# Patient Record
Sex: FEMALE | Race: WHITE | HISPANIC OR LATINO
[De-identification: names, ages, dates, MRNs, and addresses within clinical notes are randomized per-mention and may not be internally consistent; named-entity substitution may affect disease eponyms.]

---

## 2018-03-02 ENCOUNTER — RESULT REVIEW (OUTPATIENT)
Age: 58
End: 2018-03-02

## 2018-03-28 ENCOUNTER — APPOINTMENT (OUTPATIENT)
Dept: INTERNAL MEDICINE | Facility: CLINIC | Age: 58
End: 2018-03-28
Payer: COMMERCIAL

## 2018-03-28 VITALS
OXYGEN SATURATION: 98 % | WEIGHT: 162 LBS | DIASTOLIC BLOOD PRESSURE: 70 MMHG | BODY MASS INDEX: 28.7 KG/M2 | HEART RATE: 72 BPM | HEIGHT: 63 IN | SYSTOLIC BLOOD PRESSURE: 118 MMHG

## 2018-03-28 DIAGNOSIS — Z00.00 ENCOUNTER FOR GENERAL ADULT MEDICAL EXAMINATION W/OUT ABNORMAL FINDINGS: ICD-10-CM

## 2018-03-28 DIAGNOSIS — E78.00 PURE HYPERCHOLESTEROLEMIA, UNSPECIFIED: ICD-10-CM

## 2018-03-28 PROCEDURE — 99396 PREV VISIT EST AGE 40-64: CPT

## 2018-03-29 LAB
ALBUMIN SERPL ELPH-MCNC: 4.4 G/DL
ALP BLD-CCNC: 65 U/L
ALT SERPL-CCNC: 19 U/L
ANION GAP SERPL CALC-SCNC: 16 MMOL/L
AST SERPL-CCNC: 24 U/L
BASOPHILS # BLD AUTO: 0.02 K/UL
BASOPHILS NFR BLD AUTO: 0.4 %
BILIRUB SERPL-MCNC: 0.2 MG/DL
BUN SERPL-MCNC: 18 MG/DL
CALCIUM SERPL-MCNC: 10.7 MG/DL
CHLORIDE SERPL-SCNC: 103 MMOL/L
CHOLEST SERPL-MCNC: 251 MG/DL
CHOLEST/HDLC SERPL: 3.9 RATIO
CO2 SERPL-SCNC: 23 MMOL/L
CREAT SERPL-MCNC: 0.82 MG/DL
EOSINOPHIL # BLD AUTO: 0.09 K/UL
EOSINOPHIL NFR BLD AUTO: 1.6 %
GLUCOSE SERPL-MCNC: 77 MG/DL
HBA1C MFR BLD HPLC: 5.4 %
HCT VFR BLD CALC: 39.4 %
HDLC SERPL-MCNC: 65 MG/DL
HGB BLD-MCNC: 12.7 G/DL
IMM GRANULOCYTES NFR BLD AUTO: 0.2 %
LDLC SERPL CALC-MCNC: 168 MG/DL
LYMPHOCYTES # BLD AUTO: 2.13 K/UL
LYMPHOCYTES NFR BLD AUTO: 37.4 %
MAN DIFF?: NORMAL
MCHC RBC-ENTMCNC: 27.5 PG
MCHC RBC-ENTMCNC: 32.2 GM/DL
MCV RBC AUTO: 85.5 FL
MONOCYTES # BLD AUTO: 0.47 K/UL
MONOCYTES NFR BLD AUTO: 8.3 %
NEUTROPHILS # BLD AUTO: 2.97 K/UL
NEUTROPHILS NFR BLD AUTO: 52.1 %
PLATELET # BLD AUTO: 227 K/UL
POTASSIUM SERPL-SCNC: 4.7 MMOL/L
PROT SERPL-MCNC: 7.9 G/DL
RBC # BLD: 4.61 M/UL
RBC # FLD: 16.5 %
SODIUM SERPL-SCNC: 142 MMOL/L
TRIGL SERPL-MCNC: 90 MG/DL
TSH SERPL-ACNC: 0.46 UIU/ML
WBC # FLD AUTO: 5.69 K/UL

## 2018-06-11 ENCOUNTER — FORM ENCOUNTER (OUTPATIENT)
Age: 58
End: 2018-06-11

## 2018-06-12 ENCOUNTER — APPOINTMENT (OUTPATIENT)
Dept: ULTRASOUND IMAGING | Facility: CLINIC | Age: 58
End: 2018-06-12
Payer: COMMERCIAL

## 2018-06-12 ENCOUNTER — APPOINTMENT (OUTPATIENT)
Dept: MAMMOGRAPHY | Facility: CLINIC | Age: 58
End: 2018-06-12
Payer: COMMERCIAL

## 2018-06-12 ENCOUNTER — OUTPATIENT (OUTPATIENT)
Dept: OUTPATIENT SERVICES | Facility: HOSPITAL | Age: 58
LOS: 1 days | End: 2018-06-12
Payer: COMMERCIAL

## 2018-06-12 DIAGNOSIS — Z00.8 ENCOUNTER FOR OTHER GENERAL EXAMINATION: ICD-10-CM

## 2018-06-12 PROCEDURE — 77066 DX MAMMO INCL CAD BI: CPT | Mod: 26

## 2018-06-12 PROCEDURE — 76641 ULTRASOUND BREAST COMPLETE: CPT | Mod: 26,50

## 2018-06-12 PROCEDURE — G0279: CPT

## 2018-06-12 PROCEDURE — G0279: CPT | Mod: 26

## 2018-06-12 PROCEDURE — 77066 DX MAMMO INCL CAD BI: CPT

## 2018-06-12 PROCEDURE — 76641 ULTRASOUND BREAST COMPLETE: CPT

## 2018-06-18 DIAGNOSIS — M25.569 PAIN IN UNSPECIFIED KNEE: ICD-10-CM

## 2020-01-08 ENCOUNTER — APPOINTMENT (OUTPATIENT)
Dept: INTERNAL MEDICINE | Facility: CLINIC | Age: 60
End: 2020-01-08

## 2020-03-15 ENCOUNTER — INPATIENT (INPATIENT)
Facility: HOSPITAL | Age: 60
LOS: 34 days | End: 2020-04-19
Attending: INTERNAL MEDICINE | Admitting: INTERNAL MEDICINE
Payer: COMMERCIAL

## 2020-03-15 VITALS
OXYGEN SATURATION: 98 % | TEMPERATURE: 100 F | DIASTOLIC BLOOD PRESSURE: 79 MMHG | SYSTOLIC BLOOD PRESSURE: 124 MMHG | HEART RATE: 108 BPM | RESPIRATION RATE: 20 BRPM

## 2020-03-15 LAB
ALBUMIN SERPL ELPH-MCNC: 4.3 G/DL — SIGNIFICANT CHANGE UP (ref 3.3–5)
ALP SERPL-CCNC: 59 U/L — SIGNIFICANT CHANGE UP (ref 40–120)
ALT FLD-CCNC: 42 U/L — HIGH (ref 4–33)
ANION GAP SERPL CALC-SCNC: 13 MMO/L — SIGNIFICANT CHANGE UP (ref 7–14)
APPEARANCE UR: CLEAR — SIGNIFICANT CHANGE UP
AST SERPL-CCNC: 33 U/L — HIGH (ref 4–32)
BACTERIA # UR AUTO: NEGATIVE — SIGNIFICANT CHANGE UP
BASOPHILS # BLD AUTO: 0.01 K/UL — SIGNIFICANT CHANGE UP (ref 0–0.2)
BASOPHILS NFR BLD AUTO: 0.2 % — SIGNIFICANT CHANGE UP (ref 0–2)
BILIRUB SERPL-MCNC: 0.3 MG/DL — SIGNIFICANT CHANGE UP (ref 0.2–1.2)
BILIRUB UR-MCNC: NEGATIVE — SIGNIFICANT CHANGE UP
BLOOD UR QL VISUAL: HIGH
BUN SERPL-MCNC: 14 MG/DL — SIGNIFICANT CHANGE UP (ref 7–23)
CALCIUM SERPL-MCNC: 10.1 MG/DL — SIGNIFICANT CHANGE UP (ref 8.4–10.5)
CHLORIDE SERPL-SCNC: 95 MMOL/L — LOW (ref 98–107)
CO2 SERPL-SCNC: 23 MMOL/L — SIGNIFICANT CHANGE UP (ref 22–31)
COLOR SPEC: YELLOW — SIGNIFICANT CHANGE UP
CREAT SERPL-MCNC: 0.84 MG/DL — SIGNIFICANT CHANGE UP (ref 0.5–1.3)
EOSINOPHIL # BLD AUTO: 0 K/UL — SIGNIFICANT CHANGE UP (ref 0–0.5)
EOSINOPHIL NFR BLD AUTO: 0 % — SIGNIFICANT CHANGE UP (ref 0–6)
GLUCOSE SERPL-MCNC: 106 MG/DL — HIGH (ref 70–99)
GLUCOSE UR-MCNC: NEGATIVE — SIGNIFICANT CHANGE UP
HCT VFR BLD CALC: 41.2 % — SIGNIFICANT CHANGE UP (ref 34.5–45)
HGB BLD-MCNC: 13 G/DL — SIGNIFICANT CHANGE UP (ref 11.5–15.5)
HYALINE CASTS # UR AUTO: SIGNIFICANT CHANGE UP
IMM GRANULOCYTES NFR BLD AUTO: 0.2 % — SIGNIFICANT CHANGE UP (ref 0–1.5)
KETONES UR-MCNC: SIGNIFICANT CHANGE UP
LEUKOCYTE ESTERASE UR-ACNC: NEGATIVE — SIGNIFICANT CHANGE UP
LYMPHOCYTES # BLD AUTO: 1.06 K/UL — SIGNIFICANT CHANGE UP (ref 1–3.3)
LYMPHOCYTES # BLD AUTO: 20.5 % — SIGNIFICANT CHANGE UP (ref 13–44)
MCHC RBC-ENTMCNC: 26.9 PG — LOW (ref 27–34)
MCHC RBC-ENTMCNC: 31.6 % — LOW (ref 32–36)
MCV RBC AUTO: 85.1 FL — SIGNIFICANT CHANGE UP (ref 80–100)
MONOCYTES # BLD AUTO: 0.4 K/UL — SIGNIFICANT CHANGE UP (ref 0–0.9)
MONOCYTES NFR BLD AUTO: 7.7 % — SIGNIFICANT CHANGE UP (ref 2–14)
NEUTROPHILS # BLD AUTO: 3.7 K/UL — SIGNIFICANT CHANGE UP (ref 1.8–7.4)
NEUTROPHILS NFR BLD AUTO: 71.4 % — SIGNIFICANT CHANGE UP (ref 43–77)
NITRITE UR-MCNC: NEGATIVE — SIGNIFICANT CHANGE UP
NRBC # FLD: 0 K/UL — SIGNIFICANT CHANGE UP (ref 0–0)
PH UR: 6 — SIGNIFICANT CHANGE UP (ref 5–8)
PLATELET # BLD AUTO: 158 K/UL — SIGNIFICANT CHANGE UP (ref 150–400)
PMV BLD: 11.4 FL — SIGNIFICANT CHANGE UP (ref 7–13)
POTASSIUM SERPL-MCNC: 4 MMOL/L — SIGNIFICANT CHANGE UP (ref 3.5–5.3)
POTASSIUM SERPL-SCNC: 4 MMOL/L — SIGNIFICANT CHANGE UP (ref 3.5–5.3)
PROT SERPL-MCNC: 8 G/DL — SIGNIFICANT CHANGE UP (ref 6–8.3)
PROT UR-MCNC: 200 — HIGH
RBC # BLD: 4.84 M/UL — SIGNIFICANT CHANGE UP (ref 3.8–5.2)
RBC # FLD: 14.6 % — HIGH (ref 10.3–14.5)
RBC CASTS # UR COMP ASSIST: HIGH (ref 0–?)
SODIUM SERPL-SCNC: 131 MMOL/L — LOW (ref 135–145)
SP GR SPEC: 1.04 — SIGNIFICANT CHANGE UP (ref 1–1.04)
SQUAMOUS # UR AUTO: SIGNIFICANT CHANGE UP
UROBILINOGEN FLD QL: SIGNIFICANT CHANGE UP
WBC # BLD: 5.18 K/UL — SIGNIFICANT CHANGE UP (ref 3.8–10.5)
WBC # FLD AUTO: 5.18 K/UL — SIGNIFICANT CHANGE UP (ref 3.8–10.5)
WBC UR QL: SIGNIFICANT CHANGE UP (ref 0–?)

## 2020-03-15 PROCEDURE — 71045 X-RAY EXAM CHEST 1 VIEW: CPT | Mod: 26

## 2020-03-15 RX ORDER — ACETAMINOPHEN 500 MG
650 TABLET ORAL ONCE
Refills: 0 | Status: COMPLETED | OUTPATIENT
Start: 2020-03-15 | End: 2020-03-15

## 2020-03-15 RX ORDER — KETOROLAC TROMETHAMINE 30 MG/ML
30 SYRINGE (ML) INJECTION ONCE
Refills: 0 | Status: DISCONTINUED | OUTPATIENT
Start: 2020-03-15 | End: 2020-03-15

## 2020-03-15 RX ORDER — SODIUM CHLORIDE 9 MG/ML
2000 INJECTION INTRAMUSCULAR; INTRAVENOUS; SUBCUTANEOUS ONCE
Refills: 0 | Status: COMPLETED | OUTPATIENT
Start: 2020-03-15 | End: 2020-03-15

## 2020-03-15 RX ADMIN — Medication 100 MILLIGRAM(S): at 22:31

## 2020-03-15 RX ADMIN — Medication 650 MILLIGRAM(S): at 22:31

## 2020-03-15 RX ADMIN — Medication 30 MILLIGRAM(S): at 22:31

## 2020-03-15 RX ADMIN — Medication 30 MILLIGRAM(S): at 23:00

## 2020-03-15 RX ADMIN — SODIUM CHLORIDE 2000 MILLILITER(S): 9 INJECTION INTRAMUSCULAR; INTRAVENOUS; SUBCUTANEOUS at 22:32

## 2020-03-15 NOTE — ED ADULT TRIAGE NOTE - CHIEF COMPLAINT QUOTE
"I have been feeling fever since thursday and I been progressivley getting worse, fever, chills, aching all over body, coughing."

## 2020-03-15 NOTE — ED ADULT NURSE NOTE - OBJECTIVE STATEMENT
Pt is a 59yr old female, A&OX4 and ambulatory, complaining of dry cough, intermittent SOB, body aches, and subjective fevers x1 week. Pt oral temp currently 100.3F. Medicated as per order. Pt denies any sick contacts or recent travel. Pt reports going to urgent care and being prescribed Tamiflu which she is currently taking, "they didn't tell me if I had the flu or not". Droplet precautions initiated, RVP sent. Pt took Motrin at 7pm tonight. Pt breathing even and unlabored, oxygen saturation at 100% on room air. Appears comfortable. Denies chest pain, headache, dizziness, abd. pain, N/V, and dysuria at this time. VS as noted. 20g IV placed in the left AC. Labs sent. Will continue to monitor.

## 2020-03-16 DIAGNOSIS — Z02.9 ENCOUNTER FOR ADMINISTRATIVE EXAMINATIONS, UNSPECIFIED: ICD-10-CM

## 2020-03-16 DIAGNOSIS — J18.9 PNEUMONIA, UNSPECIFIED ORGANISM: ICD-10-CM

## 2020-03-16 DIAGNOSIS — R74.0 NONSPECIFIC ELEVATION OF LEVELS OF TRANSAMINASE AND LACTIC ACID DEHYDROGENASE [LDH]: ICD-10-CM

## 2020-03-16 DIAGNOSIS — E87.1 HYPO-OSMOLALITY AND HYPONATREMIA: ICD-10-CM

## 2020-03-16 DIAGNOSIS — A41.9 SEPSIS, UNSPECIFIED ORGANISM: ICD-10-CM

## 2020-03-16 DIAGNOSIS — Z29.9 ENCOUNTER FOR PROPHYLACTIC MEASURES, UNSPECIFIED: ICD-10-CM

## 2020-03-16 LAB
ALBUMIN SERPL ELPH-MCNC: 3.4 G/DL — SIGNIFICANT CHANGE UP (ref 3.3–5)
ALBUMIN SERPL ELPH-MCNC: 3.7 G/DL — SIGNIFICANT CHANGE UP (ref 3.3–5)
ALP SERPL-CCNC: 44 U/L — SIGNIFICANT CHANGE UP (ref 40–120)
ALP SERPL-CCNC: 46 U/L — SIGNIFICANT CHANGE UP (ref 40–120)
ALT FLD-CCNC: 32 U/L — SIGNIFICANT CHANGE UP (ref 4–33)
ALT FLD-CCNC: 34 U/L — HIGH (ref 4–33)
ANION GAP SERPL CALC-SCNC: 10 MMO/L — SIGNIFICANT CHANGE UP (ref 7–14)
ANION GAP SERPL CALC-SCNC: 9 MMO/L — SIGNIFICANT CHANGE UP (ref 7–14)
AST SERPL-CCNC: 25 U/L — SIGNIFICANT CHANGE UP (ref 4–32)
AST SERPL-CCNC: 28 U/L — SIGNIFICANT CHANGE UP (ref 4–32)
B PERT DNA SPEC QL NAA+PROBE: NOT DETECTED — SIGNIFICANT CHANGE UP
BILIRUB SERPL-MCNC: 0.2 MG/DL — SIGNIFICANT CHANGE UP (ref 0.2–1.2)
BILIRUB SERPL-MCNC: < 0.2 MG/DL — LOW (ref 0.2–1.2)
BUN SERPL-MCNC: 10 MG/DL — SIGNIFICANT CHANGE UP (ref 7–23)
BUN SERPL-MCNC: 8 MG/DL — SIGNIFICANT CHANGE UP (ref 7–23)
C PNEUM DNA SPEC QL NAA+PROBE: NOT DETECTED — SIGNIFICANT CHANGE UP
CALCIUM SERPL-MCNC: 9 MG/DL — SIGNIFICANT CHANGE UP (ref 8.4–10.5)
CALCIUM SERPL-MCNC: 9 MG/DL — SIGNIFICANT CHANGE UP (ref 8.4–10.5)
CHLORIDE SERPL-SCNC: 102 MMOL/L — SIGNIFICANT CHANGE UP (ref 98–107)
CHLORIDE SERPL-SCNC: 107 MMOL/L — SIGNIFICANT CHANGE UP (ref 98–107)
CO2 SERPL-SCNC: 21 MMOL/L — LOW (ref 22–31)
CO2 SERPL-SCNC: 23 MMOL/L — SIGNIFICANT CHANGE UP (ref 22–31)
CREAT SERPL-MCNC: 0.56 MG/DL — SIGNIFICANT CHANGE UP (ref 0.5–1.3)
CREAT SERPL-MCNC: 0.7 MG/DL — SIGNIFICANT CHANGE UP (ref 0.5–1.3)
FLUAV H1 2009 PAND RNA SPEC QL NAA+PROBE: NOT DETECTED — SIGNIFICANT CHANGE UP
FLUAV H1 RNA SPEC QL NAA+PROBE: NOT DETECTED — SIGNIFICANT CHANGE UP
FLUAV H3 RNA SPEC QL NAA+PROBE: NOT DETECTED — SIGNIFICANT CHANGE UP
FLUAV SUBTYP SPEC NAA+PROBE: NOT DETECTED — SIGNIFICANT CHANGE UP
FLUBV RNA SPEC QL NAA+PROBE: NOT DETECTED — SIGNIFICANT CHANGE UP
GLUCOSE SERPL-MCNC: 107 MG/DL — HIGH (ref 70–99)
GLUCOSE SERPL-MCNC: 124 MG/DL — HIGH (ref 70–99)
HADV DNA SPEC QL NAA+PROBE: NOT DETECTED — SIGNIFICANT CHANGE UP
HCOV PNL SPEC NAA+PROBE: SIGNIFICANT CHANGE UP
HCT VFR BLD CALC: 34.8 % — SIGNIFICANT CHANGE UP (ref 34.5–45)
HCV AB S/CO SERPL IA: 0.12 S/CO — SIGNIFICANT CHANGE UP (ref 0–0.99)
HCV AB SERPL-IMP: SIGNIFICANT CHANGE UP
HGB BLD-MCNC: 10.9 G/DL — LOW (ref 11.5–15.5)
HMPV RNA SPEC QL NAA+PROBE: NOT DETECTED — SIGNIFICANT CHANGE UP
HPIV1 RNA SPEC QL NAA+PROBE: NOT DETECTED — SIGNIFICANT CHANGE UP
HPIV2 RNA SPEC QL NAA+PROBE: NOT DETECTED — SIGNIFICANT CHANGE UP
HPIV3 RNA SPEC QL NAA+PROBE: NOT DETECTED — SIGNIFICANT CHANGE UP
HPIV4 RNA SPEC QL NAA+PROBE: NOT DETECTED — SIGNIFICANT CHANGE UP
MAGNESIUM SERPL-MCNC: 1.8 MG/DL — SIGNIFICANT CHANGE UP (ref 1.6–2.6)
MAGNESIUM SERPL-MCNC: 1.8 MG/DL — SIGNIFICANT CHANGE UP (ref 1.6–2.6)
MCHC RBC-ENTMCNC: 27.3 PG — SIGNIFICANT CHANGE UP (ref 27–34)
MCHC RBC-ENTMCNC: 31.3 % — LOW (ref 32–36)
MCV RBC AUTO: 87 FL — SIGNIFICANT CHANGE UP (ref 80–100)
NRBC # FLD: 0 K/UL — SIGNIFICANT CHANGE UP (ref 0–0)
PHOSPHATE SERPL-MCNC: 1.4 MG/DL — LOW (ref 2.5–4.5)
PHOSPHATE SERPL-MCNC: 1.7 MG/DL — LOW (ref 2.5–4.5)
PLATELET # BLD AUTO: 131 K/UL — LOW (ref 150–400)
PMV BLD: 12 FL — SIGNIFICANT CHANGE UP (ref 7–13)
POTASSIUM SERPL-MCNC: 3.5 MMOL/L — SIGNIFICANT CHANGE UP (ref 3.5–5.3)
POTASSIUM SERPL-MCNC: 3.8 MMOL/L — SIGNIFICANT CHANGE UP (ref 3.5–5.3)
POTASSIUM SERPL-SCNC: 3.5 MMOL/L — SIGNIFICANT CHANGE UP (ref 3.5–5.3)
POTASSIUM SERPL-SCNC: 3.8 MMOL/L — SIGNIFICANT CHANGE UP (ref 3.5–5.3)
PROT SERPL-MCNC: 6.4 G/DL — SIGNIFICANT CHANGE UP (ref 6–8.3)
PROT SERPL-MCNC: 6.6 G/DL — SIGNIFICANT CHANGE UP (ref 6–8.3)
RBC # BLD: 4 M/UL — SIGNIFICANT CHANGE UP (ref 3.8–5.2)
RBC # FLD: 15 % — HIGH (ref 10.3–14.5)
RSV RNA SPEC QL NAA+PROBE: NOT DETECTED — SIGNIFICANT CHANGE UP
RV+EV RNA SPEC QL NAA+PROBE: NOT DETECTED — SIGNIFICANT CHANGE UP
SODIUM SERPL-SCNC: 135 MMOL/L — SIGNIFICANT CHANGE UP (ref 135–145)
SODIUM SERPL-SCNC: 137 MMOL/L — SIGNIFICANT CHANGE UP (ref 135–145)
WBC # BLD: 3.9 K/UL — SIGNIFICANT CHANGE UP (ref 3.8–10.5)
WBC # FLD AUTO: 3.9 K/UL — SIGNIFICANT CHANGE UP (ref 3.8–10.5)

## 2020-03-16 PROCEDURE — 12345: CPT | Mod: NC

## 2020-03-16 PROCEDURE — 99223 1ST HOSP IP/OBS HIGH 75: CPT

## 2020-03-16 RX ORDER — AZITHROMYCIN 500 MG/1
500 TABLET, FILM COATED ORAL ONCE
Refills: 0 | Status: COMPLETED | OUTPATIENT
Start: 2020-03-16 | End: 2020-03-16

## 2020-03-16 RX ORDER — AZITHROMYCIN 500 MG/1
TABLET, FILM COATED ORAL
Refills: 0 | Status: DISCONTINUED | OUTPATIENT
Start: 2020-03-16 | End: 2020-03-18

## 2020-03-16 RX ORDER — AZITHROMYCIN 500 MG/1
500 TABLET, FILM COATED ORAL EVERY 24 HOURS
Refills: 0 | Status: DISCONTINUED | OUTPATIENT
Start: 2020-03-17 | End: 2020-03-18

## 2020-03-16 RX ORDER — ACETAMINOPHEN 500 MG
650 TABLET ORAL EVERY 6 HOURS
Refills: 0 | Status: DISCONTINUED | OUTPATIENT
Start: 2020-03-16 | End: 2020-03-20

## 2020-03-16 RX ORDER — ENOXAPARIN SODIUM 100 MG/ML
40 INJECTION SUBCUTANEOUS DAILY
Refills: 0 | Status: DISCONTINUED | OUTPATIENT
Start: 2020-03-16 | End: 2020-03-21

## 2020-03-16 RX ORDER — ACETAMINOPHEN 500 MG
325 TABLET ORAL ONCE
Refills: 0 | Status: COMPLETED | OUTPATIENT
Start: 2020-03-16 | End: 2020-03-16

## 2020-03-16 RX ORDER — CEFTRIAXONE 500 MG/1
1000 INJECTION, POWDER, FOR SOLUTION INTRAMUSCULAR; INTRAVENOUS EVERY 24 HOURS
Refills: 0 | Status: COMPLETED | OUTPATIENT
Start: 2020-03-16 | End: 2020-03-22

## 2020-03-16 RX ORDER — SODIUM CHLORIDE 9 MG/ML
1000 INJECTION INTRAMUSCULAR; INTRAVENOUS; SUBCUTANEOUS
Refills: 0 | Status: DISCONTINUED | OUTPATIENT
Start: 2020-03-16 | End: 2020-03-19

## 2020-03-16 RX ORDER — SODIUM,POTASSIUM PHOSPHATES 278-250MG
1 POWDER IN PACKET (EA) ORAL
Refills: 0 | Status: COMPLETED | OUTPATIENT
Start: 2020-03-16 | End: 2020-03-17

## 2020-03-16 RX ORDER — ONDANSETRON 8 MG/1
4 TABLET, FILM COATED ORAL EVERY 6 HOURS
Refills: 0 | Status: DISCONTINUED | OUTPATIENT
Start: 2020-03-16 | End: 2020-03-21

## 2020-03-16 RX ADMIN — Medication 1 TABLET(S): at 23:27

## 2020-03-16 RX ADMIN — ENOXAPARIN SODIUM 40 MILLIGRAM(S): 100 INJECTION SUBCUTANEOUS at 12:06

## 2020-03-16 RX ADMIN — Medication 650 MILLIGRAM(S): at 13:21

## 2020-03-16 RX ADMIN — CEFTRIAXONE 100 MILLIGRAM(S): 500 INJECTION, POWDER, FOR SOLUTION INTRAMUSCULAR; INTRAVENOUS at 07:27

## 2020-03-16 RX ADMIN — Medication 650 MILLIGRAM(S): at 11:22

## 2020-03-16 RX ADMIN — Medication 325 MILLIGRAM(S): at 00:00

## 2020-03-16 RX ADMIN — Medication 650 MILLIGRAM(S): at 00:00

## 2020-03-16 RX ADMIN — Medication 650 MILLIGRAM(S): at 19:50

## 2020-03-16 RX ADMIN — AZITHROMYCIN 255 MILLIGRAM(S): 500 TABLET, FILM COATED ORAL at 04:10

## 2020-03-16 RX ADMIN — SODIUM CHLORIDE 75 MILLILITER(S): 9 INJECTION INTRAMUSCULAR; INTRAVENOUS; SUBCUTANEOUS at 04:10

## 2020-03-16 RX ADMIN — Medication 650 MILLIGRAM(S): at 05:50

## 2020-03-16 RX ADMIN — Medication 650 MILLIGRAM(S): at 07:40

## 2020-03-16 RX ADMIN — Medication 130 MILLIGRAM(S): at 23:29

## 2020-03-16 RX ADMIN — Medication 1 TABLET(S): at 11:22

## 2020-03-16 RX ADMIN — Medication 650 MILLIGRAM(S): at 20:50

## 2020-03-16 NOTE — H&P ADULT - NSHPPHYSICALEXAM_GEN_ALL_CORE
Exam limited as no disposable stethoscope was available upon my exam    GENERAL APPEARANCE: Well developed, well nourished, alert and cooperative. NAD. Toxic appearing  HEENT:  PERRL, EOMI. External auditory canals normal, hearing grossly intact.  NECK: Neck supple, non-tender without lymphadenopathy, masses or thyromegaly.  LUNGS: Breathing comfortable in no resp distress   ABDOMEN: Positive bowel sounds. Soft, nondistended, nontender. No guarding or rebound.   MUSCULOSKELETAL: ROM intact spine and extremities. No joint erythema or tenderness.   EXTREMITIES: No significant deformity or joint abnormality. No edema. Peripheral pulses intact. No varicosities.  NEUROLOGICAL: CN II-XII intact. Strength and sensation symmetric and intact throughout.   SKIN: Skin normal color, texture and turgor with no lesions or eruptions.  PSYCHIATRIC: AOx3.Normal affect and behavior.

## 2020-03-16 NOTE — ED PROVIDER NOTE - OBJECTIVE STATEMENT
58 Y/O F C/O fever chills and bodyaches with cough since Wednesday. Pt denies recent travel, sick contacts or any other sx or acute complaints. Pt states she has not recently taken any fever reducers and states she has a poor appetite. Pt denies any other sx or acute complaints.

## 2020-03-16 NOTE — H&P ADULT - NSHPLABSRESULTS_GEN_ALL_CORE
(03-15 @ 22:27)                      13.0  5.18 )-----------( 158                 41.2    Neutrophils = 3.70 (71.4%)  Lymphocytes = 1.06 (20.5%)  Eosinophils = 0.00 (0.0%)  Basophils = 0.01 (0.2%)  Monocytes = 0.40 (7.7%)  Bands = --%    03-15    131<L>  |  95<L>  |  14  ----------------------------<  106<H>  4.0   |  23  |  0.84    Ca    10.1      15 Mar 2020 22:27    TPro  8.0  /  Alb  4.3  /  TBili  0.3  /  DBili  x   /  AST  33<H>  /  ALT  42<H>  /  AlkPhos  59  03-15          RVP: Neg        Urinalysis Basic - ( 15 Mar 2020 22:27 )    Color: YELLOW / Appearance: CLEAR / S.037 / pH: 6.0  Gluc: NEGATIVE / Ketone: TRACE  / Bili: NEGATIVE / Urobili: TRACE   Blood: MODERATE / Protein: 200 / Nitrite: NEGATIVE   Leuk Esterase: NEGATIVE / RBC: 11-25 / WBC 3-5   Sq Epi: FEW / Non Sq Epi: x / Bacteria: NEGATIVE      < from: Xray Chest 1 View- PORTABLE-Urgent (03.15.20 @ 22:48) >      INTERPRETATION:  Subtle bilateral patchy opacities compatible with multifocal infection in the appropriate clinical context.          < end of copied text >      Labs reviewed  Imaging reviewed    EKG: NSR, no acute ST changes

## 2020-03-16 NOTE — PROGRESS NOTE ADULT - PROBLEM SELECTOR PLAN 1
-Pt p/w low grade fevers and tachycardia in the setting of cough and CXR demonstrating bilateral patchy opacities  -No recent hospitalizations or abx. c/w CTX and azithro  -RVP neg, follow up COVID 19  -no recent travel, works at [employer], went to [place] on tuesday (3/10/20) because she generally just felt "unwell" and fatigued, she later found out a physician there tested positive for COVID but she doesn't believe he was working there the day she went in. COVID still in process, f/u result  -f/u blood cx

## 2020-03-16 NOTE — H&P ADULT - PROBLEM SELECTOR PLAN 2
-bacterial vs viral pna  -c/w CTX and azithro for likely CAP given no recent hospitalizations or abx  -r/o COVID 19  -monitor oxygen sats

## 2020-03-16 NOTE — H&P ADULT - NSHPREVIEWOFSYSTEMS_GEN_ALL_CORE
CONSTITUTIONAL:  +fever +night sweats +chills   HEENT:  Eyes:  No visual loss, blurred vision, double vision or yellow sclerae. Ears, Nose, Throat:  No hearing loss, sneezing, congestion, runny nose or sore throat.  SKIN:  No rash or itching.  CARDIOVASCULAR:  No chest pain, chest pressure or chest discomfort. No palpitations or edema.  RESPIRATORY:  +cough No shortness of breath  GASTROINTESTINAL:  +nausea +anorexia No vomiting or diarrhea. No abdominal pain or blood.  GENITOURINARY:  Denies hematuria, dysuria.   NEUROLOGICAL:  No headache, dizziness, syncope, paralysis, ataxia, numbness or tingling in the extremities. No change in bowel or bladder control.  MUSCULOSKELETAL:  +myalgias No back pain, joint pain or stiffness.  HEMATOLOGIC:  No anemia, bleeding or bruising.  LYMPHATICS:  No enlarged nodes. No history of splenectomy.  PSYCHIATRIC:  No history of depression or anxiety.  ALLERGIES:  No history of asthma, hives, eczema or rhinitis.

## 2020-03-16 NOTE — PROGRESS NOTE ADULT - SUBJECTIVE AND OBJECTIVE BOX
[facility] Division of Hospital Medicine  Deon Mcintosh MD  Pager #[phone]    Patient is a 59y old  Female who presents with a chief complaint of cough, fever (16 Mar 2020 02:05)    SUBJECTIVE / OVERNIGHT EVENTS: No acute events. tmax 100.5. Currently patient reports feeling "cold" but otherwise denies pain or discomfort. Does not feel short of breath currently. Has occasional nonproductive cough unchanged from prior. No nausea, vomiting, chest pain.     MEDICATIONS  (STANDING):  azithromycin  IVPB      cefTRIAXone   IVPB 1000 milliGRAM(s) IV Intermittent every 24 hours  enoxaparin Injectable 40 milliGRAM(s) SubCutaneous daily  potassium acid phosphate/sodium acid phosphate tablet (K-PHOS No. 2) 1 Tablet(s) Oral four times a day with meals  sodium chloride 0.9%. 1000 milliLiter(s) (75 mL/Hr) IV Continuous <Continuous>    MEDICATIONS  (PRN):  acetaminophen   Tablet .. 650 milliGRAM(s) Oral every 6 hours PRN Temp greater or equal to 38C (100.4F), Mild Pain (1 - 3)  ondansetron Injectable 4 milliGRAM(s) IV Push every 6 hours PRN Nausea and/or Vomiting    CAPILLARY BLOOD GLUCOSE    I&O's Summary    PHYSICAL EXAM:  Vital Signs Last 24 Hrs  T(C): 37.3 (16 Mar 2020 13:12), Max: 38.1 (16 Mar 2020 05:51)  T(F): 99.2 (16 Mar 2020 13:12), Max: 100.5 (16 Mar 2020 05:51)  HR: 96 (16 Mar 2020 13:12) (96 - 108)  BP: 102/84 (16 Mar 2020 13:12) (102/55 - 127/78)  BP(mean): --  RR: 16 (16 Mar 2020 13:12) (16 - 20)  SpO2: 100% (16 Mar 2020 13:12) (98% - 100%)    CONSTITUTIONAL: NAD, laying in bed, speaking in full sentences  EYES: conjunctiva and sclera clear  RESPIRATORY: Normal respiratory effort; no wheezing or crackles appreciated  CARDIOVASCULAR: Regular rate and rhythm, normal S1 and S2, no murmur/rub/gallop; No lower extremity edema; Peripheral pulses are 2+ bilaterally  ABDOMEN: Nontender to palpation, normoactive bowel sounds, no rebound/guarding  SKIN: No rashes; no palpable lesions    LABS:                        10.9   3.90  )-----------( 131      ( 16 Mar 2020 05:30 )             34.8     -    137  |  107  |  8   ----------------------------<  124<H>  3.5   |  21<L>  |  0.56    Ca    9.0      16 Mar 2020 11:04  Phos  1.4     -  Mg     1.8     -    TPro  6.6  /  Alb  3.7  /  TBili  < 0.2<L>  /  DBili  x   /  AST  25  /  ALT  32  /  AlkPhos  46  -    Urinalysis Basic - ( 15 Mar 2020 22:27 )    Color: YELLOW / Appearance: CLEAR / S.037 / pH: 6.0  Gluc: NEGATIVE / Ketone: TRACE  / Bili: NEGATIVE / Urobili: TRACE   Blood: MODERATE / Protein: 200 / Nitrite: NEGATIVE   Leuk Esterase: NEGATIVE / RBC: 11-25 / WBC 3-5   Sq Epi: FEW / Non Sq Epi: x / Bacteria: NEGATIVE      RADIOLOGY & ADDITIONAL TESTS:  < from: Xray Chest 1 View- PORTABLE-Urgent (03.15.20 @ 22:48) >    EXAM:  XR CHEST PORTABLE URGENT 1V    PROCEDURE DATE:  Mar 15 2020   INTERPRETATION:  EXAMINATION: XR CHEST URGENT  CLINICAL INDICATION: Cough.  TECHNIQUE: Single frontal view of the chest was obtained.  COMPARISON: None available..  FINDINGS:   The heart is normal in size.   Subtle bilateral patchy opacities. No pneumothorax. No pleural effusion.  No acute osseous abnormalities.  IMPRESSION:   Subtle bilateral patchy opacities compatible with multifocal infection in the appropriate clinical context.    DAISY YANES M.D., RADIOLGY RESIDENT  This document has been electronically signed.  KIANA AREVALO M.D., ATTENDING RADIOLOGIST  This document has been electronically signed. Mar 16 2020 10:36AM    < end of copied text >

## 2020-03-16 NOTE — ED PROVIDER NOTE - ATTENDING CONTRIBUTION TO CARE
58 yo with 4 days (Wednesday) of cough and body aches.  Symptoms have been constant and rather severe.  No associated symptoms.  No sick contacts or recent travel.    Gen: Well appearing in NAD  Head: NC/AT  Neck: trachea midline  Resp:  No distress  CV: tachy RR  Ext: no deformities  Neuro:  A&O appears non focal  Skin:  Warm and dry as visualized    pt with viral type symptoms in the setting of COVID pandemic.  The CxR shows multifocal PNA.  Will need admission and COVID testing and monitoring of resp status

## 2020-03-16 NOTE — H&P ADULT - HISTORY OF PRESENT ILLNESS
60 y/o F with no PMH p/w fever and cough since Thursday. Tmax 103. She presented to urgent care initially and was treated with tamiflu for presumed flu. Despite this she continued to have fevers. Pt denies recent travel, sick contacts or any other sx or acute complaints. Endorses pinching pain in chest when she coughs. Also with nausea and poor PO intake. Denies shortness of breath, abdominal pain, dysuria or LE edema.

## 2020-03-16 NOTE — PROGRESS NOTE ADULT - PROBLEM SELECTOR PLAN 2
-bacterial vs viral pna  -c/w CTX and azithro for likely CAP given no recent hospitalizations or abx  -r/o COVID 19  -Not requiring O2, not symptomatically dyspneic, satting high 90s on RA, continue to monitor O2 sats

## 2020-03-16 NOTE — ED PROVIDER NOTE - CLINICAL SUMMARY MEDICAL DECISION MAKING FREE TEXT BOX
58 Y/O F C/O fever chills and bodyaches with cough since Wednesday. Pt denies recent travel, sick contacts or any other sx or acute complaints. Pt states she has not recently taken any fever reducers and states she has a poor appetite. CXR shows multifocal pneumonia, likely viral pneumonia/potentially Covid. Pt admitted for further monitoring to ensure respiratory status does not de-compensate. Pt verbalized agreement of plan.

## 2020-03-16 NOTE — H&P ADULT - PROBLEM SELECTOR PLAN 1
-Pt p/w low grade fevers and tachycardia in the setting of cough and CXR demonstrating bilateral patchy opacities  -No recent hospitalizations or abx. c/w CTX and azithro  -RVP neg, follow up COVID 19  -f/u blood cx

## 2020-03-16 NOTE — H&P ADULT - PROBLEM SELECTOR PLAN 6
1.  Name of PCP: Dr. Perry Cristobal   2.  PCP Contacted on Admission: [ ] Y    [X ] N    3.  PCP contacted at Discharge: [ ] Y    [ ] N    [ ] N/A  4.  Post-Discharge Appointment Date and Location:  5.  Summary of Handoff given to PCP:

## 2020-03-16 NOTE — ED ADULT NURSE REASSESSMENT NOTE - NS ED NURSE REASSESS COMMENT FT1
Pt awake and alert fluids infusing vs wnl. pt denies sob with low grade fever. Pt awaiting floor bed. Pt with call bell in reach.

## 2020-03-17 DIAGNOSIS — D75.89 OTHER SPECIFIED DISEASES OF BLOOD AND BLOOD-FORMING ORGANS: ICD-10-CM

## 2020-03-17 LAB
ANION GAP SERPL CALC-SCNC: 15 MMO/L — HIGH (ref 7–14)
BUN SERPL-MCNC: 5 MG/DL — LOW (ref 7–23)
CALCIUM SERPL-MCNC: 9 MG/DL — SIGNIFICANT CHANGE UP (ref 8.4–10.5)
CHLORIDE SERPL-SCNC: 100 MMOL/L — SIGNIFICANT CHANGE UP (ref 98–107)
CK SERPL-CCNC: 48 U/L — SIGNIFICANT CHANGE UP (ref 25–170)
CO2 SERPL-SCNC: 19 MMOL/L — LOW (ref 22–31)
CREAT SERPL-MCNC: 0.56 MG/DL — SIGNIFICANT CHANGE UP (ref 0.5–1.3)
CULTURE RESULTS: SIGNIFICANT CHANGE UP
GLUCOSE SERPL-MCNC: 84 MG/DL — SIGNIFICANT CHANGE UP (ref 70–99)
HCT VFR BLD CALC: 34.3 % — LOW (ref 34.5–45)
HGB BLD-MCNC: 11.3 G/DL — LOW (ref 11.5–15.5)
MAGNESIUM SERPL-MCNC: 1.8 MG/DL — SIGNIFICANT CHANGE UP (ref 1.6–2.6)
MCHC RBC-ENTMCNC: 27.6 PG — SIGNIFICANT CHANGE UP (ref 27–34)
MCHC RBC-ENTMCNC: 32.9 % — SIGNIFICANT CHANGE UP (ref 32–36)
MCV RBC AUTO: 83.9 FL — SIGNIFICANT CHANGE UP (ref 80–100)
NRBC # FLD: 0 K/UL — SIGNIFICANT CHANGE UP (ref 0–0)
PHOSPHATE SERPL-MCNC: 2 MG/DL — LOW (ref 2.5–4.5)
PLATELET # BLD AUTO: 139 K/UL — LOW (ref 150–400)
PMV BLD: 12 FL — SIGNIFICANT CHANGE UP (ref 7–13)
POTASSIUM SERPL-MCNC: 3.4 MMOL/L — LOW (ref 3.5–5.3)
POTASSIUM SERPL-SCNC: 3.4 MMOL/L — LOW (ref 3.5–5.3)
RBC # BLD: 4.09 M/UL — SIGNIFICANT CHANGE UP (ref 3.8–5.2)
RBC # FLD: 14.7 % — HIGH (ref 10.3–14.5)
SARS-COV-2 RNA SPEC QL NAA+PROBE: (no result)
SODIUM SERPL-SCNC: 134 MMOL/L — LOW (ref 135–145)
SPECIMEN SOURCE: SIGNIFICANT CHANGE UP
TROPONIN T, HIGH SENSITIVITY: < 6 NG/L — SIGNIFICANT CHANGE UP (ref ?–14)
WBC # BLD: 4.33 K/UL — SIGNIFICANT CHANGE UP (ref 3.8–10.5)
WBC # FLD AUTO: 4.33 K/UL — SIGNIFICANT CHANGE UP (ref 3.8–10.5)

## 2020-03-17 PROCEDURE — 99233 SBSQ HOSP IP/OBS HIGH 50: CPT

## 2020-03-17 RX ORDER — IBUPROFEN 200 MG
400 TABLET ORAL ONCE
Refills: 0 | Status: COMPLETED | OUTPATIENT
Start: 2020-03-17 | End: 2020-03-17

## 2020-03-17 RX ORDER — POTASSIUM CHLORIDE 20 MEQ
40 PACKET (EA) ORAL ONCE
Refills: 0 | Status: COMPLETED | OUTPATIENT
Start: 2020-03-17 | End: 2020-03-17

## 2020-03-17 RX ADMIN — CEFTRIAXONE 100 MILLIGRAM(S): 500 INJECTION, POWDER, FOR SOLUTION INTRAMUSCULAR; INTRAVENOUS at 07:21

## 2020-03-17 RX ADMIN — Medication 650 MILLIGRAM(S): at 07:21

## 2020-03-17 RX ADMIN — AZITHROMYCIN 255 MILLIGRAM(S): 500 TABLET, FILM COATED ORAL at 01:24

## 2020-03-17 RX ADMIN — Medication 400 MILLIGRAM(S): at 21:19

## 2020-03-17 RX ADMIN — Medication 100 MILLIGRAM(S): at 01:24

## 2020-03-17 RX ADMIN — Medication 650 MILLIGRAM(S): at 08:52

## 2020-03-17 RX ADMIN — Medication 650 MILLIGRAM(S): at 16:00

## 2020-03-17 RX ADMIN — ENOXAPARIN SODIUM 40 MILLIGRAM(S): 100 INJECTION SUBCUTANEOUS at 13:50

## 2020-03-17 RX ADMIN — Medication 1 TABLET(S): at 07:21

## 2020-03-17 RX ADMIN — Medication 63.75 MILLIMOLE(S): at 13:49

## 2020-03-17 RX ADMIN — Medication 650 MILLIGRAM(S): at 14:00

## 2020-03-17 RX ADMIN — Medication 40 MILLIEQUIVALENT(S): at 13:50

## 2020-03-17 NOTE — PROVIDER CONTACT NOTE (OTHER) - ACTION/TREATMENT ORDERED:
provider ordered 1x dose of IV tylenol, medication to be given as ordered, provider states pt stable to transfer to 9T, continue to monitor

## 2020-03-17 NOTE — PROGRESS NOTE ADULT - SUBJECTIVE AND OBJECTIVE BOX
Patient is a 59y old  Female who presents with a chief complaint of cough, fever (16 Mar 2020 15:04)      SUBJECTIVE / OVERNIGHT EVENTS:    Review of Systems:     MEDICATIONS  (STANDING):  azithromycin  IVPB 500 milliGRAM(s) IV Intermittent every 24 hours  azithromycin  IVPB      cefTRIAXone   IVPB 1000 milliGRAM(s) IV Intermittent every 24 hours  enoxaparin Injectable 40 milliGRAM(s) SubCutaneous daily  sodium chloride 0.9%. 1000 milliLiter(s) (75 mL/Hr) IV Continuous <Continuous>    MEDICATIONS  (PRN):  acetaminophen   Tablet .. 650 milliGRAM(s) Oral every 6 hours PRN Temp greater or equal to 38C (100.4F), Mild Pain (1 - 3)  guaiFENesin   Syrup  (Sugar-Free) 100 milliGRAM(s) Oral every 6 hours PRN Cough  ondansetron Injectable 4 milliGRAM(s) IV Push every 6 hours PRN Nausea and/or Vomiting      PHYSICAL EXAM:    Vital Signs Last 24 Hrs  T(C): 38.9 (17 Mar 2020 07:18), Max: 38.9 (17 Mar 2020 07:18)  T(F): 102 (17 Mar 2020 07:18), Max: 102 (17 Mar 2020 07:18)  HR: 100 (17 Mar 2020 07:18) (96 - 108)  BP: 132/68 (17 Mar 2020 07:18) (102/55 - 137/70)  BP(mean): 84 (16 Mar 2020 23:04) (84 - 84)  RR: 17 (17 Mar 2020 07:18) (16 - 27)  SpO2: 95% (17 Mar 2020 07:18) (95% - 100%)    LABS:  CAPILLARY BLOOD GLUCOSE                              11.3   4.33  )-----------( 139      ( 17 Mar 2020 05:20 )             34.3     03-16    137  |  107  |  8   ----------------------------<  124<H>  3.5   |  21<L>  |  0.56    Ca    9.0      16 Mar 2020 11:04  Phos  1.4     03-16  Mg     1.8     03-16    TPro  6.6  /  Alb  3.7  /  TBili  < 0.2<L>  /  DBili  x   /  AST  25  /  ALT  32  /  AlkPhos  46  03-16      CARDIAC MARKERS ( 17 Mar 2020 05:24 )  x     / x     / 48 u/L / x     / x          Urinalysis Basic - ( 15 Mar 2020 22:27 )    Color: YELLOW / Appearance: CLEAR / S.037 / pH: 6.0  Gluc: NEGATIVE / Ketone: TRACE  / Bili: NEGATIVE / Urobili: TRACE   Blood: MODERATE / Protein: 200 / Nitrite: NEGATIVE   Leuk Esterase: NEGATIVE / RBC: 11-25 / WBC 3-5   Sq Epi: FEW / Non Sq Epi: x / Bacteria: NEGATIVE        RADIOLOGY & ADDITIONAL TESTS:    Imaging Personally Reviewed:    Consultant(s) Notes Reviewed:      Care Discussed with Consultants/Other Providers: Patient is a 59y old  Female who presents with a chief complaint of cough, fever (16 Mar 2020 15:04)      SUBJECTIVE / OVERNIGHT EVENTS: Pt feels weak and tired but denying SOB. She states that she had one episode of diarrhea this am.     Review of Systems:     MEDICATIONS  (STANDING):  azithromycin  IVPB 500 milliGRAM(s) IV Intermittent every 24 hours  azithromycin  IVPB      cefTRIAXone   IVPB 1000 milliGRAM(s) IV Intermittent every 24 hours  enoxaparin Injectable 40 milliGRAM(s) SubCutaneous daily  sodium chloride 0.9%. 1000 milliLiter(s) (75 mL/Hr) IV Continuous <Continuous>    MEDICATIONS  (PRN):  acetaminophen   Tablet .. 650 milliGRAM(s) Oral every 6 hours PRN Temp greater or equal to 38C (100.4F), Mild Pain (1 - 3)  guaiFENesin   Syrup  (Sugar-Free) 100 milliGRAM(s) Oral every 6 hours PRN Cough  ondansetron Injectable 4 milliGRAM(s) IV Push every 6 hours PRN Nausea and/or Vomiting      PHYSICAL EXAM:    Vital Signs Last 24 Hrs  T(C): 38.9 (17 Mar 2020 07:18), Max: 38.9 (17 Mar 2020 07:18)  T(F): 102 (17 Mar 2020 07:18), Max: 102 (17 Mar 2020 07:18)  HR: 100 (17 Mar 2020 07:18) (96 - 108)  BP: 132/68 (17 Mar 2020 07:18) (102/55 - 137/70)  BP(mean): 84 (16 Mar 2020 23:04) (84 - 84)  RR: 17 (17 Mar 2020 07:18) (16 - 27)  SpO2: 95% (17 Mar 2020 07:18) (95% - 100%)  CONSTITUTIONAL: NAD, laying in bed, speaking in full sentences  EYES: conjunctiva and sclera clear  RESPIRATORY: Normal respiratory effort; mild scattered wheezes   CARDIOVASCULAR: Regular rate and rhythm, normal S1 and S2, no murmur/rub/gallop; No lower extremity edema; Peripheral pulses are 2+ bilaterally  ABDOMEN: Nontender to palpation, normoactive bowel sounds, no rebound/guarding  SKIN: No rashes; no palpable lesions    LABS:  CAPILLARY BLOOD GLUCOSE                              11.3   4.33  )-----------( 139      ( 17 Mar 2020 05:20 )             34.3     03-16    137  |  107  |  8   ----------------------------<  124<H>  3.5   |  21<L>  |  0.56    Ca    9.0      16 Mar 2020 11:04  Phos  1.4     03-16  Mg     1.8     03-16    TPro  6.6  /  Alb  3.7  /  TBili  < 0.2<L>  /  DBili  x   /  AST  25  /  ALT  32  /  AlkPhos  46  03-16      CARDIAC MARKERS ( 17 Mar 2020 05:24 )  x     / x     / 48 u/L / x     / x          Urinalysis Basic - ( 15 Mar 2020 22:27 )    Color: YELLOW / Appearance: CLEAR / S.037 / pH: 6.0  Gluc: NEGATIVE / Ketone: TRACE  / Bili: NEGATIVE / Urobili: TRACE   Blood: MODERATE / Protein: 200 / Nitrite: NEGATIVE   Leuk Esterase: NEGATIVE / RBC: 11-25 / WBC 3-5   Sq Epi: FEW / Non Sq Epi: x / Bacteria: NEGATIVE        RADIOLOGY & ADDITIONAL TESTS:    Imaging Personally Reviewed:    Consultant(s) Notes Reviewed:      Care Discussed with Consultants/Other Providers:

## 2020-03-17 NOTE — PROGRESS NOTE ADULT - PROBLEM SELECTOR PLAN 1
Pt p/w fevers and tachycardia in the setting of cough and CXR demonstrating bilateral patchy opacities. No recent hospitalizations or abx. RVP neg. 3/16 blood cx NTD.   -C/w CTX and azithro  - follow up COVID 19  -f/u urine legionella

## 2020-03-17 NOTE — PROGRESS NOTE ADULT - PROBLEM SELECTOR PLAN 2
Bacterial vs viral pna  -c/w CTX and azithro for likely CAP given no recent hospitalizations or abx  -r/o COVID 19  -Not requiring O2, not symptomatically dyspneic, satting high 90s on RA, continue to monitor O2 sats  -f/u urine legionella

## 2020-03-17 NOTE — PROGRESS NOTE ADULT - ASSESSMENT
58 y/o F with no PMH p/w fever and cough admitted for sepsis 2/2  PNA (bacterial vs viral). R/o COVID.

## 2020-03-17 NOTE — PROVIDER CONTACT NOTE (OTHER) - ASSESSMENT
patient Axox4, patient tachycardic, denies chest pain, SOB, palpitations, n/v/dizziness at this time. patient complains of mild pleuritic pain and GOULD at times. No acute respiratory distress noted, patient oxygen saturation 96% on RA, HOB elevated, deep breathing encouraged. patient febrile, temperature 101.2 F orally, patient received PO tylenol around 19:50 due to pleuritic pain on previous shift. provider made aware.

## 2020-03-18 ENCOUNTER — APPOINTMENT (OUTPATIENT)
Dept: INTERNAL MEDICINE | Facility: CLINIC | Age: 60
End: 2020-03-18

## 2020-03-18 LAB
ANION GAP SERPL CALC-SCNC: 16 MMO/L — HIGH (ref 7–14)
BUN SERPL-MCNC: 6 MG/DL — LOW (ref 7–23)
CALCIUM SERPL-MCNC: 9.6 MG/DL — SIGNIFICANT CHANGE UP (ref 8.4–10.5)
CHLORIDE SERPL-SCNC: 99 MMOL/L — SIGNIFICANT CHANGE UP (ref 98–107)
CO2 SERPL-SCNC: 22 MMOL/L — SIGNIFICANT CHANGE UP (ref 22–31)
CREAT SERPL-MCNC: 0.59 MG/DL — SIGNIFICANT CHANGE UP (ref 0.5–1.3)
GLUCOSE SERPL-MCNC: 95 MG/DL — SIGNIFICANT CHANGE UP (ref 70–99)
HCT VFR BLD CALC: 35.7 % — SIGNIFICANT CHANGE UP (ref 34.5–45)
HGB BLD-MCNC: 11.8 G/DL — SIGNIFICANT CHANGE UP (ref 11.5–15.5)
L PNEUMO AG UR QL: NEGATIVE — SIGNIFICANT CHANGE UP
MAGNESIUM SERPL-MCNC: 1.8 MG/DL — SIGNIFICANT CHANGE UP (ref 1.6–2.6)
MCHC RBC-ENTMCNC: 27.8 PG — SIGNIFICANT CHANGE UP (ref 27–34)
MCHC RBC-ENTMCNC: 33.1 % — SIGNIFICANT CHANGE UP (ref 32–36)
MCV RBC AUTO: 84.2 FL — SIGNIFICANT CHANGE UP (ref 80–100)
NRBC # FLD: 0 K/UL — SIGNIFICANT CHANGE UP (ref 0–0)
PHOSPHATE SERPL-MCNC: 2.1 MG/DL — LOW (ref 2.5–4.5)
PLATELET # BLD AUTO: 166 K/UL — SIGNIFICANT CHANGE UP (ref 150–400)
PMV BLD: 12.2 FL — SIGNIFICANT CHANGE UP (ref 7–13)
POTASSIUM SERPL-MCNC: 3.9 MMOL/L — SIGNIFICANT CHANGE UP (ref 3.5–5.3)
POTASSIUM SERPL-SCNC: 3.9 MMOL/L — SIGNIFICANT CHANGE UP (ref 3.5–5.3)
RBC # BLD: 4.24 M/UL — SIGNIFICANT CHANGE UP (ref 3.8–5.2)
RBC # FLD: 15.1 % — HIGH (ref 10.3–14.5)
SODIUM SERPL-SCNC: 137 MMOL/L — SIGNIFICANT CHANGE UP (ref 135–145)
WBC # BLD: 5.14 K/UL — SIGNIFICANT CHANGE UP (ref 3.8–10.5)
WBC # FLD AUTO: 5.14 K/UL — SIGNIFICANT CHANGE UP (ref 3.8–10.5)

## 2020-03-18 PROCEDURE — 99233 SBSQ HOSP IP/OBS HIGH 50: CPT

## 2020-03-18 RX ADMIN — Medication 400 MILLIGRAM(S): at 00:52

## 2020-03-18 RX ADMIN — Medication 650 MILLIGRAM(S): at 23:25

## 2020-03-18 RX ADMIN — AZITHROMYCIN 255 MILLIGRAM(S): 500 TABLET, FILM COATED ORAL at 02:24

## 2020-03-18 RX ADMIN — Medication 650 MILLIGRAM(S): at 06:43

## 2020-03-18 RX ADMIN — ENOXAPARIN SODIUM 40 MILLIGRAM(S): 100 INJECTION SUBCUTANEOUS at 13:47

## 2020-03-18 RX ADMIN — Medication 650 MILLIGRAM(S): at 13:46

## 2020-03-18 RX ADMIN — Medication 650 MILLIGRAM(S): at 07:36

## 2020-03-18 RX ADMIN — Medication 63.75 MILLIMOLE(S): at 13:46

## 2020-03-18 RX ADMIN — CEFTRIAXONE 100 MILLIGRAM(S): 500 INJECTION, POWDER, FOR SOLUTION INTRAMUSCULAR; INTRAVENOUS at 06:43

## 2020-03-18 RX ADMIN — Medication 650 MILLIGRAM(S): at 20:42

## 2020-03-18 NOTE — PROGRESS NOTE ADULT - SUBJECTIVE AND OBJECTIVE BOX
Patient is a 59y old  Female who presents with a chief complaint of cough, fever (17 Mar 2020 09:46)      SUBJECTIVE / OVERNIGHT EVENTS:    Review of Systems:     MEDICATIONS  (STANDING):  azithromycin  IVPB 500 milliGRAM(s) IV Intermittent every 24 hours  azithromycin  IVPB      cefTRIAXone   IVPB 1000 milliGRAM(s) IV Intermittent every 24 hours  enoxaparin Injectable 40 milliGRAM(s) SubCutaneous daily  sodium chloride 0.9%. 1000 milliLiter(s) (75 mL/Hr) IV Continuous <Continuous>    MEDICATIONS  (PRN):  acetaminophen   Tablet .. 650 milliGRAM(s) Oral every 6 hours PRN Temp greater or equal to 38C (100.4F), Mild Pain (1 - 3)  guaiFENesin   Syrup  (Sugar-Free) 100 milliGRAM(s) Oral every 6 hours PRN Cough  ondansetron Injectable 4 milliGRAM(s) IV Push every 6 hours PRN Nausea and/or Vomiting      PHYSICAL EXAM:  Vital Signs Last 24 Hrs  T(C): 37.5 (18 Mar 2020 09:46), Max: 39.9 (17 Mar 2020 21:19)  T(F): 99.5 (18 Mar 2020 09:46), Max: 103.8 (17 Mar 2020 21:19)  HR: 101 (18 Mar 2020 09:46) (98 - 108)  BP: 133/82 (18 Mar 2020 09:46) (108/60 - 138/75)  BP(mean): --  RR: 18 (18 Mar 2020 09:46) (17 - 18)  SpO2: 96% (18 Mar 2020 09:46) (94% - 98%)    LABS:  CAPILLARY BLOOD GLUCOSE                              11.8   5.14  )-----------( 166      ( 18 Mar 2020 05:19 )             35.7     03-18    137  |  99  |  6<L>  ----------------------------<  95  3.9   |  22  |  0.59    Ca    9.6      18 Mar 2020 05:19  Phos  2.1     03-18  Mg     1.8     03-18    TPro  6.6  /  Alb  3.7  /  TBili  < 0.2<L>  /  DBili  x   /  AST  25  /  ALT  32  /  AlkPhos  46  03-16      CARDIAC MARKERS ( 17 Mar 2020 05:24 )  x     / x     / 48 u/L / x     / x              RADIOLOGY & ADDITIONAL TESTS:    Imaging Personally Reviewed:    Consultant(s) Notes Reviewed:      Care Discussed with Consultants/Other Providers: Patient is a 59y old  Female who presents with a chief complaint of cough, fever (17 Mar 2020 09:46)      SUBJECTIVE / OVERNIGHT EVENTS: Pt reports still feeling weak. Continues to have mild SOB.      Review of Systems:     MEDICATIONS  (STANDING):  azithromycin  IVPB 500 milliGRAM(s) IV Intermittent every 24 hours  azithromycin  IVPB      cefTRIAXone   IVPB 1000 milliGRAM(s) IV Intermittent every 24 hours  enoxaparin Injectable 40 milliGRAM(s) SubCutaneous daily  sodium chloride 0.9%. 1000 milliLiter(s) (75 mL/Hr) IV Continuous <Continuous>    MEDICATIONS  (PRN):  acetaminophen   Tablet .. 650 milliGRAM(s) Oral every 6 hours PRN Temp greater or equal to 38C (100.4F), Mild Pain (1 - 3)  guaiFENesin   Syrup  (Sugar-Free) 100 milliGRAM(s) Oral every 6 hours PRN Cough  ondansetron Injectable 4 milliGRAM(s) IV Push every 6 hours PRN Nausea and/or Vomiting      PHYSICAL EXAM:  Vital Signs Last 24 Hrs  T(C): 37.5 (18 Mar 2020 09:46), Max: 39.9 (17 Mar 2020 21:19)  T(F): 99.5 (18 Mar 2020 09:46), Max: 103.8 (17 Mar 2020 21:19)  HR: 101 (18 Mar 2020 09:46) (98 - 108)  BP: 133/82 (18 Mar 2020 09:46) (108/60 - 138/75)  BP(mean): --  RR: 18 (18 Mar 2020 09:46) (17 - 18)  SpO2: 96% (18 Mar 2020 09:46) (94% - 98%)  CONSTITUTIONAL: NAD, laying in bed, speaking in full sentences  EYES: conjunctiva and sclera clear  RESPIRATORY: Normal respiratory effort; mild scattered wheezes improved  CARDIOVASCULAR: Regular rate and rhythm, normal S1 and S2, no murmur/rub/gallop; No lower extremity edema; Peripheral pulses are 2+ bilaterally  ABDOMEN: Nontender to palpation, normoactive bowel sounds, no rebound/guarding  SKIN: No rashes; no palpable lesions  LABS:  CAPILLARY BLOOD GLUCOSE                              11.8   5.14  )-----------( 166      ( 18 Mar 2020 05:19 )             35.7     03-18    137  |  99  |  6<L>  ----------------------------<  95  3.9   |  22  |  0.59    Ca    9.6      18 Mar 2020 05:19  Phos  2.1     03-18  Mg     1.8     03-18    TPro  6.6  /  Alb  3.7  /  TBili  < 0.2<L>  /  DBili  x   /  AST  25  /  ALT  32  /  AlkPhos  46  03-16      CARDIAC MARKERS ( 17 Mar 2020 05:24 )  x     / x     / 48 u/L / x     / x              RADIOLOGY & ADDITIONAL TESTS:    Imaging Personally Reviewed:    Consultant(s) Notes Reviewed:      Care Discussed with Consultants/Other Providers:

## 2020-03-18 NOTE — CHART NOTE - NSCHARTNOTEFT_GEN_A_CORE
Called by RN with reporting that patient's Temp 102.9, , /89, SpO2 90% on RA. No acute complaints reported per RN.     Vital Signs Last 24 Hrs  T(C): 39.4 (18 Mar 2020 13:40), Max: 39.9 (17 Mar 2020 21:19)  T(F): 102.9 (18 Mar 2020 13:40), Max: 103.8 (17 Mar 2020 21:19)  HR: 118 (18 Mar 2020 13:40) (98 - 118)  BP: 154/89 (18 Mar 2020 13:40) (108/60 - 154/89)  BP(mean): --  RR: 18 (18 Mar 2020 13:40) (17 - 18)  SpO2: 90% (18 Mar 2020 13:40) (90% - 98%)                          11.8   5.14  )-----------( 166      ( 18 Mar 2020 05:19 )             35.7       03-18    137  |  99  |  6<L>  ----------------------------<  95  3.9   |  22  |  0.59    Ca    9.6      18 Mar 2020 05:19  Phos  2.1     03-18  Mg     1.8     03-18        58 y/o F with no PMH p/w fever and cough admitted for sepsis 2/2  PNA (bacterial vs viral). COVID-19 POS, now with fever, tachycardia.    PLAN:  - Tylenol  - BCx x2 ordered  - Supplemental SpO2 via NC ordered  - Legionella neg, Brenda d/c'ed  - Close VS and respiratory status monitoring    Plan of care discussed with Dr. Persaud

## 2020-03-18 NOTE — PROGRESS NOTE ADULT - PROBLEM SELECTOR PLAN 1
Pt p/w fevers and tachycardia in the setting of cough and CXR demonstrating bilateral patchy opacities. No recent hospitalizations or abx. RVP neg. 3/16 blood cx NTD. COVID 19+  -Will dc abx  CTX and azithro given presentation more likely in setting of COVID  -monitor O2 sat carefully and titrate as needed  -If increasing O2 requirements low threshold for MICU eval for intubation  -encourage PO hydration Pt p/w fevers and tachycardia in the setting of cough and CXR demonstrating bilateral patchy opacities. No recent hospitalizations or abx. RVP neg. 3/16 blood cx NTD. COVID 19+    Pt continues to spike temp today.  -repeat blood cx today 3/18  -C/w CTX  for now. If repeat bld cx neg will dc CTX presentation more likely in setting of COVID. Will dc azithro given legionella neg.   -monitor O2 sat carefully and titrate as needed  -If increasing O2 requirements low threshold for MICU eval for intubation  -encourage PO hydration

## 2020-03-19 LAB
ANION GAP SERPL CALC-SCNC: 15 MMO/L — HIGH (ref 7–14)
BASE EXCESS BLDA CALC-SCNC: 0.8 MMOL/L — SIGNIFICANT CHANGE UP
BASE EXCESS BLDA CALC-SCNC: 1.9 MMOL/L — SIGNIFICANT CHANGE UP
BLOOD GAS ARTERIAL - FIO2: 28 — SIGNIFICANT CHANGE UP
BUN SERPL-MCNC: 6 MG/DL — LOW (ref 7–23)
CALCIUM SERPL-MCNC: 9.7 MG/DL — SIGNIFICANT CHANGE UP (ref 8.4–10.5)
CHLORIDE SERPL-SCNC: 97 MMOL/L — LOW (ref 98–107)
CO2 SERPL-SCNC: 21 MMOL/L — LOW (ref 22–31)
CREAT SERPL-MCNC: 0.5 MG/DL — SIGNIFICANT CHANGE UP (ref 0.5–1.3)
FERRITIN SERPL-MCNC: 1252 NG/ML — HIGH (ref 15–150)
GLUCOSE BLDA-MCNC: 111 MG/DL — HIGH (ref 70–99)
GLUCOSE BLDA-MCNC: 128 MG/DL — HIGH (ref 70–99)
GLUCOSE SERPL-MCNC: 115 MG/DL — HIGH (ref 70–99)
HCO3 BLDA-SCNC: 26 MMOL/L — SIGNIFICANT CHANGE UP (ref 22–26)
HCO3 BLDA-SCNC: 26 MMOL/L — SIGNIFICANT CHANGE UP (ref 22–26)
HCT VFR BLD CALC: 36.7 % — SIGNIFICANT CHANGE UP (ref 34.5–45)
HCT VFR BLDA CALC: 36.1 % — SIGNIFICANT CHANGE UP (ref 34.5–46.5)
HCT VFR BLDA CALC: 38.1 % — SIGNIFICANT CHANGE UP (ref 34.5–46.5)
HGB BLD-MCNC: 12.3 G/DL — SIGNIFICANT CHANGE UP (ref 11.5–15.5)
HGB BLDA-MCNC: 11.7 G/DL — SIGNIFICANT CHANGE UP (ref 11.5–15.5)
HGB BLDA-MCNC: 12.4 G/DL — SIGNIFICANT CHANGE UP (ref 11.5–15.5)
LDH SERPL L TO P-CCNC: 445 U/L — HIGH (ref 135–225)
MAGNESIUM SERPL-MCNC: 1.9 MG/DL — SIGNIFICANT CHANGE UP (ref 1.6–2.6)
MCHC RBC-ENTMCNC: 27.5 PG — SIGNIFICANT CHANGE UP (ref 27–34)
MCHC RBC-ENTMCNC: 33.5 % — SIGNIFICANT CHANGE UP (ref 32–36)
MCV RBC AUTO: 82.1 FL — SIGNIFICANT CHANGE UP (ref 80–100)
NRBC # FLD: 0 K/UL — SIGNIFICANT CHANGE UP (ref 0–0)
PCO2 BLDA: 32 MMHG — SIGNIFICANT CHANGE UP (ref 32–48)
PCO2 BLDA: 32 MMHG — SIGNIFICANT CHANGE UP (ref 32–48)
PH BLDA: 7.48 PH — HIGH (ref 7.35–7.45)
PH BLDA: 7.5 PH — HIGH (ref 7.35–7.45)
PHOSPHATE SERPL-MCNC: 1.5 MG/DL — LOW (ref 2.5–4.5)
PLATELET # BLD AUTO: 202 K/UL — SIGNIFICANT CHANGE UP (ref 150–400)
PMV BLD: 12 FL — SIGNIFICANT CHANGE UP (ref 7–13)
PO2 BLDA: 51 MMHG — LOW (ref 83–108)
PO2 BLDA: 84 MMHG — SIGNIFICANT CHANGE UP (ref 83–108)
POTASSIUM BLDA-SCNC: 3.6 MMOL/L — SIGNIFICANT CHANGE UP (ref 3.4–4.5)
POTASSIUM BLDA-SCNC: 3.8 MMOL/L — SIGNIFICANT CHANGE UP (ref 3.4–4.5)
POTASSIUM SERPL-MCNC: 3.3 MMOL/L — LOW (ref 3.5–5.3)
POTASSIUM SERPL-SCNC: 3.3 MMOL/L — LOW (ref 3.5–5.3)
RBC # BLD: 4.47 M/UL — SIGNIFICANT CHANGE UP (ref 3.8–5.2)
RBC # FLD: 14.6 % — HIGH (ref 10.3–14.5)
SAO2 % BLDA: 88.4 % — LOW (ref 95–99)
SAO2 % BLDA: 97.2 % — SIGNIFICANT CHANGE UP (ref 95–99)
SODIUM BLDA-SCNC: 134 MMOL/L — LOW (ref 136–146)
SODIUM BLDA-SCNC: 135 MMOL/L — LOW (ref 136–146)
SODIUM SERPL-SCNC: 133 MMOL/L — LOW (ref 135–145)
WBC # BLD: 7.92 K/UL — SIGNIFICANT CHANGE UP (ref 3.8–10.5)
WBC # FLD AUTO: 7.92 K/UL — SIGNIFICANT CHANGE UP (ref 3.8–10.5)

## 2020-03-19 PROCEDURE — 99222 1ST HOSP IP/OBS MODERATE 55: CPT | Mod: GC

## 2020-03-19 PROCEDURE — 93010 ELECTROCARDIOGRAM REPORT: CPT

## 2020-03-19 PROCEDURE — 99233 SBSQ HOSP IP/OBS HIGH 50: CPT

## 2020-03-19 RX ORDER — ACETAMINOPHEN 500 MG
1000 TABLET ORAL ONCE
Refills: 0 | Status: COMPLETED | OUTPATIENT
Start: 2020-03-19 | End: 2020-03-19

## 2020-03-19 RX ORDER — SODIUM,POTASSIUM PHOSPHATES 278-250MG
1 POWDER IN PACKET (EA) ORAL ONCE
Refills: 0 | Status: COMPLETED | OUTPATIENT
Start: 2020-03-19 | End: 2020-03-19

## 2020-03-19 RX ORDER — THIAMINE MONONITRATE (VIT B1) 100 MG
200 TABLET ORAL
Refills: 0 | Status: COMPLETED | OUTPATIENT
Start: 2020-03-19 | End: 2020-03-23

## 2020-03-19 RX ORDER — HYDROXYCHLOROQUINE SULFATE 200 MG
200 TABLET ORAL EVERY 12 HOURS
Refills: 0 | Status: COMPLETED | OUTPATIENT
Start: 2020-03-20 | End: 2020-03-24

## 2020-03-19 RX ORDER — HYDROXYCHLOROQUINE SULFATE 200 MG
400 TABLET ORAL EVERY 12 HOURS
Refills: 0 | Status: COMPLETED | OUTPATIENT
Start: 2020-03-19 | End: 2020-03-20

## 2020-03-19 RX ORDER — SODIUM CHLORIDE 9 MG/ML
500 INJECTION INTRAMUSCULAR; INTRAVENOUS; SUBCUTANEOUS ONCE
Refills: 0 | Status: COMPLETED | OUTPATIENT
Start: 2020-03-19 | End: 2020-03-19

## 2020-03-19 RX ORDER — POTASSIUM CHLORIDE 20 MEQ
40 PACKET (EA) ORAL EVERY 4 HOURS
Refills: 0 | Status: COMPLETED | OUTPATIENT
Start: 2020-03-19 | End: 2020-03-19

## 2020-03-19 RX ORDER — ASCORBIC ACID 60 MG
1500 TABLET,CHEWABLE ORAL EVERY 6 HOURS
Refills: 0 | Status: COMPLETED | OUTPATIENT
Start: 2020-03-19 | End: 2020-03-24

## 2020-03-19 RX ADMIN — Medication 85 MILLIMOLE(S): at 12:39

## 2020-03-19 RX ADMIN — Medication 650 MILLIGRAM(S): at 07:35

## 2020-03-19 RX ADMIN — Medication 103 MILLIGRAM(S): at 18:09

## 2020-03-19 RX ADMIN — ENOXAPARIN SODIUM 40 MILLIGRAM(S): 100 INJECTION SUBCUTANEOUS at 12:39

## 2020-03-19 RX ADMIN — Medication 400 MILLIGRAM(S): at 17:06

## 2020-03-19 RX ADMIN — Medication 40 MILLIEQUIVALENT(S): at 18:07

## 2020-03-19 RX ADMIN — Medication 1 TABLET(S): at 18:34

## 2020-03-19 RX ADMIN — SODIUM CHLORIDE 500 MILLILITER(S): 9 INJECTION INTRAMUSCULAR; INTRAVENOUS; SUBCUTANEOUS at 07:31

## 2020-03-19 RX ADMIN — Medication 102 MILLIGRAM(S): at 19:56

## 2020-03-19 RX ADMIN — Medication 40 MILLIEQUIVALENT(S): at 12:39

## 2020-03-19 RX ADMIN — Medication 650 MILLIGRAM(S): at 06:38

## 2020-03-19 RX ADMIN — Medication 400 MILLIGRAM(S): at 12:39

## 2020-03-19 RX ADMIN — CEFTRIAXONE 100 MILLIGRAM(S): 500 INJECTION, POWDER, FOR SOLUTION INTRAMUSCULAR; INTRAVENOUS at 06:39

## 2020-03-19 NOTE — CHART NOTE - NSCHARTNOTEFT_GEN_A_CORE
Called by RN with reporting that patient hypoxic on NC, placed on non-rebreather; Tachycardia , Temp 103F. Mild chest pressure reported.     Vital Signs Last 24 Hrs  T(C): 38.3 (19 Mar 2020 07:35), Max: 39.4 (18 Mar 2020 13:40)  T(F): 101 (19 Mar 2020 07:35), Max: 103 (19 Mar 2020 06:30)  HR: 113 (19 Mar 2020 06:30) (101 - 118)  BP: 140/81 (19 Mar 2020 06:30) (120/68 - 154/89)  BP(mean): --  RR: 20 (19 Mar 2020 06:36) (17 - 20)  SpO2: 93% (19 Mar 2020 06:36) (78% - 96%)                          12.3   7.92  )-----------( 202      ( 19 Mar 2020 05:21 )             36.7       03-19    133<L>  |  97<L>  |  6<L>  ----------------------------<  115<H>  3.3<L>   |  21<L>  |  0.50    Ca    9.7      19 Mar 2020 05:21  Phos  1.5     03-19  Mg     1.9     03-19      60 y/o F with no PMH p/w fever and cough admitted for sepsis 2/2  PNA (bacterial vs viral). COVID-19 POS, now with fever 103F, tachycardia, hypoxia with NC.     PLAN:  - placed on non-rebreather, SPO2 93-95%  - continuous pulse ox, VS q4h  - EKG ordered for tachycardia  - Tylenol given  - 500cc bolus ordered  - BCx 3/18 pending    Discussed with attending Dr. Persaud

## 2020-03-19 NOTE — PROGRESS NOTE ADULT - SUBJECTIVE AND OBJECTIVE BOX
Patient is a 59y old  Female who presents with a chief complaint of cough, fever (18 Mar 2020 10:44)      SUBJECTIVE / OVERNIGHT EVENTS:    Review of Systems:     MEDICATIONS  (STANDING):  cefTRIAXone   IVPB 1000 milliGRAM(s) IV Intermittent every 24 hours  enoxaparin Injectable 40 milliGRAM(s) SubCutaneous daily  sodium chloride 0.9%. 1000 milliLiter(s) (75 mL/Hr) IV Continuous <Continuous>    MEDICATIONS  (PRN):  acetaminophen   Tablet .. 650 milliGRAM(s) Oral every 6 hours PRN Temp greater or equal to 38C (100.4F), Mild Pain (1 - 3)  guaiFENesin   Syrup  (Sugar-Free) 100 milliGRAM(s) Oral every 6 hours PRN Cough  ondansetron Injectable 4 milliGRAM(s) IV Push every 6 hours PRN Nausea and/or Vomiting      PHYSICAL EXAM:  Vital Signs Last 24 Hrs  T(C): 37.2 (19 Mar 2020 09:15), Max: 39.4 (18 Mar 2020 13:40)  T(F): 99 (19 Mar 2020 09:15), Max: 103 (19 Mar 2020 06:30)  HR: 102 (19 Mar 2020 09:15) (101 - 118)  BP: 118/65 (19 Mar 2020 09:15) (118/65 - 154/89)  BP(mean): --  RR: 30 (19 Mar 2020 09:15) (17 - 30)  SpO2: 95% (19 Mar 2020 09:15) (78% - 96%)    LABS:  CAPILLARY BLOOD GLUCOSE                              12.3   7.92  )-----------( 202      ( 19 Mar 2020 05:21 )             36.7     03-19    133<L>  |  97<L>  |  6<L>  ----------------------------<  115<H>  3.3<L>   |  21<L>  |  0.50    Ca    9.7      19 Mar 2020 05:21  Phos  1.5     03-19  Mg     1.9     03-19                RADIOLOGY & ADDITIONAL TESTS:    Imaging Personally Reviewed:    Consultant(s) Notes Reviewed:      Care Discussed with Consultants/Other Providers: Patient is a 59y old  Female who presents with a chief complaint of cough, fever (18 Mar 2020 10:44)      SUBJECTIVE / OVERNIGHT EVENTS: Pt reporting increased SOB today. She denies worsening cough of CP. She denies n/v/d.     Review of Systems:     MEDICATIONS  (STANDING):  cefTRIAXone   IVPB 1000 milliGRAM(s) IV Intermittent every 24 hours  enoxaparin Injectable 40 milliGRAM(s) SubCutaneous daily  sodium chloride 0.9%. 1000 milliLiter(s) (75 mL/Hr) IV Continuous <Continuous>    MEDICATIONS  (PRN):  acetaminophen   Tablet .. 650 milliGRAM(s) Oral every 6 hours PRN Temp greater or equal to 38C (100.4F), Mild Pain (1 - 3)  guaiFENesin   Syrup  (Sugar-Free) 100 milliGRAM(s) Oral every 6 hours PRN Cough  ondansetron Injectable 4 milliGRAM(s) IV Push every 6 hours PRN Nausea and/or Vomiting      PHYSICAL EXAM:  Vital Signs Last 24 Hrs  T(C): 37.2 (19 Mar 2020 09:15), Max: 39.4 (18 Mar 2020 13:40)  T(F): 99 (19 Mar 2020 09:15), Max: 103 (19 Mar 2020 06:30)  HR: 102 (19 Mar 2020 09:15) (101 - 118)  BP: 118/65 (19 Mar 2020 09:15) (118/65 - 154/89)  BP(mean): --  RR: 30 (19 Mar 2020 09:15) (17 - 30)  SpO2: 95% (19 Mar 2020 09:15) (78% - 96%)  CONSTITUTIONAL: laying in bed, on non rebreather,  EYES: conjunctiva and sclera clear  RESPIRATORY:;  increased WOB but currently not in resp distress, CTA, decrease breath sounds at base   CARDIOVASCULAR: Regular rate and rhythm, normal S1 and S2, no murmur/rub/gallop; No lower extremity edema; Peripheral pulses are 2+ bilaterally  ABDOMEN: Nontender to palpation, normoactive bowel sounds, no rebound/guarding  SKIN: No rashes; no palpable lesions    LABS:  CAPILLARY BLOOD GLUCOSE                              12.3   7.92  )-----------( 202      ( 19 Mar 2020 05:21 )             36.7     03-19    133<L>  |  97<L>  |  6<L>  ----------------------------<  115<H>  3.3<L>   |  21<L>  |  0.50    Ca    9.7      19 Mar 2020 05:21  Phos  1.5     03-19  Mg     1.9     03-19                RADIOLOGY & ADDITIONAL TESTS:    Imaging Personally Reviewed:    Consultant(s) Notes Reviewed:      Care Discussed with Consultants/Other Providers:

## 2020-03-19 NOTE — PHARMACOTHERAPY INTERVENTION NOTE - COMMENTS
ALEXA GARCIA, 59y Female COVID-19 (+), hypoxic, increasing breathing, still febrile.    Recommendation(s):  1) Suggest starting hydroxychloroquine 400 mg PO q12h x 2 doses, then 200 mg PO q12h x 8 more doses (total of 5 days).     With kind regards,  Rohan Sims, PharmD  PGY-2 Infectious Diseases Pharmacy Resident  Spectra [phone]  .

## 2020-03-19 NOTE — CONSULT NOTE ADULT - ATTENDING COMMENTS
Agree with above. Patient with COVID-19 pneumonia. She has had progressive hypoxemic respiratory failure and is currently on a NRB with a PO2 58 and increased work of breathing. She unfortunately appears to be progressing and I believe that she needs to be intubated.    I have spoken with patient and multiple family members of hers via FaceTime and have told them of my belief. We offered to transfer patient to ICU for further management and likely intubation. She initially deferred intubation and asked to have more time to discuss with her family. They appear to understand the risks of delayed intubation should she have continued hypoxia.     Please repeat ABG in 1 hour.   I do think patient would benefit from ICU level care if she is agreeable to intubation. Agree with above. Patient with COVID-19 pneumonia. She has had progressive hypoxemic respiratory failure and is currently on a NRB with a PO2 58 and increased work of breathing. She unfortunately appears to be progressing and I believe that she needs to be intubated.    I have spoken with patient and multiple family members of hers via FaceTime and have told them of my belief. We offered to transfer patient to ICU for further management and likely intubation. She initially deferred intubation and asked to have more time to discuss with her family. They appear to understand the risks of delayed intubation should she have continued hypoxia.     Please repeat ABG in 1 hour after patient has been self-proned for this time. Immediately upon proning she did have an increase in her peripheral O2 saturations..   I do think patient would benefit from ICU level care if she is agreeable to intubation.

## 2020-03-19 NOTE — CONSULT NOTE ADULT - ASSESSMENT
59F with Covid-19 PNA and worsening hypoxic respiratory failure.    - Please check ABG, LDH, Ferritin, ESR, CRP.  - Please re-prone patient after labs obtained on NRB.  - Continue Plaquenil, vitamin C and vitamin B1  - May switch to Azithromycin instead of Ceftriaxone but either okay.  - Please obtain IL-6, IL-8 and full T-cell subset, G6PD, Triglyceride tomorrow AM.  - No use of steroids, Bipap or High flow.  - She may need mechanical ventilation.  - Will inform CTICU/SICU of the patient's worsening respiratory status. 59F with Covid-19 PNA and worsening hypoxic respiratory failure.    - Please check ABG, LDH, Ferritin, ESR, CRP.  - Please re-prone patient after labs obtained on NRB.  - Continue Plaquenil, vitamin C and vitamin B1  - May switch to Azithromycin instead of Ceftriaxone but either okay.  - Please obtain IL-6, IL-8 and full T-cell subset, G6PD, Triglyceride tomorrow AM.  - No use of steroids, Bipap or High flow.  - She may need mechanical ventilation.   - Recommend intravenous repletion of phosphate given worsening respiratory status may use sodium phosphate 15mg.  - Will inform CTICU/SICU of the patient's worsening respiratory status. 59F with Covid-19 PNA and worsening hypoxic respiratory failure.    - Please check ABG, LDH, Ferritin, ESR, CRP.  - Please re-prone patient after labs obtained on NRB.  - Continue Plaquenil, vitamin C and vitamin B1  - May switch to Azithromycin instead of Ceftriaxone but either okay.  - Please obtain IL-6, IL-8 and full T-cell subset, G6PD, Triglyceride tomorrow AM.  - No use of steroids, Bipap or High flow.  - She may need mechanical ventilation.   - Recommend intravenous repletion of phosphate given worsening respiratory status may use sodium phosphate 15mg.  - Will inform CTICU/SICU of the patient's worsening respiratory status.  - Please leave NPO

## 2020-03-19 NOTE — DISCHARGE NOTE NURSING/CASE MANAGEMENT/SOCIAL WORK - NSDCPNINST_GEN_ALL_CORE
Call MD for any c/o shortness of breath or fever.  Continue to drink plenty of fluids, stay away from crowds, stay home, wash hands frequently.

## 2020-03-19 NOTE — PROVIDER CONTACT NOTE (OTHER) - SITUATION
Patient's O2 was 78 on 2L NC. Complained of pressure on chest. Bumped oxygen up to 5L with no change in status. Nonrebreather applied and oxygen went up to 93.

## 2020-03-19 NOTE — DISCHARGE NOTE NURSING/CASE MANAGEMENT/SOCIAL WORK - NSDCPECAREGIVERED_GEN_ALL_CORE
Recommendation for patients with COVID-19 who no longer require hospitalization: returning home, What to do if you Are sick with coronavirus disease

## 2020-03-19 NOTE — CHART NOTE - NSCHARTNOTEFT_GEN_A_CORE
Called by RN with reporting that patient-------             Vital Signs Last 24 Hrs  T(C): 37.3 (19 Mar 2020 14:10), Max: 39.4 (19 Mar 2020 06:30)  T(F): 99.2 (19 Mar 2020 14:10), Max: 103 (19 Mar 2020 06:30)  HR: 117 (19 Mar 2020 15:05) (102 - 117)  BP: 151/89 (19 Mar 2020 14:10) (118/65 - 151/89)  BP(mean): --  RR: 28 (19 Mar 2020 15:05) (17 - 30)  SpO2: 95% (19 Mar 2020 15:05) (78% - 96%)                          12.3   7.92  )-----------( 202      ( 19 Mar 2020 05:21 )             36.7       03-19    133<L>  |  97<L>  |  6<L>  ----------------------------<  115<H>  3.3<L>   |  21<L>  |  0.50    Ca    9.7      19 Mar 2020 05:21  Phos  1.5     03-19  Mg     1.9     03-19              Patient is a 59y old  Female who presents with a chief complaint of cough, fever (19 Mar 2020 09:32)  59yFemale        PHYSICAL EXAM  GENERAL: NAD, AAOx3  HEAD:  Atraumatic, Normocephalic  EYES: EOMI, PERRLA, conjunctiva and sclera clear  NECK: Supple, No JVD, No LAD  CHEST/LUNG: Clear to auscultation bilaterally; No wheeze  HEART: s1 s2 Regular rate and rhythm; No murmurs, rubs, or gallops  ABDOMEN: Soft, Nontender, Nondistended; Bowel sounds present X 4 quadrants   EXTREMITIES:  2+ Peripheral Pulses, No clubbing, cyanosis, or edema  SKIN: No rashes or lesions,  b/l LE not red, cool to touch, cellulitis appears to be healing, no open skin no drainage  NEURO: nonfocal CN/motor/sensory/reflexes  Psych: normal affect and behavior, calm and cooperative     PLAN:        discussed with attending Called by RN with reporting that patient desaturating to 87-88% on non re-breather, placed on prone position- SPO2 93-95%, RR 30s, temp 104F. Examined patient at bedside, no chest pain, dizziness, vision changes, palpitations. Pulm consult was called.       Vital Signs Last 24 Hrs  T(C): 40 (19 Mar 2020 16:20), Max: 40 (19 Mar 2020 16:20)  T(F): 104 (19 Mar 2020 16:20), Max: 104 (19 Mar 2020 16:20)  HR: 120 (19 Mar 2020 15:51) (102 - 120)  BP: 145/92 (19 Mar 2020 15:51) (118/65 - 151/89)  BP(mean): --  RR: 35 (19 Mar 2020 15:51) (17 - 35)  SpO2: 93% (19 Mar 2020 15:51) (78% - 95%)                          12.3   7.92  )-----------( 202      ( 19 Mar 2020 05:21 )             36.7       03-19    133<L>  |  97<L>  |  6<L>  ----------------------------<  115<H>  3.3<L>   |  21<L>  |  0.50    Ca    9.7      19 Mar 2020 05:21  Phos  1.5     03-19  Mg     1.9     03-19      60 y/o F with no PMH p/w fever and cough admitted for sepsis 2/2  PNA (bacterial vs viral). COVID-19 POS, now with fever 104F, tachycardia, hypoxia with non re-breather, tachypnea.     PLAN:  - IV tylenol for fever, BCx pending  - Tummy time (prone position) to assist with breathing  - Continue non re-breather for now, may potentially need intubation if clinically worsens (discussed with RN, patient, patient's family, Pulm)  - Pulm consulted- Obtained ABG, CRP, ESR, LDH, Ferritin as per recs, assessed patient at bedside with ACP  - Started Vitamin C/Thiamine, patient on Plaquenil  - Close VS/continuous monitoring       ABG results and plan of care discussed with attending Dr. Persaud and Pulm [facility]

## 2020-03-19 NOTE — PROGRESS NOTE ADULT - PROBLEM SELECTOR PLAN 1
Pt p/w fevers and tachycardia in the setting of cough and CXR demonstrating bilateral patchy opacities. No recent hospitalizations or abx. RVP neg. 3/16 blood cx NTD. COVID 19+    Pt continues to spike temp today.  -f/u repeat blood cx  3/18  -Azithro dcd given legionella neg. Will dc CTX now as presentation more likely in setting of COVID.   -monitor O2 sat carefully and titrate as needed. She is currently sating well on non-rebreather. If she is becoming fatigued or hypoxic on non rebreather low threshold for MICU/RRT eval for intubation  -encourage PO hydration Pt p/w fevers and tachycardia in the setting of cough and CXR demonstrating bilateral patchy opacities. No recent hospitalizations or abx. RVP neg. 3/16 blood cx NTD. COVID 19+    Pt continues to spike temp today.  -f/u repeat blood cx  3/18  -Azithro dcd given legionella neg. Will dc CTX now as presentation more likely in setting of COVID.   -monitor O2 sat carefully and titrate as needed. She is currently sating well on non-rebreather. If she is becoming fatigued or hypoxic on non rebreather low threshold for MICU/RRT eval for intubation  -Given worsening clinical status/increased O2 requirements will start Plaquenil protocol

## 2020-03-19 NOTE — CONSULT NOTE ADULT - SUBJECTIVE AND OBJECTIVE BOX
CHIEF COMPLAINT: dyspnea    HPI: 59F with no significant PMH presented with 1 week of fevers, dry cough, on day of diarrhea on day of admission and was found to have COVID-19 PNA. She was on 6L NC and had progressive dyspnea so this morning she was placed on NRB. She feels better with self proning. She had taken a break from proning and has dyspnea with tachypnea. She has negative RVP and subtle bilateral patchy opacities on CXR on admission. Legionella negative.  She is a non-smoker.    PAST MEDICAL & SURGICAL HISTORY:  No pertinent past medical history  No significant past surgical history      FAMILY HISTORY:  FH: hypertension      SOCIAL HISTORY:  Smoking: [ x] Never Smoked [ ] Former Smoker (__ packs x ___ years) [ ] Current Smoker  (__ packs x ___ years)  Substance Use: [ ] Never Used [ ] Used ____  EtOH Use:  Marital Status: [ ] Single [ x]  [ ]  [ ]   Sexual History:   Occupation:  Recent Travel:  Country of Birth:  Advance Directives:    Allergies    No Known Allergies    Intolerances        HOME MEDICATIONS:    REVIEW OF SYSTEMS:  Constitutional: [ ] negative [ x] fevers [ ] chills [ ] weight loss [ ] weight gain  HEENT: [x ] negative [ ] dry eyes [ ] eye irritation [ ] postnasal drip [ ] nasal congestion  CV: [ ] negative  [ ] chest pain [ ] orthopnea [ ] palpitations [ ] murmur  Resp: [ ] negative [x ] cough [x ] shortness of breath [ ] dyspnea [ ] wheezing [ ] sputum [ ] hemoptysis  GI: [ ] negative [ ] nausea [ ] vomiting x[ ] diarrhea [ ] constipation [ ] abd pain [ ] dysphagia   : [x ] negative [ ] dysuria [ ] nocturia [ ] hematuria [ ] increased urinary frequency  Musculoskeletal: [ ] negative [ ] back pain [ ] myalgias [ ] arthralgias [ ] fracture  Skin: [x ] negative [ ] rash [ ] itch  Neurological: [x ] negative [ ] headache [ ] dizziness [ ] syncope [ ] weakness [ ] numbness  Psychiatric: [x ] negative [ ] anxiety [ ] depression  Endocrine: [x ] negative [ ] diabetes [ ] thyroid problem  Hematologic/Lymphatic: [ ] negative [ ] anemia [ ] bleeding problem  Allergic/Immunologic: [ ] negative [ ] itchy eyes [ ] nasal discharge [ ] hives [ ] angioedema  [ ] All other systems negative  [ ] Unable to assess ROS because ________    OBJECTIVE:  T(C): 37.3 (19 Mar 2020 14:10), Max: 39.4 (19 Mar 2020 06:30)  T(F): 99.2 (19 Mar 2020 14:10), Max: 103 (19 Mar 2020 06:30)  HR: 120 (19 Mar 2020 15:51) (102 - 120)  BP: 145/92 (19 Mar 2020 15:51) (118/65 - 151/89)  RR: 35 (19 Mar 2020 15:51) (17 - 35)  SpO2: 93% (19 Mar 2020 15:51) (78% - 96%)        CAPILLARY BLOOD GLUCOSE          PHYSICAL EXAM:  General: Very tachypneic and cannot speak in full sentences.  HEENT: FRANSISCO  Lymph Nodes:  Neck: No JVD  Respiratory: Bibasilar crackles  Cardiovascular: tachycardic RR  Abdomen: S/NT/ND  Extremities: -C/C/E  Skin: No rash  Neurological: non-focal  Psychiatry:    HOSPITAL MEDICATIONS:  enoxaparin Injectable 40 milliGRAM(s) SubCutaneous daily    cefTRIAXone   IVPB 1000 milliGRAM(s) IV Intermittent every 24 hours  hydroxychloroquine 400 milliGRAM(s) Oral every 12 hours        guaiFENesin   Syrup  (Sugar-Free) 100 milliGRAM(s) Oral every 6 hours PRN    acetaminophen   Tablet .. 650 milliGRAM(s) Oral every 6 hours PRN  ondansetron Injectable 4 milliGRAM(s) IV Push every 6 hours PRN          ascorbic acid IVPB 1500 milliGRAM(s) IV Intermittent every 6 hours  potassium chloride    Tablet ER 40 milliEquivalent(s) Oral every 4 hours  potassium phosphate / sodium phosphate powder 1 Packet(s) Oral once  sodium chloride 0.9%. 1000 milliLiter(s) IV Continuous <Continuous>  thiamine IVPB 200 milliGRAM(s) IV Intermittent <User Schedule>            LABS:                        12.3   7.92  )-----------( 202      ( 19 Mar 2020 05:21 )             36.7     Hgb Trend: 12.3<--, 11.8<--, 11.3<--, 10.9<--, 13.0<--  03-19    133<L>  |  97<L>  |  6<L>  ----------------------------<  115<H>  3.3<L>   |  21<L>  |  0.50    Ca    9.7      19 Mar 2020 05:21  Phos  1.5     03-19  Mg     1.9     03-19      Creatinine Trend: 0.50<--, 0.59<--, 0.56<--, 0.56<--, 0.70<--, 0.84<--            MICROBIOLOGY:     RADIOLOGY:  [ ] Reviewed and interpreted by me    PULMONARY FUNCTION TESTS:    EKG: CHIEF COMPLAINT: dyspnea    HPI: 59F with no significant PMH presented with 1 week of fevers, dry cough, on day of diarrhea on day of admission and was found to have COVID-19 PNA. She was on 6L NC and had progressive dyspnea so this morning she was placed on NRB. She feels better with self proning. She had taken a break from proning and has dyspnea with tachypnea. She has negative RVP and subtle bilateral patchy opacities on CXR on admission. Legionella negative.  She is a non-smoker.    PAST MEDICAL & SURGICAL HISTORY:  No pertinent past medical history  No significant past surgical history      FAMILY HISTORY:  FH: hypertension      SOCIAL HISTORY:  Smoking: [ x] Never Smoked [ ] Former Smoker (__ packs x ___ years) [ ] Current Smoker  (__ packs x ___ years)  Substance Use: [ ] Never Used [ ] Used ____  EtOH Use:  Marital Status: [ ] Single [ x]  [ ]  [ ]   Sexual History:   Occupation:  Recent Travel:  Country of Birth:  Advance Directives:    Allergies    No Known Allergies    Intolerances        HOME MEDICATIONS:    REVIEW OF SYSTEMS:  Constitutional: [ ] negative [ x] fevers [ ] chills [ ] weight loss [ ] weight gain  HEENT: [x ] negative [ ] dry eyes [ ] eye irritation [ ] postnasal drip [ ] nasal congestion  CV: [ ] negative  [ ] chest pain [ ] orthopnea [ ] palpitations [ ] murmur  Resp: [ ] negative [x ] cough [x ] shortness of breath [ ] dyspnea [ ] wheezing [ ] sputum [ ] hemoptysis  GI: [ ] negative [ ] nausea [ ] vomiting x[ ] diarrhea [ ] constipation [ ] abd pain [ ] dysphagia   : [x ] negative [ ] dysuria [ ] nocturia [ ] hematuria [ ] increased urinary frequency  Musculoskeletal: [ ] negative [ ] back pain [ ] myalgias [ ] arthralgias [ ] fracture  Skin: [x ] negative [ ] rash [ ] itch  Neurological: [x ] negative [ ] headache [ ] dizziness [ ] syncope [ ] weakness [ ] numbness  Psychiatric: [x ] negative [ ] anxiety [ ] depression  Endocrine: [x ] negative [ ] diabetes [ ] thyroid problem  Hematologic/Lymphatic: [ ] negative [ ] anemia [ ] bleeding problem  Allergic/Immunologic: [ ] negative [ ] itchy eyes [ ] nasal discharge [ ] hives [ ] angioedema  [ ] All other systems negative  [ ] Unable to assess ROS because ________    OBJECTIVE:  T(C): 37.3 (19 Mar 2020 14:10), Max: 39.4 (19 Mar 2020 06:30)  T(F): 99.2 (19 Mar 2020 14:10), Max: 103 (19 Mar 2020 06:30)  HR: 120 (19 Mar 2020 15:51) (102 - 120)  BP: 145/92 (19 Mar 2020 15:51) (118/65 - 151/89)  RR: 35 (19 Mar 2020 15:51) (17 - 35)  SpO2: 93% (19 Mar 2020 15:51) (78% - 96%)        CAPILLARY BLOOD GLUCOSE          PHYSICAL EXAM:  General: Very tachypneic and cannot speak in full sentences.  HEENT: FRANSISCO  Lymph Nodes:  Neck: No JVD  Respiratory: Bibasilar crackles  Cardiovascular: tachycardic RR  Abdomen: S/NT/ND  Extremities: -C/C/E  Skin: No rash  Neurological: non-focal  Psychiatry:    HOSPITAL MEDICATIONS:  enoxaparin Injectable 40 milliGRAM(s) SubCutaneous daily    cefTRIAXone   IVPB 1000 milliGRAM(s) IV Intermittent every 24 hours  hydroxychloroquine 400 milliGRAM(s) Oral every 12 hours        guaiFENesin   Syrup  (Sugar-Free) 100 milliGRAM(s) Oral every 6 hours PRN    acetaminophen   Tablet .. 650 milliGRAM(s) Oral every 6 hours PRN  ondansetron Injectable 4 milliGRAM(s) IV Push every 6 hours PRN          ascorbic acid IVPB 1500 milliGRAM(s) IV Intermittent every 6 hours  potassium chloride    Tablet ER 40 milliEquivalent(s) Oral every 4 hours  potassium phosphate / sodium phosphate powder 1 Packet(s) Oral once  sodium chloride 0.9%. 1000 milliLiter(s) IV Continuous <Continuous>  thiamine IVPB 200 milliGRAM(s) IV Intermittent <User Schedule>            LABS:                        12.3   7.92  )-----------( 202      ( 19 Mar 2020 05:21 )             36.7     Hgb Trend: 12.3<--, 11.8<--, 11.3<--, 10.9<--, 13.0<--  03-19    133<L>  |  97<L>  |  6<L>  ----------------------------<  115<H>  3.3<L>   |  21<L>  |  0.50    Ca    9.7      19 Mar 2020 05:21  Phos  1.5     03-19  Mg     1.9     03-19      Creatinine Trend: 0.50<--, 0.59<--, 0.56<--, 0.56<--, 0.70<--, 0.84<--            MICROBIOLOGY:       RADIOLOGY:  [ ] Reviewed and interpreted by me    PULMONARY FUNCTION TESTS:    EKG:

## 2020-03-20 LAB
ANION GAP SERPL CALC-SCNC: 13 MMO/L — SIGNIFICANT CHANGE UP (ref 7–14)
APTT BLD: 29.9 SEC — SIGNIFICANT CHANGE UP (ref 27.5–36.3)
BASE EXCESS BLDA CALC-SCNC: -2.8 MMOL/L — SIGNIFICANT CHANGE UP
BASE EXCESS BLDA CALC-SCNC: -3.9 MMOL/L — SIGNIFICANT CHANGE UP
BASE EXCESS BLDA CALC-SCNC: -4.8 MMOL/L — SIGNIFICANT CHANGE UP
BASE EXCESS BLDA CALC-SCNC: 0.4 MMOL/L — SIGNIFICANT CHANGE UP
BASOPHILS # BLD AUTO: 0.03 K/UL — SIGNIFICANT CHANGE UP (ref 0–0.2)
BASOPHILS NFR BLD AUTO: 0.2 % — SIGNIFICANT CHANGE UP (ref 0–2)
BASOPHILS NFR SPEC: 0 % — SIGNIFICANT CHANGE UP (ref 0–2)
BLASTS # FLD: 0 % — SIGNIFICANT CHANGE UP (ref 0–0)
BLD GP AB SCN SERPL QL: NEGATIVE — SIGNIFICANT CHANGE UP
BLOOD GAS ARTERIAL - FIO2: 100 — SIGNIFICANT CHANGE UP
BLOOD GAS ARTERIAL - FIO2: 30 — SIGNIFICANT CHANGE UP
BLOOD GAS ARTERIAL - FIO2: 40 — SIGNIFICANT CHANGE UP
BUN SERPL-MCNC: 7 MG/DL — SIGNIFICANT CHANGE UP (ref 7–23)
BURR CELLS BLD QL SMEAR: PRESENT — SIGNIFICANT CHANGE UP
CA-I BLDA-SCNC: 1.28 MMOL/L — SIGNIFICANT CHANGE UP (ref 1.15–1.29)
CA-I BLDA-SCNC: 1.37 MMOL/L — HIGH (ref 1.15–1.29)
CALCIUM SERPL-MCNC: 9.9 MG/DL — SIGNIFICANT CHANGE UP (ref 8.4–10.5)
CHLORIDE SERPL-SCNC: 99 MMOL/L — SIGNIFICANT CHANGE UP (ref 98–107)
CK SERPL-CCNC: 58 U/L — SIGNIFICANT CHANGE UP (ref 25–170)
CO2 SERPL-SCNC: 21 MMOL/L — LOW (ref 22–31)
CREAT SERPL-MCNC: 0.49 MG/DL — LOW (ref 0.5–1.3)
CRP SERPL HS-MCNC: > 300 MG/L — SIGNIFICANT CHANGE UP
D DIMER BLD IA.RAPID-MCNC: 3379 NG/ML — SIGNIFICANT CHANGE UP
EOSINOPHIL # BLD AUTO: 0.02 K/UL — SIGNIFICANT CHANGE UP (ref 0–0.5)
EOSINOPHIL NFR BLD AUTO: 0.1 % — SIGNIFICANT CHANGE UP (ref 0–6)
EOSINOPHIL NFR FLD: 0 % — SIGNIFICANT CHANGE UP (ref 0–6)
ERYTHROCYTE [SEDIMENTATION RATE] IN BLOOD: 90 MM/HR — HIGH (ref 4–25)
FERRITIN SERPL-MCNC: 1789 NG/ML — HIGH (ref 15–150)
GIANT PLATELETS BLD QL SMEAR: PRESENT — SIGNIFICANT CHANGE UP
GLUCOSE BLDA-MCNC: 111 MG/DL — HIGH (ref 70–99)
GLUCOSE BLDA-MCNC: 114 MG/DL — HIGH (ref 70–99)
GLUCOSE BLDA-MCNC: 136 MG/DL — HIGH (ref 70–99)
GLUCOSE BLDA-MCNC: 149 MG/DL — HIGH (ref 70–99)
GLUCOSE SERPL-MCNC: 109 MG/DL — HIGH (ref 70–99)
HCO3 BLDA-SCNC: 21 MMOL/L — LOW (ref 22–26)
HCO3 BLDA-SCNC: 21 MMOL/L — LOW (ref 22–26)
HCO3 BLDA-SCNC: 22 MMOL/L — SIGNIFICANT CHANGE UP (ref 22–26)
HCO3 BLDA-SCNC: 25 MMOL/L — SIGNIFICANT CHANGE UP (ref 22–26)
HCT VFR BLD CALC: 34.4 % — LOW (ref 34.5–45)
HCT VFR BLD CALC: 35.5 % — SIGNIFICANT CHANGE UP (ref 34.5–45)
HCT VFR BLDA CALC: 32.9 % — LOW (ref 34.5–46.5)
HCT VFR BLDA CALC: 34.1 % — LOW (ref 34.5–46.5)
HCT VFR BLDA CALC: 34.9 % — SIGNIFICANT CHANGE UP (ref 34.5–46.5)
HCT VFR BLDA CALC: 37.7 % — SIGNIFICANT CHANGE UP (ref 34.5–46.5)
HGB BLD-MCNC: 11.4 G/DL — LOW (ref 11.5–15.5)
HGB BLD-MCNC: 11.9 G/DL — SIGNIFICANT CHANGE UP (ref 11.5–15.5)
HGB BLDA-MCNC: 10.7 G/DL — LOW (ref 11.5–15.5)
HGB BLDA-MCNC: 11 G/DL — LOW (ref 11.5–15.5)
HGB BLDA-MCNC: 11.3 G/DL — LOW (ref 11.5–15.5)
HGB BLDA-MCNC: 12.2 G/DL — SIGNIFICANT CHANGE UP (ref 11.5–15.5)
HYPOCHROMIA BLD QL: SLIGHT — SIGNIFICANT CHANGE UP
IMM GRANULOCYTES NFR BLD AUTO: 2.2 % — HIGH (ref 0–1.5)
INR BLD: 1.17 — SIGNIFICANT CHANGE UP (ref 0.88–1.17)
LACTATE BLDA-SCNC: 1.2 MMOL/L — SIGNIFICANT CHANGE UP (ref 0.5–2)
LACTATE BLDA-SCNC: 1.3 MMOL/L — SIGNIFICANT CHANGE UP (ref 0.5–2)
LDH SERPL L TO P-CCNC: 515 U/L — HIGH (ref 135–225)
LDH SERPL L TO P-CCNC: 617 U/L — HIGH (ref 135–225)
LYMPHOCYTES # BLD AUTO: 0.9 K/UL — LOW (ref 1–3.3)
LYMPHOCYTES # BLD AUTO: 5.4 % — LOW (ref 13–44)
LYMPHOCYTES NFR SPEC AUTO: 2.6 % — LOW (ref 13–44)
MAGNESIUM SERPL-MCNC: 2.1 MG/DL — SIGNIFICANT CHANGE UP (ref 1.6–2.6)
MCHC RBC-ENTMCNC: 27.5 PG — SIGNIFICANT CHANGE UP (ref 27–34)
MCHC RBC-ENTMCNC: 27.9 PG — SIGNIFICANT CHANGE UP (ref 27–34)
MCHC RBC-ENTMCNC: 33.1 % — SIGNIFICANT CHANGE UP (ref 32–36)
MCHC RBC-ENTMCNC: 33.5 % — SIGNIFICANT CHANGE UP (ref 32–36)
MCV RBC AUTO: 82 FL — SIGNIFICANT CHANGE UP (ref 80–100)
MCV RBC AUTO: 84.3 FL — SIGNIFICANT CHANGE UP (ref 80–100)
METAMYELOCYTES # FLD: 0 % — SIGNIFICANT CHANGE UP (ref 0–1)
MONOCYTES # BLD AUTO: 0.67 K/UL — SIGNIFICANT CHANGE UP (ref 0–0.9)
MONOCYTES NFR BLD AUTO: 4 % — SIGNIFICANT CHANGE UP (ref 2–14)
MONOCYTES NFR BLD: 2.6 % — SIGNIFICANT CHANGE UP (ref 2–9)
MYELOCYTES NFR BLD: 0 % — SIGNIFICANT CHANGE UP (ref 0–0)
NEUTROPHIL AB SER-ACNC: 91.3 % — HIGH (ref 43–77)
NEUTROPHILS # BLD AUTO: 14.73 K/UL — HIGH (ref 1.8–7.4)
NEUTROPHILS NFR BLD AUTO: 88.1 % — HIGH (ref 43–77)
NEUTS BAND # BLD: 2.6 % — SIGNIFICANT CHANGE UP (ref 0–6)
NRBC # FLD: 0 K/UL — SIGNIFICANT CHANGE UP (ref 0–0)
NRBC # FLD: 0.02 K/UL — SIGNIFICANT CHANGE UP (ref 0–0)
OTHER - HEMATOLOGY %: 0 — SIGNIFICANT CHANGE UP
PCO2 BLDA: 33 MMHG — SIGNIFICANT CHANGE UP (ref 32–48)
PCO2 BLDA: 36 MMHG — SIGNIFICANT CHANGE UP (ref 32–48)
PCO2 BLDA: 36 MMHG — SIGNIFICANT CHANGE UP (ref 32–48)
PCO2 BLDA: 45 MMHG — SIGNIFICANT CHANGE UP (ref 32–48)
PH BLDA: 7.3 PH — LOW (ref 7.35–7.45)
PH BLDA: 7.36 PH — SIGNIFICANT CHANGE UP (ref 7.35–7.45)
PH BLDA: 7.39 PH — SIGNIFICANT CHANGE UP (ref 7.35–7.45)
PH BLDA: 7.47 PH — HIGH (ref 7.35–7.45)
PHOSPHATE SERPL-MCNC: 1.2 MG/DL — LOW (ref 2.5–4.5)
PLATELET # BLD AUTO: 282 K/UL — SIGNIFICANT CHANGE UP (ref 150–400)
PLATELET # BLD AUTO: 325 K/UL — SIGNIFICANT CHANGE UP (ref 150–400)
PLATELET COUNT - ESTIMATE: NORMAL — SIGNIFICANT CHANGE UP
PMV BLD: 10.9 FL — SIGNIFICANT CHANGE UP (ref 7–13)
PMV BLD: 11.4 FL — SIGNIFICANT CHANGE UP (ref 7–13)
PO2 BLDA: 117 MMHG — HIGH (ref 83–108)
PO2 BLDA: 177 MMHG — HIGH (ref 83–108)
PO2 BLDA: 228 MMHG — HIGH (ref 83–108)
PO2 BLDA: 62 MMHG — LOW (ref 83–108)
POTASSIUM BLDA-SCNC: 3.3 MMOL/L — LOW (ref 3.4–4.5)
POTASSIUM BLDA-SCNC: 3.3 MMOL/L — LOW (ref 3.4–4.5)
POTASSIUM BLDA-SCNC: 3.5 MMOL/L — SIGNIFICANT CHANGE UP (ref 3.4–4.5)
POTASSIUM BLDA-SCNC: 3.5 MMOL/L — SIGNIFICANT CHANGE UP (ref 3.4–4.5)
POTASSIUM SERPL-MCNC: 3.7 MMOL/L — SIGNIFICANT CHANGE UP (ref 3.5–5.3)
POTASSIUM SERPL-SCNC: 3.7 MMOL/L — SIGNIFICANT CHANGE UP (ref 3.5–5.3)
PROCALCITONIN SERPL-MCNC: 0.37 NG/ML — HIGH (ref 0.02–0.1)
PROMYELOCYTES # FLD: 0 % — SIGNIFICANT CHANGE UP (ref 0–0)
PROTHROM AB SERPL-ACNC: 13.4 SEC — HIGH (ref 9.8–13.1)
RBC # BLD: 4.08 M/UL — SIGNIFICANT CHANGE UP (ref 3.8–5.2)
RBC # BLD: 4.33 M/UL — SIGNIFICANT CHANGE UP (ref 3.8–5.2)
RBC # FLD: 14.6 % — HIGH (ref 10.3–14.5)
RBC # FLD: 15 % — HIGH (ref 10.3–14.5)
RH IG SCN BLD-IMP: POSITIVE — SIGNIFICANT CHANGE UP
SAO2 % BLDA: 92.9 % — LOW (ref 95–99)
SAO2 % BLDA: 97.9 % — SIGNIFICANT CHANGE UP (ref 95–99)
SAO2 % BLDA: 99.1 % — HIGH (ref 95–99)
SAO2 % BLDA: 99.2 % — HIGH (ref 95–99)
SODIUM BLDA-SCNC: 135 MMOL/L — LOW (ref 136–146)
SODIUM BLDA-SCNC: 136 MMOL/L — SIGNIFICANT CHANGE UP (ref 136–146)
SODIUM BLDA-SCNC: 137 MMOL/L — SIGNIFICANT CHANGE UP (ref 136–146)
SODIUM BLDA-SCNC: 139 MMOL/L — SIGNIFICANT CHANGE UP (ref 136–146)
SODIUM SERPL-SCNC: 133 MMOL/L — LOW (ref 135–145)
VARIANT LYMPHS # BLD: 0.9 % — SIGNIFICANT CHANGE UP
WBC # BLD: 12.06 K/UL — HIGH (ref 3.8–10.5)
WBC # BLD: 16.71 K/UL — HIGH (ref 3.8–10.5)
WBC # FLD AUTO: 12.06 K/UL — HIGH (ref 3.8–10.5)
WBC # FLD AUTO: 16.71 K/UL — HIGH (ref 3.8–10.5)

## 2020-03-20 PROCEDURE — 71045 X-RAY EXAM CHEST 1 VIEW: CPT | Mod: 26

## 2020-03-20 PROCEDURE — 71045 X-RAY EXAM CHEST 1 VIEW: CPT | Mod: 26,77

## 2020-03-20 PROCEDURE — 43752 NASAL/OROGASTRIC W/TUBE PLMT: CPT | Mod: 59

## 2020-03-20 PROCEDURE — 93308 TTE F-UP OR LMTD: CPT | Mod: 26,GC

## 2020-03-20 PROCEDURE — 36620 INSERTION CATHETER ARTERY: CPT

## 2020-03-20 PROCEDURE — 76604 US EXAM CHEST: CPT | Mod: 26,GC

## 2020-03-20 PROCEDURE — 99291 CRITICAL CARE FIRST HOUR: CPT

## 2020-03-20 PROCEDURE — 99292 CRITICAL CARE ADDL 30 MIN: CPT | Mod: 25

## 2020-03-20 PROCEDURE — 36556 INSERT NON-TUNNEL CV CATH: CPT

## 2020-03-20 RX ORDER — PROPOFOL 10 MG/ML
15 INJECTION, EMULSION INTRAVENOUS
Qty: 500 | Refills: 0 | Status: DISCONTINUED | OUTPATIENT
Start: 2020-03-20 | End: 2020-03-20

## 2020-03-20 RX ORDER — FENTANYL CITRATE 50 UG/ML
0.5 INJECTION INTRAVENOUS
Qty: 2500 | Refills: 0 | Status: DISCONTINUED | OUTPATIENT
Start: 2020-03-20 | End: 2020-03-27

## 2020-03-20 RX ORDER — POTASSIUM PHOSPHATE, MONOBASIC POTASSIUM PHOSPHATE, DIBASIC 236; 224 MG/ML; MG/ML
15 INJECTION, SOLUTION INTRAVENOUS ONCE
Refills: 0 | Status: COMPLETED | OUTPATIENT
Start: 2020-03-20 | End: 2020-03-20

## 2020-03-20 RX ORDER — MIDAZOLAM HYDROCHLORIDE 1 MG/ML
2 INJECTION, SOLUTION INTRAMUSCULAR; INTRAVENOUS ONCE
Refills: 0 | Status: DISCONTINUED | OUTPATIENT
Start: 2020-03-20 | End: 2020-03-20

## 2020-03-20 RX ORDER — CISATRACURIUM BESYLATE 2 MG/ML
2 INJECTION INTRAVENOUS
Qty: 200 | Refills: 0 | Status: DISCONTINUED | OUTPATIENT
Start: 2020-03-20 | End: 2020-03-23

## 2020-03-20 RX ORDER — AZITHROMYCIN 500 MG/1
500 TABLET, FILM COATED ORAL DAILY
Refills: 0 | Status: COMPLETED | OUTPATIENT
Start: 2020-03-20 | End: 2020-03-24

## 2020-03-20 RX ORDER — POLYETHYLENE GLYCOL 3350 17 G/17G
17 POWDER, FOR SOLUTION ORAL DAILY
Refills: 0 | Status: DISCONTINUED | OUTPATIENT
Start: 2020-03-20 | End: 2020-04-07

## 2020-03-20 RX ORDER — NOREPINEPHRINE BITARTRATE/D5W 8 MG/250ML
0.05 PLASTIC BAG, INJECTION (ML) INTRAVENOUS
Qty: 8 | Refills: 0 | Status: DISCONTINUED | OUTPATIENT
Start: 2020-03-20 | End: 2020-03-20

## 2020-03-20 RX ORDER — MIDAZOLAM HYDROCHLORIDE 1 MG/ML
0.02 INJECTION, SOLUTION INTRAMUSCULAR; INTRAVENOUS
Qty: 100 | Refills: 0 | Status: DISCONTINUED | OUTPATIENT
Start: 2020-03-20 | End: 2020-03-27

## 2020-03-20 RX ORDER — CHLORHEXIDINE GLUCONATE 213 G/1000ML
15 SOLUTION TOPICAL EVERY 12 HOURS
Refills: 0 | Status: DISCONTINUED | OUTPATIENT
Start: 2020-03-20 | End: 2020-04-03

## 2020-03-20 RX ORDER — CISATRACURIUM BESYLATE 2 MG/ML
20 INJECTION INTRAVENOUS ONCE
Refills: 0 | Status: COMPLETED | OUTPATIENT
Start: 2020-03-20 | End: 2020-03-20

## 2020-03-20 RX ORDER — PROPOFOL 10 MG/ML
15 INJECTION, EMULSION INTRAVENOUS
Qty: 1000 | Refills: 0 | Status: DISCONTINUED | OUTPATIENT
Start: 2020-03-20 | End: 2020-03-21

## 2020-03-20 RX ORDER — SODIUM CHLORIDE 9 MG/ML
10 INJECTION INTRAMUSCULAR; INTRAVENOUS; SUBCUTANEOUS
Refills: 0 | Status: DISCONTINUED | OUTPATIENT
Start: 2020-03-20 | End: 2020-03-27

## 2020-03-20 RX ORDER — FENTANYL CITRATE 50 UG/ML
100 INJECTION INTRAVENOUS ONCE
Refills: 0 | Status: DISCONTINUED | OUTPATIENT
Start: 2020-03-20 | End: 2020-03-20

## 2020-03-20 RX ORDER — CHLORHEXIDINE GLUCONATE 213 G/1000ML
1 SOLUTION TOPICAL
Refills: 0 | Status: DISCONTINUED | OUTPATIENT
Start: 2020-03-20 | End: 2020-03-22

## 2020-03-20 RX ORDER — NOREPINEPHRINE BITARTRATE/D5W 8 MG/250ML
0.05 PLASTIC BAG, INJECTION (ML) INTRAVENOUS
Qty: 16 | Refills: 0 | Status: DISCONTINUED | OUTPATIENT
Start: 2020-03-20 | End: 2020-04-03

## 2020-03-20 RX ORDER — FAMOTIDINE 10 MG/ML
20 INJECTION INTRAVENOUS
Refills: 0 | Status: DISCONTINUED | OUTPATIENT
Start: 2020-03-20 | End: 2020-03-21

## 2020-03-20 RX ORDER — CHLORHEXIDINE GLUCONATE 213 G/1000ML
1 SOLUTION TOPICAL
Refills: 0 | Status: DISCONTINUED | OUTPATIENT
Start: 2020-03-20 | End: 2020-04-19

## 2020-03-20 RX ORDER — ACETAMINOPHEN 500 MG
1000 TABLET ORAL ONCE
Refills: 0 | Status: COMPLETED | OUTPATIENT
Start: 2020-03-20 | End: 2020-03-20

## 2020-03-20 RX ORDER — MAGNESIUM SULFATE 500 MG/ML
2 VIAL (ML) INJECTION ONCE
Refills: 0 | Status: COMPLETED | OUTPATIENT
Start: 2020-03-20 | End: 2020-03-20

## 2020-03-20 RX ORDER — AZITHROMYCIN 500 MG/1
TABLET, FILM COATED ORAL
Refills: 0 | Status: DISCONTINUED | OUTPATIENT
Start: 2020-03-20 | End: 2020-03-20

## 2020-03-20 RX ORDER — AZITHROMYCIN 500 MG/1
500 TABLET, FILM COATED ORAL ONCE
Refills: 0 | Status: DISCONTINUED | OUTPATIENT
Start: 2020-03-20 | End: 2020-03-20

## 2020-03-20 RX ORDER — ACETAMINOPHEN 500 MG
650 TABLET ORAL EVERY 6 HOURS
Refills: 0 | Status: DISCONTINUED | OUTPATIENT
Start: 2020-03-20 | End: 2020-04-19

## 2020-03-20 RX ORDER — FUROSEMIDE 40 MG
40 TABLET ORAL ONCE
Refills: 0 | Status: COMPLETED | OUTPATIENT
Start: 2020-03-20 | End: 2020-03-20

## 2020-03-20 RX ADMIN — MIDAZOLAM HYDROCHLORIDE 2 MILLIGRAM(S): 1 INJECTION, SOLUTION INTRAMUSCULAR; INTRAVENOUS at 06:00

## 2020-03-20 RX ADMIN — MIDAZOLAM HYDROCHLORIDE 1.44 MG/KG/HR: 1 INJECTION, SOLUTION INTRAMUSCULAR; INTRAVENOUS at 20:10

## 2020-03-20 RX ADMIN — Medication 102 MILLIGRAM(S): at 17:42

## 2020-03-20 RX ADMIN — POLYETHYLENE GLYCOL 3350 17 GRAM(S): 17 POWDER, FOR SOLUTION ORAL at 13:03

## 2020-03-20 RX ADMIN — AZITHROMYCIN 500 MILLIGRAM(S): 500 TABLET, FILM COATED ORAL at 11:40

## 2020-03-20 RX ADMIN — Medication 650 MILLIGRAM(S): at 04:12

## 2020-03-20 RX ADMIN — FENTANYL CITRATE 100 MICROGRAM(S): 50 INJECTION INTRAVENOUS at 06:00

## 2020-03-20 RX ADMIN — Medication 3.38 MICROGRAM(S)/KG/MIN: at 20:09

## 2020-03-20 RX ADMIN — Medication 650 MILLIGRAM(S): at 21:00

## 2020-03-20 RX ADMIN — CEFTRIAXONE 100 MILLIGRAM(S): 500 INJECTION, POWDER, FOR SOLUTION INTRAMUSCULAR; INTRAVENOUS at 09:10

## 2020-03-20 RX ADMIN — Medication 3.38 MICROGRAM(S)/KG/MIN: at 17:44

## 2020-03-20 RX ADMIN — Medication 650 MILLIGRAM(S): at 20:09

## 2020-03-20 RX ADMIN — CISATRACURIUM BESYLATE 20 MILLIGRAM(S): 2 INJECTION INTRAVENOUS at 06:00

## 2020-03-20 RX ADMIN — PROPOFOL 6.49 MICROGRAM(S)/KG/MIN: 10 INJECTION, EMULSION INTRAVENOUS at 11:18

## 2020-03-20 RX ADMIN — CISATRACURIUM BESYLATE 8.66 MICROGRAM(S)/KG/MIN: 2 INJECTION INTRAVENOUS at 20:10

## 2020-03-20 RX ADMIN — Medication 102 MILLIGRAM(S): at 09:10

## 2020-03-20 RX ADMIN — MIDAZOLAM HYDROCHLORIDE 1.44 MG/KG/HR: 1 INJECTION, SOLUTION INTRAMUSCULAR; INTRAVENOUS at 08:05

## 2020-03-20 RX ADMIN — Medication 400 MILLIGRAM(S): at 22:15

## 2020-03-20 RX ADMIN — CHLORHEXIDINE GLUCONATE 15 MILLILITER(S): 213 SOLUTION TOPICAL at 09:09

## 2020-03-20 RX ADMIN — Medication 103 MILLIGRAM(S): at 11:40

## 2020-03-20 RX ADMIN — Medication 400 MILLIGRAM(S): at 00:11

## 2020-03-20 RX ADMIN — Medication 103 MILLIGRAM(S): at 17:42

## 2020-03-20 RX ADMIN — CHLORHEXIDINE GLUCONATE 15 MILLILITER(S): 213 SOLUTION TOPICAL at 17:42

## 2020-03-20 RX ADMIN — FAMOTIDINE 104 MILLIGRAM(S): 10 INJECTION INTRAVENOUS at 17:42

## 2020-03-20 RX ADMIN — Medication 40 MILLIGRAM(S): at 15:00

## 2020-03-20 RX ADMIN — CISATRACURIUM BESYLATE 8.66 MICROGRAM(S)/KG/MIN: 2 INJECTION INTRAVENOUS at 11:19

## 2020-03-20 RX ADMIN — Medication 50 GRAM(S): at 09:10

## 2020-03-20 RX ADMIN — FENTANYL CITRATE 3.61 MICROGRAM(S)/KG/HR: 50 INJECTION INTRAVENOUS at 11:35

## 2020-03-20 RX ADMIN — POTASSIUM PHOSPHATE, MONOBASIC POTASSIUM PHOSPHATE, DIBASIC 62.5 MILLIMOLE(S): 236; 224 INJECTION, SOLUTION INTRAVENOUS at 09:10

## 2020-03-20 RX ADMIN — Medication 103 MILLIGRAM(S): at 00:10

## 2020-03-20 RX ADMIN — Medication 103 MILLIGRAM(S): at 09:10

## 2020-03-20 RX ADMIN — PROPOFOL 6.49 MICROGRAM(S)/KG/MIN: 10 INJECTION, EMULSION INTRAVENOUS at 20:10

## 2020-03-20 RX ADMIN — Medication 650 MILLIGRAM(S): at 04:45

## 2020-03-20 RX ADMIN — FENTANYL CITRATE 3.61 MICROGRAM(S)/KG/HR: 50 INJECTION INTRAVENOUS at 20:10

## 2020-03-20 RX ADMIN — Medication 200 MILLIGRAM(S): at 13:03

## 2020-03-20 RX ADMIN — SODIUM CHLORIDE 10 MILLILITER(S): 9 INJECTION INTRAMUSCULAR; INTRAVENOUS; SUBCUTANEOUS at 11:20

## 2020-03-20 RX ADMIN — Medication 1000 MILLIGRAM(S): at 23:00

## 2020-03-20 RX ADMIN — ENOXAPARIN SODIUM 40 MILLIGRAM(S): 100 INJECTION SUBCUTANEOUS at 11:40

## 2020-03-20 NOTE — RAPID RESPONSE TEAM SUMMARY - NSSITUATIONBACKGROUNDRRT_GEN_ALL_CORE
60 y/o Female admitted with acute hypoxemic respiratory failure in the setting of COVID-19 Pneumonia. RRT called for hypoxemia. Upon arrival at bedside, pt lethargic appearing in respiratory distress and using accessory muscles of breathing. Pt SpO2 85% on NRB. Discussed risks/benefits of intubation with both patient and her daughter on Facetime who agreed to intubation and ICU care. Anesthesia paged and performed successful intubation. Pt sedated with Propofol and started on Levophed for transfer to CTICU. RRT concluded.

## 2020-03-20 NOTE — PROGRESS NOTE ADULT - SUBJECTIVE AND OBJECTIVE BOX
INTERVAL HPI/OVERNIGHT EVENTS:    SUBJECTIVE: Patient seen and examined at bedside.     CONSTITUTIONAL: No weakness, fevers or chills  EYES/ENT: No visual changes;  No vertigo or throat pain   NECK: No pain or stiffness  RESPIRATORY: No cough, wheezing, hemoptysis; No shortness of breath  CARDIOVASCULAR: No chest pain or palpitations  GASTROINTESTINAL: No abdominal or epigastric pain. No nausea, vomiting, or hematemesis; No diarrhea or constipation. No melena or hematochezia.  GENITOURINARY: No dysuria, frequency or hematuria  NEUROLOGICAL: No numbness or weakness  SKIN: No itching, rashes    OBJECTIVE:    VITAL SIGNS:  ICU Vital Signs Last 24 Hrs  T(C): 38.6 (20 Mar 2020 05:45), Max: 40 (19 Mar 2020 16:20)  T(F): 101.4 (20 Mar 2020 05:45), Max: 104 (19 Mar 2020 16:20)  HR: 99 (20 Mar 2020 06:00) (99 - 130)  BP: 95/68 (20 Mar 2020 06:00) (95/68 - 152/85)  BP(mean): 75 (20 Mar 2020 06:00) (75 - 100)  ABP: --  ABP(mean): --  RR: 20 (20 Mar 2020 06:00) (18 - 35)  SpO2: 93% (20 Mar 2020 06:00) (67% - 99%)        CAPILLARY BLOOD GLUCOSE      POCT Blood Glucose.: 93 mg/dL (20 Mar 2020 04:20)      PHYSICAL EXAM:    General: NAD  HEENT: NC/AT; PERRL, clear conjunctiva  Neck: supple  Respiratory: CTA b/l  Cardiovascular: +S1/S2; RRR  Abdomen: soft, NT/ND; +BS x4  Extremities: WWP, 2+ peripheral pulses b/l; no LE edema  Skin: normal color and turgor; no rash  Neurological:    MEDICATIONS:  MEDICATIONS  (STANDING):  ascorbic acid IVPB 1500 milliGRAM(s) IV Intermittent every 6 hours  azithromycin  IVPB      cefTRIAXone   IVPB 1000 milliGRAM(s) IV Intermittent every 24 hours  chlorhexidine 0.12% Liquid 15 milliLiter(s) Oral Mucosa every 12 hours  chlorhexidine 4% Liquid 1 Application(s) Topical <User Schedule>  chlorhexidine 4% Liquid 1 Application(s) Topical <User Schedule>  cisatracurium Infusion 2 MICROgram(s)/kG/Min (8.66 mL/Hr) IV Continuous <Continuous>  enoxaparin Injectable 40 milliGRAM(s) SubCutaneous daily  famotidine  IVPB 20 milliGRAM(s) IV Intermittent two times a day  hydroxychloroquine 200 milliGRAM(s) Oral every 12 hours  magnesium sulfate  IVPB 2 Gram(s) IV Intermittent once  midazolam Infusion 0.02 mG/kG/Hr (1.44 mL/Hr) IV Continuous <Continuous>  norepinephrine Infusion 0.05 MICROgram(s)/kG/Min (6.76 mL/Hr) IV Continuous <Continuous>  polyethylene glycol 3350 17 Gram(s) Oral daily  potassium phosphate IVPB 15 milliMole(s) IV Intermittent once  propofol Infusion. 15 MICROgram(s)/kG/Min (6.49 mL/Hr) IV Continuous <Continuous>  thiamine IVPB 200 milliGRAM(s) IV Intermittent <User Schedule>    MEDICATIONS  (PRN):  acetaminophen   Tablet .. 650 milliGRAM(s) Oral every 6 hours PRN Temp greater or equal to 38C (100.4F), Mild Pain (1 - 3)  guaiFENesin   Syrup  (Sugar-Free) 100 milliGRAM(s) Oral every 6 hours PRN Cough  ondansetron Injectable 4 milliGRAM(s) IV Push every 6 hours PRN Nausea and/or Vomiting  sodium chloride 0.9% lock flush 10 milliLiter(s) IV Push every 1 hour PRN Pre/post blood products, medications, blood draw, and to maintain line patency      ALLERGIES:  Allergies    No Known Allergies    Intolerances        LABS:                        11.4   16.71 )-----------( 325      ( 20 Mar 2020 06:45 )             34.4     03-20    133<L>  |  99  |  7   ----------------------------<  109<H>  3.7   |  21<L>  |  0.49<L>    Ca    9.9      20 Mar 2020 04:30  Phos  1.2     03-20  Mg     2.1     03-20      PT/INR - ( 20 Mar 2020 04:30 )   PT: 13.4 SEC;   INR: 1.17          PTT - ( 20 Mar 2020 06:45 )  PTT:29.9 SEC      RADIOLOGY & ADDITIONAL TESTS: Reviewed. INTERVAL HPI/OVERNIGHT EVENTS:  - No acute events overnight     SUBJECTIVE: Patient seen and examined at bedside.     ROS: limited due to intubation and sedation     OBJECTIVE:    VITAL SIGNS:  ICU Vital Signs Last 24 Hrs  T(C): 38.6 (20 Mar 2020 05:45), Max: 40 (19 Mar 2020 16:20)  T(F): 101.4 (20 Mar 2020 05:45), Max: 104 (19 Mar 2020 16:20)  HR: 99 (20 Mar 2020 06:00) (99 - 130)  BP: 95/68 (20 Mar 2020 06:00) (95/68 - 152/85)  BP(mean): 75 (20 Mar 2020 06:00) (75 - 100)  ABP: --  ABP(mean): --  RR: 20 (20 Mar 2020 06:00) (18 - 35)  SpO2: 93% (20 Mar 2020 06:00) (67% - 99%)        CAPILLARY BLOOD GLUCOSE      POCT Blood Glucose.: 93 mg/dL (20 Mar 2020 04:20)      PHYSICAL EXAM:  Examined by fellow/attending see below for more details    MEDICATIONS:  MEDICATIONS  (STANDING):  ascorbic acid IVPB 1500 milliGRAM(s) IV Intermittent every 6 hours  azithromycin  IVPB      cefTRIAXone   IVPB 1000 milliGRAM(s) IV Intermittent every 24 hours  chlorhexidine 0.12% Liquid 15 milliLiter(s) Oral Mucosa every 12 hours  chlorhexidine 4% Liquid 1 Application(s) Topical <User Schedule>  chlorhexidine 4% Liquid 1 Application(s) Topical <User Schedule>  cisatracurium Infusion 2 MICROgram(s)/kG/Min (8.66 mL/Hr) IV Continuous <Continuous>  enoxaparin Injectable 40 milliGRAM(s) SubCutaneous daily  famotidine  IVPB 20 milliGRAM(s) IV Intermittent two times a day  hydroxychloroquine 200 milliGRAM(s) Oral every 12 hours  magnesium sulfate  IVPB 2 Gram(s) IV Intermittent once  midazolam Infusion 0.02 mG/kG/Hr (1.44 mL/Hr) IV Continuous <Continuous>  norepinephrine Infusion 0.05 MICROgram(s)/kG/Min (6.76 mL/Hr) IV Continuous <Continuous>  polyethylene glycol 3350 17 Gram(s) Oral daily  potassium phosphate IVPB 15 milliMole(s) IV Intermittent once  propofol Infusion. 15 MICROgram(s)/kG/Min (6.49 mL/Hr) IV Continuous <Continuous>  thiamine IVPB 200 milliGRAM(s) IV Intermittent <User Schedule>    MEDICATIONS  (PRN):  acetaminophen   Tablet .. 650 milliGRAM(s) Oral every 6 hours PRN Temp greater or equal to 38C (100.4F), Mild Pain (1 - 3)  guaiFENesin   Syrup  (Sugar-Free) 100 milliGRAM(s) Oral every 6 hours PRN Cough  ondansetron Injectable 4 milliGRAM(s) IV Push every 6 hours PRN Nausea and/or Vomiting  sodium chloride 0.9% lock flush 10 milliLiter(s) IV Push every 1 hour PRN Pre/post blood products, medications, blood draw, and to maintain line patency      ALLERGIES:  Allergies    No Known Allergies    Intolerances        LABS:                        11.4   16.71 )-----------( 325      ( 20 Mar 2020 06:45 )             34.4     03-20    133<L>  |  99  |  7   ----------------------------<  109<H>  3.7   |  21<L>  |  0.49<L>    Ca    9.9      20 Mar 2020 04:30  Phos  1.2     03-20  Mg     2.1     03-20      PT/INR - ( 20 Mar 2020 04:30 )   PT: 13.4 SEC;   INR: 1.17          PTT - ( 20 Mar 2020 06:45 )  PTT:29.9 SEC      RADIOLOGY & ADDITIONAL TESTS: Reviewed.

## 2020-03-20 NOTE — CHART NOTE - NSCHARTNOTEFT_GEN_A_CORE
: Dr. Mao    INDICATION: hypoxemic respiratory failure    PROCEDURE:  [x] LIMITED ECHO  [x] LIMITED CHEST  [ ] LIMITED RETROPERITONEAL  [ ] LIMITED ABDOMINAL  [ ] LIMITED DVT  [ ] NEEDLE GUIDANCE VASCULAR  [ ] NEEDLE GUIDANCE THORACENTESIS  [ ] NEEDLE GUIDANCE PARACENTESIS  [ ] NEEDLE GUIDANCE PERICARDIOCENTESIS  [ ] OTHER    FINDINGS:  Scattered B lines in b/l anterior lung fields with lung sliding  Diffuse B lines at lung bases b/l without pleural effusion  Grossly normal LV and RV systolic function  Normal RV:LV size ratio  IVC 2 cm    INTERPRETATION:  Diffuse pulmonary infiltrates, worse at the bases  No pneumothorax, no pleural effusion  Grossly normal cardiac function  IVC 2 cm    Tod Mao PGY-4  Pulmonary/Critical Care Fellow  Pager: [phone] ([facility]) [phone] (NS)  Pulmonary Spectra #[id number] (NS) / [phone] ([facility]) : Dr. Mao    INDICATION: hypoxemic respiratory failure    PROCEDURE:  [x] LIMITED ECHO  [x] LIMITED CHEST  [ ] LIMITED RETROPERITONEAL  [ ] LIMITED ABDOMINAL  [ ] LIMITED DVT  [ ] NEEDLE GUIDANCE VASCULAR  [ ] NEEDLE GUIDANCE THORACENTESIS  [ ] NEEDLE GUIDANCE PARACENTESIS  [ ] NEEDLE GUIDANCE PERICARDIOCENTESIS  [ ] OTHER    FINDINGS:  Scattered B lines in b/l anterior lung fields with lung sliding  Diffuse B lines at lung bases b/l without pleural effusion  Grossly normal LV and RV systolic function  Normal RV:LV size ratio  IVC 2 cm    INTERPRETATION:  Diffuse pulmonary infiltrates, worse at the bases  No pneumothorax, no pleural effusion  Grossly normal cardiac function  IVC 2 cm    Tod Mao PGY-4  Pulmonary/Critical Care Fellow  Pager: [phone] ([facility]) [phone] (NS)  Pulmonary Spectra #[id number] (NS) / [id number] (J)    Images saved to [facility]    Attending Attestation:  Agree with above. I was present for the entire procedure and have reviewed all images.

## 2020-03-20 NOTE — PROGRESS NOTE ADULT - ASSESSMENT
A/P:  60 y/o female with no PMHx admitted for hypoxia in setting of confirmed COVID PNA, transferred to MICU for intubation in setting of hypoxic respiratory failure     Neuro  -Sedated, on propofol  -also on versed and nimbex    Cardiac  -on levophed 0.05    Pulm  -Currently intubated 2/2 hypoxic respiratory failure in setting of COVID-19 (tx as below)  -CXR demonstrates b/l patchy opacities  -pronation as tolerated  -send IL-6 and IL-8    GI  -no active issues    Renal  -no active issues    ID  -COVID-19 (+) 3/16/20  -started on Vit C/thiamine/HCQ protocol on 3/19/20  -was originally being covered for CAP with azithromycin and CTX  -narrowed to CTX given negative legionella  -other infectious w/u (RVP, BCx(3/16 , 3/18), legionella, UA) negative    Endo  -no active issues    Heme  -no active issues    PPx: HSQ; Protonix;   FEN: none; replete electrolytes PRN; NPO  Code status: Full      Dispo: c/w care on ICU  Access/Lines: left central line in place. IO on right femur A/P:  60 y/o female with no PMHx admitted for hypoxia in setting of confirmed COVID PNA, transferred to MICU for intubation in setting of hypoxic respiratory failure     Neuro  -Sedated, on propofol  -also on versed and nimbex    Cardiac  -on levophed   - MAP goal > 65     Pulm  -Currently intubated 2/2 hypoxic respiratory failure in setting of COVID-19 (tx as below)  -CXR demonstrates b/l patchy opacities  -pronation as tolerated  -send IL-6 and IL-8    GI  -no active issues    Renal  -no active issues    ID  -COVID-19 (+) 3/16/20  -started on Vit C/thiamine/HCQ protocol on 3/19/20  -was originally being covered for CAP with azithromycin and CTX  -narrowed to CTX given negative legionella  - Restarted on Azithromycin and Pepcid 20mg BID   -other infectious w/u (RVP, BCx(3/16 , 3/18), legionella, UA) negative    Endo  -no active issues    Heme  -no active issues    DVT PPx: HSQ

## 2020-03-20 NOTE — CHART NOTE - NSCHARTNOTEFT_GEN_A_CORE
Writer was notified by RN that patient was desating  and tachycardic while on tummy time on NRB mask to high 80's.  Repeated ABG was 7.48/32/84/26/97% from prior 7.50/32/51/26/88%.      RRT was called for intubation as patient was already made aware of the care plan.  Patient will transferred to SICU.  All labs ordered: CBC, ABG, Coags, CHEM, Mag and Phos.  pending results.

## 2020-03-21 DIAGNOSIS — N17.9 ACUTE KIDNEY FAILURE, UNSPECIFIED: ICD-10-CM

## 2020-03-21 LAB
ALBUMIN SERPL ELPH-MCNC: 2.5 G/DL — LOW (ref 3.3–5)
ALP SERPL-CCNC: 86 U/L — SIGNIFICANT CHANGE UP (ref 40–120)
ALT FLD-CCNC: 64 U/L — HIGH (ref 4–33)
ANION GAP SERPL CALC-SCNC: 13 MMO/L — SIGNIFICANT CHANGE UP (ref 7–14)
ANION GAP SERPL CALC-SCNC: 14 MMO/L — SIGNIFICANT CHANGE UP (ref 7–14)
APTT BLD: 29 SEC — SIGNIFICANT CHANGE UP (ref 27.5–36.3)
AST SERPL-CCNC: 37 U/L — HIGH (ref 4–32)
BASE EXCESS BLDA CALC-SCNC: -3.9 MMOL/L — SIGNIFICANT CHANGE UP
BASE EXCESS BLDA CALC-SCNC: -5 MMOL/L — SIGNIFICANT CHANGE UP
BASE EXCESS BLDA CALC-SCNC: -6.5 MMOL/L — SIGNIFICANT CHANGE UP
BASE EXCESS BLDA CALC-SCNC: -6.6 MMOL/L — SIGNIFICANT CHANGE UP
BASE EXCESS BLDA CALC-SCNC: -7.2 MMOL/L — SIGNIFICANT CHANGE UP
BASOPHILS # BLD AUTO: 0.04 K/UL — SIGNIFICANT CHANGE UP (ref 0–0.2)
BASOPHILS NFR BLD AUTO: 0.3 % — SIGNIFICANT CHANGE UP (ref 0–2)
BILIRUB SERPL-MCNC: 0.4 MG/DL — SIGNIFICANT CHANGE UP (ref 0.2–1.2)
BLOOD GAS ARTERIAL - FIO2: 30 — SIGNIFICANT CHANGE UP
BLOOD GAS ARTERIAL - FIO2: 40 — SIGNIFICANT CHANGE UP
BUN SERPL-MCNC: 19 MG/DL — SIGNIFICANT CHANGE UP (ref 7–23)
BUN SERPL-MCNC: 21 MG/DL — SIGNIFICANT CHANGE UP (ref 7–23)
CA-I BLDA-SCNC: 1.16 MMOL/L — SIGNIFICANT CHANGE UP (ref 1.15–1.29)
CA-I BLDA-SCNC: 1.24 MMOL/L — SIGNIFICANT CHANGE UP (ref 1.15–1.29)
CA-I BLDA-SCNC: 1.26 MMOL/L — SIGNIFICANT CHANGE UP (ref 1.15–1.29)
CA-I BLDA-SCNC: 1.31 MMOL/L — HIGH (ref 1.15–1.29)
CALCIUM SERPL-MCNC: 8.7 MG/DL — SIGNIFICANT CHANGE UP (ref 8.4–10.5)
CALCIUM SERPL-MCNC: 9.4 MG/DL — SIGNIFICANT CHANGE UP (ref 8.4–10.5)
CHLORIDE BLDA-SCNC: 108 MMOL/L — SIGNIFICANT CHANGE UP (ref 96–108)
CHLORIDE SERPL-SCNC: 100 MMOL/L — SIGNIFICANT CHANGE UP (ref 98–107)
CHLORIDE SERPL-SCNC: 100 MMOL/L — SIGNIFICANT CHANGE UP (ref 98–107)
CK SERPL-CCNC: 51 U/L — SIGNIFICANT CHANGE UP (ref 25–170)
CO2 SERPL-SCNC: 21 MMOL/L — LOW (ref 22–31)
CO2 SERPL-SCNC: 21 MMOL/L — LOW (ref 22–31)
CREAT SERPL-MCNC: 2.67 MG/DL — HIGH (ref 0.5–1.3)
CREAT SERPL-MCNC: 3.07 MG/DL — HIGH (ref 0.5–1.3)
CRP SERPL-MCNC: 567.3 MG/L — HIGH
CULTURE RESULTS: SIGNIFICANT CHANGE UP
D DIMER BLD IA.RAPID-MCNC: 2230 NG/ML — SIGNIFICANT CHANGE UP
EOSINOPHIL # BLD AUTO: 0.01 K/UL — SIGNIFICANT CHANGE UP (ref 0–0.5)
EOSINOPHIL NFR BLD AUTO: 0.1 % — SIGNIFICANT CHANGE UP (ref 0–6)
ERYTHROCYTE [SEDIMENTATION RATE] IN BLOOD: 91 MM/HR — HIGH (ref 4–25)
FERRITIN SERPL-MCNC: 1819 NG/ML — HIGH (ref 15–150)
GLUCOSE BLDA-MCNC: 100 MG/DL — HIGH (ref 70–99)
GLUCOSE BLDA-MCNC: 103 MG/DL — HIGH (ref 70–99)
GLUCOSE BLDA-MCNC: 108 MG/DL — HIGH (ref 70–99)
GLUCOSE BLDA-MCNC: 116 MG/DL — HIGH (ref 70–99)
GLUCOSE BLDA-MCNC: 84 MG/DL — SIGNIFICANT CHANGE UP (ref 70–99)
GLUCOSE SERPL-MCNC: 128 MG/DL — HIGH (ref 70–99)
GLUCOSE SERPL-MCNC: 87 MG/DL — SIGNIFICANT CHANGE UP (ref 70–99)
HCO3 BLDA-SCNC: 18 MMOL/L — LOW (ref 22–26)
HCO3 BLDA-SCNC: 18 MMOL/L — LOW (ref 22–26)
HCO3 BLDA-SCNC: 19 MMOL/L — LOW (ref 22–26)
HCO3 BLDA-SCNC: 19 MMOL/L — LOW (ref 22–26)
HCO3 BLDA-SCNC: 20 MMOL/L — LOW (ref 22–26)
HCT VFR BLD CALC: 34.5 % — SIGNIFICANT CHANGE UP (ref 34.5–45)
HCT VFR BLDA CALC: 29.5 % — LOW (ref 34.5–46.5)
HCT VFR BLDA CALC: 29.7 % — LOW (ref 34.5–46.5)
HCT VFR BLDA CALC: 31.5 % — LOW (ref 34.5–46.5)
HCT VFR BLDA CALC: 32.9 % — LOW (ref 34.5–46.5)
HCT VFR BLDA CALC: 33.8 % — LOW (ref 34.5–46.5)
HGB BLD-MCNC: 10.7 G/DL — LOW (ref 11.5–15.5)
HGB BLDA-MCNC: 10.2 G/DL — LOW (ref 11.5–15.5)
HGB BLDA-MCNC: 10.7 G/DL — LOW (ref 11.5–15.5)
HGB BLDA-MCNC: 10.9 G/DL — LOW (ref 11.5–15.5)
HGB BLDA-MCNC: 9.5 G/DL — LOW (ref 11.5–15.5)
HGB BLDA-MCNC: 9.6 G/DL — LOW (ref 11.5–15.5)
IMM GRANULOCYTES NFR BLD AUTO: 3 % — HIGH (ref 0–1.5)
INR BLD: 1.03 — SIGNIFICANT CHANGE UP (ref 0.88–1.17)
LACTATE BLDA-SCNC: 1 MMOL/L — SIGNIFICANT CHANGE UP (ref 0.5–2)
LACTATE BLDA-SCNC: 1 MMOL/L — SIGNIFICANT CHANGE UP (ref 0.5–2)
LACTATE BLDA-SCNC: 1.1 MMOL/L — SIGNIFICANT CHANGE UP (ref 0.5–2)
LACTATE BLDA-SCNC: 1.3 MMOL/L — SIGNIFICANT CHANGE UP (ref 0.5–2)
LACTATE BLDA-SCNC: 1.3 MMOL/L — SIGNIFICANT CHANGE UP (ref 0.5–2)
LDH SERPL L TO P-CCNC: 469 U/L — HIGH (ref 135–225)
LYMPHOCYTES # BLD AUTO: 1.33 K/UL — SIGNIFICANT CHANGE UP (ref 1–3.3)
LYMPHOCYTES # BLD AUTO: 9.6 % — LOW (ref 13–44)
MAGNESIUM SERPL-MCNC: 2.4 MG/DL — SIGNIFICANT CHANGE UP (ref 1.6–2.6)
MAGNESIUM SERPL-MCNC: 2.7 MG/DL — HIGH (ref 1.6–2.6)
MCHC RBC-ENTMCNC: 27.2 PG — SIGNIFICANT CHANGE UP (ref 27–34)
MCHC RBC-ENTMCNC: 31 % — LOW (ref 32–36)
MCV RBC AUTO: 87.8 FL — SIGNIFICANT CHANGE UP (ref 80–100)
MONOCYTES # BLD AUTO: 0.56 K/UL — SIGNIFICANT CHANGE UP (ref 0–0.9)
MONOCYTES NFR BLD AUTO: 4.1 % — SIGNIFICANT CHANGE UP (ref 2–14)
NEUTROPHILS # BLD AUTO: 11.47 K/UL — HIGH (ref 1.8–7.4)
NEUTROPHILS NFR BLD AUTO: 82.9 % — HIGH (ref 43–77)
NRBC # FLD: 0.03 K/UL — SIGNIFICANT CHANGE UP (ref 0–0)
PCO2 BLDA: 44 MMHG — SIGNIFICANT CHANGE UP (ref 32–48)
PCO2 BLDA: 55 MMHG — HIGH (ref 32–48)
PCO2 BLDA: 58 MMHG — HIGH (ref 32–48)
PCO2 BLDA: 62 MMHG — HIGH (ref 32–48)
PCO2 BLDA: 65 MMHG — HIGH (ref 32–48)
PH BLDA: 7.13 PH — CRITICAL LOW (ref 7.35–7.45)
PH BLDA: 7.18 PH — CRITICAL LOW (ref 7.35–7.45)
PH BLDA: 7.18 PH — CRITICAL LOW (ref 7.35–7.45)
PH BLDA: 7.24 PH — LOW (ref 7.35–7.45)
PH BLDA: 7.26 PH — LOW (ref 7.35–7.45)
PHOSPHATE SERPL-MCNC: 6.3 MG/DL — HIGH (ref 2.5–4.5)
PHOSPHATE SERPL-MCNC: 6.8 MG/DL — HIGH (ref 2.5–4.5)
PLATELET # BLD AUTO: 324 K/UL — SIGNIFICANT CHANGE UP (ref 150–400)
PMV BLD: 11.1 FL — SIGNIFICANT CHANGE UP (ref 7–13)
PO2 BLDA: 53 MMHG — LOW (ref 83–108)
PO2 BLDA: 71 MMHG — LOW (ref 83–108)
PO2 BLDA: 72 MMHG — LOW (ref 83–108)
PO2 BLDA: 81 MMHG — LOW (ref 83–108)
PO2 BLDA: 84 MMHG — SIGNIFICANT CHANGE UP (ref 83–108)
POTASSIUM BLDA-SCNC: 3.2 MMOL/L — LOW (ref 3.4–4.5)
POTASSIUM BLDA-SCNC: 3.9 MMOL/L — SIGNIFICANT CHANGE UP (ref 3.4–4.5)
POTASSIUM BLDA-SCNC: 3.9 MMOL/L — SIGNIFICANT CHANGE UP (ref 3.4–4.5)
POTASSIUM BLDA-SCNC: 4 MMOL/L — SIGNIFICANT CHANGE UP (ref 3.4–4.5)
POTASSIUM BLDA-SCNC: 4.1 MMOL/L — SIGNIFICANT CHANGE UP (ref 3.4–4.5)
POTASSIUM SERPL-MCNC: 4 MMOL/L — SIGNIFICANT CHANGE UP (ref 3.5–5.3)
POTASSIUM SERPL-MCNC: 4.4 MMOL/L — SIGNIFICANT CHANGE UP (ref 3.5–5.3)
POTASSIUM SERPL-SCNC: 4 MMOL/L — SIGNIFICANT CHANGE UP (ref 3.5–5.3)
POTASSIUM SERPL-SCNC: 4.4 MMOL/L — SIGNIFICANT CHANGE UP (ref 3.5–5.3)
PROT SERPL-MCNC: 6.9 G/DL — SIGNIFICANT CHANGE UP (ref 6–8.3)
PROTHROM AB SERPL-ACNC: 11.8 SEC — SIGNIFICANT CHANGE UP (ref 9.8–13.1)
RBC # BLD: 3.93 M/UL — SIGNIFICANT CHANGE UP (ref 3.8–5.2)
RBC # FLD: 16.6 % — HIGH (ref 10.3–14.5)
SAO2 % BLDA: 87.4 % — LOW (ref 95–99)
SAO2 % BLDA: 93 % — LOW (ref 95–99)
SAO2 % BLDA: 93.6 % — LOW (ref 95–99)
SAO2 % BLDA: 94.6 % — LOW (ref 95–99)
SAO2 % BLDA: 95.8 % — SIGNIFICANT CHANGE UP (ref 95–99)
SODIUM BLDA-SCNC: 133 MMOL/L — LOW (ref 136–146)
SODIUM BLDA-SCNC: 134 MMOL/L — LOW (ref 136–146)
SODIUM BLDA-SCNC: 134 MMOL/L — LOW (ref 136–146)
SODIUM BLDA-SCNC: 136 MMOL/L — SIGNIFICANT CHANGE UP (ref 136–146)
SODIUM BLDA-SCNC: 137 MMOL/L — SIGNIFICANT CHANGE UP (ref 136–146)
SODIUM SERPL-SCNC: 134 MMOL/L — LOW (ref 135–145)
SODIUM SERPL-SCNC: 135 MMOL/L — SIGNIFICANT CHANGE UP (ref 135–145)
SPECIMEN SOURCE: SIGNIFICANT CHANGE UP
TRIGL SERPL-MCNC: 318 MG/DL — HIGH (ref 10–149)
TROPONIN T, HIGH SENSITIVITY: 16 NG/L — SIGNIFICANT CHANGE UP (ref ?–14)
WBC # BLD: 13.82 K/UL — HIGH (ref 3.8–10.5)
WBC # FLD AUTO: 13.82 K/UL — HIGH (ref 3.8–10.5)

## 2020-03-21 PROCEDURE — 99291 CRITICAL CARE FIRST HOUR: CPT

## 2020-03-21 RX ORDER — DEXTROSE 50 % IN WATER 50 %
50 SYRINGE (ML) INTRAVENOUS ONCE
Refills: 0 | Status: COMPLETED | OUTPATIENT
Start: 2020-03-21 | End: 2020-03-21

## 2020-03-21 RX ORDER — SODIUM CHLORIDE 9 MG/ML
1000 INJECTION, SOLUTION INTRAVENOUS ONCE
Refills: 0 | Status: COMPLETED | OUTPATIENT
Start: 2020-03-21 | End: 2020-03-21

## 2020-03-21 RX ORDER — HEPARIN SODIUM 5000 [USP'U]/ML
5000 INJECTION INTRAVENOUS; SUBCUTANEOUS EVERY 8 HOURS
Refills: 0 | Status: DISCONTINUED | OUTPATIENT
Start: 2020-03-21 | End: 2020-03-24

## 2020-03-21 RX ORDER — PROPOFOL 10 MG/ML
20 INJECTION, EMULSION INTRAVENOUS
Qty: 1000 | Refills: 0 | Status: DISCONTINUED | OUTPATIENT
Start: 2020-03-21 | End: 2020-04-03

## 2020-03-21 RX ORDER — FAMOTIDINE 10 MG/ML
20 INJECTION INTRAVENOUS DAILY
Refills: 0 | Status: DISCONTINUED | OUTPATIENT
Start: 2020-03-22 | End: 2020-04-19

## 2020-03-21 RX ORDER — FAMOTIDINE 10 MG/ML
20 INJECTION INTRAVENOUS
Refills: 0 | Status: DISCONTINUED | OUTPATIENT
Start: 2020-03-21 | End: 2020-03-21

## 2020-03-21 RX ADMIN — Medication 103 MILLIGRAM(S): at 12:46

## 2020-03-21 RX ADMIN — FENTANYL CITRATE 3.61 MICROGRAM(S)/KG/HR: 50 INJECTION INTRAVENOUS at 08:07

## 2020-03-21 RX ADMIN — PROPOFOL 6.49 MICROGRAM(S)/KG/MIN: 10 INJECTION, EMULSION INTRAVENOUS at 08:07

## 2020-03-21 RX ADMIN — FENTANYL CITRATE 3.61 MICROGRAM(S)/KG/HR: 50 INJECTION INTRAVENOUS at 23:40

## 2020-03-21 RX ADMIN — HEPARIN SODIUM 5000 UNIT(S): 5000 INJECTION INTRAVENOUS; SUBCUTANEOUS at 23:39

## 2020-03-21 RX ADMIN — MIDAZOLAM HYDROCHLORIDE 1.44 MG/KG/HR: 1 INJECTION, SOLUTION INTRAMUSCULAR; INTRAVENOUS at 08:07

## 2020-03-21 RX ADMIN — CHLORHEXIDINE GLUCONATE 1 APPLICATION(S): 213 SOLUTION TOPICAL at 06:49

## 2020-03-21 RX ADMIN — Medication 103 MILLIGRAM(S): at 01:00

## 2020-03-21 RX ADMIN — Medication 200 MILLIGRAM(S): at 11:42

## 2020-03-21 RX ADMIN — AZITHROMYCIN 500 MILLIGRAM(S): 500 TABLET, FILM COATED ORAL at 11:42

## 2020-03-21 RX ADMIN — Medication 103 MILLIGRAM(S): at 18:27

## 2020-03-21 RX ADMIN — SODIUM CHLORIDE 1000 MILLILITER(S): 9 INJECTION, SOLUTION INTRAVENOUS at 11:36

## 2020-03-21 RX ADMIN — SODIUM CHLORIDE 1000 MILLILITER(S): 9 INJECTION, SOLUTION INTRAVENOUS at 09:45

## 2020-03-21 RX ADMIN — CISATRACURIUM BESYLATE 8.66 MICROGRAM(S)/KG/MIN: 2 INJECTION INTRAVENOUS at 08:08

## 2020-03-21 RX ADMIN — MIDAZOLAM HYDROCHLORIDE 1.44 MG/KG/HR: 1 INJECTION, SOLUTION INTRAMUSCULAR; INTRAVENOUS at 23:39

## 2020-03-21 RX ADMIN — Medication 102 MILLIGRAM(S): at 17:51

## 2020-03-21 RX ADMIN — Medication 102 MILLIGRAM(S): at 06:48

## 2020-03-21 RX ADMIN — PROPOFOL 8.66 MICROGRAM(S)/KG/MIN: 10 INJECTION, EMULSION INTRAVENOUS at 23:40

## 2020-03-21 RX ADMIN — Medication 103 MILLIGRAM(S): at 23:39

## 2020-03-21 RX ADMIN — FAMOTIDINE 104 MILLIGRAM(S): 10 INJECTION INTRAVENOUS at 06:51

## 2020-03-21 RX ADMIN — CISATRACURIUM BESYLATE 8.66 MICROGRAM(S)/KG/MIN: 2 INJECTION INTRAVENOUS at 23:40

## 2020-03-21 RX ADMIN — Medication 200 MILLIGRAM(S): at 01:40

## 2020-03-21 RX ADMIN — Medication 650 MILLIGRAM(S): at 00:30

## 2020-03-21 RX ADMIN — Medication 3.38 MICROGRAM(S)/KG/MIN: at 23:40

## 2020-03-21 RX ADMIN — Medication 50 MILLILITER(S): at 18:33

## 2020-03-21 RX ADMIN — POLYETHYLENE GLYCOL 3350 17 GRAM(S): 17 POWDER, FOR SOLUTION ORAL at 11:45

## 2020-03-21 RX ADMIN — Medication 650 MILLIGRAM(S): at 23:45

## 2020-03-21 RX ADMIN — CHLORHEXIDINE GLUCONATE 1 APPLICATION(S): 213 SOLUTION TOPICAL at 01:38

## 2020-03-21 RX ADMIN — CHLORHEXIDINE GLUCONATE 15 MILLILITER(S): 213 SOLUTION TOPICAL at 18:27

## 2020-03-21 RX ADMIN — Medication 200 MILLIGRAM(S): at 23:39

## 2020-03-21 RX ADMIN — Medication 103 MILLIGRAM(S): at 06:49

## 2020-03-21 RX ADMIN — CHLORHEXIDINE GLUCONATE 15 MILLILITER(S): 213 SOLUTION TOPICAL at 06:48

## 2020-03-21 RX ADMIN — Medication 3.38 MICROGRAM(S)/KG/MIN: at 08:07

## 2020-03-21 RX ADMIN — HEPARIN SODIUM 5000 UNIT(S): 5000 INJECTION INTRAVENOUS; SUBCUTANEOUS at 13:42

## 2020-03-21 RX ADMIN — CEFTRIAXONE 100 MILLIGRAM(S): 500 INJECTION, POWDER, FOR SOLUTION INTRAMUSCULAR; INTRAVENOUS at 06:49

## 2020-03-21 NOTE — DIETITIAN INITIAL EVALUATION ADULT. - PERTINENT LABORATORY DATA
3/21/2020  WBC 13.82 HIGH, Cr. 2.67 high, H/H 10.7/34.5 low, [id number] high, Phos. 6.8 high, Triglycerides  318 high

## 2020-03-21 NOTE — PROGRESS NOTE ADULT - SUBJECTIVE AND OBJECTIVE BOX
INTERVAL HPI/OVERNIGHT EVENTS:  - Febrile 101.7 overnight received Tylenol x 2  - Attempted to prone but tachycardic and hypotensive so did not tolerate  - Gave a bolus of propofol     SUBJECTIVE: Patient seen and examined at bedside.     ROS: unable to obtain due to intubation and sedation     OBJECTIVE:    VITAL SIGNS:  ICU Vital Signs Last 24 Hrs  T(C): 34.9 (21 Mar 2020 08:00), Max: 38.8 (20 Mar 2020 20:00)  T(F): 94.9 (21 Mar 2020 08:00), Max: 101.8 (20 Mar 2020 20:00)  HR: 90 (21 Mar 2020 10:46) (90 - 119)  BP: 117/74 (21 Mar 2020 10:00) (89/56 - 117/74)  BP(mean): 83 (21 Mar 2020 10:00) (65 - 85)  ABP: 139/69 (21 Mar 2020 10:00) (99/48 - 139/69)  ABP(mean): 97 (21 Mar 2020 10:00) (65 - 97)  RR: 24 (21 Mar 2020 10:00) (18 - 29)  SpO2: 96% (21 Mar 2020 10:46) (92% - 100%)    Mode: AC/ CMV (Assist Control/ Continuous Mandatory Ventilation), RR (machine): 28, TV (machine): 300, FiO2: 30, PEEP: 12, ITime: 1, MAP: 18, PIP: 28    03-20 @ 07:01 - 03-21 @ 07:00  --------------------------------------------------------  IN: 1570.3 mL / OUT: 745 mL / NET: 825.3 mL    03-21 @ 07:01 - 03-21 @ 10:51  --------------------------------------------------------  IN: 1210.6 mL / OUT: 55 mL / NET: 1155.6 mL      CAPILLARY BLOOD GLUCOSE      POCT Blood Glucose.: 94 mg/dL (20 Mar 2020 17:40)      PHYSICAL EXAM:  Examined by fellow/attending see below for details     MEDICATIONS:  MEDICATIONS  (STANDING):  ascorbic acid IVPB 1500 milliGRAM(s) IV Intermittent every 6 hours  azithromycin   Tablet 500 milliGRAM(s) Oral daily  cefTRIAXone   IVPB 1000 milliGRAM(s) IV Intermittent every 24 hours  chlorhexidine 0.12% Liquid 15 milliLiter(s) Oral Mucosa every 12 hours  chlorhexidine 4% Liquid 1 Application(s) Topical <User Schedule>  chlorhexidine 4% Liquid 1 Application(s) Topical <User Schedule>  cisatracurium Infusion 2 MICROgram(s)/kG/Min (8.66 mL/Hr) IV Continuous <Continuous>  famotidine  IVPB 20 milliGRAM(s) IV Intermittent two times a day  fentaNYL   Infusion. 0.5 MICROgram(s)/kG/Hr (3.61 mL/Hr) IV Continuous <Continuous>  heparin  Injectable 5000 Unit(s) SubCutaneous every 8 hours  hydroxychloroquine 200 milliGRAM(s) Oral every 12 hours  lactated ringers Bolus 1000 milliLiter(s) IV Bolus once  midazolam Infusion 0.02 mG/kG/Hr (1.44 mL/Hr) IV Continuous <Continuous>  norepinephrine Infusion 0.05 MICROgram(s)/kG/Min (3.38 mL/Hr) IV Continuous <Continuous>  polyethylene glycol 3350 17 Gram(s) Oral daily  propofol Infusion. 15 MICROgram(s)/kG/Min (6.49 mL/Hr) IV Continuous <Continuous>  thiamine IVPB 200 milliGRAM(s) IV Intermittent <User Schedule>    MEDICATIONS  (PRN):  acetaminophen    Suspension .. 650 milliGRAM(s) Oral every 6 hours PRN Temp greater or equal to 38C (100.4F)  guaiFENesin   Syrup  (Sugar-Free) 100 milliGRAM(s) Oral every 6 hours PRN Cough  ondansetron Injectable 4 milliGRAM(s) IV Push every 6 hours PRN Nausea and/or Vomiting  sodium chloride 0.9% lock flush 10 milliLiter(s) IV Push every 1 hour PRN Pre/post blood products, medications, blood draw, and to maintain line patency      ALLERGIES:  Allergies    No Known Allergies    Intolerances        LABS:                        10.7   13.82 )-----------( 324      ( 21 Mar 2020 06:35 )             34.5     03-21    135  |  100  |  19  ----------------------------<  128<H>  4.0   |  21<L>  |  2.67<H>    Ca    9.4      21 Mar 2020 06:35  Phos  6.8     03-21  Mg     2.7     03-21    TPro  6.9  /  Alb  2.5<L>  /  TBili  0.4  /  DBili  x   /  AST  37<H>  /  ALT  64<H>  /  AlkPhos  86  03-21    PT/INR - ( 21 Mar 2020 06:35 )   PT: 11.8 SEC;   INR: 1.03          PTT - ( 21 Mar 2020 06:35 )  PTT:29.0 SEC      RADIOLOGY & ADDITIONAL TESTS: Reviewed.

## 2020-03-21 NOTE — CONSULT NOTE ADULT - SUBJECTIVE AND OBJECTIVE BOX
[facility] DIVISION OF KIDNEY DISEASES AND HYPERTENSION -- [phone]  -- INITIAL CONSULT NOTE  --------------------------------------------------------------------------------  HPI: 59-year-old female with no PMHx admitted for hypoxic respiratory failure. Nephrology consulted for SAM. Pt. was seen and examined at bedside. Found to have confirmed COVID PNA, transferred to MICU for intubation on 3/20/2020. Pt. with no previous history of kidney disease. On lab review of [facility] SCr WNL (0.82) on 3/28/18. Scr stable to 0.84 during current hospitalization (3/15/2020). Scr elevated to 2.61 today (3/21/2020).     Pt. was seen and examined at bedside, intubated, on IV pressor support, sedated. FiO2 30%.    PAST HISTORY  --------------------------------------------------------------------------------  PAST MEDICAL & SURGICAL HISTORY:  No pertinent past medical history  No significant past surgical history    FAMILY HISTORY:  FH: hypertension    PAST SOCIAL HISTORY:  Lives at home    ALLERGIES & MEDICATIONS  --------------------------------------------------------------------------------  Allergies    No Known Allergies    Intolerances      Standing Inpatient Medications  ascorbic acid IVPB 1500 milliGRAM(s) IV Intermittent every 6 hours  azithromycin   Tablet 500 milliGRAM(s) Oral daily  cefTRIAXone   IVPB 1000 milliGRAM(s) IV Intermittent every 24 hours  chlorhexidine 0.12% Liquid 15 milliLiter(s) Oral Mucosa every 12 hours  chlorhexidine 4% Liquid 1 Application(s) Topical <User Schedule>  chlorhexidine 4% Liquid 1 Application(s) Topical <User Schedule>  cisatracurium Infusion 2 MICROgram(s)/kG/Min IV Continuous <Continuous>  famotidine  IVPB 20 milliGRAM(s) IV Intermittent two times a day  fentaNYL   Infusion. 0.5 MICROgram(s)/kG/Hr IV Continuous <Continuous>  heparin  Injectable 5000 Unit(s) SubCutaneous every 8 hours  hydroxychloroquine 200 milliGRAM(s) Oral every 12 hours  midazolam Infusion 0.02 mG/kG/Hr IV Continuous <Continuous>  norepinephrine Infusion 0.05 MICROgram(s)/kG/Min IV Continuous <Continuous>  polyethylene glycol 3350 17 Gram(s) Oral daily  propofol Infusion. 15 MICROgram(s)/kG/Min IV Continuous <Continuous>  thiamine IVPB 200 milliGRAM(s) IV Intermittent <User Schedule>    PRN Inpatient Medications  acetaminophen    Suspension .. 650 milliGRAM(s) Oral every 6 hours PRN  guaiFENesin   Syrup  (Sugar-Free) 100 milliGRAM(s) Oral every 6 hours PRN  ondansetron Injectable 4 milliGRAM(s) IV Push every 6 hours PRN  sodium chloride 0.9% lock flush 10 milliLiter(s) IV Push every 1 hour PRN    REVIEW OF SYSTEMS  --------------------------------------------------------------------------------  Unable to obtain    VITALS/PHYSICAL EXAM  --------------------------------------------------------------------------------  T(C): 35.4 (03-21-20 @ 12:00), Max: 38.8 (03-20-20 @ 20:00)  HR: 89 (03-21-20 @ 12:00) (89 - 119)  BP: 123/77 (03-21-20 @ 12:00) (89/56 - 123/77)  RR: 28 (03-21-20 @ 12:00) (18 - 29)  SpO2: 92% (03-21-20 @ 12:00) (92% - 100%)  Wt(kg): --    03-20-20 @ 07:01  -  03-21-20 @ 07:00  --------------------------------------------------------  IN: 1570.3 mL / OUT: 745 mL / NET: 825.3 mL    03-21-20 @ 07:01  -  03-21-20 @ 13:07  --------------------------------------------------------  IN: 1326 mL / OUT: 120 mL / NET: 1206 mL    Physical Exam:  	Gen: Intubated  	HEENT: +ETT  	Pulm: Coarse breath sounds B/L  	CV: S1S2  	Abd: Soft, +BS   	Ext: No LE edema B/L  	Neuro: Sedated  	Skin: Warm and dry    LABS/STUDIES  --------------------------------------------------------------------------------              10.7   13.82 >-----------<  324      [03-21-20 @ 06:35]              34.5     135  |  100  |  19  ----------------------------<  128      [03-21-20 @ 06:35]  4.0   |  21  |  2.67        Ca     9.4     [03-21-20 @ 06:35]      Mg     2.7     [03-21-20 @ 06:35]      Phos  6.8     [03-21-20 @ 06:35]    TPro  6.9  /  Alb  2.5  /  TBili  0.4  /  DBili  x   /  AST  37  /  ALT  64  /  AlkPhos  86  [03-21-20 @ 06:35]    PT/INR: PT 11.8 , INR 1.03       [03-21-20 @ 06:35]  PTT: 29.0       [03-21-20 @ 06:35]    CK 51      [03-21-20 @ 06:35]        [03-21-20 @ 06:35]    Creatinine Trend:  SCr 2.67 [03-21 @ 06:35]  SCr 0.49 [03-20 @ 04:30]  SCr 0.50 [03-19 @ 05:21]  SCr 0.59 [03-18 @ 05:19]  SCr 0.56 [03-17 @ 05:24]

## 2020-03-21 NOTE — DIETITIAN INITIAL EVALUATION ADULT. - PERTINENT MEDS FT
Miralax, Vit. C, Thiamine, Pepcid, Hydroxychloroquine, Rocephin, Zithromax, Propofol, Versed, Fentanyl, LEVOPHED

## 2020-03-21 NOTE — CONSULT NOTE ADULT - PROBLEM SELECTOR RECOMMENDATION 9
Pt. with SAM in the setting of COVID-19 infection, hypotension. [facility]. Scr stable to 0.84 during current hospitalization (3/15/2020). Scr elevated to 2.61 today (3/21/2020). Pt. with likely ATN. Recommend to continue IVF. Obtain UA, urine electrolytes (Na, K, Cl, urea, Osm, creatinine), Kidney sonogram. Monitor labs and urine output. Avoid any potential nephrotoxins. Dose medications as per eGFR. Pt. with SAM in the setting of COVID-19 infection, hypotension. On lab review of [facility] SCr WNL (0.82) on 3/28/18. Scr 0.84 during current hospitalization (3/15/2020). Scr elevated to 2.61 today (3/21/2020). Pt. with likely ATN. Recommend to continue IVF. Obtain UA, urine electrolytes (Na, K, Cl, urea, Osm, creatinine), Kidney sonogram. Monitor labs and urine output. Avoid any potential nephrotoxins. Dose medications as per eGFR.

## 2020-03-21 NOTE — CONSULT NOTE ADULT - ATTENDING COMMENTS
oliguric ATN.   Fio2 40%   permissive hypercapnia  fluid status ; not hypervolemic. a trial of IVF again to improve UOP.  assessed for Hd and will hold off. ICU team to continue to evaluate and reassess HD needs based on ACid-base and volume

## 2020-03-21 NOTE — DIETITIAN INITIAL EVALUATION ADULT. - PHYSICAL APPEARANCE
overweight/per BMI. No nutrition focussed physical examination considered @ present per hospital guidelines for COVID 19 isolation

## 2020-03-21 NOTE — PROGRESS NOTE ADULT - ASSESSMENT
A/P:  58 y/o female with no PMHx admitted for hypoxia in setting of confirmed COVID PNA, transferred to MICU for intubation in setting of hypoxic respiratory failure     Neuro  -Sedated, on propofol and fentanyl   -also on versed and nimbex    Cardiac  -on levophed increasing requirements   - MAP goal > 65     Pulm  -Currently intubated 2/2 hypoxic respiratory failure in setting of COVID-19 (tx as below)  -CXR demonstrates b/l patchy opacities  - unable to prone acidemic on ABG this AM will change vent settings and repeat ABG     GI  -no active issues    Renal  SAM SCr 0.49 -> >2  with decreased UOP hemodynamically mediated in the setting of sepsis/COVID  - Will attempt to fluid challenge with 1L of LR  - Consult renal   - Check bladder scan     ID  -COVID-19 (+) 3/16/20  -started on Vit C/thiamine/HCQ protocol on 3/19/20  -was originally being covered for CAP with azithromycin and CTX  -narrowed to CTX given negative legionella  - Restarted on Azithromycin and Pepcid 20mg BID   -other infectious w/u (RVP, BCx(3/16 , 3/18), legionella, UA) negative    Endo  -no active issues    Heme  -no active issues    DVT PPx: HSQ

## 2020-03-21 NOTE — DIETITIAN INITIAL EVALUATION ADULT. - OTHER INFO
60 y/o female with no PMHx admitted for hypoxia in setting of confirmed COVID PNA, transferred to MICU for intubation in setting of hypoxic respiratory failure . Pt. remains intubated, sedated , initiated on EN support, was on PO until 3/20/2020. No known food allergies , pt. unable to speak 2/2 intubation .

## 2020-03-22 LAB
ALBUMIN SERPL ELPH-MCNC: 1.9 G/DL — LOW (ref 3.3–5)
ALBUMIN SERPL ELPH-MCNC: 2.8 G/DL — LOW (ref 3.3–5)
ALP SERPL-CCNC: 85 U/L — SIGNIFICANT CHANGE UP (ref 40–120)
ALP SERPL-CCNC: 96 U/L — SIGNIFICANT CHANGE UP (ref 40–120)
ALT FLD-CCNC: 32 U/L — SIGNIFICANT CHANGE UP (ref 4–33)
ALT FLD-CCNC: 44 U/L — HIGH (ref 4–33)
ANION GAP SERPL CALC-SCNC: 14 MMO/L — SIGNIFICANT CHANGE UP (ref 7–14)
ANION GAP SERPL CALC-SCNC: 15 MMO/L — HIGH (ref 7–14)
AST SERPL-CCNC: 21 U/L — SIGNIFICANT CHANGE UP (ref 4–32)
AST SERPL-CCNC: 28 U/L — SIGNIFICANT CHANGE UP (ref 4–32)
BASE EXCESS BLDA CALC-SCNC: -5.3 MMOL/L — SIGNIFICANT CHANGE UP
BASE EXCESS BLDA CALC-SCNC: -5.6 MMOL/L — SIGNIFICANT CHANGE UP
BASOPHILS # BLD AUTO: 0.02 K/UL — SIGNIFICANT CHANGE UP (ref 0–0.2)
BASOPHILS # BLD AUTO: 0.02 K/UL — SIGNIFICANT CHANGE UP (ref 0–0.2)
BASOPHILS NFR BLD AUTO: 0.2 % — SIGNIFICANT CHANGE UP (ref 0–2)
BASOPHILS NFR BLD AUTO: 0.2 % — SIGNIFICANT CHANGE UP (ref 0–2)
BILIRUB SERPL-MCNC: 0.3 MG/DL — SIGNIFICANT CHANGE UP (ref 0.2–1.2)
BILIRUB SERPL-MCNC: 0.4 MG/DL — SIGNIFICANT CHANGE UP (ref 0.2–1.2)
BLOOD GAS ARTERIAL - FIO2: 40 — SIGNIFICANT CHANGE UP
BUN SERPL-MCNC: 22 MG/DL — SIGNIFICANT CHANGE UP (ref 7–23)
BUN SERPL-MCNC: 24 MG/DL — HIGH (ref 7–23)
CALCIUM SERPL-MCNC: 8.8 MG/DL — SIGNIFICANT CHANGE UP (ref 8.4–10.5)
CALCIUM SERPL-MCNC: 9 MG/DL — SIGNIFICANT CHANGE UP (ref 8.4–10.5)
CHLORIDE BLDA-SCNC: 106 MMOL/L — SIGNIFICANT CHANGE UP (ref 96–108)
CHLORIDE SERPL-SCNC: 100 MMOL/L — SIGNIFICANT CHANGE UP (ref 98–107)
CHLORIDE SERPL-SCNC: 100 MMOL/L — SIGNIFICANT CHANGE UP (ref 98–107)
CK SERPL-CCNC: 316 U/L — HIGH (ref 25–170)
CO2 SERPL-SCNC: 18 MMOL/L — LOW (ref 22–31)
CO2 SERPL-SCNC: 18 MMOL/L — LOW (ref 22–31)
CREAT SERPL-MCNC: 3.4 MG/DL — HIGH (ref 0.5–1.3)
CREAT SERPL-MCNC: 4.1 MG/DL — HIGH (ref 0.5–1.3)
CRP SERPL-MCNC: 552.6 MG/L — HIGH
EOSINOPHIL # BLD AUTO: 0.1 K/UL — SIGNIFICANT CHANGE UP (ref 0–0.5)
EOSINOPHIL # BLD AUTO: 0.21 K/UL — SIGNIFICANT CHANGE UP (ref 0–0.5)
EOSINOPHIL NFR BLD AUTO: 0.9 % — SIGNIFICANT CHANGE UP (ref 0–6)
EOSINOPHIL NFR BLD AUTO: 1.6 % — SIGNIFICANT CHANGE UP (ref 0–6)
GLUCOSE BLDA-MCNC: 121 MG/DL — HIGH (ref 70–99)
GLUCOSE BLDA-MCNC: 99 MG/DL — SIGNIFICANT CHANGE UP (ref 70–99)
GLUCOSE SERPL-MCNC: 100 MG/DL — HIGH (ref 70–99)
GLUCOSE SERPL-MCNC: 118 MG/DL — HIGH (ref 70–99)
HCO3 BLDA-SCNC: 19 MMOL/L — LOW (ref 22–26)
HCO3 BLDA-SCNC: 20 MMOL/L — LOW (ref 22–26)
HCT VFR BLD CALC: 25.2 % — LOW (ref 34.5–45)
HCT VFR BLD CALC: 29.6 % — LOW (ref 34.5–45)
HCT VFR BLDA CALC: 26.7 % — LOW (ref 34.5–46.5)
HCT VFR BLDA CALC: 30.7 % — LOW (ref 34.5–46.5)
HGB BLD-MCNC: 8.5 G/DL — LOW (ref 11.5–15.5)
HGB BLD-MCNC: 9.4 G/DL — LOW (ref 11.5–15.5)
HGB BLDA-MCNC: 8.6 G/DL — LOW (ref 11.5–15.5)
HGB BLDA-MCNC: 9.9 G/DL — LOW (ref 11.5–15.5)
IMM GRANULOCYTES NFR BLD AUTO: 3.9 % — HIGH (ref 0–1.5)
IMM GRANULOCYTES NFR BLD AUTO: 4.3 % — HIGH (ref 0–1.5)
LACTATE BLDA-SCNC: 1.4 MMOL/L — SIGNIFICANT CHANGE UP (ref 0.5–2)
LYMPHOCYTES # BLD AUTO: 0.69 K/UL — LOW (ref 1–3.3)
LYMPHOCYTES # BLD AUTO: 0.85 K/UL — LOW (ref 1–3.3)
LYMPHOCYTES # BLD AUTO: 5.4 % — LOW (ref 13–44)
LYMPHOCYTES # BLD AUTO: 7.6 % — LOW (ref 13–44)
MAGNESIUM SERPL-MCNC: 2.3 MG/DL — SIGNIFICANT CHANGE UP (ref 1.6–2.6)
MAGNESIUM SERPL-MCNC: 2.7 MG/DL — HIGH (ref 1.6–2.6)
MCHC RBC-ENTMCNC: 27.2 PG — SIGNIFICANT CHANGE UP (ref 27–34)
MCHC RBC-ENTMCNC: 28.3 PG — SIGNIFICANT CHANGE UP (ref 27–34)
MCHC RBC-ENTMCNC: 31.8 % — LOW (ref 32–36)
MCHC RBC-ENTMCNC: 33.7 % — SIGNIFICANT CHANGE UP (ref 32–36)
MCV RBC AUTO: 84 FL — SIGNIFICANT CHANGE UP (ref 80–100)
MCV RBC AUTO: 85.5 FL — SIGNIFICANT CHANGE UP (ref 80–100)
MONOCYTES # BLD AUTO: 0.63 K/UL — SIGNIFICANT CHANGE UP (ref 0–0.9)
MONOCYTES # BLD AUTO: 0.7 K/UL — SIGNIFICANT CHANGE UP (ref 0–0.9)
MONOCYTES NFR BLD AUTO: 5.4 % — SIGNIFICANT CHANGE UP (ref 2–14)
MONOCYTES NFR BLD AUTO: 5.6 % — SIGNIFICANT CHANGE UP (ref 2–14)
NEUTROPHILS # BLD AUTO: 10.7 K/UL — HIGH (ref 1.8–7.4)
NEUTROPHILS # BLD AUTO: 9.14 K/UL — HIGH (ref 1.8–7.4)
NEUTROPHILS NFR BLD AUTO: 81.8 % — HIGH (ref 43–77)
NEUTROPHILS NFR BLD AUTO: 83.1 % — HIGH (ref 43–77)
NRBC # FLD: 0.02 K/UL — SIGNIFICANT CHANGE UP (ref 0–0)
NRBC # FLD: 0.03 K/UL — SIGNIFICANT CHANGE UP (ref 0–0)
PCO2 BLDA: 45 MMHG — SIGNIFICANT CHANGE UP (ref 32–48)
PCO2 BLDA: 46 MMHG — SIGNIFICANT CHANGE UP (ref 32–48)
PH BLDA: 7.27 PH — LOW (ref 7.35–7.45)
PH BLDA: 7.27 PH — LOW (ref 7.35–7.45)
PHOSPHATE SERPL-MCNC: 4.6 MG/DL — HIGH (ref 2.5–4.5)
PHOSPHATE SERPL-MCNC: 5.6 MG/DL — HIGH (ref 2.5–4.5)
PLATELET # BLD AUTO: 315 K/UL — SIGNIFICANT CHANGE UP (ref 150–400)
PLATELET # BLD AUTO: 341 K/UL — SIGNIFICANT CHANGE UP (ref 150–400)
PMV BLD: 10.6 FL — SIGNIFICANT CHANGE UP (ref 7–13)
PMV BLD: 10.7 FL — SIGNIFICANT CHANGE UP (ref 7–13)
PO2 BLDA: 57 MMHG — LOW (ref 83–108)
PO2 BLDA: 78 MMHG — LOW (ref 83–108)
POTASSIUM BLDA-SCNC: 3.9 MMOL/L — SIGNIFICANT CHANGE UP (ref 3.4–4.5)
POTASSIUM BLDA-SCNC: 4 MMOL/L — SIGNIFICANT CHANGE UP (ref 3.4–4.5)
POTASSIUM SERPL-MCNC: 4.1 MMOL/L — SIGNIFICANT CHANGE UP (ref 3.5–5.3)
POTASSIUM SERPL-MCNC: 4.3 MMOL/L — SIGNIFICANT CHANGE UP (ref 3.5–5.3)
POTASSIUM SERPL-SCNC: 4.1 MMOL/L — SIGNIFICANT CHANGE UP (ref 3.5–5.3)
POTASSIUM SERPL-SCNC: 4.3 MMOL/L — SIGNIFICANT CHANGE UP (ref 3.5–5.3)
PROT SERPL-MCNC: 5.8 G/DL — LOW (ref 6–8.3)
PROT SERPL-MCNC: 5.9 G/DL — LOW (ref 6–8.3)
RBC # BLD: 3 M/UL — LOW (ref 3.8–5.2)
RBC # BLD: 3.46 M/UL — LOW (ref 3.8–5.2)
RBC # FLD: 16.6 % — HIGH (ref 10.3–14.5)
RBC # FLD: 16.9 % — HIGH (ref 10.3–14.5)
SAO2 % BLDA: 88.2 % — LOW (ref 95–99)
SAO2 % BLDA: 95.3 % — SIGNIFICANT CHANGE UP (ref 95–99)
SODIUM BLDA-SCNC: 131 MMOL/L — LOW (ref 136–146)
SODIUM BLDA-SCNC: 134 MMOL/L — LOW (ref 136–146)
SODIUM SERPL-SCNC: 132 MMOL/L — LOW (ref 135–145)
SODIUM SERPL-SCNC: 133 MMOL/L — LOW (ref 135–145)
TRIGL SERPL-MCNC: 214 MG/DL — HIGH (ref 10–149)
WBC # BLD: 11.18 K/UL — HIGH (ref 3.8–10.5)
WBC # BLD: 12.87 K/UL — HIGH (ref 3.8–10.5)
WBC # FLD AUTO: 11.18 K/UL — HIGH (ref 3.8–10.5)
WBC # FLD AUTO: 12.87 K/UL — HIGH (ref 3.8–10.5)

## 2020-03-22 PROCEDURE — 99233 SBSQ HOSP IP/OBS HIGH 50: CPT | Mod: GC

## 2020-03-22 RX ORDER — ALBUMIN HUMAN 25 %
250 VIAL (ML) INTRAVENOUS EVERY 6 HOURS
Refills: 0 | Status: DISCONTINUED | OUTPATIENT
Start: 2020-03-22 | End: 2020-03-22

## 2020-03-22 RX ORDER — ALBUMIN HUMAN 25 %
250 VIAL (ML) INTRAVENOUS ONCE
Refills: 0 | Status: COMPLETED | OUTPATIENT
Start: 2020-03-22 | End: 2020-03-22

## 2020-03-22 RX ORDER — SODIUM CHLORIDE 9 MG/ML
1000 INJECTION, SOLUTION INTRAVENOUS ONCE
Refills: 0 | Status: DISCONTINUED | OUTPATIENT
Start: 2020-03-22 | End: 2020-03-22

## 2020-03-22 RX ORDER — MAGNESIUM SULFATE 500 MG/ML
2 VIAL (ML) INJECTION ONCE
Refills: 0 | Status: COMPLETED | OUTPATIENT
Start: 2020-03-22 | End: 2020-03-22

## 2020-03-22 RX ORDER — ALBUMIN HUMAN 25 %
50 VIAL (ML) INTRAVENOUS EVERY 6 HOURS
Refills: 0 | Status: COMPLETED | OUTPATIENT
Start: 2020-03-22 | End: 2020-03-23

## 2020-03-22 RX ORDER — ALBUMIN HUMAN 25 %
100 VIAL (ML) INTRAVENOUS EVERY 6 HOURS
Refills: 0 | Status: DISCONTINUED | OUTPATIENT
Start: 2020-03-22 | End: 2020-03-22

## 2020-03-22 RX ORDER — BUMETANIDE 0.25 MG/ML
4 INJECTION INTRAMUSCULAR; INTRAVENOUS
Qty: 20 | Refills: 0 | Status: DISCONTINUED | OUTPATIENT
Start: 2020-03-22 | End: 2020-03-24

## 2020-03-22 RX ORDER — SODIUM CHLORIDE 9 MG/ML
1000 INJECTION, SOLUTION INTRAVENOUS ONCE
Refills: 0 | Status: COMPLETED | OUTPATIENT
Start: 2020-03-22 | End: 2020-03-22

## 2020-03-22 RX ORDER — BUMETANIDE 0.25 MG/ML
2 INJECTION INTRAMUSCULAR; INTRAVENOUS
Refills: 0 | Status: DISCONTINUED | OUTPATIENT
Start: 2020-03-22 | End: 2020-03-22

## 2020-03-22 RX ADMIN — CHLORHEXIDINE GLUCONATE 1 APPLICATION(S): 213 SOLUTION TOPICAL at 06:27

## 2020-03-22 RX ADMIN — HEPARIN SODIUM 5000 UNIT(S): 5000 INJECTION INTRAVENOUS; SUBCUTANEOUS at 06:25

## 2020-03-22 RX ADMIN — Medication 50 MILLILITER(S): at 18:48

## 2020-03-22 RX ADMIN — FENTANYL CITRATE 3.61 MICROGRAM(S)/KG/HR: 50 INJECTION INTRAVENOUS at 20:09

## 2020-03-22 RX ADMIN — Medication 50 MILLILITER(S): at 13:41

## 2020-03-22 RX ADMIN — Medication 102 MILLIGRAM(S): at 06:24

## 2020-03-22 RX ADMIN — POLYETHYLENE GLYCOL 3350 17 GRAM(S): 17 POWDER, FOR SOLUTION ORAL at 13:39

## 2020-03-22 RX ADMIN — CHLORHEXIDINE GLUCONATE 15 MILLILITER(S): 213 SOLUTION TOPICAL at 17:33

## 2020-03-22 RX ADMIN — FENTANYL CITRATE 3.61 MICROGRAM(S)/KG/HR: 50 INJECTION INTRAVENOUS at 08:39

## 2020-03-22 RX ADMIN — Medication 3.38 MICROGRAM(S)/KG/MIN: at 20:09

## 2020-03-22 RX ADMIN — CISATRACURIUM BESYLATE 8.66 MICROGRAM(S)/KG/MIN: 2 INJECTION INTRAVENOUS at 08:39

## 2020-03-22 RX ADMIN — Medication 103 MILLIGRAM(S): at 06:51

## 2020-03-22 RX ADMIN — Medication 103 MILLIGRAM(S): at 18:48

## 2020-03-22 RX ADMIN — BUMETANIDE 2 MILLIGRAM(S): 0.25 INJECTION INTRAMUSCULAR; INTRAVENOUS at 13:40

## 2020-03-22 RX ADMIN — MIDAZOLAM HYDROCHLORIDE 1.44 MG/KG/HR: 1 INJECTION, SOLUTION INTRAMUSCULAR; INTRAVENOUS at 08:39

## 2020-03-22 RX ADMIN — Medication 102 MILLIGRAM(S): at 17:33

## 2020-03-22 RX ADMIN — HEPARIN SODIUM 5000 UNIT(S): 5000 INJECTION INTRAVENOUS; SUBCUTANEOUS at 21:17

## 2020-03-22 RX ADMIN — MIDAZOLAM HYDROCHLORIDE 1.44 MG/KG/HR: 1 INJECTION, SOLUTION INTRAMUSCULAR; INTRAVENOUS at 06:32

## 2020-03-22 RX ADMIN — Medication 50 GRAM(S): at 06:38

## 2020-03-22 RX ADMIN — Medication 200 MILLIGRAM(S): at 13:40

## 2020-03-22 RX ADMIN — HEPARIN SODIUM 5000 UNIT(S): 5000 INJECTION INTRAVENOUS; SUBCUTANEOUS at 13:39

## 2020-03-22 RX ADMIN — Medication 103 MILLIGRAM(S): at 23:13

## 2020-03-22 RX ADMIN — CEFTRIAXONE 100 MILLIGRAM(S): 500 INJECTION, POWDER, FOR SOLUTION INTRAMUSCULAR; INTRAVENOUS at 08:00

## 2020-03-22 RX ADMIN — CHLORHEXIDINE GLUCONATE 15 MILLILITER(S): 213 SOLUTION TOPICAL at 06:25

## 2020-03-22 RX ADMIN — Medication 103 MILLIGRAM(S): at 13:41

## 2020-03-22 RX ADMIN — Medication 125 MILLILITER(S): at 06:26

## 2020-03-22 RX ADMIN — SODIUM CHLORIDE 2000 MILLILITER(S): 9 INJECTION, SOLUTION INTRAVENOUS at 07:07

## 2020-03-22 RX ADMIN — Medication 3.38 MICROGRAM(S)/KG/MIN: at 08:38

## 2020-03-22 RX ADMIN — AZITHROMYCIN 500 MILLIGRAM(S): 500 TABLET, FILM COATED ORAL at 13:40

## 2020-03-22 RX ADMIN — PROPOFOL 8.66 MICROGRAM(S)/KG/MIN: 10 INJECTION, EMULSION INTRAVENOUS at 08:39

## 2020-03-22 RX ADMIN — Medication 50 MILLILITER(S): at 23:16

## 2020-03-22 RX ADMIN — PROPOFOL 8.66 MICROGRAM(S)/KG/MIN: 10 INJECTION, EMULSION INTRAVENOUS at 20:09

## 2020-03-22 RX ADMIN — BUMETANIDE 2 MILLIGRAM(S): 0.25 INJECTION INTRAMUSCULAR; INTRAVENOUS at 23:13

## 2020-03-22 RX ADMIN — Medication 200 MILLIGRAM(S): at 23:16

## 2020-03-22 RX ADMIN — MIDAZOLAM HYDROCHLORIDE 1.44 MG/KG/HR: 1 INJECTION, SOLUTION INTRAMUSCULAR; INTRAVENOUS at 20:09

## 2020-03-22 RX ADMIN — FAMOTIDINE 20 MILLIGRAM(S): 10 INJECTION INTRAVENOUS at 13:40

## 2020-03-22 RX ADMIN — CISATRACURIUM BESYLATE 8.66 MICROGRAM(S)/KG/MIN: 2 INJECTION INTRAVENOUS at 20:09

## 2020-03-22 NOTE — PROGRESS NOTE ADULT - ASSESSMENT
A/P:  58 y/o female with no PMHx admitted for hypoxia in setting of confirmed COVID PNA, transferred to MICU for intubation in setting of hypoxic respiratory failure     Neuro  -Sedated, on propofol and fentanyl   -also on versed and nimbex    Cardiac  -on levophed increasing requirements   - MAP goal > 65     Pulm  -Currently intubated 2/2 hypoxic respiratory failure in setting of COVID-19 (tx as below)  -CXR demonstrates b/l patchy opacities  - unable to prone acidemic on ABG this AM will change vent settings and repeat ABG     GI  -no active issues    Renal  SAM SCr 0.49 -> >2  with decreased UOP hemodynamically mediated in the setting of sepsis/COVID  - improved with IVF yesterday, also received albumin 250 cc overnight, will f/u renal recommendations.     ID  -COVID-19 (+) 3/16/20  -started on Vit C/thiamine/HCQ protocol on 3/19/20  -was originally being covered for CAP with azithromycin and CTX  - legionelle negative  - c/w Azithromycin and Pepcid 20mg BID   -other infectious w/u (RVP, BCx(3/16 , 3/18), legionella, UA) negative    Endo  -no active issues    Heme  -no active issues    DVT PPx: HSQ A/P:  60 y/o female with no PMHx admitted for hypoxia in setting of confirmed COVID PNA, transferred to MICU for intubation in setting of hypoxic respiratory failure     Neuro  -Sedated, on propofol and fentanyl   -also on versed and nimbex    Cardiac  -on levophed increasing requirements   - MAP goal > 65     Pulm  -Currently intubated 2/2 hypoxic respiratory failure in setting of COVID-19 (tx as below)  -CXR demonstrates b/l patchy opacities    GI  -no active issues    Renal  SAM SCr 0.49 -> >2--> 3.4 today  with decreased UOP hemodynamically mediated in the setting of sepsis/COVID  - improved with IVF yesterday, also received albumin 250 cc overnight with mild UOP increase, will f/u renal recommendations.   - plan to trial albumin 25% 250 cc q6h for 24 hrs, along with bumex 2 mg BID diuresis, discussed with renal.     ID  -COVID-19 (+) 3/16/20  -started on Vit C/thiamine/HCQ protocol on 3/19/20  -was originally being covered for CAP with azithromycin and CTX  - c/w Azithromycin and Pepcid 20mg BID   -other infectious w/u (RVP, BCx(3/16 , 3/18), legionella, UA) negative    Endo  -no active issues    Heme  -no active issues    DVT PPx: HSQ A/P:  60 y/o female with no PMHx admitted for hypoxia in setting of confirmed COVID PNA, transferred to MICU for intubation in setting of hypoxic respiratory failure     Neuro  -Sedated, on propofol and fentanyl   -also on versed and nimbex    Cardiac  -on levophed increasing requirements   - MAP goal > 65     Pulm  -Currently intubated 2/2 hypoxic respiratory failure in setting of COVID-19 (tx as below)  -CXR demonstrates b/l patchy opacities    GI  -no active issues    Renal  SAM SCr 0.49 -> >2--> 3.4 today  with decreased UOP hemodynamically mediated in the setting of sepsis/COVID  - improved with IVF yesterday, also received albumin 250 cc overnight with mild UOP increase, will f/u renal recommendations.   - plan to trial albumin 25% 50 cc q6h for 24 hrs, along with bumex 2 mg BID diuresis, discussed with renal.   - will likely need HD if this fails.     ID  -COVID-19 (+) 3/16/20  -started on Vit C/thiamine/HCQ protocol on 3/19/20  -was originally being covered for CAP with azithromycin and CTX  - c/w Azithromycin and Pepcid 20mg BID   -other infectious w/u (RVP, BCx(3/16 , 3/18), legionella, UA) negative    Endo  -no active issues    Heme  -no active issues    DVT PPx: HSQ

## 2020-03-22 NOTE — PROGRESS NOTE ADULT - SUBJECTIVE AND OBJECTIVE BOX
CHIEF COMPLAINT:    Interval Events: Yesterday patient received 2L IV fluid bolusess for oliguria. Renal team consulted due to worsening renal function. Bladder scan showed ~100 cc. Albumin also noted to be low at 1.9. Overnight, patient also got 250 cc's of 5% albumin. Vent settings increased as follows: RR increased to 30, and TV increased to 330.     REVIEW OF SYSTEMS:  Constitutional: [ ] negative [ ] fevers [ ] chills [ ] weight loss [ ] weight gain  HEENT: [ ] negative [ ] dry eyes [ ] eye irritation [ ] postnasal drip [ ] nasal congestion  CV: [ ] negative  [ ] chest pain [ ] orthopnea [ ] palpitations [ ] murmur  Resp: [ ] negative [ ] cough [ ] shortness of breath [ ] dyspnea [ ] wheezing [ ] sputum [ ] hemoptysis  GI: [ ] negative [ ] nausea [ ] vomiting [ ] diarrhea [ ] constipation [ ] abd pain [ ] dysphagia   : [ ] negative [ ] dysuria [ ] nocturia [ ] hematuria [ ] increased urinary frequency  Musculoskeletal: [ ] negative [ ] back pain [ ] myalgias [ ] arthralgias [ ] fracture  Skin: [ ] negative [ ] rash [ ] itch  Neurological: [ ] negative [ ] headache [ ] dizziness [ ] syncope [ ] weakness [ ] numbness  Psychiatric: [ ] negative [ ] anxiety [ ] depression  Endocrine: [ ] negative [ ] diabetes [ ] thyroid problem  Hematologic/Lymphatic: [ ] negative [ ] anemia [ ] bleeding problem  Allergic/Immunologic: [ ] negative [ ] itchy eyes [ ] nasal discharge [ ] hives [ ] angioedema  [ ] All other systems negative  [ ] Unable to assess ROS because ________    OBJECTIVE:  ICU Vital Signs Last 24 Hrs  T(C): 36.4 (22 Mar 2020 04:00), Max: 38.2 (21 Mar 2020 23:46)  T(F): 97.5 (22 Mar 2020 04:00), Max: 100.7 (21 Mar 2020 23:46)  HR: 88 (22 Mar 2020 07:00) (88 - 118)  BP: 107/57 (22 Mar 2020 07:00) (94/60 - 123/77)  BP(mean): 69 (22 Mar 2020 07:00) (66 - 89)  ABP: 124/56 (22 Mar 2020 07:00) (93/47 - 143/65)  ABP(mean): 80 (22 Mar 2020 07:00) (62 - 97)  RR: 30 (22 Mar 2020 07:00) (24 - 30)  SpO2: 99% (22 Mar 2020 07:00) (91% - 100%)    Mode: AC/ CMV (Assist Control/ Continuous Mandatory Ventilation), RR (machine): 30, TV (machine): 330, FiO2: 40, PEEP: 12, ITime: 1, MAP: 17.6, PIP: 29    03-21 @ 07:01  -  03-22 @ 07:00  --------------------------------------------------------  IN: 4394.9 mL / OUT: 630 mL / NET: 3764.9 mL      CAPILLARY BLOOD GLUCOSE      POCT Blood Glucose.: 193 mg/dL (21 Mar 2020 18:59)      PHYSICAL EXAM:  General:   HEENT:   Lymph Nodes:  Neck:   Respiratory:   Cardiovascular:   Abdomen:   Extremities:   Skin:   Neurological:  Psychiatry:    LINES:    HOSPITAL MEDICATIONS:  heparin  Injectable 5000 Unit(s) SubCutaneous every 8 hours    azithromycin   Tablet 500 milliGRAM(s) Oral daily  cefTRIAXone   IVPB 1000 milliGRAM(s) IV Intermittent every 24 hours  hydroxychloroquine 200 milliGRAM(s) Oral every 12 hours    norepinephrine Infusion 0.05 MICROgram(s)/kG/Min IV Continuous <Continuous>      guaiFENesin   Syrup  (Sugar-Free) 100 milliGRAM(s) Oral every 6 hours PRN    acetaminophen    Suspension .. 650 milliGRAM(s) Oral every 6 hours PRN  cisatracurium Infusion 2 MICROgram(s)/kG/Min IV Continuous <Continuous>  fentaNYL   Infusion. 0.5 MICROgram(s)/kG/Hr IV Continuous <Continuous>  midazolam Infusion 0.02 mG/kG/Hr IV Continuous <Continuous>  propofol Infusion 20 MICROgram(s)/kG/Min IV Continuous <Continuous>    famotidine Injectable 20 milliGRAM(s) IV Push daily  polyethylene glycol 3350 17 Gram(s) Oral daily        ascorbic acid IVPB 1500 milliGRAM(s) IV Intermittent every 6 hours  sodium chloride 0.9% lock flush 10 milliLiter(s) IV Push every 1 hour PRN  thiamine IVPB 200 milliGRAM(s) IV Intermittent <User Schedule>      chlorhexidine 0.12% Liquid 15 milliLiter(s) Oral Mucosa every 12 hours  chlorhexidine 4% Liquid 1 Application(s) Topical <User Schedule>  chlorhexidine 4% Liquid 1 Application(s) Topical <User Schedule>        LABS:                        9.4    11.18 )-----------( 315      ( 22 Mar 2020 03:39 )             29.6     Hgb Trend: 9.4<--, 10.7<--, 11.4<--, 11.9<--, 12.3<--  03-22    132<L>  |  100  |  22  ----------------------------<  100<H>  4.3   |  18<L>  |  3.40<H>    Ca    9.0      22 Mar 2020 03:39  Phos  5.6     03-22  Mg     2.3     03-22    TPro  5.8<L>  /  Alb  1.9<L>  /  TBili  0.3  /  DBili  x   /  AST  28  /  ALT  44<H>  /  AlkPhos  85  03-22    Creatinine Trend: 3.40<--, 3.07<--, 2.67<--, 0.49<--, 0.50<--, 0.59<--  PT/INR - ( 21 Mar 2020 06:35 )   PT: 11.8 SEC;   INR: 1.03          PTT - ( 21 Mar 2020 06:35 )  PTT:29.0 SEC    Arterial Blood Gas:  03-22 @ 03:39  7.27/45/78/19/95.3/-5.6  ABG lactate: --  Arterial Blood Gas:  03-21 @ 22:35  7.26/44/72/19/94.6/-6.6  ABG lactate: 1.0  Arterial Blood Gas:  03-21 @ 20:42  7.18/58/84/18/95.8/-6.5  ABG lactate: 1.1  Arterial Blood Gas:  03-21 @ 11:50  7.24/55/53/20/87.4/-3.9  ABG lactate: 1.3  Arterial Blood Gas:  03-21 @ 09:00  7.18/62/71/19/93.0/-5.0  ABG lactate: 1.3  Arterial Blood Gas:  03-21 @ 06:35  7.13/65/81/18/93.6/-7.2  ABG lactate: 1.0  Arterial Blood Gas:  03-20 @ 15:29  7.30/45/117/21/97.9/-3.9  ABG lactate: --  Arterial Blood Gas:  03-20 @ 09:06  7.36/36/177/21/99.1/-4.8  ABG lactate: --        MICROBIOLOGY:     RADIOLOGY:  [ ] Reviewed and interpreted by me    EKG: CHIEF COMPLAINT:    Interval Events: Yesterday patient received 2L IV fluid bolusess for oliguria. Renal team consulted due to worsening renal function. Bladder scan showed ~100 cc. Albumin also noted to be low at 1.9. Overnight, patient also got 250 cc's of 5% albumin. Vent settings increased as follows: RR increased to 30, and TV increased to 330.       OBJECTIVE:  ICU Vital Signs Last 24 Hrs  T(C): 36.4 (22 Mar 2020 04:00), Max: 38.2 (21 Mar 2020 23:46)  T(F): 97.5 (22 Mar 2020 04:00), Max: 100.7 (21 Mar 2020 23:46)  HR: 88 (22 Mar 2020 07:00) (88 - 118)  BP: 107/57 (22 Mar 2020 07:00) (94/60 - 123/77)  BP(mean): 69 (22 Mar 2020 07:00) (66 - 89)  ABP: 124/56 (22 Mar 2020 07:00) (93/47 - 143/65)  ABP(mean): 80 (22 Mar 2020 07:00) (62 - 97)  RR: 30 (22 Mar 2020 07:00) (24 - 30)  SpO2: 99% (22 Mar 2020 07:00) (91% - 100%)    Mode: AC/ CMV (Assist Control/ Continuous Mandatory Ventilation), RR (machine): 30, TV (machine): 330, FiO2: 40, PEEP: 12, ITime: 1, MAP: 17.6, PIP: 29    03-21 @ 07:01  -  03-22 @ 07:00  --------------------------------------------------------  IN: 4394.9 mL / OUT: 630 mL / NET: 3764.9 mL      CAPILLARY BLOOD GLUCOSE      POCT Blood Glucose.: 193 mg/dL (21 Mar 2020 18:59)      PHYSICAL EXAM:  please see attending addendum    LINES:    HOSPITAL MEDICATIONS:  heparin  Injectable 5000 Unit(s) SubCutaneous every 8 hours    azithromycin   Tablet 500 milliGRAM(s) Oral daily  cefTRIAXone   IVPB 1000 milliGRAM(s) IV Intermittent every 24 hours  hydroxychloroquine 200 milliGRAM(s) Oral every 12 hours    norepinephrine Infusion 0.05 MICROgram(s)/kG/Min IV Continuous <Continuous>      guaiFENesin   Syrup  (Sugar-Free) 100 milliGRAM(s) Oral every 6 hours PRN    acetaminophen    Suspension .. 650 milliGRAM(s) Oral every 6 hours PRN  cisatracurium Infusion 2 MICROgram(s)/kG/Min IV Continuous <Continuous>  fentaNYL   Infusion. 0.5 MICROgram(s)/kG/Hr IV Continuous <Continuous>  midazolam Infusion 0.02 mG/kG/Hr IV Continuous <Continuous>  propofol Infusion 20 MICROgram(s)/kG/Min IV Continuous <Continuous>    famotidine Injectable 20 milliGRAM(s) IV Push daily  polyethylene glycol 3350 17 Gram(s) Oral daily        ascorbic acid IVPB 1500 milliGRAM(s) IV Intermittent every 6 hours  sodium chloride 0.9% lock flush 10 milliLiter(s) IV Push every 1 hour PRN  thiamine IVPB 200 milliGRAM(s) IV Intermittent <User Schedule>      chlorhexidine 0.12% Liquid 15 milliLiter(s) Oral Mucosa every 12 hours  chlorhexidine 4% Liquid 1 Application(s) Topical <User Schedule>  chlorhexidine 4% Liquid 1 Application(s) Topical <User Schedule>        LABS:                        9.4    11.18 )-----------( 315      ( 22 Mar 2020 03:39 )             29.6     Hgb Trend: 9.4<--, 10.7<--, 11.4<--, 11.9<--, 12.3<--  03-22    132<L>  |  100  |  22  ----------------------------<  100<H>  4.3   |  18<L>  |  3.40<H>    Ca    9.0      22 Mar 2020 03:39  Phos  5.6     03-22  Mg     2.3     03-22    TPro  5.8<L>  /  Alb  1.9<L>  /  TBili  0.3  /  DBili  x   /  AST  28  /  ALT  44<H>  /  AlkPhos  85  03-22    Creatinine Trend: 3.40<--, 3.07<--, 2.67<--, 0.49<--, 0.50<--, 0.59<--  PT/INR - ( 21 Mar 2020 06:35 )   PT: 11.8 SEC;   INR: 1.03          PTT - ( 21 Mar 2020 06:35 )  PTT:29.0 SEC    Arterial Blood Gas:  03-22 @ 03:39  7.27/45/78/19/95.3/-5.6  ABG lactate: --  Arterial Blood Gas:  03-21 @ 22:35  7.26/44/72/19/94.6/-6.6  ABG lactate: 1.0  Arterial Blood Gas:  03-21 @ 20:42  7.18/58/84/18/95.8/-6.5  ABG lactate: 1.1  Arterial Blood Gas:  03-21 @ 11:50  7.24/55/53/20/87.4/-3.9  ABG lactate: 1.3  Arterial Blood Gas:  03-21 @ 09:00  7.18/62/71/19/93.0/-5.0  ABG lactate: 1.3  Arterial Blood Gas:  03-21 @ 06:35  7.13/65/81/18/93.6/-7.2  ABG lactate: 1.0  Arterial Blood Gas:  03-20 @ 15:29  7.30/45/117/21/97.9/-3.9  ABG lactate: --  Arterial Blood Gas:  03-20 @ 09:06  7.36/36/177/21/99.1/-4.8  ABG lactate: --        MICROBIOLOGY:     RADIOLOGY:  [ ] Reviewed and interpreted by me    EKG:

## 2020-03-23 LAB
ANION GAP SERPL CALC-SCNC: 18 MMO/L — HIGH (ref 7–14)
APTT BLD: 31.7 SEC — SIGNIFICANT CHANGE UP (ref 27.5–36.3)
BASE EXCESS BLDA CALC-SCNC: -6.1 MMOL/L — SIGNIFICANT CHANGE UP
BASE EXCESS BLDA CALC-SCNC: -7.6 MMOL/L — SIGNIFICANT CHANGE UP
BASE EXCESS BLDA CALC-SCNC: -9.3 MMOL/L — SIGNIFICANT CHANGE UP
BASE EXCESS BLDA CALC-SCNC: 18 MMOL/L — SIGNIFICANT CHANGE UP
BASOPHILS # BLD AUTO: 0.02 K/UL — SIGNIFICANT CHANGE UP (ref 0–0.2)
BASOPHILS NFR BLD AUTO: 0.2 % — SIGNIFICANT CHANGE UP (ref 0–2)
BLOOD GAS ARTERIAL - FIO2: 40 — SIGNIFICANT CHANGE UP
BUN SERPL-MCNC: 29 MG/DL — HIGH (ref 7–23)
CA-I BLDA-SCNC: 1.27 MMOL/L — SIGNIFICANT CHANGE UP (ref 1.15–1.29)
CALCIUM SERPL-MCNC: 9.1 MG/DL — SIGNIFICANT CHANGE UP (ref 8.4–10.5)
CHLORIDE BLDA-SCNC: 107 MMOL/L — SIGNIFICANT CHANGE UP (ref 96–108)
CHLORIDE BLDA-SCNC: 98 MMOL/L — SIGNIFICANT CHANGE UP (ref 96–108)
CHLORIDE SERPL-SCNC: 99 MMOL/L — SIGNIFICANT CHANGE UP (ref 98–107)
CO2 SERPL-SCNC: 17 MMOL/L — LOW (ref 22–31)
CREAT SERPL-MCNC: 4.6 MG/DL — HIGH (ref 0.5–1.3)
CRP SERPL-MCNC: 492.7 MG/L — HIGH
CULTURE RESULTS: SIGNIFICANT CHANGE UP
CULTURE RESULTS: SIGNIFICANT CHANGE UP
D DIMER BLD IA.RAPID-MCNC: 2250 NG/ML — SIGNIFICANT CHANGE UP
EOSINOPHIL # BLD AUTO: 0.21 K/UL — SIGNIFICANT CHANGE UP (ref 0–0.5)
EOSINOPHIL NFR BLD AUTO: 1.7 % — SIGNIFICANT CHANGE UP (ref 0–6)
ERYTHROCYTE [SEDIMENTATION RATE] IN BLOOD: 91 MM/HR — HIGH (ref 4–25)
FERRITIN SERPL-MCNC: 1161 NG/ML — HIGH (ref 15–150)
FERRITIN SERPL-MCNC: 1199 NG/ML — HIGH (ref 15–150)
GLUCOSE BLDA-MCNC: 120 MG/DL — HIGH (ref 70–99)
GLUCOSE BLDA-MCNC: 121 MG/DL — HIGH (ref 70–99)
GLUCOSE BLDA-MCNC: 131 MG/DL — HIGH (ref 70–99)
GLUCOSE BLDA-MCNC: 133 MG/DL — HIGH (ref 70–99)
GLUCOSE SERPL-MCNC: 126 MG/DL — HIGH (ref 70–99)
HCO3 BLDA-SCNC: 17 MMOL/L — LOW (ref 22–26)
HCO3 BLDA-SCNC: 18 MMOL/L — LOW (ref 22–26)
HCO3 BLDA-SCNC: 19 MMOL/L — LOW (ref 22–26)
HCO3 BLDA-SCNC: 40 MMOL/L — HIGH (ref 22–26)
HCT VFR BLD CALC: 25.2 % — LOW (ref 34.5–45)
HCT VFR BLDA CALC: 25.8 % — LOW (ref 34.5–46.5)
HCT VFR BLDA CALC: 26.4 % — LOW (ref 34.5–46.5)
HCT VFR BLDA CALC: 27.2 % — LOW (ref 34.5–46.5)
HCT VFR BLDA CALC: 27.4 % — LOW (ref 34.5–46.5)
HGB BLD-MCNC: 8.2 G/DL — LOW (ref 11.5–15.5)
HGB BLDA-MCNC: 8.3 G/DL — LOW (ref 11.5–15.5)
HGB BLDA-MCNC: 8.5 G/DL — LOW (ref 11.5–15.5)
HGB BLDA-MCNC: 8.7 G/DL — LOW (ref 11.5–15.5)
HGB BLDA-MCNC: 8.8 G/DL — LOW (ref 11.5–15.5)
IMM GRANULOCYTES NFR BLD AUTO: 4.8 % — HIGH (ref 0–1.5)
INR BLD: 1.01 — SIGNIFICANT CHANGE UP (ref 0.88–1.17)
LACTATE BLDA-SCNC: 1.3 MMOL/L — SIGNIFICANT CHANGE UP (ref 0.5–2)
LACTATE BLDA-SCNC: 1.4 MMOL/L — SIGNIFICANT CHANGE UP (ref 0.5–2)
LACTATE BLDA-SCNC: 1.6 MMOL/L — SIGNIFICANT CHANGE UP (ref 0.5–2)
LDH SERPL L TO P-CCNC: 326 U/L — HIGH (ref 135–225)
LYMPHOCYTES # BLD AUTO: 0.74 K/UL — LOW (ref 1–3.3)
LYMPHOCYTES # BLD AUTO: 5.9 % — LOW (ref 13–44)
MCHC RBC-ENTMCNC: 27.3 PG — SIGNIFICANT CHANGE UP (ref 27–34)
MCHC RBC-ENTMCNC: 32.5 % — SIGNIFICANT CHANGE UP (ref 32–36)
MCV RBC AUTO: 84 FL — SIGNIFICANT CHANGE UP (ref 80–100)
MONOCYTES # BLD AUTO: 0.68 K/UL — SIGNIFICANT CHANGE UP (ref 0–0.9)
MONOCYTES NFR BLD AUTO: 5.4 % — SIGNIFICANT CHANGE UP (ref 2–14)
NEUTROPHILS # BLD AUTO: 10.32 K/UL — HIGH (ref 1.8–7.4)
NEUTROPHILS NFR BLD AUTO: 82 % — HIGH (ref 43–77)
NRBC # FLD: 0.03 K/UL — SIGNIFICANT CHANGE UP (ref 0–0)
PCO2 BLDA: 113 MMHG — CRITICAL HIGH (ref 32–48)
PCO2 BLDA: 45 MMHG — SIGNIFICANT CHANGE UP (ref 32–48)
PCO2 BLDA: 46 MMHG — SIGNIFICANT CHANGE UP (ref 32–48)
PCO2 BLDA: 47 MMHG — SIGNIFICANT CHANGE UP (ref 32–48)
PH BLDA: 7.21 PH — LOW (ref 7.35–7.45)
PH BLDA: 7.22 PH — LOW (ref 7.35–7.45)
PH BLDA: 7.23 PH — LOW (ref 7.35–7.45)
PH BLDA: 7.26 PH — LOW (ref 7.35–7.45)
PLATELET # BLD AUTO: 356 K/UL — SIGNIFICANT CHANGE UP (ref 150–400)
PMV BLD: 10.4 FL — SIGNIFICANT CHANGE UP (ref 7–13)
PO2 BLDA: 58 MMHG — LOW (ref 83–108)
PO2 BLDA: 66 MMHG — LOW (ref 83–108)
PO2 BLDA: 78 MMHG — LOW (ref 83–108)
PO2 BLDA: 98 MMHG — SIGNIFICANT CHANGE UP (ref 83–108)
POTASSIUM BLDA-SCNC: 3.8 MMOL/L — SIGNIFICANT CHANGE UP (ref 3.4–4.5)
POTASSIUM BLDA-SCNC: 3.9 MMOL/L — SIGNIFICANT CHANGE UP (ref 3.4–4.5)
POTASSIUM BLDA-SCNC: 3.9 MMOL/L — SIGNIFICANT CHANGE UP (ref 3.4–4.5)
POTASSIUM BLDA-SCNC: 4.4 MMOL/L — SIGNIFICANT CHANGE UP (ref 3.4–4.5)
POTASSIUM SERPL-MCNC: 4.1 MMOL/L — SIGNIFICANT CHANGE UP (ref 3.5–5.3)
POTASSIUM SERPL-SCNC: 4.1 MMOL/L — SIGNIFICANT CHANGE UP (ref 3.5–5.3)
PROTHROM AB SERPL-ACNC: 11.6 SEC — SIGNIFICANT CHANGE UP (ref 9.8–13.1)
RBC # BLD: 3 M/UL — LOW (ref 3.8–5.2)
RBC # FLD: 16.6 % — HIGH (ref 10.3–14.5)
SAO2 % BLDA: 87.7 % — LOW (ref 95–99)
SAO2 % BLDA: 89.6 % — LOW (ref 95–99)
SAO2 % BLDA: 94.6 % — LOW (ref 95–99)
SAO2 % BLDA: 95.3 % — SIGNIFICANT CHANGE UP (ref 95–99)
SODIUM BLDA-SCNC: 133 MMOL/L — LOW (ref 136–146)
SODIUM BLDA-SCNC: 134 MMOL/L — LOW (ref 136–146)
SODIUM BLDA-SCNC: 134 MMOL/L — LOW (ref 136–146)
SODIUM BLDA-SCNC: 142 MMOL/L — SIGNIFICANT CHANGE UP (ref 136–146)
SODIUM SERPL-SCNC: 134 MMOL/L — LOW (ref 135–145)
SPECIMEN SOURCE: SIGNIFICANT CHANGE UP
SPECIMEN SOURCE: SIGNIFICANT CHANGE UP
TROPONIN T, HIGH SENSITIVITY: 9 NG/L — SIGNIFICANT CHANGE UP (ref ?–14)
WBC # BLD: 12.57 K/UL — HIGH (ref 3.8–10.5)
WBC # FLD AUTO: 12.57 K/UL — HIGH (ref 3.8–10.5)

## 2020-03-23 PROCEDURE — 71045 X-RAY EXAM CHEST 1 VIEW: CPT | Mod: 26

## 2020-03-23 PROCEDURE — 36556 INSERT NON-TUNNEL CV CATH: CPT

## 2020-03-23 PROCEDURE — 99233 SBSQ HOSP IP/OBS HIGH 50: CPT | Mod: GC

## 2020-03-23 PROCEDURE — 99291 CRITICAL CARE FIRST HOUR: CPT

## 2020-03-23 RX ORDER — HEPARIN SODIUM 5000 [USP'U]/ML
300 INJECTION INTRAVENOUS; SUBCUTANEOUS
Qty: 10000 | Refills: 0 | Status: DISCONTINUED | OUTPATIENT
Start: 2020-03-23 | End: 2020-03-24

## 2020-03-23 RX ORDER — MAGNESIUM SULFATE 500 MG/ML
2 VIAL (ML) INJECTION ONCE
Refills: 0 | Status: COMPLETED | OUTPATIENT
Start: 2020-03-23 | End: 2020-03-23

## 2020-03-23 RX ADMIN — CHLORHEXIDINE GLUCONATE 15 MILLILITER(S): 213 SOLUTION TOPICAL at 18:01

## 2020-03-23 RX ADMIN — BUMETANIDE 15 MG/HR: 0.25 INJECTION INTRAMUSCULAR; INTRAVENOUS at 07:41

## 2020-03-23 RX ADMIN — Medication 103 MILLIGRAM(S): at 11:49

## 2020-03-23 RX ADMIN — Medication 50 GRAM(S): at 07:40

## 2020-03-23 RX ADMIN — POLYETHYLENE GLYCOL 3350 17 GRAM(S): 17 POWDER, FOR SOLUTION ORAL at 11:49

## 2020-03-23 RX ADMIN — FENTANYL CITRATE 3.61 MICROGRAM(S)/KG/HR: 50 INJECTION INTRAVENOUS at 07:40

## 2020-03-23 RX ADMIN — CHLORHEXIDINE GLUCONATE 1 APPLICATION(S): 213 SOLUTION TOPICAL at 05:25

## 2020-03-23 RX ADMIN — CISATRACURIUM BESYLATE 8.66 MICROGRAM(S)/KG/MIN: 2 INJECTION INTRAVENOUS at 07:41

## 2020-03-23 RX ADMIN — Medication 103 MILLIGRAM(S): at 05:25

## 2020-03-23 RX ADMIN — PROPOFOL 8.66 MICROGRAM(S)/KG/MIN: 10 INJECTION, EMULSION INTRAVENOUS at 20:58

## 2020-03-23 RX ADMIN — BUMETANIDE 20 MG/HR: 0.25 INJECTION INTRAMUSCULAR; INTRAVENOUS at 20:59

## 2020-03-23 RX ADMIN — FAMOTIDINE 20 MILLIGRAM(S): 10 INJECTION INTRAVENOUS at 11:49

## 2020-03-23 RX ADMIN — MIDAZOLAM HYDROCHLORIDE 1.44 MG/KG/HR: 1 INJECTION, SOLUTION INTRAMUSCULAR; INTRAVENOUS at 20:59

## 2020-03-23 RX ADMIN — PROPOFOL 8.66 MICROGRAM(S)/KG/MIN: 10 INJECTION, EMULSION INTRAVENOUS at 07:40

## 2020-03-23 RX ADMIN — Medication 200 MILLIGRAM(S): at 11:49

## 2020-03-23 RX ADMIN — Medication 650 MILLIGRAM(S): at 22:00

## 2020-03-23 RX ADMIN — Medication 650 MILLIGRAM(S): at 21:00

## 2020-03-23 RX ADMIN — Medication 3.38 MICROGRAM(S)/KG/MIN: at 07:40

## 2020-03-23 RX ADMIN — MIDAZOLAM HYDROCHLORIDE 1.44 MG/KG/HR: 1 INJECTION, SOLUTION INTRAMUSCULAR; INTRAVENOUS at 07:41

## 2020-03-23 RX ADMIN — BUMETANIDE 15 MG/HR: 0.25 INJECTION INTRAMUSCULAR; INTRAVENOUS at 01:01

## 2020-03-23 RX ADMIN — AZITHROMYCIN 500 MILLIGRAM(S): 500 TABLET, FILM COATED ORAL at 11:49

## 2020-03-23 RX ADMIN — Medication 103 MILLIGRAM(S): at 18:01

## 2020-03-23 RX ADMIN — HEPARIN SODIUM 5000 UNIT(S): 5000 INJECTION INTRAVENOUS; SUBCUTANEOUS at 05:25

## 2020-03-23 RX ADMIN — Medication 50 MILLILITER(S): at 05:25

## 2020-03-23 RX ADMIN — HEPARIN SODIUM 5000 UNIT(S): 5000 INJECTION INTRAVENOUS; SUBCUTANEOUS at 13:26

## 2020-03-23 RX ADMIN — FENTANYL CITRATE 3.61 MICROGRAM(S)/KG/HR: 50 INJECTION INTRAVENOUS at 20:59

## 2020-03-23 RX ADMIN — Medication 102 MILLIGRAM(S): at 05:24

## 2020-03-23 RX ADMIN — Medication 3.38 MICROGRAM(S)/KG/MIN: at 20:59

## 2020-03-23 RX ADMIN — CHLORHEXIDINE GLUCONATE 15 MILLILITER(S): 213 SOLUTION TOPICAL at 05:25

## 2020-03-23 NOTE — PROGRESS NOTE ADULT - ASSESSMENT
A/P:  60 y/o female with no PMHx admitted for hypoxia in setting of confirmed COVID PNA, transferred to MICU for intubation in setting of hypoxic respiratory failure     Neuro  -Sedated, on propofol and fentanyl   -also on versed and nimbex    Cardiac  -on levophed increasing requirements   - MAP goal > 65     Pulm  -Currently intubated 2/2 hypoxic respiratory failure in setting of COVID-19 (tx as below)  -CXR demonstrates b/l patchy opacities    GI  -no active issues    Renal  SAM SCr 0.49 -> >2--> 3.4 today  with decreased UOP hemodynamically mediated in the setting of sepsis/COVID  - improved with IVF yesterday, also received albumin 250 cc overnight with mild UOP increase, will f/u renal recommendations.   - plan to trial albumin 25% 50 cc q6h for 24 hrs, along with bumex 2 mg BID diuresis, discussed with renal.   - will likely need HD if this fails.     ID  -COVID-19 (+) 3/16/20  -started on Vit C/thiamine/HCQ protocol on 3/19/20  -was originally being covered for CAP with azithromycin and CTX  - c/w Azithromycin and Pepcid 20mg BID   -other infectious w/u (RVP, BCx(3/16 , 3/18), legionella, UA) negative    Endo  -no active issues    Heme  -no active issues    DVT PPx: HSQ

## 2020-03-23 NOTE — PROGRESS NOTE ADULT - ATTENDING COMMENTS
Agree with above. Patient is critically ill requiring ICU level care. Patient seen on rounds with ICU team this AM. Patient with COVID-19 has had progressive hypoxemic respiratory failure    1. Acute Hypoxemic Respiratory Failure - continue mechanical ventilation. Followup ABG and adjust vent as able. Maintain O2 sat > 90% as able  2. Infectious Disease - continue antibiotics. Will treat with Plaquenil, Vitamin C, Thiamine, and Azithromycin. Will see if patient is eligible for any study drugs (trials or compassionate use)  3. Cardiovascular - continue vasopressors to maintain MAP > 65. Check EKG to monitor QTc. May require magnesium  4. Renal - decreased urine outpt, follow Cr, bolus as necessary for urine outpt, renal consult    Exam  Neuro: intubated/sedated  HEENT: ET in place  Resp: Decreased BS at bases  Cards: S1, S2  Abd: soft/NT  Ext: no edema  Skin: no significant skin breakdown
Agree with above.  Patient seen and examined. Chart reviewed.    59 year old woman acute respiratory failure with hypoxia, ARDS, COVID pneumonia, shock with oliguric renal failure    - sedated and paralyzed for vent coordination, trial off paralytics  - discuss with nephrology possibility of CRRT    critically ill patient multiple bedside rounds with therapeutic interventions through out the day
Agree with above. Patient is critically ill requiring ICU level care. Patient seen on rounds with ICU team this AM. Patient with COVID-19 has had progressive hypoxemic respiratory failure ultimately requiring intubation - after she, and her family, agreed.    1. Acute Hypoxemic Respiratory Failure - continue mechanical ventilation. Followup ABG and adjust vent as able. Maintain O2 sat > 90% as able  2. Infectious Disease - continue antibiotics. Will treat with Plaquenil, Vitamin C, Thiamine, and Azithromycin. Will see if patient is eligible for any study drugs (trials or compassionate use)  3. Cardiovascular - continue vasopressors to maintain MAP > 65. Check EKG to monitor QTc. May require magnesium
Agree with above. Patient is critically ill requiring ICU level care. Patient seen on rounds with ICU team this AM. Patient with COVID-19 has had progressive hypoxemic respiratory failure    1. Acute Hypoxemic Respiratory Failure - continue mechanical ventilation. Followup ABG and adjust vent as able. Maintain O2 sat > 90% as able  2. Infectious Disease - continue antibiotics. Will treat with Plaquenil, Vitamin C, Thiamine, and Azithromycin. Will see if patient is eligible for any study drugs (trials or compassionate use)  3. Cardiovascular - continue vasopressors to maintain MAP > 65. Check EKG to monitor QTc. May require magnesium  4. Renal - decreased urine outpt, follow Cr, albumin/lasix combo, f/u renal recs    Exam  Neuro: intubated/sedated  HEENT: ET in place  Resp: Decreased BS at bases  Cards: S1, S2  Abd: soft/NT  Ext: no edema  Skin: no significant skin breakdown

## 2020-03-23 NOTE — PROGRESS NOTE ADULT - PROBLEM SELECTOR PLAN 1
Pt. with non oliguric SAM in the setting of COVID-19 infection, hypotension. [facility]. Scr 0.84 during current hospitalization (3/15/2020). Scr elevated to 2.61 (3/21/2020) today uptrending to 4.1mg/dl. On bumex IV, may add metolazone to keep pt non oliguric.  Pt. with likely ATN. Obtain UA, urine electrolytes (Na, K, Cl, urea, Osm, creatinine), check C3/C4.  Kidney sonogram when feasible.  No indication for RRT but would monitor closely. Monitor labs and urine output. Avoid any potential nephrotoxins. Dose medications as per eGFR. Pt. with non-oliguric SAM in the setting of hypotension and COVID-19 infection. Pt. with likely ATN. Scr was WNL (0.82) on 3/28/18, increased to 4.1 today. Pt. non-oliguric, maintaining good urine output on IV Bumex. Check C3 and C4 and renal sonogram. Will need to consider RRT if SAM continues to worsen. Monitor labs and urine output. Avoid any potential nephrotoxins. Dose medications as per eGFR

## 2020-03-23 NOTE — PROGRESS NOTE ADULT - SUBJECTIVE AND OBJECTIVE BOX
Interval Events: Patient had a decreased urine output overnight. Started on albumin for albumin assisted diuresis. Bladder scan at 100cc.     OBJECTIVE:  ICU Vital Signs Last 24 Hrs  T(C): 37.8 (23 Mar 2020 12:00), Max: 37.8 (23 Mar 2020 08:00)  T(F): 100 (23 Mar 2020 12:00), Max: 100 (23 Mar 2020 08:00)  HR: 118 (23 Mar 2020 13:00) (103 - 118)  BP: 94/51 (23 Mar 2020 10:00) (94/51 - 123/67)  BP(mean): 61 (23 Mar 2020 10:00) (61 - 79)  ABP: 100/48 (23 Mar 2020 13:00) (97/72 - 128/62)  ABP(mean): 65 (23 Mar 2020 13:00) (64 - 91)  RR: 30 (23 Mar 2020 13:00) (28 - 30)  SpO2: 92% (23 Mar 2020 13:00) (89% - 97%)    Mode: AC/ CMV (Assist Control/ Continuous Mandatory Ventilation), RR (machine): 30, TV (machine): 330, FiO2: 60, PEEP: 12, MAP: 17, PIP: 32    03-22 @ 07:01 - 03-23 @ 07:00  --------------------------------------------------------  IN: 3578.6 mL / OUT: 1705 mL / NET: 1873.6 mL    03-23 @ 07:01 - 03-23 @ 13:13  --------------------------------------------------------  IN: 989.3 mL / OUT: 520 mL / NET: 469.3 mL      CAPILLARY BLOOD GLUCOSE      POCT Blood Glucose.: 193 mg/dL (21 Mar 2020 18:59)        PHYSICAL EXAM:  See attending addendum.    LINES:    HOSPITAL MEDICATIONS:  Standing Meds:  ascorbic acid IVPB 1500 milliGRAM(s) IV Intermittent every 6 hours  azithromycin   Tablet 500 milliGRAM(s) Oral daily  buMETAnide Infusion 3 mG/Hr IV Continuous <Continuous>  chlorhexidine 0.12% Liquid 15 milliLiter(s) Oral Mucosa every 12 hours  chlorhexidine 4% Liquid 1 Application(s) Topical <User Schedule>  famotidine Injectable 20 milliGRAM(s) IV Push daily  fentaNYL   Infusion. 0.5 MICROgram(s)/kG/Hr IV Continuous <Continuous>  heparin  Injectable 5000 Unit(s) SubCutaneous every 8 hours  hydroxychloroquine 200 milliGRAM(s) Oral every 12 hours  metolazone 10 milliGRAM(s) Oral daily  midazolam Infusion 0.02 mG/kG/Hr IV Continuous <Continuous>  norepinephrine Infusion 0.05 MICROgram(s)/kG/Min IV Continuous <Continuous>  polyethylene glycol 3350 17 Gram(s) Oral daily  propofol Infusion 20 MICROgram(s)/kG/Min IV Continuous <Continuous>      PRN Meds:  acetaminophen    Suspension .. 650 milliGRAM(s) Oral every 6 hours PRN  guaiFENesin   Syrup  (Sugar-Free) 100 milliGRAM(s) Oral every 6 hours PRN  sodium chloride 0.9% lock flush 10 milliLiter(s) IV Push every 1 hour PRN      LABS:                        8.2    12.57 )-----------( 356      ( 23 Mar 2020 04:00 )             25.2     Hgb Trend: 8.2<--, 8.5<--, 9.4<--, 10.7<--, 11.4<--  03-22    133<L>  |  100  |  24<H>  ----------------------------<  118<H>  4.1   |  18<L>  |  4.10<H>    Ca    8.8      22 Mar 2020 21:20  Phos  4.6     03-22  Mg     2.7     03-22    TPro  5.9<L>  /  Alb  2.8<L>  /  TBili  0.4  /  DBili  x   /  AST  21  /  ALT  32  /  AlkPhos  96  03-22    Creatinine Trend: 4.10<--, 3.40<--, 3.07<--, 2.67<--, 0.49<--, 0.50<--  PT/INR - ( 23 Mar 2020 04:00 )   PT: 11.6 SEC;   INR: 1.01          PTT - ( 23 Mar 2020 04:00 )  PTT:31.7 SEC    Arterial Blood Gas:  03-23 @ 06:00  7.23/113/78/40/94.6/18.0  ABG lactate: 1.3  Arterial Blood Gas:  03-23 @ 04:00  7.26/46/58/19/89.6/-6.1  ABG lactate: 1.4  Arterial Blood Gas:  03-22 @ 21:20  7.27/46/57/20/88.2/-5.3  ABG lactate: 1.4  Arterial Blood Gas:  03-22 @ 03:39  7.27/45/78/19/95.3/-5.6  ABG lactate: --  Arterial Blood Gas:  03-21 @ 22:35  7.26/44/72/19/94.6/-6.6  ABG lactate: 1.0  Arterial Blood Gas:  03-21 @ 20:42  7.18/58/84/18/95.8/-6.5  ABG lactate: 1.1        MICROBIOLOGY:     RADIOLOGY:  [ ] Reviewed and interpreted by me    EKG: Interval Events: Patient had a decreased urine output overnight. Started on albumin for albumin assisted diuresis. Bladder scan at 100cc.     OBJECTIVE:  ICU Vital Signs Last 24 Hrs  T(C): 37.8 (23 Mar 2020 12:00), Max: 37.8 (23 Mar 2020 08:00)  T(F): 100 (23 Mar 2020 12:00), Max: 100 (23 Mar 2020 08:00)  HR: 118 (23 Mar 2020 13:00) (103 - 118)  BP: 94/51 (23 Mar 2020 10:00) (94/51 - 123/67)  BP(mean): 61 (23 Mar 2020 10:00) (61 - 79)  ABP: 100/48 (23 Mar 2020 13:00) (97/72 - 128/62)  ABP(mean): 65 (23 Mar 2020 13:00) (64 - 91)  RR: 30 (23 Mar 2020 13:00) (28 - 30)  SpO2: 92% (23 Mar 2020 13:00) (89% - 97%)    Mode: AC/ CMV (Assist Control/ Continuous Mandatory Ventilation), RR (machine): 30, TV (machine): 330, FiO2: 60, PEEP: 12, MAP: 17, PIP: 32    03-22 @ 07:01 - 03-23 @ 07:00  --------------------------------------------------------  IN: 3578.6 mL / OUT: 1705 mL / NET: 1873.6 mL    03-23 @ 07:01 - 03-23 @ 13:13  --------------------------------------------------------  IN: 989.3 mL / OUT: 520 mL / NET: 469.3 mL      CAPILLARY BLOOD GLUCOSE      POCT Blood Glucose.: 193 mg/dL (21 Mar 2020 18:59)        PHYSICAL EXAM:  Neurocognitive: sedated, intubated  Cardiac: RRR no murmurs  Chest: coarse mechanical breath sounds through out all lung fields  Abdomen: soft, non tender, non distended  Ext: no clubbing, cyanosis or edema    LINES:    HOSPITAL MEDICATIONS:  Standing Meds:  ascorbic acid IVPB 1500 milliGRAM(s) IV Intermittent every 6 hours  azithromycin   Tablet 500 milliGRAM(s) Oral daily  buMETAnide Infusion 3 mG/Hr IV Continuous <Continuous>  chlorhexidine 0.12% Liquid 15 milliLiter(s) Oral Mucosa every 12 hours  chlorhexidine 4% Liquid 1 Application(s) Topical <User Schedule>  famotidine Injectable 20 milliGRAM(s) IV Push daily  fentaNYL   Infusion. 0.5 MICROgram(s)/kG/Hr IV Continuous <Continuous>  heparin  Injectable 5000 Unit(s) SubCutaneous every 8 hours  hydroxychloroquine 200 milliGRAM(s) Oral every 12 hours  metolazone 10 milliGRAM(s) Oral daily  midazolam Infusion 0.02 mG/kG/Hr IV Continuous <Continuous>  norepinephrine Infusion 0.05 MICROgram(s)/kG/Min IV Continuous <Continuous>  polyethylene glycol 3350 17 Gram(s) Oral daily  propofol Infusion 20 MICROgram(s)/kG/Min IV Continuous <Continuous>      PRN Meds:  acetaminophen    Suspension .. 650 milliGRAM(s) Oral every 6 hours PRN  guaiFENesin   Syrup  (Sugar-Free) 100 milliGRAM(s) Oral every 6 hours PRN  sodium chloride 0.9% lock flush 10 milliLiter(s) IV Push every 1 hour PRN      LABS:                        8.2    12.57 )-----------( 356      ( 23 Mar 2020 04:00 )             25.2     Hgb Trend: 8.2<--, 8.5<--, 9.4<--, 10.7<--, 11.4<--  03-22    133<L>  |  100  |  24<H>  ----------------------------<  118<H>  4.1   |  18<L>  |  4.10<H>    Ca    8.8      22 Mar 2020 21:20  Phos  4.6     03-22  Mg     2.7     03-22    TPro  5.9<L>  /  Alb  2.8<L>  /  TBili  0.4  /  DBili  x   /  AST  21  /  ALT  32  /  AlkPhos  96  03-22    Creatinine Trend: 4.10<--, 3.40<--, 3.07<--, 2.67<--, 0.49<--, 0.50<--  PT/INR - ( 23 Mar 2020 04:00 )   PT: 11.6 SEC;   INR: 1.01          PTT - ( 23 Mar 2020 04:00 )  PTT:31.7 SEC    Arterial Blood Gas:  03-23 @ 06:00  7.23/113/78/40/94.6/18.0  ABG lactate: 1.3  Arterial Blood Gas:  03-23 @ 04:00  7.26/46/58/19/89.6/-6.1  ABG lactate: 1.4  Arterial Blood Gas:  03-22 @ 21:20  7.27/46/57/20/88.2/-5.3  ABG lactate: 1.4  Arterial Blood Gas:  03-22 @ 03:39  7.27/45/78/19/95.3/-5.6  ABG lactate: --  Arterial Blood Gas:  03-21 @ 22:35  7.26/44/72/19/94.6/-6.6  ABG lactate: 1.0  Arterial Blood Gas:  03-21 @ 20:42  7.18/58/84/18/95.8/-6.5  ABG lactate: 1.1        MICROBIOLOGY:     RADIOLOGY:  [ ] Reviewed and interpreted by me    EKG:

## 2020-03-24 LAB
ALBUMIN SERPL ELPH-MCNC: 2.4 G/DL — LOW (ref 3.3–5)
ALP SERPL-CCNC: 126 U/L — HIGH (ref 40–120)
ALT FLD-CCNC: 33 U/L — SIGNIFICANT CHANGE UP (ref 4–33)
ANION GAP SERPL CALC-SCNC: 16 MMO/L — HIGH (ref 7–14)
ANISOCYTOSIS BLD QL: SLIGHT — SIGNIFICANT CHANGE UP
APTT BLD: 32.4 SEC — SIGNIFICANT CHANGE UP (ref 27.5–36.3)
APTT BLD: 68.7 SEC — HIGH (ref 27.5–36.3)
APTT BLD: 72.7 SEC — HIGH (ref 27.5–36.3)
AST SERPL-CCNC: 22 U/L — SIGNIFICANT CHANGE UP (ref 4–32)
BASE EXCESS BLDA CALC-SCNC: -2.9 MMOL/L — SIGNIFICANT CHANGE UP
BASE EXCESS BLDA CALC-SCNC: -3.3 MMOL/L — SIGNIFICANT CHANGE UP
BASE EXCESS BLDA CALC-SCNC: -4.6 MMOL/L — SIGNIFICANT CHANGE UP
BASE EXCESS BLDA CALC-SCNC: -6.9 MMOL/L — SIGNIFICANT CHANGE UP
BASE EXCESS BLDA CALC-SCNC: -8.8 MMOL/L — SIGNIFICANT CHANGE UP
BASOPHILS # BLD AUTO: 0.05 K/UL — SIGNIFICANT CHANGE UP (ref 0–0.2)
BASOPHILS NFR BLD AUTO: 0.3 % — SIGNIFICANT CHANGE UP (ref 0–2)
BASOPHILS NFR SPEC: 0 % — SIGNIFICANT CHANGE UP (ref 0–2)
BILIRUB SERPL-MCNC: 0.5 MG/DL — SIGNIFICANT CHANGE UP (ref 0.2–1.2)
BLASTS # FLD: 0 % — SIGNIFICANT CHANGE UP (ref 0–0)
BLD GP AB SCN SERPL QL: NEGATIVE — SIGNIFICANT CHANGE UP
BLOOD GAS ARTERIAL - FIO2: 100 — SIGNIFICANT CHANGE UP
BLOOD GAS ARTERIAL - FIO2: 60 — SIGNIFICANT CHANGE UP
BLOOD GAS ARTERIAL - FIO2: 60 — SIGNIFICANT CHANGE UP
BUN SERPL-MCNC: 27 MG/DL — HIGH (ref 7–23)
BURR CELLS BLD QL SMEAR: PRESENT — SIGNIFICANT CHANGE UP
CA-I BLDA-SCNC: 1.18 MMOL/L — SIGNIFICANT CHANGE UP (ref 1.15–1.29)
CA-I BLDA-SCNC: 1.2 MMOL/L — SIGNIFICANT CHANGE UP (ref 1.15–1.29)
CA-I BLDA-SCNC: 1.2 MMOL/L — SIGNIFICANT CHANGE UP (ref 1.15–1.29)
CALCIUM SERPL-MCNC: 9.1 MG/DL — SIGNIFICANT CHANGE UP (ref 8.4–10.5)
CHLORIDE BLDA-SCNC: 107 MMOL/L — SIGNIFICANT CHANGE UP (ref 96–108)
CHLORIDE BLDA-SCNC: 108 MMOL/L — SIGNIFICANT CHANGE UP (ref 96–108)
CHLORIDE SERPL-SCNC: 99 MMOL/L — SIGNIFICANT CHANGE UP (ref 98–107)
CK SERPL-CCNC: 137 U/L — SIGNIFICANT CHANGE UP (ref 25–170)
CO2 SERPL-SCNC: 17 MMOL/L — LOW (ref 22–31)
CREAT SERPL-MCNC: 4.1 MG/DL — HIGH (ref 0.5–1.3)
CRP SERPL-MCNC: 529.5 MG/L — HIGH
D DIMER BLD IA.RAPID-MCNC: 6003 NG/ML — SIGNIFICANT CHANGE UP
EOSINOPHIL # BLD AUTO: 0.15 K/UL — SIGNIFICANT CHANGE UP (ref 0–0.5)
EOSINOPHIL NFR BLD AUTO: 0.8 % — SIGNIFICANT CHANGE UP (ref 0–6)
EOSINOPHIL NFR FLD: 1.3 % — SIGNIFICANT CHANGE UP (ref 0–6)
ERYTHROCYTE [SEDIMENTATION RATE] IN BLOOD: 108 MM/HR — HIGH (ref 4–25)
ERYTHROCYTE [SEDIMENTATION RATE] IN BLOOD: SIGNIFICANT CHANGE UP MM/HR (ref 4–25)
FERRITIN SERPL-MCNC: 1118 NG/ML — HIGH (ref 15–150)
GIANT PLATELETS BLD QL SMEAR: PRESENT — SIGNIFICANT CHANGE UP
GLUCOSE BLDA-MCNC: 114 MG/DL — HIGH (ref 70–99)
GLUCOSE BLDA-MCNC: 137 MG/DL — HIGH (ref 70–99)
GLUCOSE BLDA-MCNC: 141 MG/DL — HIGH (ref 70–99)
GLUCOSE BLDA-MCNC: 143 MG/DL — HIGH (ref 70–99)
GLUCOSE BLDA-MCNC: 147 MG/DL — HIGH (ref 70–99)
GLUCOSE SERPL-MCNC: 139 MG/DL — HIGH (ref 70–99)
HCO3 BLDA-SCNC: 17 MMOL/L — LOW (ref 22–26)
HCO3 BLDA-SCNC: 18 MMOL/L — LOW (ref 22–26)
HCO3 BLDA-SCNC: 20 MMOL/L — LOW (ref 22–26)
HCO3 BLDA-SCNC: 22 MMOL/L — SIGNIFICANT CHANGE UP (ref 22–26)
HCO3 BLDA-SCNC: 22 MMOL/L — SIGNIFICANT CHANGE UP (ref 22–26)
HCT VFR BLD CALC: 25.8 % — LOW (ref 34.5–45)
HCT VFR BLD CALC: 26.1 % — LOW (ref 34.5–45)
HCT VFR BLDA CALC: 18.3 % — CRITICAL LOW (ref 34.5–46.5)
HCT VFR BLDA CALC: 25.3 % — LOW (ref 34.5–46.5)
HCT VFR BLDA CALC: 25.5 % — LOW (ref 34.5–46.5)
HCT VFR BLDA CALC: 26.5 % — LOW (ref 34.5–46.5)
HCT VFR BLDA CALC: 27.5 % — LOW (ref 34.5–46.5)
HGB BLD-MCNC: 8.5 G/DL — LOW (ref 11.5–15.5)
HGB BLD-MCNC: 8.5 G/DL — LOW (ref 11.5–15.5)
HGB BLDA-MCNC: 5.8 G/DL — CRITICAL LOW (ref 11.5–15.5)
HGB BLDA-MCNC: 8.1 G/DL — LOW (ref 11.5–15.5)
HGB BLDA-MCNC: 8.2 G/DL — LOW (ref 11.5–15.5)
HGB BLDA-MCNC: 8.5 G/DL — LOW (ref 11.5–15.5)
HGB BLDA-MCNC: 8.9 G/DL — LOW (ref 11.5–15.5)
HYPOCHROMIA BLD QL: SLIGHT — SIGNIFICANT CHANGE UP
IMM GRANULOCYTES NFR BLD AUTO: 8.4 % — HIGH (ref 0–1.5)
INR BLD: 1.05 — SIGNIFICANT CHANGE UP (ref 0.88–1.17)
LACTATE BLDA-SCNC: 1.4 MMOL/L — SIGNIFICANT CHANGE UP (ref 0.5–2)
LACTATE BLDA-SCNC: 1.6 MMOL/L — SIGNIFICANT CHANGE UP (ref 0.5–2)
LDH SERPL L TO P-CCNC: 415 U/L — HIGH (ref 135–225)
LYMPHOCYTES # BLD AUTO: 0.62 K/UL — LOW (ref 1–3.3)
LYMPHOCYTES # BLD AUTO: 3.3 % — LOW (ref 13–44)
LYMPHOCYTES NFR SPEC AUTO: 3.9 % — LOW (ref 13–44)
MAGNESIUM SERPL-MCNC: 3.1 MG/DL — HIGH (ref 1.6–2.6)
MCHC RBC-ENTMCNC: 27.4 PG — SIGNIFICANT CHANGE UP (ref 27–34)
MCHC RBC-ENTMCNC: 27.7 PG — SIGNIFICANT CHANGE UP (ref 27–34)
MCHC RBC-ENTMCNC: 32.6 % — SIGNIFICANT CHANGE UP (ref 32–36)
MCHC RBC-ENTMCNC: 32.9 % — SIGNIFICANT CHANGE UP (ref 32–36)
MCV RBC AUTO: 84 FL — SIGNIFICANT CHANGE UP (ref 80–100)
MCV RBC AUTO: 84.2 FL — SIGNIFICANT CHANGE UP (ref 80–100)
METAMYELOCYTES # FLD: 1.8 % — HIGH (ref 0–1)
MICROCYTES BLD QL: SLIGHT — SIGNIFICANT CHANGE UP
MONOCYTES # BLD AUTO: 0.72 K/UL — SIGNIFICANT CHANGE UP (ref 0–0.9)
MONOCYTES NFR BLD AUTO: 3.8 % — SIGNIFICANT CHANGE UP (ref 2–14)
MONOCYTES NFR BLD: 1.3 % — LOW (ref 2–9)
MYELOCYTES NFR BLD: 3 % — HIGH (ref 0–0)
NEUTROPHIL AB SER-ACNC: 85.7 % — HIGH (ref 43–77)
NEUTROPHILS # BLD AUTO: 15.77 K/UL — HIGH (ref 1.8–7.4)
NEUTROPHILS NFR BLD AUTO: 83.4 % — HIGH (ref 43–77)
NEUTS BAND # BLD: 3 % — SIGNIFICANT CHANGE UP (ref 0–6)
NRBC # BLD: 1 /100WBC — SIGNIFICANT CHANGE UP
NRBC # FLD: 0.06 K/UL — SIGNIFICANT CHANGE UP (ref 0–0)
NRBC # FLD: 0.08 K/UL — SIGNIFICANT CHANGE UP (ref 0–0)
OTHER - HEMATOLOGY %: 0 — SIGNIFICANT CHANGE UP
OVALOCYTES BLD QL SMEAR: SLIGHT — SIGNIFICANT CHANGE UP
PCO2 BLDA: 46 MMHG — SIGNIFICANT CHANGE UP (ref 32–48)
PCO2 BLDA: 50 MMHG — HIGH (ref 32–48)
PCO2 BLDA: 52 MMHG — HIGH (ref 32–48)
PCO2 BLDA: 53 MMHG — HIGH (ref 32–48)
PCO2 BLDA: 54 MMHG — HIGH (ref 32–48)
PH BLDA: 7.16 PH — CRITICAL LOW (ref 7.35–7.45)
PH BLDA: 7.19 PH — CRITICAL LOW (ref 7.35–7.45)
PH BLDA: 7.26 PH — LOW (ref 7.35–7.45)
PH BLDA: 7.27 PH — LOW (ref 7.35–7.45)
PH BLDA: 7.3 PH — LOW (ref 7.35–7.45)
PHOSPHATE SERPL-MCNC: 5.2 MG/DL — HIGH (ref 2.5–4.5)
PLATELET # BLD AUTO: 285 K/UL — SIGNIFICANT CHANGE UP (ref 150–400)
PLATELET # BLD AUTO: 304 K/UL — SIGNIFICANT CHANGE UP (ref 150–400)
PLATELET COUNT - ESTIMATE: NORMAL — SIGNIFICANT CHANGE UP
PMV BLD: 10 FL — SIGNIFICANT CHANGE UP (ref 7–13)
PMV BLD: 10.1 FL — SIGNIFICANT CHANGE UP (ref 7–13)
PO2 BLDA: 102 MMHG — SIGNIFICANT CHANGE UP (ref 83–108)
PO2 BLDA: 165 MMHG — HIGH (ref 83–108)
PO2 BLDA: 75 MMHG — LOW (ref 83–108)
PO2 BLDA: 87 MMHG — SIGNIFICANT CHANGE UP (ref 83–108)
PO2 BLDA: 94 MMHG — SIGNIFICANT CHANGE UP (ref 83–108)
POIKILOCYTOSIS BLD QL AUTO: SLIGHT — SIGNIFICANT CHANGE UP
POLYCHROMASIA BLD QL SMEAR: SLIGHT — SIGNIFICANT CHANGE UP
POTASSIUM BLDA-SCNC: 4.6 MMOL/L — HIGH (ref 3.4–4.5)
POTASSIUM BLDA-SCNC: 4.6 MMOL/L — HIGH (ref 3.4–4.5)
POTASSIUM BLDA-SCNC: 4.7 MMOL/L — HIGH (ref 3.4–4.5)
POTASSIUM SERPL-MCNC: 4.8 MMOL/L — SIGNIFICANT CHANGE UP (ref 3.5–5.3)
POTASSIUM SERPL-SCNC: 4.8 MMOL/L — SIGNIFICANT CHANGE UP (ref 3.5–5.3)
PROCALCITONIN SERPL-MCNC: 2.44 NG/ML — HIGH (ref 0.02–0.1)
PROMYELOCYTES # FLD: 0 % — SIGNIFICANT CHANGE UP (ref 0–0)
PROT SERPL-MCNC: 6.4 G/DL — SIGNIFICANT CHANGE UP (ref 6–8.3)
PROTHROM AB SERPL-ACNC: 12 SEC — SIGNIFICANT CHANGE UP (ref 9.8–13.1)
RBC # BLD: 3.07 M/UL — LOW (ref 3.8–5.2)
RBC # BLD: 3.1 M/UL — LOW (ref 3.8–5.2)
RBC # FLD: 16.7 % — HIGH (ref 10.3–14.5)
RBC # FLD: 16.7 % — HIGH (ref 10.3–14.5)
RH IG SCN BLD-IMP: POSITIVE — SIGNIFICANT CHANGE UP
SAO2 % BLDA: 92.6 % — LOW (ref 95–99)
SAO2 % BLDA: 93.8 % — LOW (ref 95–99)
SAO2 % BLDA: 95.3 % — SIGNIFICANT CHANGE UP (ref 95–99)
SAO2 % BLDA: 96 % — SIGNIFICANT CHANGE UP (ref 95–99)
SAO2 % BLDA: 98.3 % — SIGNIFICANT CHANGE UP (ref 95–99)
SMUDGE CELLS # BLD: PRESENT — SIGNIFICANT CHANGE UP
SODIUM BLDA-SCNC: 133 MMOL/L — LOW (ref 136–146)
SODIUM BLDA-SCNC: 133 MMOL/L — LOW (ref 136–146)
SODIUM BLDA-SCNC: 134 MMOL/L — LOW (ref 136–146)
SODIUM SERPL-SCNC: 132 MMOL/L — LOW (ref 135–145)
TARGETS BLD QL SMEAR: SLIGHT — SIGNIFICANT CHANGE UP
TRIGL SERPL-MCNC: 191 MG/DL — HIGH (ref 10–149)
TROPONIN T, HIGH SENSITIVITY: 10 NG/L — SIGNIFICANT CHANGE UP (ref ?–14)
VARIANT LYMPHS # BLD: 0 % — SIGNIFICANT CHANGE UP
WBC # BLD: 18.9 K/UL — HIGH (ref 3.8–10.5)
WBC # BLD: 21.3 K/UL — HIGH (ref 3.8–10.5)
WBC # FLD AUTO: 18.9 K/UL — HIGH (ref 3.8–10.5)
WBC # FLD AUTO: 21.3 K/UL — HIGH (ref 3.8–10.5)

## 2020-03-24 PROCEDURE — 99223 1ST HOSP IP/OBS HIGH 75: CPT

## 2020-03-24 PROCEDURE — 99292 CRITICAL CARE ADDL 30 MIN: CPT

## 2020-03-24 PROCEDURE — 99233 SBSQ HOSP IP/OBS HIGH 50: CPT

## 2020-03-24 PROCEDURE — 99291 CRITICAL CARE FIRST HOUR: CPT

## 2020-03-24 PROCEDURE — 90945 DIALYSIS ONE EVALUATION: CPT | Mod: GC

## 2020-03-24 RX ORDER — HEPARIN SODIUM 5000 [USP'U]/ML
900 INJECTION INTRAVENOUS; SUBCUTANEOUS
Qty: 25000 | Refills: 0 | Status: DISCONTINUED | OUTPATIENT
Start: 2020-03-24 | End: 2020-03-27

## 2020-03-24 RX ORDER — ASPIRIN/CALCIUM CARB/MAGNESIUM 324 MG
325 TABLET ORAL DAILY
Refills: 0 | Status: DISCONTINUED | OUTPATIENT
Start: 2020-03-24 | End: 2020-03-30

## 2020-03-24 RX ORDER — HEPARIN SODIUM 5000 [USP'U]/ML
1000 INJECTION INTRAVENOUS; SUBCUTANEOUS
Qty: 25000 | Refills: 0 | Status: DISCONTINUED | OUTPATIENT
Start: 2020-03-24 | End: 2020-03-24

## 2020-03-24 RX ORDER — HEPARIN SODIUM 5000 [USP'U]/ML
300 INJECTION INTRAVENOUS; SUBCUTANEOUS
Qty: 10000 | Refills: 0 | Status: DISCONTINUED | OUTPATIENT
Start: 2020-03-24 | End: 2020-03-25

## 2020-03-24 RX ADMIN — Medication 103 MILLIGRAM(S): at 12:00

## 2020-03-24 RX ADMIN — Medication 3.38 MICROGRAM(S)/KG/MIN: at 20:16

## 2020-03-24 RX ADMIN — HEPARIN SODIUM 10 UNIT(S)/HR: 5000 INJECTION INTRAVENOUS; SUBCUTANEOUS at 09:23

## 2020-03-24 RX ADMIN — CHLORHEXIDINE GLUCONATE 1 APPLICATION(S): 213 SOLUTION TOPICAL at 06:03

## 2020-03-24 RX ADMIN — FENTANYL CITRATE 3.61 MICROGRAM(S)/KG/HR: 50 INJECTION INTRAVENOUS at 09:22

## 2020-03-24 RX ADMIN — Medication 325 MILLIGRAM(S): at 11:17

## 2020-03-24 RX ADMIN — Medication 3.38 MICROGRAM(S)/KG/MIN: at 09:22

## 2020-03-24 RX ADMIN — CHLORHEXIDINE GLUCONATE 15 MILLILITER(S): 213 SOLUTION TOPICAL at 18:34

## 2020-03-24 RX ADMIN — HEPARIN SODIUM 10 UNIT(S)/HR: 5000 INJECTION INTRAVENOUS; SUBCUTANEOUS at 05:48

## 2020-03-24 RX ADMIN — AZITHROMYCIN 500 MILLIGRAM(S): 500 TABLET, FILM COATED ORAL at 11:16

## 2020-03-24 RX ADMIN — HEPARIN SODIUM 5000 UNIT(S): 5000 INJECTION INTRAVENOUS; SUBCUTANEOUS at 06:03

## 2020-03-24 RX ADMIN — MIDAZOLAM HYDROCHLORIDE 1.44 MG/KG/HR: 1 INJECTION, SOLUTION INTRAMUSCULAR; INTRAVENOUS at 20:16

## 2020-03-24 RX ADMIN — PROPOFOL 8.66 MICROGRAM(S)/KG/MIN: 10 INJECTION, EMULSION INTRAVENOUS at 20:17

## 2020-03-24 RX ADMIN — Medication 103 MILLIGRAM(S): at 00:35

## 2020-03-24 RX ADMIN — HEPARIN SODIUM 5000 UNIT(S): 5000 INJECTION INTRAVENOUS; SUBCUTANEOUS at 00:33

## 2020-03-24 RX ADMIN — MIDAZOLAM HYDROCHLORIDE 1.44 MG/KG/HR: 1 INJECTION, SOLUTION INTRAMUSCULAR; INTRAVENOUS at 09:22

## 2020-03-24 RX ADMIN — CHLORHEXIDINE GLUCONATE 15 MILLILITER(S): 213 SOLUTION TOPICAL at 06:03

## 2020-03-24 RX ADMIN — PROPOFOL 8.66 MICROGRAM(S)/KG/MIN: 10 INJECTION, EMULSION INTRAVENOUS at 09:23

## 2020-03-24 RX ADMIN — FENTANYL CITRATE 3.61 MICROGRAM(S)/KG/HR: 50 INJECTION INTRAVENOUS at 20:16

## 2020-03-24 RX ADMIN — FAMOTIDINE 20 MILLIGRAM(S): 10 INJECTION INTRAVENOUS at 11:16

## 2020-03-24 RX ADMIN — POLYETHYLENE GLYCOL 3350 17 GRAM(S): 17 POWDER, FOR SOLUTION ORAL at 11:17

## 2020-03-24 RX ADMIN — Medication 103 MILLIGRAM(S): at 07:02

## 2020-03-24 RX ADMIN — Medication 200 MILLIGRAM(S): at 00:34

## 2020-03-24 NOTE — PROGRESS NOTE ADULT - SUBJECTIVE AND OBJECTIVE BOX
ALEXA GARCIA            MRN-[id number]         No Known Allergies                 HPI:  58 y/o F with no PMH p/w fever and cough since Thursday. Tmax 103. She presented to urgent care initially and was treated with tamiflu for presumed flu. Despite this she continued to have fevers. Pt denies recent travel, sick contacts or any other sx or acute complaints. Endorses pinching pain in chest when she coughs. Also with nausea and poor PO intake. Denies shortness of breath, abdominal pain, dysuria or LE edema. (16 Mar 2020 02:05)          Issues:              Acute hypoxic respiratory failure              ARDS              Septic shock              COVID-19+              SAM              Mixed metabolic / respiratory acidosis              Anemia    Drips:             Propofol / Fentanyl / Versed /  Levophed / Vasopressin                   Home Medications:      PAST MEDICAL & SURGICAL HISTORY:  No pertinent past medical history  No significant past surgical history        ICU Vital Signs Last 24 Hrs  T(C): 36.1 (24 Mar 2020 12:00), Max: 38.3 (24 Mar 2020 00:00)  T(F): 97 (24 Mar 2020 12:00), Max: 100.9 (24 Mar 2020 00:00)  HR: 77 (24 Mar 2020 15:51) (77 - 119)  BP: 99/59 (24 Mar 2020 15:00) (94/62 - 120/67)  BP(mean): 69 (24 Mar 2020 15:00) (64 - 80)  ABP: 129/63 (24 Mar 2020 15:00) (93/47 - 140/64)  ABP(mean): 85 (24 Mar 2020 15:00) (61 - 87)  RR: 30 (24 Mar 2020 15:00) (29 - 33)  SpO2: 100% (24 Mar 2020 15:51) (92% - 100%)    I&O's Detail    23 Mar 2020 07:01  -  24 Mar 2020 07:00  --------------------------------------------------------  IN:    bumetanide Infusion: 195 mL    bumetanide Infusion: 80 mL    cisatracurium Infusion: 26.1 mL    Enteral Tube Flush: 90 mL    fentaNYL Infusion.: 518.4 mL    heparin Infusion: 20 mL    IV PiggyBack: 550 mL    midazolam Infusion: 33.6 mL    norepinephrine Infusion: 455.3 mL    ns in tub fed  [id number]: 1440 mL    propofol Infusion: 312 mL    Total IN: 3720.4 mL    OUT:    Indwelling Catheter - Urethral: 1400 mL  Total OUT: 1400 mL    Total NET: 2320.4 mL      24 Mar 2020 07:01  -  24 Mar 2020 16:20  --------------------------------------------------------  IN:    Enteral Tube Flush: 60 mL    fentaNYL Infusion.: 108 mL    heparin Infusion: 50 mL    midazolam Infusion: 7 mL    norepinephrine Infusion: 105 mL    ns in tub fed  [id number]: 920 mL    propofol Infusion: 65 mL  Total IN: 1315 mL    OUT:    Indwelling Catheter - Urethral: 85 mL  Total OUT: 85 mL    Total NET: 1230 mL        CAPILLARY BLOOD GLUCOSE          Home Medications:      MEDICATIONS  (STANDING):  aspirin 325 milliGRAM(s) Oral daily  chlorhexidine 0.12% Liquid 15 milliLiter(s) Oral Mucosa every 12 hours  chlorhexidine 4% Liquid 1 Application(s) Topical <User Schedule>  CRRT Treatment    <Continuous>  famotidine Injectable 20 milliGRAM(s) IV Push daily  fentaNYL   Infusion. 0.5 MICROgram(s)/kG/Hr (3.61 mL/Hr) IV Continuous <Continuous>  heparin  Infusion 1000 Unit(s)/Hr (10 mL/Hr) IV Continuous <Continuous>  heparin  Infusion Syringe 300 Unit(s)/Hr (0.6 mL/Hr) CRRT <Continuous>  metolazone 10 milliGRAM(s) Oral daily  midazolam Infusion 0.02 mG/kG/Hr (1.44 mL/Hr) IV Continuous <Continuous>  norepinephrine Infusion 0.05 MICROgram(s)/kG/Min (3.38 mL/Hr) IV Continuous <Continuous>  polyethylene glycol 3350 17 Gram(s) Oral daily  PrismaSATE Dialysate BGK 4 / 2.5 5000 milliLiter(s) (1500 mL/Hr) CRRT <Continuous>  PrismaSOL Filtration BGK 4 / 2.5 5000 milliLiter(s) (1000 mL/Hr) CRRT <Continuous>  PrismaSOL Filtration BGK 4 / 2.5 5000 milliLiter(s) (500 mL/Hr) CRRT <Continuous>  propofol Infusion 20 MICROgram(s)/kG/Min (8.66 mL/Hr) IV Continuous <Continuous>    MEDICATIONS  (PRN):  acetaminophen    Suspension .. 650 milliGRAM(s) Oral every 6 hours PRN Temp greater or equal to 38C (100.4F)  guaiFENesin   Syrup  (Sugar-Free) 100 milliGRAM(s) Oral every 6 hours PRN Cough  sodium chloride 0.9% lock flush 10 milliLiter(s) IV Push every 1 hour PRN Pre/post blood products, medications, blood draw, and to maintain line patency      Mode: AC/ CMV (Assist Control/ Continuous Mandatory Ventilation)  RR (machine): 30  TV (machine): 330  FiO2: 60  PEEP: 14  MAP: 22  PIP: 36      Physical exam:                             General:                Sedated and intubated                                                Neuro:                  Could not assess                          Cardiovascular:   S1 & S2, regular                           Respiratory:         Coarse breath sounds                          GI:                          Soft, nondistended, Bowel sounds +                            Ext:                        Edema     Labs:                                                                           8.5    21.30 )-----------( 304      ( 24 Mar 2020 05:50 )             25.8             03-24    132<L>  |  99  |  27<H>  ----------------------------<  139<H>  4.8   |  17<L>  |  4.10<H>    Ca    9.1      24 Mar 2020 04:30  Phos  5.2     03-24  Mg     3.1     03-24    TPro  6.4  /  Alb  2.4<L>  /  TBili  0.5  /  DBili  x   /  AST  22  /  ALT  33  /  AlkPhos  126<H>  03-24                  PT/INR - ( 24 Mar 2020 04:30 )   PT: 12.0 SEC;   INR: 1.05          PTT - ( 24 Mar 2020 10:45 )  PTT:68.7 SEC  LIVER FUNCTIONS - ( 24 Mar 2020 04:30 )  Alb: 2.4 g/dL / Pro: 6.4 g/dL / ALK PHOS: 126 u/L / ALT: 33 u/L / AST: 22 u/L / GGT: x                   CXR:    < from: Xray Chest 1 View- PORTABLE-Urgent (03.23.20 @ 23:17) >  Since the last study, right-sided IJ hemodialysis catheter has been placed and its tip is at the SVC/right atrial junction.    The endotracheal and enteric tubes remain in place in addition to a left subclavian line.    Increasing bilateral and diffuse granular opacities consistent with ARDS may be secondary to covid infection if present. The heart is not enlarged and there are no effusions.      COMPARISON:  March 20      IMPRESSION:    1. Status post hemodialysis catheter placement.  2. Perihilar diffuse airspace opacities consistent with ARDS secondary to covid infection.        Plan:    General:  HPI:  58 y/o F with no PMH p/w fever and cough since Thursday. Tmax 103. She presented to urgent care initially and was treated with tamiflu for presumed flu. Despite this she continued to have fevers. Pt denies recent travel, sick contacts or any other sx or acute complaints. Endorses pinching pain in chest when she coughs. Also with nausea and poor PO intake. Denies shortness of breath, abdominal pain, dysuria or LE edema. (16 Mar 2020 02:05)                              Neuro:                                         Sedated with Propofol, Versed and Fentanyl                                                                  Cardiovascular:                                          Continue hemodynamic monitoring.                                        Titrate Levophed / Vasopressin to MAP>65                                        Daily EKGs, monitor QTc                                        Continue ASA 325mg daily + Heparin htt - Target PTT 50-80                             Respiratory:                                         Acute hypoxemic respiratory failure due to Pneumonia / COVID-19+                                         On full mechanical vent support EI--60-14.                                                Wean FiO2 as tolerated                                                Follow ABGs                                                Monitor Plateau pressure / driving pressure daily                                                Initiation and maintenance of vent settings as per ARDSnet protocol                                                             Continue bronchodilators, pulmonary toilet                            GI                                         Has NGT in place, tube feeds as tolerated                                         Continue Pepcid                                         Bowel regimen	                                                                 Renal:                                         CRRT in progress     Strict I/Os                                         Renal f/u                                         Monitor BUN / Cr                                                 Hem/ Onc:                                                                                  DVT prophylaxis with SCDs / on Heparin gtt                                         Follow CBC  & signs of bleeding                           Infectious disease:                                            Pneumonia                                           COVID-19 +                                          Follow cultures                                          Plaquenil , Vit-C, Thiamin, Azithromycin  x 5 days                                          Continue Abs / Ceftriaxone                            Endocrine                                            Continue Accu-Checks with coverage, NPH    Pt is on Lovinox  and Venodyne boots for DVT prophylaxis.     Pertinent clinical, laboratory, radiographic, hemodynamic, echocardiographic, respiratory data, microbiologic data and chart were reviewed and analyzed frequently throughout the course of the day and night  Patient seen, examined and plan discussed with  CTICU team during rounds.    Pt's status &  goals of care  discussed with family       I have spent  75  minutes of critical care time with this pt between  7am  and 11.59 pm              Favian Moreland MD

## 2020-03-24 NOTE — PROGRESS NOTE ADULT - SUBJECTIVE AND OBJECTIVE BOX
[facility] DIVISION OF KIDNEY DISEASES AND HYPERTENSION -- FOLLOW UP NOTE  --------------------------------------------------------------------------------  59-year-old female with no PMH admitted to ICU for respiratory failure, pneumonia, SAM and COVID-19. [facility] 3/[facility]/18, increased to 4.6 yesterday, uop dropped hence was initiated on CRRT/CVVHDF overnight (03/23/20)    Pt. was seen and examined at bedside, intubated, on IV pressor support. UOP 1.4 lit/24 hours net positive 2.3 lit/24 hours. Sedated. FiO2 100%, PEEP 14. On CRRT, as per RN clotted 1 time.     PAST HISTORY  --------------------------------------------------------------------------------  No significant changes to PMH, PSH, FHx, SHx, unless otherwise noted    ALLERGIES & MEDICATIONS  --------------------------------------------------------------------------------  Allergies    No Known Allergies    Intolerances      Standing Inpatient Medications  ascorbic acid IVPB 1500 milliGRAM(s) IV Intermittent every 6 hours  aspirin 325 milliGRAM(s) Oral daily  azithromycin   Tablet 500 milliGRAM(s) Oral daily  chlorhexidine 0.12% Liquid 15 milliLiter(s) Oral Mucosa every 12 hours  chlorhexidine 4% Liquid 1 Application(s) Topical <User Schedule>  CRRT Treatment    <Continuous>  famotidine Injectable 20 milliGRAM(s) IV Push daily  fentaNYL   Infusion. 0.5 MICROgram(s)/kG/Hr IV Continuous <Continuous>  heparin  Infusion 1000 Unit(s)/Hr IV Continuous <Continuous>  heparin  Infusion Syringe 300 Unit(s)/Hr CRRT <Continuous>  metolazone 10 milliGRAM(s) Oral daily  midazolam Infusion 0.02 mG/kG/Hr IV Continuous <Continuous>  norepinephrine Infusion 0.05 MICROgram(s)/kG/Min IV Continuous <Continuous>  polyethylene glycol 3350 17 Gram(s) Oral daily  PrismaSATE Dialysate BGK 4 / 2.5 5000 milliLiter(s) CRRT <Continuous>  PrismaSOL Filtration BGK 4 / 2.5 5000 milliLiter(s) CRRT <Continuous>  PrismaSOL Filtration BGK 4 / 2.5 5000 milliLiter(s) CRRT <Continuous>  propofol Infusion 20 MICROgram(s)/kG/Min IV Continuous <Continuous>    PRN Inpatient Medications  acetaminophen    Suspension .. 650 milliGRAM(s) Oral every 6 hours PRN  guaiFENesin   Syrup  (Sugar-Free) 100 milliGRAM(s) Oral every 6 hours PRN  sodium chloride 0.9% lock flush 10 milliLiter(s) IV Push every 1 hour PRN      REVIEW OF SYSTEMS  --------------------------------------------------------------------------------  not able to obtain.     VITALS/PHYSICAL EXAM  --------------------------------------------------------------------------------  T(C): 36.3 (03-24-20 @ 04:00), Max: 38.3 (03-24-20 @ 00:00)  HR: 94 (03-24-20 @ 09:00) (94 - 119)  BP: 109/62 (03-24-20 @ 08:00) (94/62 - 120/67)  RR: 30 (03-24-20 @ 09:00) (29 - 33)  SpO2: 100% (03-24-20 @ 09:00) (92% - 100%)  Wt(kg): --        03-23-20 @ 07:01  -  03-24-20 @ 07:00  --------------------------------------------------------  IN: 3720.4 mL / OUT: 1400 mL / NET: 2320.4 mL    03-24-20 @ 07:01  -  03-24-20 @ 11:10  --------------------------------------------------------  IN: 411 mL / OUT: 40 mL / NET: 371 mL      Physical Exam (limited):  	Gen: Intubated  	HEENT: +ETT  	Pulm: Coarse breath sounds B/L  	CV: S1S2+  	Abd: Soft, +BS   	Ext: No LE edema B/L  	Neuro: Sedated  	Skin: Warm and dry              Access: Right IJ non tunneled catheter.     LABS/STUDIES  --------------------------------------------------------------------------------              8.5    21.30 >-----------<  304      [03-24-20 @ 05:50]              25.8     132  |  99  |  27  ----------------------------<  139      [03-24-20 @ 04:30]  4.8   |  17  |  4.10        Ca     9.1     [03-24-20 @ 04:30]      Mg     3.1     [03-24-20 @ 04:30]      Phos  5.2     [03-24-20 @ 04:30]    TPro  6.4  /  Alb  2.4  /  TBili  0.5  /  DBili  x   /  AST  22  /  ALT  33  /  AlkPhos  126  [03-24-20 @ 04:30]    PT/INR: PT 12.0 , INR 1.05       [03-24-20 @ 04:30]  PTT: 68.7       [03-24-20 @ 10:45]          [03-24-20 @ 04:30]        [03-23-20 @ 02:26]    Creatinine Trend:  SCr 4.10 [03-24 @ 04:30]  SCr 4.60 [03-23 @ 13:50]  SCr 4.10 [03-22 @ 21:20]  SCr 3.40 [03-22 @ 03:39]  SCr 3.07 [03-21 @ 20:42]    Urinalysis - [03-15-20 @ 22:27]      Color YELLOW / Appearance CLEAR / SG 1.037 / pH 6.0      Gluc NEGATIVE / Ketone TRACE  / Bili NEGATIVE / Urobili TRACE       Blood MODERATE / Protein 200 / Leuk Est NEGATIVE / Nitrite NEGATIVE      RBC 11-25 / WBC 3-5 / Hyaline 1+ / Gran  / Sq Epi FEW / Non Sq Epi  / Bacteria NEGATIVE      Ferritin 1118      [03-24-20 @ 04:30]  Lipid: chol --, , HDL --, LDL --      [03-24-20 @ 04:30]    HCV 0.12, Nonreactive Hepatitis C AB  S/CO Ratio                        Interpretation  < 1.00                                   Non-Reactive  1.00 - 4.99                         Weakly-Reactive  >= 5.00                                Reactive  Non-Reactive: Aperson with a non-reactive HCV antibody  result is considered uninfected.  No further action is  needed unless recent infection is suspected.  In these  cases, consider repeat testing later to detect  seroconversion..  Weakly-Reactive: HCV antibody test is abnormal, HCV RNA  Qualitative test will follow.  Reactive: HCV antibody test is abnormal, HCV RNA  Qualitative test will follow.  Note: HCV antibody testing is performed on the [employer] system.      [03-16-20 @ 05:30] [facility] DIVISION OF KIDNEY DISEASES AND HYPERTENSION -- FOLLOW UP NOTE  --------------------------------------------------------------------------------  59-year-old female with no PMH admitted to ICU for respiratory failure, pneumonia, SAM and COVID-19. Scr increased to 4.6 on 3/23/20. Pt. initiated on CRRT/CVVHDF on 3/23/20.    Pt. was seen and examined in ICU. Pt. intubated, on IV pressor support. UOP ~1.4 lit/24 hours. FiO2 100%, PEEP 14. Pt. clotted CRRT circuit once overnight (as RN).     PAST HISTORY  --------------------------------------------------------------------------------  No significant changes to PMH, PSH, FHx, SHx, unless otherwise noted    ALLERGIES & MEDICATIONS  --------------------------------------------------------------------------------  Allergies    No Known Allergies    Intolerances    Standing Inpatient Medications  ascorbic acid IVPB 1500 milliGRAM(s) IV Intermittent every 6 hours  aspirin 325 milliGRAM(s) Oral daily  azithromycin   Tablet 500 milliGRAM(s) Oral daily  chlorhexidine 0.12% Liquid 15 milliLiter(s) Oral Mucosa every 12 hours  chlorhexidine 4% Liquid 1 Application(s) Topical <User Schedule>  CRRT Treatment    <Continuous>  famotidine Injectable 20 milliGRAM(s) IV Push daily  fentaNYL   Infusion. 0.5 MICROgram(s)/kG/Hr IV Continuous <Continuous>  heparin  Infusion 1000 Unit(s)/Hr IV Continuous <Continuous>  heparin  Infusion Syringe 300 Unit(s)/Hr CRRT <Continuous>  metolazone 10 milliGRAM(s) Oral daily  midazolam Infusion 0.02 mG/kG/Hr IV Continuous <Continuous>  norepinephrine Infusion 0.05 MICROgram(s)/kG/Min IV Continuous <Continuous>  polyethylene glycol 3350 17 Gram(s) Oral daily  PrismaSATE Dialysate BGK 4 / 2.5 5000 milliLiter(s) CRRT <Continuous>  PrismaSOL Filtration BGK 4 / 2.5 5000 milliLiter(s) CRRT <Continuous>  PrismaSOL Filtration BGK 4 / 2.5 5000 milliLiter(s) CRRT <Continuous>  propofol Infusion 20 MICROgram(s)/kG/Min IV Continuous <Continuous>    PRN Inpatient Medications  acetaminophen    Suspension .. 650 milliGRAM(s) Oral every 6 hours PRN  guaiFENesin   Syrup  (Sugar-Free) 100 milliGRAM(s) Oral every 6 hours PRN  sodium chloride 0.9% lock flush 10 milliLiter(s) IV Push every 1 hour PRN    REVIEW OF SYSTEMS  --------------------------------------------------------------------------------  Unable to obtain.    VITALS/PHYSICAL EXAM  --------------------------------------------------------------------------------  T(C): 36.3 (03-24-20 @ 04:00), Max: 38.3 (03-24-20 @ 00:00)  HR: 94 (03-24-20 @ 09:00) (94 - 119)  BP: 109/62 (03-24-20 @ 08:00) (94/62 - 120/67)  RR: 30 (03-24-20 @ 09:00) (29 - 33)  SpO2: 100% (03-24-20 @ 09:00) (92% - 100%)  Wt(kg): --    03-23-20 @ 07:01  -  03-24-20 @ 07:00  --------------------------------------------------------  IN: 3720.4 mL / OUT: 1400 mL / NET: 2320.4 mL    03-24-20 @ 07:01  -  03-24-20 @ 11:10  --------------------------------------------------------  IN: 411 mL / OUT: 40 mL / NET: 371 mL    Physical Exam (limited):  	Gen: Intubated  	HEENT: +ETT  	Pulm: Coarse breath sounds B/L  	CV: S1S2+  	Abd: Soft, +BS   	Ext: No LE edema B/L  	Neuro: Sedated  	Skin: Warm and dry              Access: Right IJ non tunneled catheter+    LABS/STUDIES  --------------------------------------------------------------------------------              8.5    21.30 >-----------<  304      [03-24-20 @ 05:50]              25.8     132  |  99  |  27  ----------------------------<  139      [03-24-20 @ 04:30]  4.8   |  17  |  4.10        Ca     9.1     [03-24-20 @ 04:30]      Mg     3.1     [03-24-20 @ 04:30]      Phos  5.2     [03-24-20 @ 04:30]    TPro  6.4  /  Alb  2.4  /  TBili  0.5  /  DBili  x   /  AST  22  /  ALT  33  /  AlkPhos  126  [03-24-20 @ 04:30]          [03-24-20 @ 04:30]        [03-23-20 @ 02:26]    Creatinine Trend:  SCr 4.10 [03-24 @ 04:30]  SCr 4.60 [03-23 @ 13:50]  SCr 4.10 [03-22 @ 21:20]  SCr 3.40 [03-22 @ 03:39]  SCr 3.07 [03-21 @ 20:42]    Urinalysis - [03-15-20 @ 22:27]      Color YELLOW / Appearance CLEAR / SG 1.037 / pH 6.0      Gluc NEGATIVE / Ketone TRACE  / Bili NEGATIVE / Urobili TRACE       Blood MODERATE / Protein 200 / Leuk Est NEGATIVE / Nitrite NEGATIVE      RBC 11-25 / WBC 3-5 / Hyaline 1+ / Gran  / Sq Epi FEW / Non Sq Epi  / Bacteria NEGATIVE    Ferritin 1118      [03-24-20 @ 04:30]  Lipid: chol --, , HDL --, LDL --      [03-24-20 @ 04:30]    HCV 0.12, Nonreactive Hepatitis C AB  S/CO Ratio                        Interpretation  < 1.00                                   Non-Reactive  1.00 - 4.99                         Weakly-Reactive  >= 5.00                                Reactive  Non-Reactive: Aperson with a non-reactive HCV antibody  result is considered uninfected.  No further action is  needed unless recent infection is suspected.  In these  cases, consider repeat testing later to detect  seroconversion..  Weakly-Reactive: HCV antibody test is abnormal, HCV RNA  Qualitative test will follow.  Reactive: HCV antibody test is abnormal, HCV RNA  Qualitative test will follow.  Note: HCV antibody testing is performed on the [employer]   system.      [03-16-20 @ 05:30]

## 2020-03-24 NOTE — CONSULT NOTE ADULT - PROBLEM SELECTOR RECOMMENDATION 9
- Patient requiring mechanical ventilation,   - Sedation, increase ventilatory requirements.  - Prognosis is extremely poor.

## 2020-03-24 NOTE — PROGRESS NOTE ADULT - ASSESSMENT
Assessment/Plan:  60 y/o female with no PMHx admitted on 3/17/20 w/ hypoxia in setting of confirmed COVID PNA, transferred to MICU for and intubated on 3/20 in setting of hypoxic respiratory failure     Neuro  -Sedated, on propofol  -also on versed and nimbex    Cardiac  -on levophed w/ increasing requirements; Currently at 0.3mcg/kg/min  - MAP goal > 65     Pulm  -Currently intubated 2/2 hypoxic respiratory failure in setting of COVID-19  -CXR demonstrates b/l patchy opacities  -Current Vent settings: FIO2 100%, RR 30, , PEEP 14    GI  -no active issues    Renal  SAM SCr 0.49 -> 4.1 today  with ~30ml/hr urine output while on CVVH removing 100ml/hr currently and goal 200ml/hr; Fluid balance +2.3L last 24hrs  -Bumex and albumin stopped earlier; Continues on zaroxolyn 10mg OGT QD    ID  -COVID-19 (+) 3/16/20  -WBC up to 21 today (from 12  yesterday)  -started on Vit C/thiamine (3/19-3/24)/Plaquenil(3/9-3/24)  -Being covered for CAP with azithromycin; Ceftriaxone finished (3/16-3/22)  -c/w Pepcid 20mg BID   -all other cultures negative    Endo  -no active issues    Heme  -heparin drip started for clotted CVVH circuit earlier    Lines: AMALIA Kaur SC 3-Lumen catheter, L femoral wong, OGT, dunn

## 2020-03-24 NOTE — PROGRESS NOTE ADULT - SUBJECTIVE AND OBJECTIVE BOX
SUBJECTIVE: Patient seen and examined at bedside.  Intubated, sedated and paralyzed with propofol, versed, and nimbex drips.    CONSTITUTIONAL: No weakness, fevers or chills  EYES/ENT: No visual changes;  No vertigo or throat pain   NECK: No pain or stiffness  RESPIRATORY: No cough, wheezing, hemoptysis; No shortness of breath  CARDIOVASCULAR: No chest pain or palpitations  GASTROINTESTINAL: No abdominal or epigastric pain. No nausea, vomiting, or hematemesis; No diarrhea or constipation. No melena or hematochezia.  GENITOURINARY: No dysuria, frequency or hematuria  NEUROLOGICAL: No numbness or weakness  SKIN: No itching, rashes    OBJECTIVE:    VITAL SIGNS:  ICU Vital Signs Last 24 Hrs  T(C): 36.3 (24 Mar 2020 04:00), Max: 38.3 (24 Mar 2020 00:00)  T(F): 97.3 (24 Mar 2020 04:00), Max: 100.9 (24 Mar 2020 00:00)  HR: 94 (24 Mar 2020 09:00) (94 - 119)  BP: 109/62 (24 Mar 2020 08:00) (94/62 - 120/67)  BP(mean): 73 (24 Mar 2020 08:00) (64 - 80)  ABP: 130/62 (24 Mar 2020 09:00) (93/47 - 140/64)  ABP(mean): 84 (24 Mar 2020 09:00) (61 - 87)  RR: 30 (24 Mar 2020 09:00) (29 - 33)  SpO2: 100% (24 Mar 2020 09:00) (92% - 100%)    Mode: AC/ CMV (Assist Control/ Continuous Mandatory Ventilation), RR (machine): 30, TV (machine): 330, FiO2: 100, PEEP: 14, MAP: 22, PIP: 36    03-23 @ 07:01 - 03-24 @ 07:00  --------------------------------------------------------  IN: 3720.4 mL / OUT: 1400 mL / NET: 2320.4 mL    03-24 @ 07:01 - 03-24 @ 10:54  --------------------------------------------------------  IN: 411 mL / OUT: 40 mL / NET: 371 mL      Physical Exam:  Gen: intubated, sedated and paralyzed  Heart: S1, S2; No audible murmurs  Lungs: Coarse breath sounds B/L  Abd: +BS's x4, soft, ND/NT  Ext: All ext warm to touch    MEDICATIONS:  MEDICATIONS  (STANDING):  ascorbic acid IVPB 1500 milliGRAM(s) IV Intermittent every 6 hours  aspirin 325 milliGRAM(s) Oral daily  azithromycin   Tablet 500 milliGRAM(s) Oral daily  chlorhexidine 0.12% Liquid 15 milliLiter(s) Oral Mucosa every 12 hours  chlorhexidine 4% Liquid 1 Application(s) Topical <User Schedule>  CRRT Treatment    <Continuous>  famotidine Injectable 20 milliGRAM(s) IV Push daily  fentaNYL   Infusion. 0.5 MICROgram(s)/kG/Hr (3.61 mL/Hr) IV Continuous <Continuous>  heparin  Infusion 1000 Unit(s)/Hr (10 mL/Hr) IV Continuous <Continuous>  heparin  Infusion Syringe 300 Unit(s)/Hr (0.6 mL/Hr) CRRT <Continuous>  heparin  Injectable 5000 Unit(s) SubCutaneous every 8 hours  metolazone 10 milliGRAM(s) Oral daily  midazolam Infusion 0.02 mG/kG/Hr (1.44 mL/Hr) IV Continuous <Continuous>  norepinephrine Infusion 0.05 MICROgram(s)/kG/Min (3.38 mL/Hr) IV Continuous <Continuous>  polyethylene glycol 3350 17 Gram(s) Oral daily  PrismaSATE Dialysate BGK 4 / 2.5 5000 milliLiter(s) (1500 mL/Hr) CRRT <Continuous>  PrismaSOL Filtration BGK 4 / 2.5 5000 milliLiter(s) (1000 mL/Hr) CRRT <Continuous>  PrismaSOL Filtration BGK 4 / 2.5 5000 milliLiter(s) (500 mL/Hr) CRRT <Continuous>  propofol Infusion 20 MICROgram(s)/kG/Min (8.66 mL/Hr) IV Continuous <Continuous>    MEDICATIONS  (PRN):  acetaminophen    Suspension .. 650 milliGRAM(s) Oral every 6 hours PRN Temp greater or equal to 38C (100.4F)  guaiFENesin   Syrup  (Sugar-Free) 100 milliGRAM(s) Oral every 6 hours PRN Cough  sodium chloride 0.9% lock flush 10 milliLiter(s) IV Push every 1 hour PRN Pre/post blood products, medications, blood draw, and to maintain line patency      ALLERGIES:  Allergies    No Known Allergies    Intolerances      LABS:                        8.5    21.30 )-----------( 304      ( 24 Mar 2020 05:50 )             25.8     03-24    132<L>  |  99  |  27<H>  ----------------------------<  139<H>  4.8   |  17<L>  |  4.10<H>    Ca    9.1      24 Mar 2020 04:30  Phos  5.2     03-24  Mg     3.1     03-24    TPro  6.4  /  Alb  2.4<L>  /  TBili  0.5  /  DBili  x   /  AST  22  /  ALT  33  /  AlkPhos  126<H>  03-24    PT/INR - ( 24 Mar 2020 04:30 )   PT: 12.0 SEC;   INR: 1.05          PTT - ( 24 Mar 2020 04:30 )  PTT:32.4 SEC      Culture - Blood (collected 03-18-20 @ 14:26)  Source: .Blood Blood-Venous  Final Report (03-23-20 @ 19:01):    No growth at 5 days.    Culture - Blood (collected 03-18-20 @ 14:26)  Source: .Blood Blood-Peripheral  Final Report (03-23-20 @ 19:01):    No growth at 5 days.      RADIOLOGY & ADDITIONAL TESTS: Reviewed. SUBJECTIVE: Patient seen and examined at bedside.  Intubated, sedated and paralyzed with propofol, versed, and nimbex drips.    VITAL SIGNS:  ICU Vital Signs Last 24 Hrs  T(C): 36.3 (24 Mar 2020 04:00), Max: 38.3 (24 Mar 2020 00:00)  T(F): 97.3 (24 Mar 2020 04:00), Max: 100.9 (24 Mar 2020 00:00)  HR: 94 (24 Mar 2020 09:00) (94 - 119)  BP: 109/62 (24 Mar 2020 08:00) (94/62 - 120/67)  BP(mean): 73 (24 Mar 2020 08:00) (64 - 80)  ABP: 130/62 (24 Mar 2020 09:00) (93/47 - 140/64)  ABP(mean): 84 (24 Mar 2020 09:00) (61 - 87)  RR: 30 (24 Mar 2020 09:00) (29 - 33)  SpO2: 100% (24 Mar 2020 09:00) (92% - 100%)    Mode: AC/ CMV (Assist Control/ Continuous Mandatory Ventilation), RR (machine): 30, TV (machine): 330, FiO2: 100, PEEP: 14, MAP: 22, PIP: 36    03-23 @ 07:01 - 03-24 @ 07:00  --------------------------------------------------------  IN: 3720.4 mL / OUT: 1400 mL / NET: 2320.4 mL    03-24 @ 07:01 - 03-24 @ 10:54  --------------------------------------------------------  IN: 411 mL / OUT: 40 mL / NET: 371 mL      Physical Exam:  Gen: intubated, sedated and paralyzed  Heart: S1, S2; No audible murmurs  Lungs: Coarse breath sounds B/L  Abd: +BS's x4, soft, ND/NT  Ext: All ext warm to touch    MEDICATIONS:  MEDICATIONS  (STANDING):  ascorbic acid IVPB 1500 milliGRAM(s) IV Intermittent every 6 hours  aspirin 325 milliGRAM(s) Oral daily  azithromycin   Tablet 500 milliGRAM(s) Oral daily  chlorhexidine 0.12% Liquid 15 milliLiter(s) Oral Mucosa every 12 hours  chlorhexidine 4% Liquid 1 Application(s) Topical <User Schedule>  CRRT Treatment    <Continuous>  famotidine Injectable 20 milliGRAM(s) IV Push daily  fentaNYL   Infusion. 0.5 MICROgram(s)/kG/Hr (3.61 mL/Hr) IV Continuous <Continuous>  heparin  Infusion 1000 Unit(s)/Hr (10 mL/Hr) IV Continuous <Continuous>  heparin  Infusion Syringe 300 Unit(s)/Hr (0.6 mL/Hr) CRRT <Continuous>  heparin  Injectable 5000 Unit(s) SubCutaneous every 8 hours  metolazone 10 milliGRAM(s) Oral daily  midazolam Infusion 0.02 mG/kG/Hr (1.44 mL/Hr) IV Continuous <Continuous>  norepinephrine Infusion 0.05 MICROgram(s)/kG/Min (3.38 mL/Hr) IV Continuous <Continuous>  polyethylene glycol 3350 17 Gram(s) Oral daily  PrismaSATE Dialysate BGK 4 / 2.5 5000 milliLiter(s) (1500 mL/Hr) CRRT <Continuous>  PrismaSOL Filtration BGK 4 / 2.5 5000 milliLiter(s) (1000 mL/Hr) CRRT <Continuous>  PrismaSOL Filtration BGK 4 / 2.5 5000 milliLiter(s) (500 mL/Hr) CRRT <Continuous>  propofol Infusion 20 MICROgram(s)/kG/Min (8.66 mL/Hr) IV Continuous <Continuous>    MEDICATIONS  (PRN):  acetaminophen    Suspension .. 650 milliGRAM(s) Oral every 6 hours PRN Temp greater or equal to 38C (100.4F)  guaiFENesin   Syrup  (Sugar-Free) 100 milliGRAM(s) Oral every 6 hours PRN Cough  sodium chloride 0.9% lock flush 10 milliLiter(s) IV Push every 1 hour PRN Pre/post blood products, medications, blood draw, and to maintain line patency      ALLERGIES:  Allergies    No Known Allergies    Intolerances      LABS:                        8.5    21.30 )-----------( 304      ( 24 Mar 2020 05:50 )             25.8     03-24    132<L>  |  99  |  27<H>  ----------------------------<  139<H>  4.8   |  17<L>  |  4.10<H>    Ca    9.1      24 Mar 2020 04:30  Phos  5.2     03-24  Mg     3.1     03-24    TPro  6.4  /  Alb  2.4<L>  /  TBili  0.5  /  DBili  x   /  AST  22  /  ALT  33  /  AlkPhos  126<H>  03-24    PT/INR - ( 24 Mar 2020 04:30 )   PT: 12.0 SEC;   INR: 1.05          PTT - ( 24 Mar 2020 04:30 )  PTT:32.4 SEC      Culture - Blood (collected 03-18-20 @ 14:26)  Source: .Blood Blood-Venous  Final Report (03-23-20 @ 19:01):    No growth at 5 days.    Culture - Blood (collected 03-18-20 @ 14:26)  Source: .Blood Blood-Peripheral  Final Report (03-23-20 @ 19:01):    No growth at 5 days.      RADIOLOGY & ADDITIONAL TESTS: Reviewed.

## 2020-03-24 NOTE — CONSULT NOTE ADULT - SUBJECTIVE AND OBJECTIVE BOX
HPI:  60 y/o F with no PMH p/w fever and cough since Thursday. Tmax 103. She presented to urgent care initially and was treated with tamiflu for presumed flu. Despite this she continued to have fevers. Pt denies recent travel, sick contacts or any other sx or acute complaints. Endorses pinching pain in chest when she coughs. Also with nausea and poor PO intake. Denies shortness of breath, abdominal pain, dysuria or LE edema. (16 Mar 2020 02:05)    Hospitalization with worsening respiratory failure, requiring intubation.  Worsening renal failure requiring renal replacement therapy.     PERTINENT PM/SXH:   No pertinent past medical history    No significant past surgical history    FAMILY HISTORY:  FH: hypertension    ITEMS NOT CHECKED ARE NOT PRESENT    SOCIAL HISTORY:   Significant other/partner:  [ ]  Children:  [x ]  Baptism/Spirituality:  Substance hx:  [ ]   Tobacco hx:  [ ]   Alcohol hx: [ ]   Home Opioid hx:  [ ] I-Stop Reference No:  Living Situation: [ x]Home  [ ]Long term care  [ ]Rehab [ ]Other    ADVANCE DIRECTIVES:    DNR  MOLST  [ ]  Living Will  [ ]   DECISION MAKER(s):  [ ] Health Care Proxy(s)  [ x] Surrogate(s)  [ ] Guardian           Name(s): Phone Number(s): Trixie Ch (daughter)    BASELINE (I)ADL(s) (prior to admission):  [place]: [x ]Total  [ ] Moderate [ ]Dependent    Allergies    No Known Allergies    Intolerances    MEDICATIONS  (STANDING):  aspirin 325 milliGRAM(s) Oral daily  chlorhexidine 0.12% Liquid 15 milliLiter(s) Oral Mucosa every 12 hours  chlorhexidine 4% Liquid 1 Application(s) Topical <User Schedule>  cisatracurium Infusion 1.5 MICROgram(s)/kG/Min (6.49 mL/Hr) IV Continuous <Continuous>  CRRT Treatment    <Continuous>  famotidine Injectable 20 milliGRAM(s) IV Push daily  fentaNYL   Infusion. 0.5 MICROgram(s)/kG/Hr (3.61 mL/Hr) IV Continuous <Continuous>  heparin  Infusion 900 Unit(s)/Hr (9 mL/Hr) IV Continuous <Continuous>  heparin  Infusion Syringe 300 Unit(s)/Hr (0.6 mL/Hr) CRRT <Continuous>  metolazone 10 milliGRAM(s) Oral daily  midazolam Infusion 0.02 mG/kG/Hr (1.44 mL/Hr) IV Continuous <Continuous>  norepinephrine Infusion 0.05 MICROgram(s)/kG/Min (3.38 mL/Hr) IV Continuous <Continuous>  polyethylene glycol 3350 17 Gram(s) Oral daily  PrismaSATE Dialysate BGK 4 / 2.5 5000 milliLiter(s) (1500 mL/Hr) CRRT <Continuous>  PrismaSOL Filtration BGK 4 / 2.5 5000 milliLiter(s) (800 mL/Hr) CRRT <Continuous>  PrismaSOL Filtration BGK 4 / 2.5 5000 milliLiter(s) (200 mL/Hr) CRRT <Continuous>  propofol Infusion 20 MICROgram(s)/kG/Min (8.66 mL/Hr) IV Continuous <Continuous>    MEDICATIONS  (PRN):  acetaminophen    Suspension .. 650 milliGRAM(s) Oral every 6 hours PRN Temp greater or equal to 38C (100.4F)  guaiFENesin   Syrup  (Sugar-Free) 100 milliGRAM(s) Oral every 6 hours PRN Cough  sodium chloride 0.9% lock flush 10 milliLiter(s) IV Push every 1 hour PRN Pre/post blood products, medications, blood draw, and to maintain line patency    PRESENT SYMPTOMS: [ x]Unable to obtain due to poor mentation   Source if other than patient:  [ ]Family   [ ]Team     Pain (Impact on QOL):    Location -         Minimal acceptable level (0-10 scale):                    Aggravating factors -  Quality -  Radiation -  Severity (0-10 scale) -    Timing -    PAIN AD Score:     [email or web address] (press ctrl +  left click to view)    Dyspnea:                           [ ]Mild [ ]Moderate [ ]Severe  Anxiety:                             [ ]Mild [ ]Moderate [ ]Severe  Fatigue:                             [ ]Mild [ ]Moderate [ ]Severe  Nausea:                             [ ]Mild [ ]Moderate [ ]Severe  Loss of appetite:              [ ]Mild [ ]Moderate [ ]Severe  Constipation:                    [ ]Mild [ ]Moderate [ ]Severe  Grief Present                    [ ] Yes   [ ] No   Other Symptoms:  [x ]All other review of systems negative     Karnofsky Performance Score/Palliative Performance Status Version 2:  10     %    http://palliative.info/resource_material/[email or web address]  PHYSICAL EXAM:  Vital Signs Last 24 Hrs  T(C): 37.4 (25 Mar 2020 08:00), Max: 37.4 (25 Mar 2020 08:00)  T(F): 99.4 (25 Mar 2020 08:00), Max: 99.4 (25 Mar 2020 08:00)  HR: 113 (25 Mar 2020 09:00) (75 - 113)  BP: 90/61 (25 Mar 2020 08:00) (90/55 - 105/63)  BP(mean): 66 (25 Mar 2020 08:00) (63 - 74)  RR: 30 (25 Mar 2020 09:00) (29 - 30)  SpO2: 99% (25 Mar 2020 09:00) (94% - 100%) I&O's Summary    24 Mar 2020 07:01  -  25 Mar 2020 07:00  --------------------------------------------------------  IN: 3165.4 mL / OUT: 4496 mL / NET: -1330.6 mL    25 Mar 2020 07:01  -  25 Mar 2020 09:54  --------------------------------------------------------  IN: 402 mL / OUT: 210 mL / NET: 192 mL    GENERAL: For full physical exam, please refer to ICU note from same date.  [ ]Alert  [ ]Oriented x   [ ]Lethargic  [ ]Cachexia  [ ]Unarousable  [ ]Verbal  [ ]Non-Verbal  Behavioral:   [ ] Anxiety  [ ] Delirium [ ] Agitation [ ] Other  HEENT:  [ ]Normal   [ ]Dry mouth   [ ]ET Tube/Trach  [ ]Oral lesions  PULMONARY:   [ ]Clear [ ]Tachypnea  [ ]Audible excessive secretions   [ ]Rhonchi        [ ]Right [ ]Left [ ]Bilateral  [ ]Crackles        [ ]Right [ ]Left [ ]Bilateral  [ ]Wheezing     [ ]Right [ ]Left [ ]Bilateral  CARDIOVASCULAR:    [ ]Regular [ ]Irregular [ ]Tachy  [ ]Arley [ ]Murmur [ ]Other  GASTROINTESTINAL:  [ ]Soft  [ ]Distended   [ ]+BS  [ ]Non tender [ ]Tender  [ ]PEG [ ]OGT/ NGT  Last BM:   GENITOURINARY:  [ ]Normal [ ] Incontinent   [ ]Oliguria/Anuria   [ ]Frances  MUSCULOSKELETAL:   [ ]Normal   [ ]Weakness  [ ]Bed/Wheelchair bound [ ]Edema  NEUROLOGIC:   [ ]No focal deficits  [ ] Cognitive impairment  [ ] Dysphagia [ ]Dysarthria [ ] Paresis [ ]Other   SKIN:   [ ]Normal   [ ]Pressure ulcer(s)  [ ]Rash    CRITICAL CARE:  x ] Shock Present  [x ]Septic [ ]Cardiogenic [ ]Neurologic [ ]Hypovolemic  [ ]  Vasopressors [ ]  Inotropes   [x ] Respiratory failure present  [x] Acute  [ ] Chronic [ ] Hypoxic  [ ] Hypercarbic [ ] Other  [ ] Other organ failure     LABS:                        8.6    15.19 )-----------( 184      ( 25 Mar 2020 03:20 )             25.8   03-24    132<L>  |  99  |  27<H>  ----------------------------<  139<H>  4.8   |  17<L>  |  4.10<H>    Ca    9.1      24 Mar 2020 04:30  Phos  5.2     03-24  Mg     3.1     03-24    TPro  6.4  /  Alb  2.4<L>  /  TBili  0.5  /  DBili  x   /  AST  22  /  ALT  33  /  AlkPhos  126<H>  03-24  PT/INR - ( 25 Mar 2020 03:20 )   PT: 12.5 SEC;   INR: 1.08          PTT - ( 25 Mar 2020 01:23 )  PTT:67.8 SEC      RADIOLOGY & ADDITIONAL STUDIES:    PROTEIN CALORIE MALNUTRITION PRESENT: [ ] Yes [ ] No  [ ] PPSV2 < or = to 30% [ ] significant weight loss  [ ] poor nutritional intake [ ] catabolic state [ ] anasarca     Albumin, Serum: 2.4 g/dL (03-24-20 @ 04:30)  Artificial Nutrition [ ]     REFERRALS:   [ ]Chaplaincy  [ ] Hospice  [ ]Child Life  [ ]Social Work  [ ]Case management [ ]Holistic Therapy   Goals of Care Discussion Document:

## 2020-03-24 NOTE — PROGRESS NOTE ADULT - PROBLEM SELECTOR PLAN 1
Pt. with non-oliguric SAM in the setting of hypotension and COVID-19 infection. Pt. with likely ATN. Scr was WNL (0.82) on 3/28/18, increased to 4.6 yesterday, with worsening acidosis, remained positive balance despite being on bumex, hence started on CRRT/CVVHDF, plan to continue with RRT. Check C3 and C4 and renal sonogram.  Monitor labs and urine output. Avoid any potential nephrotoxins. Dose medication as per CRRT dose of 30 ml/kg/hr. Pt. with non-oliguric SAM in the setting of hypotension and COVID-19 infection. Pt. with likely ATN. Scr increased to 4.6 on 3/23/20. Pt. started on CRRT/CVVHDF. Pt. tolerating CRRT/CVVHDF. HD catheter functioning. Plan is to continue CRRT as per discussion with ICU team. Check serum C3 and C4 and renal sonogram. Monitor labs and urine output. Avoid any potential nephrotoxins

## 2020-03-24 NOTE — CONSULT NOTE ADULT - PROBLEM SELECTOR RECOMMENDATION 4
- Patient with multiorgan failure, in the setting of active COVID infection, with minimal improvement.  - Communicated with patients kids today. Explained overall poor prognosis.  - They are in understanding.  - Wish to continue with interventions as long as they are offered by ICU staff.  - All are understanding that death is likely during hospitalization/

## 2020-03-25 DIAGNOSIS — J96.90 RESPIRATORY FAILURE, UNSPECIFIED, UNSPECIFIED WHETHER WITH HYPOXIA OR HYPERCAPNIA: ICD-10-CM

## 2020-03-25 DIAGNOSIS — Z51.5 ENCOUNTER FOR PALLIATIVE CARE: ICD-10-CM

## 2020-03-25 DIAGNOSIS — A41.9 SEPSIS, UNSPECIFIED ORGANISM: ICD-10-CM

## 2020-03-25 LAB
ALBUMIN SERPL ELPH-MCNC: 2.5 G/DL — LOW (ref 3.3–5)
ALP SERPL-CCNC: 126 U/L — HIGH (ref 40–120)
ALT FLD-CCNC: 44 U/L — HIGH (ref 4–33)
ANION GAP SERPL CALC-SCNC: 13 MMO/L — SIGNIFICANT CHANGE UP (ref 7–14)
APTT BLD: 51.4 SEC — HIGH (ref 27.5–36.3)
APTT BLD: 54.6 SEC — HIGH (ref 27.5–36.3)
APTT BLD: 67.8 SEC — HIGH (ref 27.5–36.3)
AST SERPL-CCNC: 32 U/L — SIGNIFICANT CHANGE UP (ref 4–32)
BASE EXCESS BLDA CALC-SCNC: -0.1 MMOL/L — SIGNIFICANT CHANGE UP
BASE EXCESS BLDA CALC-SCNC: -0.4 MMOL/L — SIGNIFICANT CHANGE UP
BASE EXCESS BLDA CALC-SCNC: 0.6 MMOL/L — SIGNIFICANT CHANGE UP
BILIRUB SERPL-MCNC: 0.4 MG/DL — SIGNIFICANT CHANGE UP (ref 0.2–1.2)
BUN SERPL-MCNC: 14 MG/DL — SIGNIFICANT CHANGE UP (ref 7–23)
CA-I BLDA-SCNC: 1.25 MMOL/L — SIGNIFICANT CHANGE UP (ref 1.15–1.29)
CALCIUM SERPL-MCNC: 9.3 MG/DL — SIGNIFICANT CHANGE UP (ref 8.4–10.5)
CHLORIDE SERPL-SCNC: 100 MMOL/L — SIGNIFICANT CHANGE UP (ref 98–107)
CK SERPL-CCNC: 117 U/L — SIGNIFICANT CHANGE UP (ref 25–170)
CO2 SERPL-SCNC: 22 MMOL/L — SIGNIFICANT CHANGE UP (ref 22–31)
CREAT SERPL-MCNC: 1.72 MG/DL — HIGH (ref 0.5–1.3)
CRP SERPL-MCNC: 559.1 MG/L — HIGH
D DIMER BLD IA.RAPID-MCNC: 3705 NG/ML — SIGNIFICANT CHANGE UP
ERYTHROCYTE [SEDIMENTATION RATE] IN BLOOD: 117 MM/HR — HIGH (ref 4–25)
FERRITIN SERPL-MCNC: 1555 NG/ML — HIGH (ref 15–150)
GLUCOSE BLDA-MCNC: 115 MG/DL — HIGH (ref 70–99)
GLUCOSE BLDA-MCNC: 118 MG/DL — HIGH (ref 70–99)
GLUCOSE BLDA-MCNC: 97 MG/DL — SIGNIFICANT CHANGE UP (ref 70–99)
GLUCOSE SERPL-MCNC: 111 MG/DL — HIGH (ref 70–99)
HCO3 BLDA-SCNC: 24 MMOL/L — SIGNIFICANT CHANGE UP (ref 22–26)
HCT VFR BLD CALC: 25.8 % — LOW (ref 34.5–45)
HCT VFR BLDA CALC: 25.7 % — LOW (ref 34.5–46.5)
HCT VFR BLDA CALC: 26.1 % — LOW (ref 34.5–46.5)
HCT VFR BLDA CALC: 28 % — LOW (ref 34.5–46.5)
HGB BLD-MCNC: 8.6 G/DL — LOW (ref 11.5–15.5)
HGB BLDA-MCNC: 8.2 G/DL — LOW (ref 11.5–15.5)
HGB BLDA-MCNC: 8.4 G/DL — LOW (ref 11.5–15.5)
HGB BLDA-MCNC: 9 G/DL — LOW (ref 11.5–15.5)
INR BLD: 1.08 — SIGNIFICANT CHANGE UP (ref 0.88–1.17)
LACTATE BLDA-SCNC: 2 MMOL/L — SIGNIFICANT CHANGE UP (ref 0.5–2)
LDH SERPL L TO P-CCNC: 401 U/L — HIGH (ref 135–225)
MAGNESIUM SERPL-MCNC: 2.6 MG/DL — SIGNIFICANT CHANGE UP (ref 1.6–2.6)
MCHC RBC-ENTMCNC: 27.6 PG — SIGNIFICANT CHANGE UP (ref 27–34)
MCHC RBC-ENTMCNC: 33.3 % — SIGNIFICANT CHANGE UP (ref 32–36)
MCV RBC AUTO: 82.7 FL — SIGNIFICANT CHANGE UP (ref 80–100)
NRBC # FLD: 0.1 K/UL — SIGNIFICANT CHANGE UP (ref 0–0)
PCO2 BLDA: 55 MMHG — HIGH (ref 32–48)
PCO2 BLDA: 58 MMHG — HIGH (ref 32–48)
PCO2 BLDA: 59 MMHG — HIGH (ref 32–48)
PH BLDA: 7.27 PH — LOW (ref 7.35–7.45)
PH BLDA: 7.27 PH — LOW (ref 7.35–7.45)
PH BLDA: 7.3 PH — LOW (ref 7.35–7.45)
PHOSPHATE SERPL-MCNC: 2.8 MG/DL — SIGNIFICANT CHANGE UP (ref 2.5–4.5)
PLATELET # BLD AUTO: 184 K/UL — SIGNIFICANT CHANGE UP (ref 150–400)
PMV BLD: 10.4 FL — SIGNIFICANT CHANGE UP (ref 7–13)
PO2 BLDA: 54 MMHG — LOW (ref 83–108)
PO2 BLDA: 87 MMHG — SIGNIFICANT CHANGE UP (ref 83–108)
PO2 BLDA: 97 MMHG — SIGNIFICANT CHANGE UP (ref 83–108)
POTASSIUM BLDA-SCNC: 4.5 MMOL/L — SIGNIFICANT CHANGE UP (ref 3.4–4.5)
POTASSIUM BLDA-SCNC: 4.9 MMOL/L — HIGH (ref 3.4–4.5)
POTASSIUM BLDA-SCNC: 5.1 MMOL/L — HIGH (ref 3.4–4.5)
POTASSIUM SERPL-MCNC: 5.1 MMOL/L — SIGNIFICANT CHANGE UP (ref 3.5–5.3)
POTASSIUM SERPL-SCNC: 5.1 MMOL/L — SIGNIFICANT CHANGE UP (ref 3.5–5.3)
PROT SERPL-MCNC: 6.6 G/DL — SIGNIFICANT CHANGE UP (ref 6–8.3)
PROTHROM AB SERPL-ACNC: 12.5 SEC — SIGNIFICANT CHANGE UP (ref 9.8–13.1)
RBC # BLD: 3.12 M/UL — LOW (ref 3.8–5.2)
RBC # FLD: 17 % — HIGH (ref 10.3–14.5)
SAO2 % BLDA: 87.1 % — LOW (ref 95–99)
SAO2 % BLDA: 95.4 % — SIGNIFICANT CHANGE UP (ref 95–99)
SAO2 % BLDA: 96.5 % — SIGNIFICANT CHANGE UP (ref 95–99)
SODIUM BLDA-SCNC: 134 MMOL/L — LOW (ref 136–146)
SODIUM BLDA-SCNC: 135 MMOL/L — LOW (ref 136–146)
SODIUM BLDA-SCNC: 135 MMOL/L — LOW (ref 136–146)
SODIUM SERPL-SCNC: 135 MMOL/L — SIGNIFICANT CHANGE UP (ref 135–145)
TROPONIN T, HIGH SENSITIVITY: < 6 NG/L — SIGNIFICANT CHANGE UP (ref ?–14)
WBC # BLD: 15.19 K/UL — HIGH (ref 3.8–10.5)
WBC # FLD AUTO: 15.19 K/UL — HIGH (ref 3.8–10.5)

## 2020-03-25 PROCEDURE — 99292 CRITICAL CARE ADDL 30 MIN: CPT

## 2020-03-25 PROCEDURE — 99291 CRITICAL CARE FIRST HOUR: CPT

## 2020-03-25 PROCEDURE — 90945 DIALYSIS ONE EVALUATION: CPT | Mod: GC

## 2020-03-25 RX ORDER — HEPARIN SODIUM 5000 [USP'U]/ML
300 INJECTION INTRAVENOUS; SUBCUTANEOUS
Qty: 10000 | Refills: 0 | Status: DISCONTINUED | OUTPATIENT
Start: 2020-03-25 | End: 2020-03-26

## 2020-03-25 RX ORDER — CISATRACURIUM BESYLATE 2 MG/ML
1.5 INJECTION INTRAVENOUS
Qty: 200 | Refills: 0 | Status: DISCONTINUED | OUTPATIENT
Start: 2020-03-25 | End: 2020-03-25

## 2020-03-25 RX ORDER — SENNA PLUS 8.6 MG/1
15 TABLET ORAL DAILY
Refills: 0 | Status: DISCONTINUED | OUTPATIENT
Start: 2020-03-25 | End: 2020-04-07

## 2020-03-25 RX ADMIN — Medication 3.38 MICROGRAM(S)/KG/MIN: at 20:54

## 2020-03-25 RX ADMIN — POLYETHYLENE GLYCOL 3350 17 GRAM(S): 17 POWDER, FOR SOLUTION ORAL at 13:20

## 2020-03-25 RX ADMIN — Medication 3.38 MICROGRAM(S)/KG/MIN: at 08:13

## 2020-03-25 RX ADMIN — HEPARIN SODIUM 9 UNIT(S)/HR: 5000 INJECTION INTRAVENOUS; SUBCUTANEOUS at 08:14

## 2020-03-25 RX ADMIN — PROPOFOL 8.66 MICROGRAM(S)/KG/MIN: 10 INJECTION, EMULSION INTRAVENOUS at 19:59

## 2020-03-25 RX ADMIN — MIDAZOLAM HYDROCHLORIDE 1.44 MG/KG/HR: 1 INJECTION, SOLUTION INTRAMUSCULAR; INTRAVENOUS at 08:13

## 2020-03-25 RX ADMIN — PROPOFOL 8.66 MICROGRAM(S)/KG/MIN: 10 INJECTION, EMULSION INTRAVENOUS at 08:13

## 2020-03-25 RX ADMIN — Medication 325 MILLIGRAM(S): at 13:20

## 2020-03-25 RX ADMIN — FENTANYL CITRATE 3.61 MICROGRAM(S)/KG/HR: 50 INJECTION INTRAVENOUS at 08:13

## 2020-03-25 RX ADMIN — CHLORHEXIDINE GLUCONATE 1 APPLICATION(S): 213 SOLUTION TOPICAL at 05:34

## 2020-03-25 RX ADMIN — CHLORHEXIDINE GLUCONATE 15 MILLILITER(S): 213 SOLUTION TOPICAL at 05:34

## 2020-03-25 RX ADMIN — HEPARIN SODIUM 9 UNIT(S)/HR: 5000 INJECTION INTRAVENOUS; SUBCUTANEOUS at 20:55

## 2020-03-25 RX ADMIN — CHLORHEXIDINE GLUCONATE 15 MILLILITER(S): 213 SOLUTION TOPICAL at 17:53

## 2020-03-25 RX ADMIN — MIDAZOLAM HYDROCHLORIDE 1.44 MG/KG/HR: 1 INJECTION, SOLUTION INTRAMUSCULAR; INTRAVENOUS at 19:59

## 2020-03-25 RX ADMIN — FAMOTIDINE 20 MILLIGRAM(S): 10 INJECTION INTRAVENOUS at 13:20

## 2020-03-25 RX ADMIN — FENTANYL CITRATE 3.61 MICROGRAM(S)/KG/HR: 50 INJECTION INTRAVENOUS at 19:59

## 2020-03-25 NOTE — PROGRESS NOTE ADULT - ASSESSMENT
58 y/o F with no PMH p/w fever and cough since Thursday. Tmax 103. She presented to urgent care initially and was treated with tamiflu for presumed flu. Despite this she continued to have fevers. Pt denies recent travel, sick contacts or any other sx or acute complaints. Developed acute hypoxic respiratory failure requiring intubation & was found to be COVID+.      NEURO  - Sedated with Propofol, Versed and Fentanyl                                          CARDIO   - Continue hemodynamic monitoring.  - Titrate Levophed to MAP>65  - Continue ASA 325mg daily + Heparin Drip - Target PTT 50-80     RESPIRATORY  - Acute hypoxemic respiratory failure due to Pneumonia / COVID-19+  - Completed COVID-19 cocktail  - On full mechanical vent support AC 30/330/60%/14  - Wean FiO2 as tolerated  - Follow ABGs  - Monitor Plateau pressure / driving pressure daily  - Initiation and maintenance of vent settings as per [employer] protocol                      - Continue bronchodilators, pulmonary toilet    GI  - Has NGT in place, tube feeds as tolerated  - Continue Pepcid  - Bowel regimen	, adding Senna                                         RENAL  - CRRT in progress, Nephro follow up  - Strict I/Os, Negative 1330cc/24 hrs with increasing Levophed requirements, will decrease about of fluid removal/hr to 100cc  - Monitor BUN / Cr, Cr now 1.72 down from 4.1                          HEME  - DVT prophylaxis with SCDs / on Heparin gtt, PTT 50-80  - Follow CBC, H&H Stable    INFECTIOUS DISEASE  - Pneumonia, completed antibiotic course  - COVID-19 +, completed treatment cocktail 3/24  - Follow cultures, negative to date, RVP Negative, Urine Legionella Negative  - Plaquenil , Vit-C, Thiamin, Azithromycin completed    ENDOCRINE  - Continue Accu-Checks, coverage as needed    Pt is on Lovenox & Venodyne boots for DVT prophylaxis. 58 y/o F with no PMH p/w fever and cough since Thursday. Tmax 103. She presented to urgent care initially and was treated with tamiflu for presumed flu. Despite this she continued to have fevers. Pt denies recent travel, sick contacts or any other sx or acute complaints. Developed acute hypoxic respiratory failure requiring intubation & was found to be COVID+.      NEURO  - Sedated with Propofol, Versed and Fentanyl                                          CARDIO   - Continue hemodynamic monitoring.  - Titrate Levophed to MAP>65  - Continue ASA 325mg daily + Heparin Drip - Target PTT 50-80     RESPIRATORY  - Acute hypoxemic respiratory failure due to Pneumonia / COVID-19+  - Completed COVID-19 cocktail  - On full mechanical vent support AC 30/330/60%/14  - Wean FiO2 as tolerated  - Follow ABGs  - Monitor Plateau pressure / driving pressure daily  - Initiation and maintenance of vent settings as per [employer] protocol                      - Continue bronchodilators, pulmonary toilet    GI  - Has NGT in place, tube feeds as tolerated  - Continue Pepcid  - Bowel regimen	, adding Senna                                         RENAL  - CRRT in progress, Nephro follow up  - Strict I/Os, Negative 1330cc/24 hrs with increasing Levophed requirements, will decrease about of fluid removal/hr to 100cc  - Monitor BUN / Cr, Cr now 1.72 down from 4.1                          HEME  - DVT prophylaxis with SCDs / on Heparin gtt, PTT 50-80  - Follow CBC, H&H Stable    INFECTIOUS DISEASE  - Pneumonia, completed antibiotic course  - COVID-19 +, completed treatment cocktail 3/24  - Follow cultures, negative to date, RVP Negative, Urine Legionella Negative  - Plaquenil , Vit-C, Thiamin, Azithromycin completed    ENDOCRINE  - Continue Accu-Checks, coverage as needed    Pt is on Lovenox & Venodyne boots for DVT prophylaxis.       Critical Care Attestation:   Critical Care Attestation:  Attending with resident/fellow:  I have personally provided 75 minutes of critical care time concurrently with the resident/fellow. This time excludes time spent on separate procedures and time spent teaching. I have reviewed the resident/fellow’s documentation and I agree with the assessment and plan of care  Attending Attestation:   Agree with above.  Patient seen and examined. Chart reviewed.    59 year old woman acute respiratory failure with hypoxia, ARDS, COVID pneumonia, shock with oliguric renal failure    - sedated  vent coordination, trial off paralytics  - SAM, discussed with nephrology , on CRRT    critically ill patient multiple bedside rounds with therapeutic interventions through out the day .     I was physically present for the key portions of the evaluation and management (E/M) service provided.  I agree with the above history, physical, and plan which I have reviewed and edited where appropriate.     Plan discussed with ICU team.

## 2020-03-25 NOTE — PROGRESS NOTE ADULT - PROBLEM SELECTOR PLAN 1
Pt. with non-oliguric SAM in the setting of hypotension and COVID-19 infection. Pt. with likely ATN. Scr increased to 4.6 on 3/23/20. Pt. started on CRRT/CVVHDF. Pt. tolerating CRRT/CVVHDF, BP maintain on IV vasopressors, tolerating 150cc/hr of fluid removal,  HD catheter functioning. Plan is to continue CRRT as per discussion with ICU team. Check serum C3 and C4 and renal sonogram once is feasible. Monitor labs and urine output. Avoid any potential nephrotoxins Pt. with oliguric SAM in the setting of hypotension and COVID-19 infection. Pt. with likely ATN. Scr increased to 4.6 on 3/23/20, started on CRRT/CVVHDF. Pt. tolerating CRRT/CVVHDF. BP being maintained on IV vasopressors. HD catheter functioning during rounds. Plan is to continue with CRRT/CVVHDF as per discussion with ICU team. Check serum C3 and C4. Check renal sonogram with doppler (when feasible). Monitor labs and urine output. Avoid any potential nephrotoxins

## 2020-03-25 NOTE — PROGRESS NOTE ADULT - SUBJECTIVE AND OBJECTIVE BOX
[facility] DIVISION OF KIDNEY DISEASES AND HYPERTENSION -- FOLLOW UP NOTE  --------------------------------------------------------------------------------  59-year-old female with no PMH admitted to ICU for respiratory failure, pneumonia, SAM and COVID-19. Scr increased to 4.6 on 3/23/20. Pt. initiated on CRRT/CVVHDF on 3/23/20.    Pt. was seen and examined in ICU. Pt. intubated, on IV pressor support, now anuric. s. FiO2 60%, PEEP 14. No clotting events with CRRT.     PAST HISTORY  --------------------------------------------------------------------------------  No significant changes to PMH, PSH, FHx, SHx, unless otherwise noted    ALLERGIES & MEDICATIONS  --------------------------------------------------------------------------------  Allergies    No Known Allergies    Intolerances      Standing Inpatient Medications  aspirin 325 milliGRAM(s) Oral daily  chlorhexidine 0.12% Liquid 15 milliLiter(s) Oral Mucosa every 12 hours  chlorhexidine 4% Liquid 1 Application(s) Topical <User Schedule>  cisatracurium Infusion 1.5 MICROgram(s)/kG/Min IV Continuous <Continuous>  CRRT Treatment    <Continuous>  famotidine Injectable 20 milliGRAM(s) IV Push daily  fentaNYL   Infusion. 0.5 MICROgram(s)/kG/Hr IV Continuous <Continuous>  heparin  Infusion 900 Unit(s)/Hr IV Continuous <Continuous>  heparin  Infusion Syringe 300 Unit(s)/Hr CRRT <Continuous>  metolazone 10 milliGRAM(s) Oral daily  midazolam Infusion 0.02 mG/kG/Hr IV Continuous <Continuous>  norepinephrine Infusion 0.05 MICROgram(s)/kG/Min IV Continuous <Continuous>  polyethylene glycol 3350 17 Gram(s) Oral daily  PrismaSATE Dialysate BGK 4 / 2.5 5000 milliLiter(s) CRRT <Continuous>  PrismaSOL Filtration BGK 4 / 2.5 5000 milliLiter(s) CRRT <Continuous>  PrismaSOL Filtration BGK 4 / 2.5 5000 milliLiter(s) CRRT <Continuous>  propofol Infusion 20 MICROgram(s)/kG/Min IV Continuous <Continuous>    PRN Inpatient Medications  acetaminophen    Suspension .. 650 milliGRAM(s) Oral every 6 hours PRN  guaiFENesin   Syrup  (Sugar-Free) 100 milliGRAM(s) Oral every 6 hours PRN  sodium chloride 0.9% lock flush 10 milliLiter(s) IV Push every 1 hour PRN      REVIEW OF SYSTEMS  --------------------------------------------------------------------------------  not able to obtain.     VITALS/PHYSICAL EXAM  --------------------------------------------------------------------------------  T(C): 37.4 (03-25-20 @ 08:00), Max: 37.4 (03-25-20 @ 08:00)  HR: 113 (03-25-20 @ 09:00) (75 - 113)  BP: 90/61 (03-25-20 @ 08:00) (90/55 - 105/63)  RR: 30 (03-25-20 @ 09:00) (29 - 30)  SpO2: 99% (03-25-20 @ 09:00) (94% - 100%)  Wt(kg): --        03-24-20 @ 07:01  -  03-25-20 @ 07:00  --------------------------------------------------------  IN: 3165.4 mL / OUT: 4496 mL / NET: -1330.6 mL    03-25-20 @ 07:01  -  03-25-20 @ 09:56  --------------------------------------------------------  IN: 402 mL / OUT: 210 mL / NET: 192 mL      Physical Exam (limited):  	Gen: Intubated  	HEENT: +ETT  	Pulm: Coarse breath sounds B/L  	CV: S1S2+  	Abd: Soft, +BS   	Ext: No LE edema B/L  	Neuro: Sedated  	Skin: Warm and dry              Access: Right IJ non tunneled catheter+    LABS/STUDIES  --------------------------------------------------------------------------------              8.6    15.19 >-----------<  184      [03-25-20 @ 03:20]              25.8     132  |  99  |  27  ----------------------------<  139      [03-24-20 @ 04:30]  4.8   |  17  |  4.10        Ca     9.1     [03-24-20 @ 04:30]      Mg     3.1     [03-24-20 @ 04:30]      Phos  5.2     [03-24-20 @ 04:30]    TPro  6.4  /  Alb  2.4  /  TBili  0.5  /  DBili  x   /  AST  22  /  ALT  33  /  AlkPhos  126  [03-24-20 @ 04:30]    PT/INR: PT 12.5 , INR 1.08       [03-25-20 @ 03:20]  PTT: 67.8       [03-25-20 @ 01:23]          [03-25-20 @ 03:20]        [03-25-20 @ 03:20]    Creatinine Trend:  SCr 4.10 [03-24 @ 04:30]  SCr 4.60 [03-23 @ 13:50]  SCr 4.10 [03-22 @ 21:20]  SCr 3.40 [03-22 @ 03:39]  SCr 3.07 [03-21 @ 20:42]    Urinalysis - [03-15-20 @ 22:27]      Color YELLOW / Appearance CLEAR / SG 1.037 / pH 6.0      Gluc NEGATIVE / Ketone TRACE  / Bili NEGATIVE / Urobili TRACE       Blood MODERATE / Protein 200 / Leuk Est NEGATIVE / Nitrite NEGATIVE      RBC 11-25 / WBC 3-5 / Hyaline 1+ / Gran  / Sq Epi FEW / Non Sq Epi  / Bacteria NEGATIVE      Ferritin 1555      [03-25-20 @ 03:20]  Lipid: chol --, , HDL --, LDL --      [03-24-20 @ 04:30]    HCV 0.12, Nonreactive Hepatitis C AB  S/CO Ratio                        Interpretation  < 1.00                                   Non-Reactive  1.00 - 4.99                         Weakly-Reactive  >= 5.00                                Reactive  Non-Reactive: Aperson with a non-reactive HCV antibody  result is considered uninfected.  No further action is  needed unless recent infection is suspected.  In these  cases, consider repeat testing later to detect  seroconversion..  Weakly-Reactive: HCV antibody test is abnormal, HCV RNA  Qualitative test will follow.  Reactive: HCV antibody test is abnormal, HCV RNA  Qualitative test will follow.  Note: HCV antibody testing is performed on the [employer] system.      [03-16-20 @ 05:30] [facility] DIVISION OF KIDNEY DISEASES AND HYPERTENSION -- FOLLOW UP NOTE  --------------------------------------------------------------------------------  59-year-old female with no PMH admitted to ICU for respiratory failure, pneumonia, SAM and COVID-19. Scr increased to 4.6 on 3/23/20. Pt. initiated on CRRT/CVVHDF on 3/23/20.    Pt. was seen and examined in ICU. Pt. intubated, on IV vasopressor support. Pt. currently oliguric, on CRRT. No clotting events with CRRT circuit. FiO2 60%, PEEP 14.     PAST HISTORY  --------------------------------------------------------------------------------  No significant changes to PMH, PSH, FHx, SHx, unless otherwise noted    ALLERGIES & MEDICATIONS  --------------------------------------------------------------------------------  Allergies    No Known Allergies    Intolerances    Standing Inpatient Medications  aspirin 325 milliGRAM(s) Oral daily  chlorhexidine 0.12% Liquid 15 milliLiter(s) Oral Mucosa every 12 hours  chlorhexidine 4% Liquid 1 Application(s) Topical <User Schedule>  cisatracurium Infusion 1.5 MICROgram(s)/kG/Min IV Continuous <Continuous>  CRRT Treatment    <Continuous>  famotidine Injectable 20 milliGRAM(s) IV Push daily  fentaNYL   Infusion. 0.5 MICROgram(s)/kG/Hr IV Continuous <Continuous>  heparin  Infusion 900 Unit(s)/Hr IV Continuous <Continuous>  heparin  Infusion Syringe 300 Unit(s)/Hr CRRT <Continuous>  metolazone 10 milliGRAM(s) Oral daily  midazolam Infusion 0.02 mG/kG/Hr IV Continuous <Continuous>  norepinephrine Infusion 0.05 MICROgram(s)/kG/Min IV Continuous <Continuous>  polyethylene glycol 3350 17 Gram(s) Oral daily  PrismaSATE Dialysate BGK 4 / 2.5 5000 milliLiter(s) CRRT <Continuous>  PrismaSOL Filtration BGK 4 / 2.5 5000 milliLiter(s) CRRT <Continuous>  PrismaSOL Filtration BGK 4 / 2.5 5000 milliLiter(s) CRRT <Continuous>  propofol Infusion 20 MICROgram(s)/kG/Min IV Continuous <Continuous>    PRN Inpatient Medications  acetaminophen    Suspension .. 650 milliGRAM(s) Oral every 6 hours PRN  guaiFENesin   Syrup  (Sugar-Free) 100 milliGRAM(s) Oral every 6 hours PRN  sodium chloride 0.9% lock flush 10 milliLiter(s) IV Push every 1 hour PRN    REVIEW OF SYSTEMS  --------------------------------------------------------------------------------  Unable to obtain.    VITALS/PHYSICAL EXAM  --------------------------------------------------------------------------------  T(C): 37.4 (03-25-20 @ 08:00), Max: 37.4 (03-25-20 @ 08:00)  HR: 113 (03-25-20 @ 09:00) (75 - 113)  BP: 90/61 (03-25-20 @ 08:00) (90/55 - 105/63)  RR: 30 (03-25-20 @ 09:00) (29 - 30)  SpO2: 99% (03-25-20 @ 09:00) (94% - 100%)  Wt(kg): --    03-24-20 @ 07:01  -  03-25-20 @ 07:00  --------------------------------------------------------  IN: 3165.4 mL / OUT: 4496 mL / NET: -1330.6 mL    03-25-20 @ 07:01  -  03-25-20 @ 09:56  --------------------------------------------------------  IN: 402 mL / OUT: 210 mL / NET: 192 mL    Physical Exam (limited):  	Gen: Intubated  	HEENT: +ETT  	Pulm: Coarse breath sounds B/L  	CV: S1S2+  	Abd: Soft, +BS   	Ext: No LE edema B/L  	Neuro: Sedated  	Skin: Warm and dry              Access: Right IJ non tunneled catheter+    LABS/STUDIES  --------------------------------------------------------------------------------              8.6    15.19 >-----------<  184      [03-25-20 @ 03:20]              25.8     132  |  99  |  27  ----------------------------<  139      [03-24-20 @ 04:30]  4.8   |  17  |  4.10        Ca     9.1     [03-24-20 @ 04:30]      Mg     3.1     [03-24-20 @ 04:30]      Phos  5.2     [03-24-20 @ 04:30]    TPro  6.4  /  Alb  2.4  /  TBili  0.5  /  DBili  x   /  AST  22  /  ALT  33  /  AlkPhos  126  [03-24-20 @ 04:30]          [03-25-20 @ 03:20]        [03-25-20 @ 03:20]    Creatinine Trend:  SCr 4.10 [03-24 @ 04:30]  SCr 4.60 [03-23 @ 13:50]  SCr 4.10 [03-22 @ 21:20]  SCr 3.40 [03-22 @ 03:39]  SCr 3.07 [03-21 @ 20:42]    Urinalysis - [03-15-20 @ 22:27]      Color YELLOW / Appearance CLEAR / SG 1.037 / pH 6.0      Gluc NEGATIVE / Ketone TRACE  / Bili NEGATIVE / Urobili TRACE       Blood MODERATE / Protein 200 / Leuk Est NEGATIVE / Nitrite NEGATIVE      RBC 11-25 / WBC 3-5 / Hyaline 1+ / Gran  / Sq Epi FEW / Non Sq Epi  / Bacteria NEGATIVE    Ferritin 1555      [03-25-20 @ 03:20]      HCV 0.12, Nonreactive Hepatitis C AB  S/CO Ratio                        Interpretation  < 1.00                                   Non-Reactive  1.00 - 4.99                         Weakly-Reactive  >= 5.00                                Reactive  Non-Reactive: Aperson with a non-reactive HCV antibody  result is considered uninfected.  No further action is  needed unless recent infection is suspected.  In these  cases, consider repeat testing later to detect  seroconversion..  Weakly-Reactive: HCV antibody test is abnormal, HCV RNA  Qualitative test will follow.  Reactive: HCV antibody test is abnormal, HCV RNA  Qualitative test will follow.  Note: HCV antibody testing is performed on the [employer]   system.      [03-16-20 @ 05:30]

## 2020-03-25 NOTE — PROGRESS NOTE ADULT - SUBJECTIVE AND OBJECTIVE BOX
Patient is a 59y old  Female who presents with a chief complaint of cough, fever (25 Mar 2020 09:55)    Significant recent/past 24 hr events: Finished treatment cocktail for COVID-19. On CVVH removing 100-200cc/hr      HPI:  58 y/o F with no PMH p/w fever and cough since Thursday. Tmax 103. She presented to urgent care initially and was treated with tamiflu for presumed flu. Despite this she continued to have fevers. Pt denies recent travel, sick contacts or any other sx or acute complaints. Developed acute hypoxic respiratory failure requiring intubation & was found to be COVID+.      PAST MEDICAL & SURGICAL HISTORY:  No pertinent past medical history  No significant past surgical history  FAMILY HISTORY: FH: hypertension  Allergies: mNo Known Allergies      SUBJECTIVE:    REVIEW OF SYSTEMS      Unable to obtain as patient is intubated & sedated      OBJECTIVE:    VITALS:  ICU Vital Signs Last 24 Hrs  T(C): 37.4 (25 Mar 2020 08:00), Max: 37.4 (25 Mar 2020 08:00)  T(F): 99.4 (25 Mar 2020 08:00), Max: 99.4 (25 Mar 2020 08:00)  HR: 116 (25 Mar 2020 13:04) (75 - 118)  BP: 97/64 (25 Mar 2020 12:00) (90/55 - 104/63)  BP(mean): 70 (25 Mar 2020 12:00) (63 - 73)  ABP: 104/51 (25 Mar 2020 13:00) (95/46 - 132/58)  ABP(mean): 67 (25 Mar 2020 13:00) (62 - 85)  RR: 31 (25 Mar 2020 13:00) (29 - 31)  SpO2: 97% (25 Mar 2020 11:00) (94% - 100%)      PHYSICAL EXAM:  Constitutional: NAD, well-groomed, well-developed  HEENT: PERRLA, EOMI, no drainage or redness  Neck: supple,  No JVD, Trachea midline  Back: Normal spine flexure, No CVA tenderness, No deformity or limitation of movement  Respiratory: Breath Sounds equal & clear bilaterally to auscultation, no accessory muscle use noted  Cardiovascular: Regular rate, regular rhythm, normal S1, S2; no murmurs or rub  Gastrointestinal: Soft, non-tender, non distended, no hepatosplenomegaly, normal bowel sounds  Extremities: no peripheral edema, no cyanosis, no clubbing   Vascular: Equal and normal pulses: 2+ peripheral pulses throughout  Neurological: sedated & intubated; no sensory, motor  deficits, normal reflexes  Psychiatric: calm, sedated  Musculoskeletal: No joint swelling or deformity; no limitation of movement  Skin: warm, dry, well perfused, no rashes      VENT SETTINGS:   Mode: AC/ CMV (Assist Control/ Continuous Mandatory Ventilation)  RR (machine): 30  TV (machine): 330  FiO2: 60  PEEP: 14  MAP: 21  PIP: 31    ABG - ( 25 Mar 2020 03:20 )  pH, Arterial: 7.27  pH, Blood: x     /  pCO2: 59    /  pO2: 87    / HCO3: 24    / Base Excess: -0.1  /  SaO2: 95.4        I&O's Detail:    24 Mar 2020 07:01  -  25 Mar 2020 07:00  --------------------------------------------------------  IN:    Enteral Tube Flush: 180 mL    fentaNYL Infusion.: 518.4 mL    heparin Infusion: 110 mL    heparin Infusion: 108 mL    midazolam Infusion: 33.6 mL    norepinephrine Infusion: 463.4 mL    ns in tub fed  [id number]: 1440 mL    propofol Infusion: 312 mL  Total IN: 3165.4 mL    OUT:    Indwelling Catheter - Urethral: 135 mL    Other: 4361 mL  Total OUT: 4496 mL    Total NET: -1330.6 mL      LABS:                        8.6    15.19 )-----------( 184      ( 25 Mar 2020 03:20 )             25.8     03-25    135  |  100  |  14  ----------------------------<  111<H>  5.1   |  22  |  1.72<H>    Ca    9.3      25 Mar 2020 09:30  Phos  2.8     03-25  Mg     2.6     03-25    TPro  6.6  /  Alb  2.5<L>  /  TBili  0.4  /  DBili  x   /  AST  32  /  ALT  44<H>  /  AlkPhos  126<H>  03-25    LIVER FUNCTIONS - ( 25 Mar 2020 09:30 )  Alb: 2.5 g/dL / Pro: 6.6 g/dL / ALK PHOS: 126 u/L / ALT: 44 u/L / AST: 32 u/L / GGT: x           PT/INR - ( 25 Mar 2020 03:20 )   PT: 12.5 SEC;   INR: 1.08          PTT - ( 25 Mar 2020 09:30 )  PTT:51.4 SEC  CARDIAC MARKERS ( 25 Mar 2020 03:20 )   u/L / CKMBx     / Troponin Tx     /      MEDICATIONS  (STANDING):  aspirin 325 milliGRAM(s) Oral daily  chlorhexidine 0.12% Liquid 15 milliLiter(s) Oral Mucosa every 12 hours  chlorhexidine 4% Liquid 1 Application(s) Topical <User Schedule>  cisatracurium Infusion 1.5 MICROgram(s)/kG/Min (6.49 mL/Hr) IV Continuous <Continuous>  CRRT Treatment    <Continuous>  famotidine Injectable 20 milliGRAM(s) IV Push daily  fentaNYL   Infusion. 0.5 MICROgram(s)/kG/Hr (3.61 mL/Hr) IV Continuous <Continuous>  heparin  Infusion 900 Unit(s)/Hr (9 mL/Hr) IV Continuous <Continuous>  heparin  Infusion Syringe 300 Unit(s)/Hr (0.6 mL/Hr) CRRT <Continuous>  metolazone 10 milliGRAM(s) Oral daily  midazolam Infusion 0.02 mG/kG/Hr (1.44 mL/Hr) IV Continuous <Continuous>  norepinephrine Infusion 0.05 MICROgram(s)/kG/Min (3.38 mL/Hr) IV Continuous <Continuous>  polyethylene glycol 3350 17 Gram(s) Oral daily  PrismaSATE Dialysate BGK 4 / 2.5 5000 milliLiter(s) (1500 mL/Hr) CRRT <Continuous>  PrismaSOL Filtration BGK 4 / 2.5 5000 milliLiter(s) (800 mL/Hr) CRRT <Continuous>  PrismaSOL Filtration BGK 4 / 2.5 5000 milliLiter(s) (200 mL/Hr) CRRT <Continuous>  propofol Infusion 20 MICROgram(s)/kG/Min (8.66 mL/Hr) IV Continuous <Continuous>    MEDICATIONS  (PRN):  acetaminophen    Suspension .. 650 milliGRAM(s) Oral every 6 hours PRN Temp greater or equal to 38C (100.4F)  guaiFENesin   Syrup  (Sugar-Free) 100 milliGRAM(s) Oral every 6 hours PRN Cough  sodium chloride 0.9% lock flush 10 milliLiter(s) IV Push every 1 hour PRN Pre/post blood products, medications, blood draw, and to maintain line patency

## 2020-03-26 LAB
ALBUMIN SERPL ELPH-MCNC: 2.4 G/DL — LOW (ref 3.3–5)
ALBUMIN SERPL ELPH-MCNC: 2.4 G/DL — LOW (ref 3.3–5)
ALP SERPL-CCNC: 137 U/L — HIGH (ref 40–120)
ALP SERPL-CCNC: 141 U/L — HIGH (ref 40–120)
ALT FLD-CCNC: 37 U/L — HIGH (ref 4–33)
ALT FLD-CCNC: 45 U/L — HIGH (ref 4–33)
ANION GAP SERPL CALC-SCNC: 11 MMO/L — SIGNIFICANT CHANGE UP (ref 7–14)
ANION GAP SERPL CALC-SCNC: 11 MMO/L — SIGNIFICANT CHANGE UP (ref 7–14)
ANION GAP SERPL CALC-SCNC: 9 MMO/L — SIGNIFICANT CHANGE UP (ref 7–14)
APTT BLD: 43 SEC — HIGH (ref 27.5–36.3)
APTT BLD: 46.8 SEC — HIGH (ref 27.5–36.3)
APTT BLD: 48.2 SEC — HIGH (ref 27.5–36.3)
AST SERPL-CCNC: 35 U/L — HIGH (ref 4–32)
AST SERPL-CCNC: 55 U/L — HIGH (ref 4–32)
BASE EXCESS BLDA CALC-SCNC: -0.2 MMOL/L — SIGNIFICANT CHANGE UP
BASE EXCESS BLDA CALC-SCNC: 1.9 MMOL/L — SIGNIFICANT CHANGE UP
BILIRUB SERPL-MCNC: 0.4 MG/DL — SIGNIFICANT CHANGE UP (ref 0.2–1.2)
BILIRUB SERPL-MCNC: 0.5 MG/DL — SIGNIFICANT CHANGE UP (ref 0.2–1.2)
BUN SERPL-MCNC: 16 MG/DL — SIGNIFICANT CHANGE UP (ref 7–23)
BUN SERPL-MCNC: 16 MG/DL — SIGNIFICANT CHANGE UP (ref 7–23)
BUN SERPL-MCNC: 17 MG/DL — SIGNIFICANT CHANGE UP (ref 7–23)
CA-I BLD-SCNC: 1.17 MMOL/L — SIGNIFICANT CHANGE UP (ref 1.03–1.23)
CALCIUM SERPL-MCNC: 9.4 MG/DL — SIGNIFICANT CHANGE UP (ref 8.4–10.5)
CALCIUM SERPL-MCNC: 9.5 MG/DL — SIGNIFICANT CHANGE UP (ref 8.4–10.5)
CALCIUM SERPL-MCNC: 9.7 MG/DL — SIGNIFICANT CHANGE UP (ref 8.4–10.5)
CHLORIDE SERPL-SCNC: 101 MMOL/L — SIGNIFICANT CHANGE UP (ref 98–107)
CHLORIDE SERPL-SCNC: 98 MMOL/L — SIGNIFICANT CHANGE UP (ref 98–107)
CHLORIDE SERPL-SCNC: 99 MMOL/L — SIGNIFICANT CHANGE UP (ref 98–107)
CO2 SERPL-SCNC: 22 MMOL/L — SIGNIFICANT CHANGE UP (ref 22–31)
CO2 SERPL-SCNC: 24 MMOL/L — SIGNIFICANT CHANGE UP (ref 22–31)
CO2 SERPL-SCNC: 25 MMOL/L — SIGNIFICANT CHANGE UP (ref 22–31)
CREAT SERPL-MCNC: 1.29 MG/DL — SIGNIFICANT CHANGE UP (ref 0.5–1.3)
CREAT SERPL-MCNC: 1.33 MG/DL — HIGH (ref 0.5–1.3)
CREAT SERPL-MCNC: 1.54 MG/DL — HIGH (ref 0.5–1.3)
CRP SERPL-MCNC: 435.4 MG/L — HIGH
FERRITIN SERPL-MCNC: 1399 NG/ML — HIGH (ref 15–150)
GLUCOSE BLDA-MCNC: 120 MG/DL — HIGH (ref 70–99)
GLUCOSE SERPL-MCNC: 119 MG/DL — HIGH (ref 70–99)
GLUCOSE SERPL-MCNC: 87 MG/DL — SIGNIFICANT CHANGE UP (ref 70–99)
GLUCOSE SERPL-MCNC: 95 MG/DL — SIGNIFICANT CHANGE UP (ref 70–99)
HCO3 BLDA-SCNC: 24 MMOL/L — SIGNIFICANT CHANGE UP (ref 22–26)
HCO3 BLDA-SCNC: 26 MMOL/L — SIGNIFICANT CHANGE UP (ref 22–26)
HCT VFR BLD CALC: 24.5 % — LOW (ref 34.5–45)
HCT VFR BLDA CALC: 24.9 % — LOW (ref 34.5–46.5)
HGB BLD-MCNC: 7.9 G/DL — LOW (ref 11.5–15.5)
HGB BLDA-MCNC: 8 G/DL — LOW (ref 11.5–15.5)
MAGNESIUM SERPL-MCNC: 2.6 MG/DL — SIGNIFICANT CHANGE UP (ref 1.6–2.6)
MAGNESIUM SERPL-MCNC: 2.6 MG/DL — SIGNIFICANT CHANGE UP (ref 1.6–2.6)
MAGNESIUM SERPL-MCNC: 2.7 MG/DL — HIGH (ref 1.6–2.6)
MCHC RBC-ENTMCNC: 27.4 PG — SIGNIFICANT CHANGE UP (ref 27–34)
MCHC RBC-ENTMCNC: 32.2 % — SIGNIFICANT CHANGE UP (ref 32–36)
MCV RBC AUTO: 85.1 FL — SIGNIFICANT CHANGE UP (ref 80–100)
NRBC # FLD: 0.34 K/UL — SIGNIFICANT CHANGE UP (ref 0–0)
NRBC FLD-RTO: 2.5 — SIGNIFICANT CHANGE UP
PCO2 BLDA: 55 MMHG — HIGH (ref 32–48)
PCO2 BLDA: 58 MMHG — HIGH (ref 32–48)
PH BLDA: 7.28 PH — LOW (ref 7.35–7.45)
PH BLDA: 7.32 PH — LOW (ref 7.35–7.45)
PHOSPHATE SERPL-MCNC: 2.1 MG/DL — LOW (ref 2.5–4.5)
PHOSPHATE SERPL-MCNC: 2.4 MG/DL — LOW (ref 2.5–4.5)
PHOSPHATE SERPL-MCNC: 2.8 MG/DL — SIGNIFICANT CHANGE UP (ref 2.5–4.5)
PLATELET # BLD AUTO: 230 K/UL — SIGNIFICANT CHANGE UP (ref 150–400)
PMV BLD: 10.7 FL — SIGNIFICANT CHANGE UP (ref 7–13)
PO2 BLDA: 132 MMHG — HIGH (ref 83–108)
PO2 BLDA: 88 MMHG — SIGNIFICANT CHANGE UP (ref 83–108)
POTASSIUM BLDA-SCNC: 4.5 MMOL/L — SIGNIFICANT CHANGE UP (ref 3.4–4.5)
POTASSIUM SERPL-MCNC: 4.6 MMOL/L — SIGNIFICANT CHANGE UP (ref 3.5–5.3)
POTASSIUM SERPL-MCNC: 5 MMOL/L — SIGNIFICANT CHANGE UP (ref 3.5–5.3)
POTASSIUM SERPL-MCNC: 5.2 MMOL/L — SIGNIFICANT CHANGE UP (ref 3.5–5.3)
POTASSIUM SERPL-SCNC: 4.6 MMOL/L — SIGNIFICANT CHANGE UP (ref 3.5–5.3)
POTASSIUM SERPL-SCNC: 5 MMOL/L — SIGNIFICANT CHANGE UP (ref 3.5–5.3)
POTASSIUM SERPL-SCNC: 5.2 MMOL/L — SIGNIFICANT CHANGE UP (ref 3.5–5.3)
PROT SERPL-MCNC: 6.7 G/DL — SIGNIFICANT CHANGE UP (ref 6–8.3)
PROT SERPL-MCNC: 7 G/DL — SIGNIFICANT CHANGE UP (ref 6–8.3)
RBC # BLD: 2.88 M/UL — LOW (ref 3.8–5.2)
RBC # FLD: 17.2 % — HIGH (ref 10.3–14.5)
SAO2 % BLDA: 96.8 % — SIGNIFICANT CHANGE UP (ref 95–99)
SAO2 % BLDA: 98.7 % — SIGNIFICANT CHANGE UP (ref 95–99)
SODIUM BLDA-SCNC: 137 MMOL/L — SIGNIFICANT CHANGE UP (ref 136–146)
SODIUM SERPL-SCNC: 132 MMOL/L — LOW (ref 135–145)
SODIUM SERPL-SCNC: 133 MMOL/L — LOW (ref 135–145)
SODIUM SERPL-SCNC: 135 MMOL/L — SIGNIFICANT CHANGE UP (ref 135–145)
TRIGL SERPL-MCNC: 159 MG/DL — HIGH (ref 10–149)
WBC # BLD: 13.42 K/UL — HIGH (ref 3.8–10.5)
WBC # FLD AUTO: 13.42 K/UL — HIGH (ref 3.8–10.5)

## 2020-03-26 PROCEDURE — 99233 SBSQ HOSP IP/OBS HIGH 50: CPT

## 2020-03-26 PROCEDURE — 99292 CRITICAL CARE ADDL 30 MIN: CPT

## 2020-03-26 PROCEDURE — 90945 DIALYSIS ONE EVALUATION: CPT | Mod: GC

## 2020-03-26 PROCEDURE — 99291 CRITICAL CARE FIRST HOUR: CPT

## 2020-03-26 RX ORDER — DEXTROSE 50 % IN WATER 50 %
25 SYRINGE (ML) INTRAVENOUS ONCE
Refills: 0 | Status: COMPLETED | OUTPATIENT
Start: 2020-03-26 | End: 2020-03-26

## 2020-03-26 RX ORDER — HEPARIN SODIUM 5000 [USP'U]/ML
300 INJECTION INTRAVENOUS; SUBCUTANEOUS
Qty: 10000 | Refills: 0 | Status: DISCONTINUED | OUTPATIENT
Start: 2020-03-26 | End: 2020-03-26

## 2020-03-26 RX ORDER — VASOPRESSIN 20 [USP'U]/ML
0.02 INJECTION INTRAVENOUS
Qty: 50 | Refills: 0 | Status: DISCONTINUED | OUTPATIENT
Start: 2020-03-26 | End: 2020-04-03

## 2020-03-26 RX ORDER — SODIUM CHLORIDE 9 MG/ML
1000 INJECTION, SOLUTION INTRAVENOUS
Refills: 0 | Status: DISCONTINUED | OUTPATIENT
Start: 2020-03-26 | End: 2020-03-26

## 2020-03-26 RX ADMIN — MIDAZOLAM HYDROCHLORIDE 1.44 MG/KG/HR: 1 INJECTION, SOLUTION INTRAMUSCULAR; INTRAVENOUS at 19:28

## 2020-03-26 RX ADMIN — Medication 3.38 MICROGRAM(S)/KG/MIN: at 10:08

## 2020-03-26 RX ADMIN — FENTANYL CITRATE 3.61 MICROGRAM(S)/KG/HR: 50 INJECTION INTRAVENOUS at 10:09

## 2020-03-26 RX ADMIN — CHLORHEXIDINE GLUCONATE 15 MILLILITER(S): 213 SOLUTION TOPICAL at 05:11

## 2020-03-26 RX ADMIN — MIDAZOLAM HYDROCHLORIDE 1.44 MG/KG/HR: 1 INJECTION, SOLUTION INTRAMUSCULAR; INTRAVENOUS at 10:09

## 2020-03-26 RX ADMIN — FENTANYL CITRATE 3.61 MICROGRAM(S)/KG/HR: 50 INJECTION INTRAVENOUS at 19:37

## 2020-03-26 RX ADMIN — VASOPRESSIN 1.2 UNIT(S)/MIN: 20 INJECTION INTRAVENOUS at 19:37

## 2020-03-26 RX ADMIN — CHLORHEXIDINE GLUCONATE 1 APPLICATION(S): 213 SOLUTION TOPICAL at 05:11

## 2020-03-26 RX ADMIN — Medication 25 GRAM(S): at 13:07

## 2020-03-26 RX ADMIN — Medication 325 MILLIGRAM(S): at 11:39

## 2020-03-26 RX ADMIN — SODIUM CHLORIDE 30 MILLILITER(S): 9 INJECTION, SOLUTION INTRAVENOUS at 12:42

## 2020-03-26 RX ADMIN — PROPOFOL 8.66 MICROGRAM(S)/KG/MIN: 10 INJECTION, EMULSION INTRAVENOUS at 10:08

## 2020-03-26 RX ADMIN — FAMOTIDINE 20 MILLIGRAM(S): 10 INJECTION INTRAVENOUS at 10:59

## 2020-03-26 RX ADMIN — Medication 3.38 MICROGRAM(S)/KG/MIN: at 19:37

## 2020-03-26 RX ADMIN — HEPARIN SODIUM 12 UNIT(S)/HR: 5000 INJECTION INTRAVENOUS; SUBCUTANEOUS at 19:36

## 2020-03-26 RX ADMIN — PROPOFOL 8.66 MICROGRAM(S)/KG/MIN: 10 INJECTION, EMULSION INTRAVENOUS at 19:28

## 2020-03-26 RX ADMIN — CHLORHEXIDINE GLUCONATE 15 MILLILITER(S): 213 SOLUTION TOPICAL at 16:46

## 2020-03-26 RX ADMIN — HEPARIN SODIUM 12 UNIT(S)/HR: 5000 INJECTION INTRAVENOUS; SUBCUTANEOUS at 11:00

## 2020-03-26 NOTE — PROGRESS NOTE ADULT - PROBLEM SELECTOR PLAN 1
Pt. with oliguric SAM in the setting of hypotension and COVID-19 infection. Pt. with likely ATN. Scr increased to 4.6 on 3/23/20, started on CRRT/CVVHDF. Pt. tolerating CRRT/CVVHDF. BP being maintained on IV vasopressors. HD catheter functioning during rounds. Continue with systemic heparin to decrease risk of circuit clotting. Plan is to continue with CRRT/CVVHDF as per discussion with ICU team. Check serum C3 and C4. Check renal sonogram with doppler (when feasible). Monitor labs and urine output. Avoid any potential nephrotoxins

## 2020-03-26 NOTE — PROGRESS NOTE ADULT - PROBLEM SELECTOR PLAN 4
- Patient with COVID positive, acute respiratory failure, as well as SAM.  - Day 6 of intubation.  - Family is aware of current condition. Understand the severity of the situation. Appreciate updates as they cannot be in the hospital. If clinical status changes, they want to be informed.
Resolved, continue to monitor
Mild  -continue to monitor
Mild.   -continue to monitor for now     Hypokalemia  -will monitor and replete PRN    Hypophosphatemia-  -will Monitor and replete PRN
Resolved  -continue to monitor

## 2020-03-26 NOTE — PROGRESS NOTE ADULT - SUBJECTIVE AND OBJECTIVE BOX
[facility] DIVISION OF KIDNEY DISEASES AND HYPERTENSION -- FOLLOW UP NOTE  --------------------------------------------------------------------------------  59-year-old female with no PMH admitted to ICU for respiratory failure, pneumonia, SAM and COVID-19. Scr increased to 4.6 on 3/23/20. Pt. initiated on CRRT/CVVHDF on 3/23/20.    Pt. was seen and examined in ICU. Pt. intubated, on IV pressor support, anuric.  FiO2 80%, PEEP 12, tolerating 100cc/hr of fluid removal. No clotting.         PAST HISTORY  --------------------------------------------------------------------------------  No significant changes to PMH, PSH, FHx, SHx, unless otherwise noted    ALLERGIES & MEDICATIONS  --------------------------------------------------------------------------------  Allergies    No Known Allergies    Intolerances      Standing Inpatient Medications  aspirin 325 milliGRAM(s) Oral daily  chlorhexidine 0.12% Liquid 15 milliLiter(s) Oral Mucosa every 12 hours  chlorhexidine 4% Liquid 1 Application(s) Topical <User Schedule>  CRRT Treatment    <Continuous>  famotidine Injectable 20 milliGRAM(s) IV Push daily  fentaNYL   Infusion. 0.5 MICROgram(s)/kG/Hr IV Continuous <Continuous>  heparin  Infusion 900 Unit(s)/Hr IV Continuous <Continuous>  heparin  Infusion Syringe 300 Unit(s)/Hr CRRT <Continuous>  midazolam Infusion 0.02 mG/kG/Hr IV Continuous <Continuous>  norepinephrine Infusion 0.05 MICROgram(s)/kG/Min IV Continuous <Continuous>  polyethylene glycol 3350 17 Gram(s) Oral daily  PrismaSATE Dialysate BGK 4 / 2.5 5000 milliLiter(s) CRRT <Continuous>  PrismaSOL Filtration BGK 4 / 2.5 5000 milliLiter(s) CRRT <Continuous>  PrismaSOL Filtration BGK 4 / 2.5 5000 milliLiter(s) CRRT <Continuous>  propofol Infusion 20 MICROgram(s)/kG/Min IV Continuous <Continuous>  senna Syrup 15 milliLiter(s) Oral daily    PRN Inpatient Medications  acetaminophen    Suspension .. 650 milliGRAM(s) Oral every 6 hours PRN  guaiFENesin   Syrup  (Sugar-Free) 100 milliGRAM(s) Oral every 6 hours PRN  sodium chloride 0.9% lock flush 10 milliLiter(s) IV Push every 1 hour PRN      REVIEW OF SYSTEMS  --------------------------------------------------------------------------------  not able to obtain.     VITALS/PHYSICAL EXAM  --------------------------------------------------------------------------------  T(C): 36.4 (03-26-20 @ 08:00), Max: 37.8 (03-25-20 @ 13:00)  HR: 95 (03-26-20 @ 08:00) (91 - 122)  BP: 102/64 (03-26-20 @ 00:00) (88/58 - 102/64)  RR: 30 (03-26-20 @ 08:00) (28 - 31)  SpO2: 100% (03-26-20 @ 08:00) (92% - 100%)  Wt(kg): --        03-25-20 @ 07:01  -  03-26-20 @ 07:00  --------------------------------------------------------  IN: 3050.2 mL / OUT: 4396 mL / NET: -1345.8 mL    03-26-20 @ 07:01  -  03-26-20 @ 09:51  --------------------------------------------------------  IN: 156 mL / OUT: 0 mL / NET: 156 mL      Physical Exam (limited):  	Gen: Intubated  	HEENT: +ETT  	Pulm: Coarse breath sounds B/L  	CV: S1S2+  	Abd: Soft, +BS   	Ext: No LE edema B/L  	Neuro: Sedated  	Skin: Warm and dry              Access: Right IJ non tunneled catheter+      LABS/STUDIES  --------------------------------------------------------------------------------              7.9    13.42 >-----------<  230      [03-26-20 @ 03:15]              24.5     133  |  98  |  17  ----------------------------<  119      [03-26-20 @ 03:15]  4.6   |  24  |  1.54        Ca     9.5     [03-26-20 @ 03:15]      Mg     2.7     [03-26-20 @ 03:15]      Phos  2.8     [03-26-20 @ 03:15]    TPro  6.7  /  Alb  2.4  /  TBili  0.4  /  DBili  x   /  AST  35  /  ALT  37  /  AlkPhos  137  [03-26-20 @ 03:15]    PT/INR: PT 12.5 , INR 1.08       [03-25-20 @ 03:20]  PTT: 48.2       [03-26-20 @ 03:15]          [03-25-20 @ 03:20]        [03-25-20 @ 03:20]    Creatinine Trend:  SCr 1.54 [03-26 @ 03:15]  SCr 1.72 [03-25 @ 09:30]  SCr 4.10 [03-24 @ 04:30]  SCr 4.60 [03-23 @ 13:50]  SCr 4.10 [03-22 @ 21:20]    Urinalysis - [03-15-20 @ 22:27]      Color YELLOW / Appearance CLEAR / SG 1.037 / pH 6.0      Gluc NEGATIVE / Ketone TRACE  / Bili NEGATIVE / Urobili TRACE       Blood MODERATE / Protein 200 / Leuk Est NEGATIVE / Nitrite NEGATIVE      RBC 11-25 / WBC 3-5 / Hyaline 1+ / Gran  / Sq Epi FEW / Non Sq Epi  / Bacteria NEGATIVE      Ferritin 1399      [03-26-20 @ 03:15]  Lipid: chol --, , HDL --, LDL --      [03-26-20 @ 03:15]    HCV 0.12, Nonreactive Hepatitis C AB  S/CO Ratio                        Interpretation  < 1.00                                   Non-Reactive  1.00 - 4.99                         Weakly-Reactive  >= 5.00                                Reactive  Non-Reactive: Aperson with a non-reactive HCV antibody  result is considered uninfected.  No further action is  needed unless recent infection is suspected.  In these  cases, consider repeat testing later to detect  seroconversion..  Weakly-Reactive: HCV antibody test is abnormal, HCV RNA  Qualitative test will follow.  Reactive: HCV antibody test is abnormal, HCV RNA  Qualitative test will follow.  Note: HCV antibody testing is performed on the [employer]   system.      [03-16-20 @ 05:30] [facility] DIVISION OF KIDNEY DISEASES AND HYPERTENSION -- FOLLOW UP NOTE  --------------------------------------------------------------------------------  59-year-old female with no PMH admitted to ICU for respiratory failure, pneumonia, SAM and COVID-19. Scr increased to 4.6 on 3/23/20. Pt. initiated on CRRT/CVVHDF on 3/23/20.    Pt. was seen and examined in ICU. Pt. intubated, on IV pressor support, anuric.  FiO2 80%, PEEP 12. Pt. tolerating ~100cc/hr of fluid removal with CRRT, no clotting.     PAST HISTORY  --------------------------------------------------------------------------------  No significant changes to PMH, PSH, FHx, SHx, unless otherwise noted    ALLERGIES & MEDICATIONS  --------------------------------------------------------------------------------  Allergies    No Known Allergies    Intolerances    Standing Inpatient Medications  aspirin 325 milliGRAM(s) Oral daily  chlorhexidine 0.12% Liquid 15 milliLiter(s) Oral Mucosa every 12 hours  chlorhexidine 4% Liquid 1 Application(s) Topical <User Schedule>  CRRT Treatment    <Continuous>  famotidine Injectable 20 milliGRAM(s) IV Push daily  fentaNYL   Infusion. 0.5 MICROgram(s)/kG/Hr IV Continuous <Continuous>  heparin  Infusion 900 Unit(s)/Hr IV Continuous <Continuous>  heparin  Infusion Syringe 300 Unit(s)/Hr CRRT <Continuous>  midazolam Infusion 0.02 mG/kG/Hr IV Continuous <Continuous>  norepinephrine Infusion 0.05 MICROgram(s)/kG/Min IV Continuous <Continuous>  polyethylene glycol 3350 17 Gram(s) Oral daily  PrismaSATE Dialysate BGK 4 / 2.5 5000 milliLiter(s) CRRT <Continuous>  PrismaSOL Filtration BGK 4 / 2.5 5000 milliLiter(s) CRRT <Continuous>  PrismaSOL Filtration BGK 4 / 2.5 5000 milliLiter(s) CRRT <Continuous>  propofol Infusion 20 MICROgram(s)/kG/Min IV Continuous <Continuous>  senna Syrup 15 milliLiter(s) Oral daily    PRN Inpatient Medications  acetaminophen    Suspension .. 650 milliGRAM(s) Oral every 6 hours PRN  guaiFENesin   Syrup  (Sugar-Free) 100 milliGRAM(s) Oral every 6 hours PRN  sodium chloride 0.9% lock flush 10 milliLiter(s) IV Push every 1 hour PRN    REVIEW OF SYSTEMS  --------------------------------------------------------------------------------  Unable to obtain.     VITALS/PHYSICAL EXAM  --------------------------------------------------------------------------------  T(C): 36.4 (03-26-20 @ 08:00), Max: 37.8 (03-25-20 @ 13:00)  HR: 95 (03-26-20 @ 08:00) (91 - 122)  BP: 102/64 (03-26-20 @ 00:00) (88/58 - 102/64)  RR: 30 (03-26-20 @ 08:00) (28 - 31)  SpO2: 100% (03-26-20 @ 08:00) (92% - 100%)  Wt(kg): --    03-25-20 @ 07:01  -  03-26-20 @ 07:00  --------------------------------------------------------  IN: 3050.2 mL / OUT: 4396 mL / NET: -1345.8 mL    03-26-20 @ 07:01  -  03-26-20 @ 09:51  --------------------------------------------------------  IN: 156 mL / OUT: 0 mL / NET: 156 mL    Physical Exam (limited):  	Gen: Intubated  	HEENT: +ETT  	Pulm: Coarse breath sounds B/L  	CV: S1S2+  	Abd: Soft, +BS   	Ext: No LE edema B/L  	Neuro: Sedated  	Skin: Warm and dry              Access: Right IJ non tunneled catheter+    LABS/STUDIES  --------------------------------------------------------------------------------              7.9    13.42 >-----------<  230      [03-26-20 @ 03:15]              24.5     133  |  98  |  17  ----------------------------<  119      [03-26-20 @ 03:15]  4.6   |  24  |  1.54        Ca     9.5     [03-26-20 @ 03:15]      Mg     2.7     [03-26-20 @ 03:15]      Phos  2.8     [03-26-20 @ 03:15]    TPro  6.7  /  Alb  2.4  /  TBili  0.4  /  DBili  x   /  AST  35  /  ALT  37  /  AlkPhos  137  [03-26-20 @ 03:15]    Creatinine Trend:  SCr 1.54 [03-26 @ 03:15]  SCr 1.72 [03-25 @ 09:30]  SCr 4.10 [03-24 @ 04:30]  SCr 4.60 [03-23 @ 13:50]  SCr 4.10 [03-22 @ 21:20]    HCV 0.12, Nonreactive Hepatitis C AB  S/CO Ratio                        Interpretation  < 1.00                                   Non-Reactive  1.00 - 4.99                         Weakly-Reactive  >= 5.00                                Reactive  Non-Reactive: Aperson with a non-reactive HCV antibody  result is considered uninfected.  No further action is  needed unless recent infection is suspected.  In these  cases, consider repeat testing later to detect  seroconversion..  Weakly-Reactive: HCV antibody test is abnormal, HCV RNA  Qualitative test will follow.  Reactive: HCV antibody test is abnormal, HCV RNA  Qualitative test will follow.  Note: HCV antibody testing is performed on the [employer]   system.      [03-16-20 @ 05:30]

## 2020-03-26 NOTE — PROGRESS NOTE ADULT - SUBJECTIVE AND OBJECTIVE BOX
ALEXA GARCIA            MRN-[id number]         No Known Allergies                 60 y/o F with no PMH p/w fever and cough since Thursday. Tmax 103. She presented to urgent care initially and was treated with tamiflu for presumed flu. Despite this she continued to have fevers. Pt denies recent travel, sick contacts or any other sx or acute complaints. Endorses pinching pain in chest when she coughs. Also with nausea and poor PO intake. Denies shortness of breath, abdominal pain, dysuria or LE edema. (16 Mar 2020 02:05)          Issues:              Acute hypoxic respiratory failure              ARDS              Septic shock              COVID-19+              SAM              Mixed metabolic / respiratory acidosis              Anemia    Drips:             Propofol / Fentanyl / Versed /  Levophed / Vasopressin                   Home Medications:      PAST MEDICAL & SURGICAL HISTORY:  No pertinent past medical history  No significant past surgical history        ICU Vital Signs Last 24 Hrs  T(C): 36.2 (26 Mar 2020 12:00), Max: 37.5 (25 Mar 2020 20:00)  T(F): 97.1 (26 Mar 2020 12:00), Max: 99.5 (25 Mar 2020 20:00)  HR: 108 (26 Mar 2020 12:00) (91 - 122)  BP: 113/70 (26 Mar 2020 12:00) (88/58 - 113/70)  BP(mean): 80 (26 Mar 2020 12:00) (64 - 80)  ABP: 121/52 (26 Mar 2020 12:00) (96/49 - 132/56)  ABP(mean): 74 (26 Mar 2020 12:00) (64 - 83)  RR: 30 (26 Mar 2020 12:00) (28 - 31)  SpO2: 100% (26 Mar 2020 12:00) (92% - 100%)    I&O's Detail    25 Mar 2020 07:01  -  26 Mar 2020 07:00  --------------------------------------------------------  IN:    Enteral Tube Flush: 180 mL    fentaNYL Infusion.: 518.8 mL    heparin Infusion: 234 mL    midazolam Infusion: 33.2 mL    norepinephrine Infusion: 332.2 mL    ns in tub fed  [id number]: 1440 mL    propofol Infusion: 312 mL  Total IN: 3050.2 mL    OUT:    Indwelling Catheter - Urethral: 10 mL    Other: 4386 mL  Total OUT: 4396 mL    Total NET: -1345.8 mL      26 Mar 2020 07:01  -  26 Mar 2020 13:01  --------------------------------------------------------  IN:    Enteral Tube Flush: 60 mL    fentaNYL Infusion.: 110 mL    heparin Infusion: 54 mL    midazolam Infusion: 5 mL    norepinephrine Infusion: 60 mL    ns in tub fed  [id number]: 180 mL    propofol Infusion: 65 mL  Total IN: 534 mL    OUT:    Other: 639 mL  Total OUT: 639 mL    Total NET: -105 mL        CAPILLARY BLOOD GLUCOSE      POCT Blood Glucose.: 81 mg/dL (26 Mar 2020 12:19)      Home Medications:      MEDICATIONS  (STANDING):  aspirin 325 milliGRAM(s) Oral daily  chlorhexidine 0.12% Liquid 15 milliLiter(s) Oral Mucosa every 12 hours  chlorhexidine 4% Liquid 1 Application(s) Topical <User Schedule>  CRRT Treatment    <Continuous>  dextrose 50% Injectable 25 Gram(s) IV Push once  famotidine Injectable 20 milliGRAM(s) IV Push daily  fentaNYL   Infusion. 0.5 MICROgram(s)/kG/Hr (3.61 mL/Hr) IV Continuous <Continuous>  heparin  Infusion 900 Unit(s)/Hr (12 mL/Hr) IV Continuous <Continuous>  midazolam Infusion 0.02 mG/kG/Hr (1.44 mL/Hr) IV Continuous <Continuous>  norepinephrine Infusion 0.05 MICROgram(s)/kG/Min (3.38 mL/Hr) IV Continuous <Continuous>  polyethylene glycol 3350 17 Gram(s) Oral daily  PrismaSATE Dialysate BGK 4 / 2.5 5000 milliLiter(s) (1500 mL/Hr) CRRT <Continuous>  PrismaSOL Filtration BGK 4 / 2.5 5000 milliLiter(s) (800 mL/Hr) CRRT <Continuous>  PrismaSOL Filtration BGK 4 / 2.5 5000 milliLiter(s) (200 mL/Hr) CRRT <Continuous>  propofol Infusion 20 MICROgram(s)/kG/Min (8.66 mL/Hr) IV Continuous <Continuous>  senna Syrup 15 milliLiter(s) Oral daily    MEDICATIONS  (PRN):  acetaminophen    Suspension .. 650 milliGRAM(s) Oral every 6 hours PRN Temp greater or equal to 38C (100.4F)  guaiFENesin   Syrup  (Sugar-Free) 100 milliGRAM(s) Oral every 6 hours PRN Cough  sodium chloride 0.9% lock flush 10 milliLiter(s) IV Push every 1 hour PRN Pre/post blood products, medications, blood draw, and to maintain line patency      Mode: AC/ CMV (Assist Control/ Continuous Mandatory Ventilation)  RR (machine): 30  TV (machine): 330  FiO2: 80  PEEP: 12  MAP: 16  PIP: 30      Physical exam:   General:                Sedated and intubated                                                Neuro:                  Could not assess                          Cardiovascular:   S1 & S2, regular                           Respiratory:         Coarse breath sounds                          GI:                          Soft, nondistended, Bowel sounds +                            Ext:                        Edema                               Labs:                                                                           7.9    13.42 )-----------( 230      ( 26 Mar 2020 03:15 )             24.5             03-26    133<L>  |  98  |  17  ----------------------------<  119<H>  4.6   |  24  |  1.54<H>    Ca    9.5      26 Mar 2020 03:15  Phos  2.8     03-26  Mg     2.7     03-26    TPro  6.7  /  Alb  2.4<L>  /  TBili  0.4  /  DBili  x   /  AST  35<H>  /  ALT  37<H>  /  AlkPhos  137<H>  03-26                  PT/INR - ( 25 Mar 2020 03:20 )   PT: 12.5 SEC;   INR: 1.08          PTT - ( 26 Mar 2020 09:21 )  PTT:46.8 SEC  LIVER FUNCTIONS - ( 26 Mar 2020 03:15 )  Alb: 2.4 g/dL / Pro: 6.7 g/dL / ALK PHOS: 137 u/L / ALT: 37 u/L / AST: 35 u/L / GGT: x             CXR:  < from: Xray Chest 1 View- PORTABLE-Urgent (03.23.20 @ 23:17) >  Since the last study, right-sided IJ hemodialysis catheter has been placed and its tip is at the SVC/right atrial junction.    The endotracheal and enteric tubes remain in place in addition to a left subclavian line.    Increasing bilateral and diffuse granular opacities consistent with ARDS may be secondary to covid infection if present. The heart is not enlarged and there are no effusions.      COMPARISON:  March 20      IMPRESSION:    1. Status post hemodialysis catheter placement.  2. Perihilar diffuse airspace opacities consistent with ARDS secondary to covid infection.          Plan:    General:  60 y/o F with no PMH p/w fever and cough since Thursday. Tmax 103. She presented to urgent care initially and was treated with tamiflu for presumed flu. Despite this she continued to have fevers. Pt denies recent travel, sick contacts or any other sx or acute complaints. Endorses pinching pain in chest when she coughs. Also with nausea and poor PO intake. Denies shortness of breath, abdominal pain, dysuria or LE edema. (16 Mar 2020 02:05)                              Neuro:                                         Sedated with Propofol, Versed and Fentanyl                                                                  Cardiovascular:                                          Continue hemodynamic monitoring.                                        Titrate Levophed / Vasopressin  to MAP>65                                        Daily EKGs, monitor QTc                                        Continue ASA 325mg daily + Heparin htt - Target PTT 50-80                             Respiratory:                                         Acute hypoxemic respiratory failure due to Pneumonia / COVID-19+                                         On full mechanical vent support EO--80-10.                                                Wean FiO2 as tolerated                                                Follow ABGs                                                Monitor Plateau pressure / driving pressure daily                                                Initiation and maintenance of vent settings as per ARDSnet protocol                                                             Continue bronchodilators, pulmonary toilet                            GI                                         Not tolerating feeds, on hold                                         Continue Pepcid                                         Bowel regimen / Miralax + Lactulose PRN	                                                                 Renal:                                         CRRT in progress, tolerating 100cc /hr fluid removal     Strict I/Os                                         Renal f/u                                         Monitor BUN / Cr                                                 Hem/ Onc:                                                                                  DVT prophylaxis with SCDs / on Heparin gtt                                         Follow CBC  & signs of bleeding                           Infectious disease:                                            Pneumonia                                           COVID-19 +                                          Follow cultures                                          Completed Plaquenil , Vit-C, Thiamin, Azithromycin  x 5 days  3/20-25                                          Completed  Ceftriaxone 3/16-22                            Endocrine      Episodes of hypoglycemia - Off Insulin. Received D50.                                           Continue Accu-Checks with coverage    Pt is on Lovinox  and Venodyne boots for DVT prophylaxis.     Pertinent clinical, laboratory, radiographic, hemodynamic, echocardiographic, respiratory data, microbiologic data and chart were reviewed and analyzed frequently throughout the course of the day and night  Patient seen, examined and plan discussed with  CTICU team during rounds.    I have spent  75  minutes of critical care time with this pt between  7am  and 11.59 pm            Favian Moreland MD

## 2020-03-26 NOTE — PROGRESS NOTE ADULT - SUBJECTIVE AND OBJECTIVE BOX
SUBJECTIVE AND OBJECTIVE:  Sedated, intubated.  INTERVAL HPI/OVERNIGHT EVENTS:  None  DNR on chart:   Allergies    No Known Allergies    Intolerances    MEDICATIONS  (STANDING):  aspirin 325 milliGRAM(s) Oral daily  chlorhexidine 0.12% Liquid 15 milliLiter(s) Oral Mucosa every 12 hours  chlorhexidine 4% Liquid 1 Application(s) Topical <User Schedule>  CRRT Treatment    <Continuous>  famotidine Injectable 20 milliGRAM(s) IV Push daily  fentaNYL   Infusion. 0.5 MICROgram(s)/kG/Hr (3.61 mL/Hr) IV Continuous <Continuous>  heparin  Infusion 900 Unit(s)/Hr (12 mL/Hr) IV Continuous <Continuous>  midazolam Infusion 0.02 mG/kG/Hr (1.44 mL/Hr) IV Continuous <Continuous>  norepinephrine Infusion 0.05 MICROgram(s)/kG/Min (3.38 mL/Hr) IV Continuous <Continuous>  polyethylene glycol 3350 17 Gram(s) Oral daily  PrismaSATE Dialysate BGK 4 / 2.5 5000 milliLiter(s) (1500 mL/Hr) CRRT <Continuous>  PrismaSOL Filtration BGK 4 / 2.5 5000 milliLiter(s) (800 mL/Hr) CRRT <Continuous>  PrismaSOL Filtration BGK 4 / 2.5 5000 milliLiter(s) (200 mL/Hr) CRRT <Continuous>  propofol Infusion 20 MICROgram(s)/kG/Min (8.66 mL/Hr) IV Continuous <Continuous>  senna Syrup 15 milliLiter(s) Oral daily  vasopressin Infusion 0.02 Unit(s)/Min (1.2 mL/Hr) IV Continuous <Continuous>    MEDICATIONS  (PRN):  acetaminophen    Suspension .. 650 milliGRAM(s) Oral every 6 hours PRN Temp greater or equal to 38C (100.4F)      ITEMS UNCHECKED ARE NOT PRESENT    PRESENT SYMPTOMS: x ]Unable to obtain due to poor mentation   Source if other than patient:  [ ]Family   [ ]Team     Pain (Impact on QOL):    Location:  Minimal acceptable level (0-10 scale):                   Aggravating factors:  Quality:  Radiation:  Severity (0-10 scale):    Timing:    Dyspnea:                           [ ]Mild [ ]Moderate [ ]Severe  Anxiety:                             [ ]Mild [ ]Moderate [ ]Severe  Fatigue:                             [ ]Mild [ ]Moderate [ ]Severe  Nausea:                             [ ]Mild [ ]Moderate [ ]Severe  Loss of appetite:              [ ]Mild [ ]Moderate [ ]Severe  Constipation:                    [ ]Mild [ ]Moderate [ ]Severe  Grief Present                    [ ] Yes  [ ] No   PAIN AD Score:	  [email or web address] (Ctrl + left click to view)    Other Symptoms:  [ ]All other review of systems negative     Karnofsky Performance Score/Palliative Performance Status Version 2:  20     %    http://palliative.info/resource_material/[email or web address].pdf  PHYSICAL EXAM:  Vital Signs Last 24 Hrs  T(C): 36.6 (27 Mar 2020 12:00), Max: 37.3 (26 Mar 2020 16:00)  T(F): 97.8 (27 Mar 2020 12:00), Max: 99.1 (26 Mar 2020 16:00)  HR: 96 (27 Mar 2020 13:00) (89 - 132)  BP: 89/63 (27 Mar 2020 08:00) (89/63 - 107/69)  BP(mean): 69 (27 Mar 2020 08:00) (64 - 81)  RR: 30 (27 Mar 2020 13:00) (29 - 30)  SpO2: 97% (27 Mar 2020 13:00) (97% - 100%) I&O's Summary    26 Mar 2020 07:01  -  27 Mar 2020 07:00  --------------------------------------------------------  IN: 2177.7 mL / OUT: 4977 mL / NET: -2799.3 mL    27 Mar 2020 07:01  -  27 Mar 2020 14:08  --------------------------------------------------------  IN: 520.2 mL / OUT: 641 mL / NET: -120.8 mL     GENERAL: For full physical exam, please refer to critical care note from same date.  [ ]Alert  [ ]Oriented x   [ ]Lethargic  [ ]Cachexia  [ ]Unarousable  [ ]Verbal  [ ]Non-Verbal  Behavioral:   [ ] Anxiety  [ ] Delirium [ ] Agitation [ ] Other  HEENT:  [ ]Normal   [ ]Dry mouth   [ ]ET Tube/Trach  [ ]Oral lesions  PULMONARY:   [ ]Clear [ ]Tachypnea  [ ]Audible excessive secretions   [ ]Rhonchi        [ ]Right [ ]Left [ ]Bilateral  [ ]Crackles        [ ]Right [ ]Left [ ]Bilateral  [ ]Wheezing     [ ]Right [ ]Left [ ]Bilateral  CARDIOVASCULAR:    [ ]Regular [ ]Irregular [ ]Tachy  [ ]Arley [ ]Murmur [ ]Other  GASTROINTESTINAL:  [ ]Soft  [ ]Distended   [ ]+BS  [ ]Non tender [ ]Tender  [ ]PEG [ ]OGT/ NGT   Last BM:   03-26-20 @ 07:01  -  03-27-20 @ 07:00  --------------------------------------------------------  OUT: 1250 mL     GENITOURINARY:  [ ]Normal [ ] Incontinent   [ ]Oliguria/Anuria   [ ]Frances  MUSCULOSKELETAL:   [ ]Normal   [ ]Weakness  [ ]Bed/Wheelchair bound [ ]Edema  NEUROLOGIC:   [ ]No focal deficits  [ ] Cognitive impairment  [ ] Dysphagia [ ]Dysarthria [ ] Paresis [ ]Other   SKIN:   [ ]Normal   [ ]Pressure ulcer(s)  [ ]Rash    CRITICAL CARE:  [ ] Shock Present  [ ]Septic [ ]Cardiogenic [ ]Neurologic [ ]Hypovolemic  [ ]  Vasopressors [ ]  Inotropes   [x ] Respiratory failure present  [ x] Acute  [ ] Chronic [ x] Hypoxic  [ ] Hypercarbic [ ] Other  [ ] Other organ failure     LABS:                        7.3    15.96 )-----------( 243      ( 27 Mar 2020 13:00 )             22.3   03-27    134<L>  |  98  |  16  ----------------------------<  92  4.4   |  25  |  1.21    Ca    9.7      27 Mar 2020 13:00  Phos  2.3     03-27  Mg     2.7     03-27    TPro  6.8  /  Alb  2.0<L>  /  TBili  0.4  /  DBili  x   /  AST  66<H>  /  ALT  47<H>  /  AlkPhos  130<H>  03-27  PT/INR - ( 27 Mar 2020 04:30 )   PT: 12.5 SEC;   INR: 1.08          PTT - ( 27 Mar 2020 13:00 )  PTT:74.3 SEC      RADIOLOGY & ADDITIONAL STUDIES:    Protein Calorie Malnutrition Present: [ ] yes [ ] no  [ ] PPSV2 < or = 30%  [ ] significant weight loss [ ] poor nutritional intake [ ] anasarca [ ] catabolic state Albumin, Serum: 2.0 g/dL (03-27-20 @ 04:30)  Artificial Nutrition [ ]     REFERRALS:   [ ]Chaplaincy  [ ] Hospice  [ ]Child Life  [ ]Social Work  [ ]Case management [ ]Holistic Therapy   Goals of Care Document:

## 2020-03-27 LAB
ALBUMIN SERPL ELPH-MCNC: 2 G/DL — LOW (ref 3.3–5)
ALP SERPL-CCNC: 130 U/L — HIGH (ref 40–120)
ALT FLD-CCNC: 47 U/L — HIGH (ref 4–33)
ANION GAP SERPL CALC-SCNC: 11 MMO/L — SIGNIFICANT CHANGE UP (ref 7–14)
ANION GAP SERPL CALC-SCNC: 9 MMO/L — SIGNIFICANT CHANGE UP (ref 7–14)
ANION GAP SERPL CALC-SCNC: 9 MMO/L — SIGNIFICANT CHANGE UP (ref 7–14)
APTT BLD: 56.8 SEC — HIGH (ref 27.5–36.3)
APTT BLD: 66.8 SEC — HIGH (ref 27.5–36.3)
APTT BLD: 74.3 SEC — HIGH (ref 27.5–36.3)
APTT BLD: 75.6 SEC — HIGH (ref 27.5–36.3)
AST SERPL-CCNC: 66 U/L — HIGH (ref 4–32)
BASE EXCESS BLDA CALC-SCNC: 1.8 MMOL/L — SIGNIFICANT CHANGE UP
BASE EXCESS BLDA CALC-SCNC: 2.1 MMOL/L — SIGNIFICANT CHANGE UP
BASE EXCESS BLDA CALC-SCNC: 2.8 MMOL/L — SIGNIFICANT CHANGE UP
BASE EXCESS BLDA CALC-SCNC: 3.6 MMOL/L — SIGNIFICANT CHANGE UP
BILIRUB SERPL-MCNC: 0.4 MG/DL — SIGNIFICANT CHANGE UP (ref 0.2–1.2)
BLD GP AB SCN SERPL QL: NEGATIVE — SIGNIFICANT CHANGE UP
BLOOD GAS ARTERIAL - FIO2: 40 — SIGNIFICANT CHANGE UP
BLOOD GAS ARTERIAL - FIO2: 60 — SIGNIFICANT CHANGE UP
BUN SERPL-MCNC: 15 MG/DL — SIGNIFICANT CHANGE UP (ref 7–23)
BUN SERPL-MCNC: 15 MG/DL — SIGNIFICANT CHANGE UP (ref 7–23)
BUN SERPL-MCNC: 16 MG/DL — SIGNIFICANT CHANGE UP (ref 7–23)
CA-I BLDA-SCNC: 1.29 MMOL/L — SIGNIFICANT CHANGE UP (ref 1.15–1.29)
CA-I BLDA-SCNC: 1.33 MMOL/L — HIGH (ref 1.15–1.29)
CALCIUM SERPL-MCNC: 9.6 MG/DL — SIGNIFICANT CHANGE UP (ref 8.4–10.5)
CALCIUM SERPL-MCNC: 9.7 MG/DL — SIGNIFICANT CHANGE UP (ref 8.4–10.5)
CALCIUM SERPL-MCNC: 9.8 MG/DL — SIGNIFICANT CHANGE UP (ref 8.4–10.5)
CHLORIDE BLDA-SCNC: 106 MMOL/L — SIGNIFICANT CHANGE UP (ref 96–108)
CHLORIDE SERPL-SCNC: 100 MMOL/L — SIGNIFICANT CHANGE UP (ref 98–107)
CHLORIDE SERPL-SCNC: 98 MMOL/L — SIGNIFICANT CHANGE UP (ref 98–107)
CHLORIDE SERPL-SCNC: 98 MMOL/L — SIGNIFICANT CHANGE UP (ref 98–107)
CK MB BLD-MCNC: 0.8 — SIGNIFICANT CHANGE UP (ref 0–2.5)
CK MB BLD-MCNC: 4.62 NG/ML — SIGNIFICANT CHANGE UP (ref 1–4.7)
CK SERPL-CCNC: 568 U/L — HIGH (ref 25–170)
CO2 SERPL-SCNC: 24 MMOL/L — SIGNIFICANT CHANGE UP (ref 22–31)
CO2 SERPL-SCNC: 25 MMOL/L — SIGNIFICANT CHANGE UP (ref 22–31)
CO2 SERPL-SCNC: 25 MMOL/L — SIGNIFICANT CHANGE UP (ref 22–31)
CREAT SERPL-MCNC: 1.16 MG/DL — SIGNIFICANT CHANGE UP (ref 0.5–1.3)
CREAT SERPL-MCNC: 1.21 MG/DL — SIGNIFICANT CHANGE UP (ref 0.5–1.3)
CREAT SERPL-MCNC: 1.25 MG/DL — SIGNIFICANT CHANGE UP (ref 0.5–1.3)
CRP SERPL-MCNC: 382.8 MG/L — HIGH
D DIMER BLD IA.RAPID-MCNC: 2695 NG/ML — SIGNIFICANT CHANGE UP
ERYTHROCYTE [SEDIMENTATION RATE] IN BLOOD: > 120 MM/HR — HIGH (ref 4–25)
FERRITIN SERPL-MCNC: 1558 NG/ML — HIGH (ref 15–150)
GLUCOSE BLDA-MCNC: 102 MG/DL — HIGH (ref 70–99)
GLUCOSE BLDA-MCNC: 91 MG/DL — SIGNIFICANT CHANGE UP (ref 70–99)
GLUCOSE BLDA-MCNC: 93 MG/DL — SIGNIFICANT CHANGE UP (ref 70–99)
GLUCOSE BLDA-MCNC: 94 MG/DL — SIGNIFICANT CHANGE UP (ref 70–99)
GLUCOSE SERPL-MCNC: 103 MG/DL — HIGH (ref 70–99)
GLUCOSE SERPL-MCNC: 92 MG/DL — SIGNIFICANT CHANGE UP (ref 70–99)
GLUCOSE SERPL-MCNC: 94 MG/DL — SIGNIFICANT CHANGE UP (ref 70–99)
HCO3 BLDA-SCNC: 26 MMOL/L — SIGNIFICANT CHANGE UP (ref 22–26)
HCO3 BLDA-SCNC: 26 MMOL/L — SIGNIFICANT CHANGE UP (ref 22–26)
HCO3 BLDA-SCNC: 27 MMOL/L — HIGH (ref 22–26)
HCO3 BLDA-SCNC: 28 MMOL/L — HIGH (ref 22–26)
HCT VFR BLD CALC: 22.3 % — LOW (ref 34.5–45)
HCT VFR BLD CALC: 22.8 % — LOW (ref 34.5–45)
HCT VFR BLD CALC: 23.2 % — LOW (ref 34.5–45)
HCT VFR BLDA CALC: 23.2 % — LOW (ref 34.5–46.5)
HCT VFR BLDA CALC: 23.3 % — LOW (ref 34.5–46.5)
HCT VFR BLDA CALC: 23.6 % — LOW (ref 34.5–46.5)
HCT VFR BLDA CALC: 24 % — LOW (ref 34.5–46.5)
HGB BLD-MCNC: 7.3 G/DL — LOW (ref 11.5–15.5)
HGB BLD-MCNC: 7.3 G/DL — LOW (ref 11.5–15.5)
HGB BLD-MCNC: 7.6 G/DL — LOW (ref 11.5–15.5)
HGB BLDA-MCNC: 7.4 G/DL — LOW (ref 11.5–15.5)
HGB BLDA-MCNC: 7.5 G/DL — LOW (ref 11.5–15.5)
HGB BLDA-MCNC: 7.6 G/DL — LOW (ref 11.5–15.5)
HGB BLDA-MCNC: 7.7 G/DL — LOW (ref 11.5–15.5)
INR BLD: 1.08 — SIGNIFICANT CHANGE UP (ref 0.88–1.17)
INR BLD: 1.08 — SIGNIFICANT CHANGE UP (ref 0.88–1.17)
LACTATE BLDA-SCNC: 0.9 MMOL/L — SIGNIFICANT CHANGE UP (ref 0.5–2)
LACTATE BLDA-SCNC: 0.9 MMOL/L — SIGNIFICANT CHANGE UP (ref 0.5–2)
LACTATE BLDA-SCNC: 1 MMOL/L — SIGNIFICANT CHANGE UP (ref 0.5–2)
LDH SERPL L TO P-CCNC: 322 U/L — HIGH (ref 135–225)
MAGNESIUM SERPL-MCNC: 2.6 MG/DL — SIGNIFICANT CHANGE UP (ref 1.6–2.6)
MAGNESIUM SERPL-MCNC: 2.7 MG/DL — HIGH (ref 1.6–2.6)
MAGNESIUM SERPL-MCNC: 2.7 MG/DL — HIGH (ref 1.6–2.6)
MCHC RBC-ENTMCNC: 27 PG — SIGNIFICANT CHANGE UP (ref 27–34)
MCHC RBC-ENTMCNC: 27.1 PG — SIGNIFICANT CHANGE UP (ref 27–34)
MCHC RBC-ENTMCNC: 27.5 PG — SIGNIFICANT CHANGE UP (ref 27–34)
MCHC RBC-ENTMCNC: 32 % — SIGNIFICANT CHANGE UP (ref 32–36)
MCHC RBC-ENTMCNC: 32.7 % — SIGNIFICANT CHANGE UP (ref 32–36)
MCHC RBC-ENTMCNC: 32.8 % — SIGNIFICANT CHANGE UP (ref 32–36)
MCV RBC AUTO: 82.9 FL — SIGNIFICANT CHANGE UP (ref 80–100)
MCV RBC AUTO: 84.2 FL — SIGNIFICANT CHANGE UP (ref 80–100)
MCV RBC AUTO: 84.4 FL — SIGNIFICANT CHANGE UP (ref 80–100)
NRBC # FLD: 0.15 K/UL — SIGNIFICANT CHANGE UP (ref 0–0)
NRBC # FLD: 0.15 K/UL — SIGNIFICANT CHANGE UP (ref 0–0)
NRBC # FLD: 0.24 K/UL — SIGNIFICANT CHANGE UP (ref 0–0)
NRBC FLD-RTO: 1.5 — SIGNIFICANT CHANGE UP
PCO2 BLDA: 43 MMHG — SIGNIFICANT CHANGE UP (ref 32–48)
PCO2 BLDA: 43 MMHG — SIGNIFICANT CHANGE UP (ref 32–48)
PCO2 BLDA: 48 MMHG — SIGNIFICANT CHANGE UP (ref 32–48)
PCO2 BLDA: 52 MMHG — HIGH (ref 32–48)
PH BLDA: 7.34 PH — LOW (ref 7.35–7.45)
PH BLDA: 7.37 PH — SIGNIFICANT CHANGE UP (ref 7.35–7.45)
PH BLDA: 7.41 PH — SIGNIFICANT CHANGE UP (ref 7.35–7.45)
PH BLDA: 7.43 PH — SIGNIFICANT CHANGE UP (ref 7.35–7.45)
PHOSPHATE SERPL-MCNC: 2.3 MG/DL — LOW (ref 2.5–4.5)
PHOSPHATE SERPL-MCNC: 2.3 MG/DL — LOW (ref 2.5–4.5)
PHOSPHATE SERPL-MCNC: 3.4 MG/DL — SIGNIFICANT CHANGE UP (ref 2.5–4.5)
PLATELET # BLD AUTO: 240 K/UL — SIGNIFICANT CHANGE UP (ref 150–400)
PLATELET # BLD AUTO: 243 K/UL — SIGNIFICANT CHANGE UP (ref 150–400)
PLATELET # BLD AUTO: 257 K/UL — SIGNIFICANT CHANGE UP (ref 150–400)
PMV BLD: 10.5 FL — SIGNIFICANT CHANGE UP (ref 7–13)
PMV BLD: 10.7 FL — SIGNIFICANT CHANGE UP (ref 7–13)
PMV BLD: 10.7 FL — SIGNIFICANT CHANGE UP (ref 7–13)
PO2 BLDA: 125 MMHG — HIGH (ref 83–108)
PO2 BLDA: 137 MMHG — HIGH (ref 83–108)
PO2 BLDA: 77 MMHG — LOW (ref 83–108)
PO2 BLDA: 97 MMHG — SIGNIFICANT CHANGE UP (ref 83–108)
POTASSIUM BLDA-SCNC: 4.1 MMOL/L — SIGNIFICANT CHANGE UP (ref 3.4–4.5)
POTASSIUM BLDA-SCNC: 4.2 MMOL/L — SIGNIFICANT CHANGE UP (ref 3.4–4.5)
POTASSIUM BLDA-SCNC: 4.2 MMOL/L — SIGNIFICANT CHANGE UP (ref 3.4–4.5)
POTASSIUM BLDA-SCNC: 4.3 MMOL/L — SIGNIFICANT CHANGE UP (ref 3.4–4.5)
POTASSIUM SERPL-MCNC: 4.4 MMOL/L — SIGNIFICANT CHANGE UP (ref 3.5–5.3)
POTASSIUM SERPL-MCNC: 4.4 MMOL/L — SIGNIFICANT CHANGE UP (ref 3.5–5.3)
POTASSIUM SERPL-MCNC: 4.5 MMOL/L — SIGNIFICANT CHANGE UP (ref 3.5–5.3)
POTASSIUM SERPL-SCNC: 4.4 MMOL/L — SIGNIFICANT CHANGE UP (ref 3.5–5.3)
POTASSIUM SERPL-SCNC: 4.4 MMOL/L — SIGNIFICANT CHANGE UP (ref 3.5–5.3)
POTASSIUM SERPL-SCNC: 4.5 MMOL/L — SIGNIFICANT CHANGE UP (ref 3.5–5.3)
PROT SERPL-MCNC: 6.8 G/DL — SIGNIFICANT CHANGE UP (ref 6–8.3)
PROTHROM AB SERPL-ACNC: 12.5 SEC — SIGNIFICANT CHANGE UP (ref 9.8–13.1)
PROTHROM AB SERPL-ACNC: 12.5 SEC — SIGNIFICANT CHANGE UP (ref 9.8–13.1)
RBC # BLD: 2.65 M/UL — LOW (ref 3.8–5.2)
RBC # BLD: 2.7 M/UL — LOW (ref 3.8–5.2)
RBC # BLD: 2.8 M/UL — LOW (ref 3.8–5.2)
RBC # FLD: 16.8 % — HIGH (ref 10.3–14.5)
RBC # FLD: 16.8 % — HIGH (ref 10.3–14.5)
RBC # FLD: 17 % — HIGH (ref 10.3–14.5)
RH IG SCN BLD-IMP: POSITIVE — SIGNIFICANT CHANGE UP
SAO2 % BLDA: 96.1 % — SIGNIFICANT CHANGE UP (ref 95–99)
SAO2 % BLDA: 97.8 % — SIGNIFICANT CHANGE UP (ref 95–99)
SAO2 % BLDA: 98.7 % — SIGNIFICANT CHANGE UP (ref 95–99)
SAO2 % BLDA: 98.8 % — SIGNIFICANT CHANGE UP (ref 95–99)
SODIUM BLDA-SCNC: 133 MMOL/L — LOW (ref 136–146)
SODIUM BLDA-SCNC: 133 MMOL/L — LOW (ref 136–146)
SODIUM BLDA-SCNC: 134 MMOL/L — LOW (ref 136–146)
SODIUM BLDA-SCNC: 134 MMOL/L — LOW (ref 136–146)
SODIUM SERPL-SCNC: 131 MMOL/L — LOW (ref 135–145)
SODIUM SERPL-SCNC: 134 MMOL/L — LOW (ref 135–145)
SODIUM SERPL-SCNC: 134 MMOL/L — LOW (ref 135–145)
TROPONIN T, HIGH SENSITIVITY: 7 NG/L — SIGNIFICANT CHANGE UP (ref ?–14)
WBC # BLD: 15.7 K/UL — HIGH (ref 3.8–10.5)
WBC # BLD: 15.96 K/UL — HIGH (ref 3.8–10.5)
WBC # BLD: 16.14 K/UL — HIGH (ref 3.8–10.5)
WBC # FLD AUTO: 15.7 K/UL — HIGH (ref 3.8–10.5)
WBC # FLD AUTO: 15.96 K/UL — HIGH (ref 3.8–10.5)
WBC # FLD AUTO: 16.14 K/UL — HIGH (ref 3.8–10.5)

## 2020-03-27 PROCEDURE — 99291 CRITICAL CARE FIRST HOUR: CPT

## 2020-03-27 PROCEDURE — 99292 CRITICAL CARE ADDL 30 MIN: CPT

## 2020-03-27 PROCEDURE — 90945 DIALYSIS ONE EVALUATION: CPT | Mod: GC

## 2020-03-27 RX ORDER — MIDAZOLAM HYDROCHLORIDE 1 MG/ML
0.02 INJECTION, SOLUTION INTRAMUSCULAR; INTRAVENOUS
Qty: 100 | Refills: 0 | Status: DISCONTINUED | OUTPATIENT
Start: 2020-03-27 | End: 2020-03-31

## 2020-03-27 RX ORDER — HEPARIN SODIUM 5000 [USP'U]/ML
1200 INJECTION INTRAVENOUS; SUBCUTANEOUS
Qty: 25000 | Refills: 0 | Status: DISCONTINUED | OUTPATIENT
Start: 2020-03-27 | End: 2020-04-05

## 2020-03-27 RX ORDER — HYDROMORPHONE HYDROCHLORIDE 2 MG/ML
0.5 INJECTION INTRAMUSCULAR; INTRAVENOUS; SUBCUTANEOUS
Refills: 0 | Status: DISCONTINUED | OUTPATIENT
Start: 2020-03-27 | End: 2020-04-03

## 2020-03-27 RX ADMIN — Medication 325 MILLIGRAM(S): at 11:15

## 2020-03-27 RX ADMIN — PROPOFOL 8.66 MICROGRAM(S)/KG/MIN: 10 INJECTION, EMULSION INTRAVENOUS at 20:13

## 2020-03-27 RX ADMIN — CHLORHEXIDINE GLUCONATE 15 MILLILITER(S): 213 SOLUTION TOPICAL at 18:38

## 2020-03-27 RX ADMIN — Medication 3.38 MICROGRAM(S)/KG/MIN: at 08:17

## 2020-03-27 RX ADMIN — HEPARIN SODIUM 12 UNIT(S)/HR: 5000 INJECTION INTRAVENOUS; SUBCUTANEOUS at 20:13

## 2020-03-27 RX ADMIN — FENTANYL CITRATE 3.61 MICROGRAM(S)/KG/HR: 50 INJECTION INTRAVENOUS at 08:17

## 2020-03-27 RX ADMIN — CHLORHEXIDINE GLUCONATE 15 MILLILITER(S): 213 SOLUTION TOPICAL at 06:12

## 2020-03-27 RX ADMIN — HEPARIN SODIUM 12 UNIT(S)/HR: 5000 INJECTION INTRAVENOUS; SUBCUTANEOUS at 08:18

## 2020-03-27 RX ADMIN — FENTANYL CITRATE 3.61 MICROGRAM(S)/KG/HR: 50 INJECTION INTRAVENOUS at 20:11

## 2020-03-27 RX ADMIN — SENNA PLUS 15 MILLILITER(S): 8.6 TABLET ORAL at 11:17

## 2020-03-27 RX ADMIN — VASOPRESSIN 1.2 UNIT(S)/MIN: 20 INJECTION INTRAVENOUS at 20:13

## 2020-03-27 RX ADMIN — POLYETHYLENE GLYCOL 3350 17 GRAM(S): 17 POWDER, FOR SOLUTION ORAL at 11:18

## 2020-03-27 RX ADMIN — FAMOTIDINE 20 MILLIGRAM(S): 10 INJECTION INTRAVENOUS at 11:15

## 2020-03-27 RX ADMIN — Medication 63.75 MILLIMOLE(S): at 01:03

## 2020-03-27 RX ADMIN — Medication 3.38 MICROGRAM(S)/KG/MIN: at 20:12

## 2020-03-27 RX ADMIN — PROPOFOL 8.66 MICROGRAM(S)/KG/MIN: 10 INJECTION, EMULSION INTRAVENOUS at 08:20

## 2020-03-27 RX ADMIN — CHLORHEXIDINE GLUCONATE 1 APPLICATION(S): 213 SOLUTION TOPICAL at 06:12

## 2020-03-27 RX ADMIN — VASOPRESSIN 1.2 UNIT(S)/MIN: 20 INJECTION INTRAVENOUS at 08:17

## 2020-03-27 RX ADMIN — MIDAZOLAM HYDROCHLORIDE 1.44 MG/KG/HR: 1 INJECTION, SOLUTION INTRAMUSCULAR; INTRAVENOUS at 20:13

## 2020-03-27 RX ADMIN — MIDAZOLAM HYDROCHLORIDE 1.44 MG/KG/HR: 1 INJECTION, SOLUTION INTRAMUSCULAR; INTRAVENOUS at 08:18

## 2020-03-27 NOTE — PROGRESS NOTE ADULT - SUBJECTIVE AND OBJECTIVE BOX
[facility] DIVISION OF KIDNEY DISEASES AND HYPERTENSION -- FOLLOW UP NOTE  --------------------------------------------------------------------------------  59-year-old female with no PMH admitted to ICU for respiratory failure, pneumonia, SAM and COVID-19. Scr increased to 4.6 on 3/23/20. Pt. initiated on CRRT/CVVHDF on 3/23/20.    Pt. was seen and examined in ICU. Pt. intubated, on IV pressor support, vasopressin added overnight, remains anuric.  FiO2 40%, PEEP 12. Pt. tolerating ~100cc/hr of fluid removal with CRRT, no clotting.       PAST HISTORY  --------------------------------------------------------------------------------  No significant changes to PMH, PSH, FHx, SHx, unless otherwise noted    ALLERGIES & MEDICATIONS  --------------------------------------------------------------------------------  Allergies    No Known Allergies    Intolerances      Standing Inpatient Medications  aspirin 325 milliGRAM(s) Oral daily  chlorhexidine 0.12% Liquid 15 milliLiter(s) Oral Mucosa every 12 hours  chlorhexidine 4% Liquid 1 Application(s) Topical <User Schedule>  CRRT Treatment    <Continuous>  famotidine Injectable 20 milliGRAM(s) IV Push daily  fentaNYL   Infusion. 0.5 MICROgram(s)/kG/Hr IV Continuous <Continuous>  heparin  Infusion 900 Unit(s)/Hr IV Continuous <Continuous>  midazolam Infusion 0.02 mG/kG/Hr IV Continuous <Continuous>  norepinephrine Infusion 0.05 MICROgram(s)/kG/Min IV Continuous <Continuous>  polyethylene glycol 3350 17 Gram(s) Oral daily  PrismaSATE Dialysate BGK 4 / 2.5 5000 milliLiter(s) CRRT <Continuous>  PrismaSOL Filtration BGK 4 / 2.5 5000 milliLiter(s) CRRT <Continuous>  PrismaSOL Filtration BGK 4 / 2.5 5000 milliLiter(s) CRRT <Continuous>  propofol Infusion 20 MICROgram(s)/kG/Min IV Continuous <Continuous>  senna Syrup 15 milliLiter(s) Oral daily  vasopressin Infusion 0.02 Unit(s)/Min IV Continuous <Continuous>    PRN Inpatient Medications  acetaminophen    Suspension .. 650 milliGRAM(s) Oral every 6 hours PRN  guaiFENesin   Syrup  (Sugar-Free) 100 milliGRAM(s) Oral every 6 hours PRN  sodium chloride 0.9% lock flush 10 milliLiter(s) IV Push every 1 hour PRN      REVIEW OF SYSTEMS  --------------------------------------------------------------------------------  not able to obtain.     VITALS/PHYSICAL EXAM  --------------------------------------------------------------------------------  T(C): 37.1 (03-27-20 @ 08:00), Max: 37.3 (03-26-20 @ 16:00)  HR: 99 (03-27-20 @ 09:15) (89 - 132)  BP: 89/63 (03-27-20 @ 08:00) (89/63 - 113/70)  RR: 30 (03-27-20 @ 09:00) (25 - 30)  SpO2: 100% (03-27-20 @ 09:15) (100% - 100%)  Wt(kg): --        03-26-20 @ 07:01  -  03-27-20 @ 07:00  --------------------------------------------------------  IN: 2177.7 mL / OUT: 4798 mL / NET: -2620.3 mL    03-27-20 @ 07:01  -  03-27-20 @ 10:09  --------------------------------------------------------  IN: 255.4 mL / OUT: 0 mL / NET: 255.4 mL      Physical Exam (limited):  	Gen: Intubated  	HEENT: +ETT  	Pulm: Coarse breath sounds B/L  	CV: S1S2+  	Abd: Soft, non distended   	Ext: No LE edema B/L  	Neuro: Sedated  	Skin: Warm and dry              Access: Right IJ non tunneled catheter+      LABS/STUDIES  --------------------------------------------------------------------------------              7.3    15.70 >-----------<  240      [03-27-20 @ 04:30]              22.8     131  |  98  |  15  ----------------------------<  103      [03-27-20 @ 04:30]  4.5   |  24  |  1.25        Ca     9.6     [03-27-20 @ 04:30]      iCa    1.17     [03-26 @ 21:45]      Mg     2.6     [03-27-20 @ 04:30]      Phos  3.4     [03-27-20 @ 04:30]    TPro  6.8  /  Alb  2.0  /  TBili  0.4  /  DBili  x   /  AST  66  /  ALT  47  /  AlkPhos  130  [03-27-20 @ 04:30]    PT/INR: PT 12.5 , INR 1.08       [03-27-20 @ 04:30]  PTT: 66.8       [03-27-20 @ 07:00]          [03-27-20 @ 04:30]        [03-27-20 @ 04:30]    Creatinine Trend:  SCr 1.25 [03-27 @ 04:30]  SCr 1.29 [03-26 @ 21:45]  SCr 1.33 [03-26 @ 15:14]  SCr 1.54 [03-26 @ 03:15]  SCr 1.72 [03-25 @ 09:30]    Urinalysis - [03-15-20 @ 22:27]      Color YELLOW / Appearance CLEAR / SG 1.037 / pH 6.0      Gluc NEGATIVE / Ketone TRACE  / Bili NEGATIVE / Urobili TRACE       Blood MODERATE / Protein 200 / Leuk Est NEGATIVE / Nitrite NEGATIVE      RBC 11-25 / WBC 3-5 / Hyaline 1+ / Gran  / Sq Epi FEW / Non Sq Epi  / Bacteria NEGATIVE      Ferritin 1558      [03-27-20 @ 04:30]  Lipid: chol --, , HDL --, LDL --      [03-26-20 @ 03:15]    HCV 0.12, Nonreactive Hepatitis C AB  S/CO Ratio                        Interpretation  < 1.00                                   Non-Reactive  1.00 - 4.99                         Weakly-Reactive  >= 5.00                                Reactive  Non-Reactive: Aperson with a non-reactive HCV antibody  result is considered uninfected.  No further action is  needed unless recent infection is suspected.  In these  cases, consider repeat testing later to detect  seroconversion..  Weakly-Reactive: HCV antibody test is abnormal, HCV RNA  Qualitative test will follow.  Reactive: HCV antibody test is abnormal, HCV RNA  Qualitative test will follow.  Note: HCV antibody testing is performed on the Echo360 system.      [03-16-20 @ 05:30] [facility] DIVISION OF KIDNEY DISEASES AND HYPERTENSION -- FOLLOW UP NOTE  --------------------------------------------------------------------------------  HPI: 59-year-old female admitted to ICU for respiratory failure, pneumonia, SAM and COVID-19. Scr increased to 4.6 on 3/23/20. Pt. initiated on CRRT/CVVHDF on 3/23/20.    Pt. was seen and examined in ICU. Pt. intubated, on IV pressor support, vasopressin added overnight, remains anuric.  FiO2 40%, PEEP 12. Pt. tolerating ~100cc/hr of net UF with CRRT, no clotting.     PAST HISTORY  --------------------------------------------------------------------------------  No significant changes to PMH, PSH, FHx, SHx, unless otherwise noted    ALLERGIES & MEDICATIONS  --------------------------------------------------------------------------------  Allergies    No Known Allergies    Intolerances    Standing Inpatient Medications  aspirin 325 milliGRAM(s) Oral daily  chlorhexidine 0.12% Liquid 15 milliLiter(s) Oral Mucosa every 12 hours  chlorhexidine 4% Liquid 1 Application(s) Topical <User Schedule>  CRRT Treatment    <Continuous>  famotidine Injectable 20 milliGRAM(s) IV Push daily  fentaNYL   Infusion. 0.5 MICROgram(s)/kG/Hr IV Continuous <Continuous>  heparin  Infusion 900 Unit(s)/Hr IV Continuous <Continuous>  midazolam Infusion 0.02 mG/kG/Hr IV Continuous <Continuous>  norepinephrine Infusion 0.05 MICROgram(s)/kG/Min IV Continuous <Continuous>  polyethylene glycol 3350 17 Gram(s) Oral daily  PrismaSATE Dialysate BGK 4 / 2.5 5000 milliLiter(s) CRRT <Continuous>  PrismaSOL Filtration BGK 4 / 2.5 5000 milliLiter(s) CRRT <Continuous>  PrismaSOL Filtration BGK 4 / 2.5 5000 milliLiter(s) CRRT <Continuous>  propofol Infusion 20 MICROgram(s)/kG/Min IV Continuous <Continuous>  senna Syrup 15 milliLiter(s) Oral daily  vasopressin Infusion 0.02 Unit(s)/Min IV Continuous <Continuous>    PRN Inpatient Medications  acetaminophen    Suspension .. 650 milliGRAM(s) Oral every 6 hours PRN  guaiFENesin   Syrup  (Sugar-Free) 100 milliGRAM(s) Oral every 6 hours PRN  sodium chloride 0.9% lock flush 10 milliLiter(s) IV Push every 1 hour PRN    REVIEW OF SYSTEMS  --------------------------------------------------------------------------------  Unable to obtain    VITALS/PHYSICAL EXAM  --------------------------------------------------------------------------------  T(C): 37.1 (03-27-20 @ 08:00), Max: 37.3 (03-26-20 @ 16:00)  HR: 99 (03-27-20 @ 09:15) (89 - 132)  BP: 89/63 (03-27-20 @ 08:00) (89/63 - 113/70)  RR: 30 (03-27-20 @ 09:00) (25 - 30)  SpO2: 100% (03-27-20 @ 09:15) (100% - 100%)  Wt(kg): --    03-26-20 @ 07:01  -  03-27-20 @ 07:00  --------------------------------------------------------  IN: 2177.7 mL / OUT: 4798 mL / NET: -2620.3 mL    03-27-20 @ 07:01  -  03-27-20 @ 10:09  --------------------------------------------------------  IN: 255.4 mL / OUT: 0 mL / NET: 255.4 mL    Physical Exam (limited):  	Gen: Intubated  	HEENT: +ETT  	Pulm: Coarse breath sounds B/L  	CV: S1S2+  	Abd: Soft, non distended   	Ext: No LE edema B/L  	Neuro: Sedated  	Skin: Warm and dry              Access: Right IJ non tunneled catheter+    LABS/STUDIES  --------------------------------------------------------------------------------              7.3    15.70 >-----------<  240      [03-27-20 @ 04:30]              22.8     131  |  98  |  15  ----------------------------<  103      [03-27-20 @ 04:30]  4.5   |  24  |  1.25        Ca     9.6     [03-27-20 @ 04:30]      iCa    1.17     [03-26 @ 21:45]      Mg     2.6     [03-27-20 @ 04:30]      Phos  3.4     [03-27-20 @ 04:30]    TPro  6.8  /  Alb  2.0  /  TBili  0.4  /  DBili  x   /  AST  66  /  ALT  47  /  AlkPhos  130  [03-27-20 @ 04:30]          [03-27-20 @ 04:30]        [03-27-20 @ 04:30]    Creatinine Trend:  SCr 1.25 [03-27 @ 04:30]  SCr 1.29 [03-26 @ 21:45]  SCr 1.33 [03-26 @ 15:14]  SCr 1.54 [03-26 @ 03:15]  SCr 1.72 [03-25 @ 09:30]

## 2020-03-27 NOTE — PROGRESS NOTE ADULT - SUBJECTIVE AND OBJECTIVE BOX
ISSUES:   Acute hypoxic respiratory failure              ARDS              Septic shock              COVID-19+              SAM              Mixed metabolic / respiratory acidosis              Anemia    INTERVAL EVENTS:   Ventilator adjusted with decreased PEEP. blood gas monitored  Remains on CRRT for renal failure  Remains on levophed for septic shock. This was titrated.    HISTORY:   Unable to obtain history or ROS due to intubation and sedation    PHYSICAL EXAM:   Gen: Comfortable, No acute distress  Eyes: Sclera white, Conjunctiva normal, Eyelids normal, Pupils symmetrical   ENT: Mucous membranes moist, OG tube in place,  ,    Neck: Trachea midline,  ,  ,  ,  ,    CV: Rate regular, Rhythm regular,  ,    Resp: Breath sounds coarse, No accessory muscles use,  ,    Abd: Soft, Mildly distended, Non-tender, Bowel sounds normal,  ,    Skin: Warm, 1+ edema of lower extremities,  ,    : Frances in place  Neuro: Unable to elicit motor response.,    Psych: RASS -5      ASSESSMENT AND PLAN:     NEURO:  Vent sedation - Continue propofol, midazolam, fentanyl for ventilator synchrony.                          RESPIRATORY:  Acute respiratory failure - Mechanical ventilation. Monitor ABG. Wean FIO2 for goal O2sat above 92. Vent bundle.                                 CARDIOVASCULAR:  Septic shock requiring IV pressors - Wean pressors for MAP goal of 65.                               RENAL:  SAM requiring dialysis – Dialysis. Renally dose medications. Monitor for hyperkalemia and uremia. Avoid nephrotoxic medications. Monitor IOs and electrolytes. Discussed with Renal Consult.         GASTROINTESTINAL:  GI prophylaxis with famotidine  Zofran and Reglan IV PRN for nausea  Tube feed              HEMATOLOGIC:  No signs of active bleeding. Monitor Hgb in CBC in AM  On therapeutic anticoagulation.  COVID hypercoagulability - Continue aspirin and heparin gtt      INFECTIOUS DISEASE:  COVID-19 infection - hydroxychloroquine, azithromycin, thiamine, vitamin C                                                 ENDOCRINE:  Stable – Monitor glucose fingersticks for goal 120-180.            Pertinent clinical, laboratory, radiographic, hemodynamic, echocardiographic, respiratory data, microbiologic data and chart were reviewed by myself and analyzed frequently throughout the course of the day and night by myself.    Plan discussed at length with the CTICU staff     Patient unable to participate with discussion of plan.    Patient required critical care management.  80 minutes were spent evaluating, managing, providing, coordinating, and documenting care for this patient, exclusive of procedures from  8AM to 1159PM.      ________________________________________________  _________________________  VITAL SIGNS:  Vital Signs Last 24 Hrs  T(C): 36.9 (27 Mar 2020 20:00), Max: 37.2 (27 Mar 2020 04:00)  T(F): 98.4 (27 Mar 2020 20:00), Max: 98.9 (27 Mar 2020 04:00)  HR: 95 (28 Mar 2020 00:00) (89 - 99)  BP: 85/59 (27 Mar 2020 22:00) (78/52 - 106/75)  BP(mean): 66 (27 Mar 2020 22:00) (58 - 81)  RR: 29 (28 Mar 2020 00:00) (28 - 31)  SpO2: 100% (27 Mar 2020 23:59) (94% - 100%)  I/Os:   I&O's Detail    26 Mar 2020 07:01  -  27 Mar 2020 07:00  --------------------------------------------------------  IN:    Enteral Tube Flush: 140 mL    fentaNYL Infusion.: 519.6 mL    heparin Infusion: 310 mL    IV PiggyBack: 300 mL    midazolam Infusion: 28.8 mL    Nepro with Carb Steady: 100 mL    norepinephrine Infusion: 274.1 mL    ns in tub fed  [id number]: 180 mL    propofol Infusion: 312 mL    vasopressin Infusion: 13.2 mL  Total IN: 2177.7 mL    OUT:    Indwelling Catheter - Urethral: 10 mL    Other: 3717 mL    Rectal Tube: 750 mL    Stool: 500 mL  Total OUT: 4977 mL    Total NET: -2799.3 mL      27 Mar 2020 07:01  -  28 Mar 2020 00:08  --------------------------------------------------------  IN:    Enteral Tube Flush: 120 mL    fentaNYL Infusion.: 273.6 mL    heparin Infusion: 112 mL    heparin Infusion: 72 mL    midazolam Infusion: 5.6 mL    midazolam Infusion: 20.4 mL    Nepro with Carb Steady: 100 mL    norepinephrine Infusion: 29 mL    ns in tub fed  [id number]: 350 mL    propofol Infusion: 194.8 mL    vasopressin Infusion: 15.6 mL  Total IN: 1293 mL    OUT:    Other: 2231 mL  Total OUT: 2231 mL    Total NET: -938 mL          Mode: AC/ CMV (Assist Control/ Continuous Mandatory Ventilation)  RR (machine): 28  TV (machine): 330  FiO2: 40  PEEP: 10  MAP: 20  PIP: 31      MEDICATIONS:  MEDICATIONS  (STANDING):  aspirin 325 milliGRAM(s) Oral daily  chlorhexidine 0.12% Liquid 15 milliLiter(s) Oral Mucosa every 12 hours  chlorhexidine 4% Liquid 1 Application(s) Topical <User Schedule>  CRRT Treatment    <Continuous>  famotidine Injectable 20 milliGRAM(s) IV Push daily  fentaNYL   Infusion. 0.5 MICROgram(s)/kG/Hr (3.61 mL/Hr) IV Continuous <Continuous>  heparin  Infusion 1200 Unit(s)/Hr (12 mL/Hr) IV Continuous <Continuous>  midazolam Infusion 0.02 mG/kG/Hr (1.44 mL/Hr) IV Continuous <Continuous>  norepinephrine Infusion 0.05 MICROgram(s)/kG/Min (3.38 mL/Hr) IV Continuous <Continuous>  polyethylene glycol 3350 17 Gram(s) Oral daily  PrismaSATE Dialysate BGK 4 / 2.5 5000 milliLiter(s) (1500 mL/Hr) CRRT <Continuous>  PrismaSOL Filtration BGK 4 / 2.5 5000 milliLiter(s) (800 mL/Hr) CRRT <Continuous>  PrismaSOL Filtration BGK 4 / 2.5 5000 milliLiter(s) (200 mL/Hr) CRRT <Continuous>  propofol Infusion 20 MICROgram(s)/kG/Min (8.66 mL/Hr) IV Continuous <Continuous>  senna Syrup 15 milliLiter(s) Oral daily  vasopressin Infusion 0.02 Unit(s)/Min (1.2 mL/Hr) IV Continuous <Continuous>    MEDICATIONS  (PRN):  acetaminophen    Suspension .. 650 milliGRAM(s) Oral every 6 hours PRN Temp greater or equal to 38C (100.4F)  HYDROmorphone  Injectable 0.5 milliGRAM(s) IV Push every 2 hours PRN For Respiratory Rate > 30 breaths per minute      LABS:                        7.6    16.14 )-----------( 257      ( 27 Mar 2020 18:30 )             23.2     03-27    134<L>  |  100  |  15  ----------------------------<  94  4.4   |  25  |  1.16    Ca    9.8      27 Mar 2020 18:30  Phos  2.3     03-27  Mg     2.7     03-27    TPro  6.8  /  Alb  2.0<L>  /  TBili  0.4  /  DBili  x   /  AST  66<H>  /  ALT  47<H>  /  AlkPhos  130<H>  03-27    LIVER FUNCTIONS - ( 27 Mar 2020 04:30 )  Alb: 2.0 g/dL / Pro: 6.8 g/dL / ALK PHOS: 130 u/L / ALT: 47 u/L / AST: 66 u/L / GGT: x           PT/INR - ( 27 Mar 2020 04:30 )   PT: 12.5 SEC;   INR: 1.08          PTT - ( 27 Mar 2020 18:30 )  PTT:75.6 SEC  ABG - ( 27 Mar 2020 18:30 )  pH, Arterial: 7.41  pH, Blood: x     /  pCO2: 43    /  pO2: 77    / HCO3: 27    / Base Excess: 2.8   /  SaO2: 96.1                _________________________

## 2020-03-27 NOTE — PROGRESS NOTE ADULT - PROBLEM SELECTOR PLAN 1
Pt. with oliguric SAM in the setting of hypotension and COVID-19 infection. Pt. with likely ATN. Scr increased to 4.6 on 3/23/20, started on CRRT/CVVHDF. Pt. tolerating CRRT/CVVHDF. BP being maintained on IV vasopressors. HD catheter functioning during rounds. Continue with systemic heparin to decrease risk of circuit clotting. Plan is to continue with CRRT/CVVHDF, if pressors requirement increase suggest to decrease fluid removal.   Check renal sonogram with doppler (when feasible). Monitor labs and urine output. Avoid any potential nephrotoxins Pt. with oliguric SAM in the setting of hypotension and COVID-19 infection. Pt. with likely ATN. Scr increased to 4.6 on 3/23/20, started on CRRT/CVVHDF. Pt. tolerating CRRT/CVVHDF. BP being maintained on IV vasopressors. HD catheter functioning during rounds. Continue with systemic heparin to decrease risk of circuit clotting. Plan is to continue with CRRT/CVVHDF. Check renal sonogram with doppler (when feasible). Monitor labs and urine output. Avoid any potential nephrotoxins

## 2020-03-27 NOTE — CHART NOTE - NSCHARTNOTEFT_GEN_A_CORE
Source:  nursing  & EMR    Diet : NPO Tube Feeding Orogastric tube Nepro Carb Steady 60 ml/hr x 24hrs daily     Patient continues intubated , sedated , on EN support , receiving excessive nutrition , contacted team MD and discussed the nutrition recommendation . Wt. slightly up . Electrolytes are within normal and BUN/ Cr. normal pt. will benefit from non renal formula .     Enteral : EN @ current order will provide 2592 kcal & 117 gm protein /d .      Current Weight: - 74.4 kg on 3/26/2020 i.e. 2.3 kg up from admit . 72.1 kg on 3/18/2020    Pertinent Medications: MEDICATIONS  (STANDING):  aspirin 325 milliGRAM(s) Oral daily  CRRT Treatment    <Continuous>  famotidine Injectable 20 milliGRAM(s) IV Push daily  fentaNYL   Infusion. 0.5 MICROgram(s)/kG/Hr (3.61 mL/Hr) IV Continuous <Continuous>  heparin  Infusion 900 Unit(s)/Hr (12 mL/Hr) IV Continuous <Continuous>  midazolam Infusion 0.02 mG/kG/Hr (1.44 mL/Hr) IV Continuous <Continuous>  norepinephrine Infusion 0.05 MICROgram(s)/kG/Min (3.38 mL/Hr) IV Continuous <Continuous>  polyethylene glycol 3350 17 Gram(s) Oral daily  PrismaSATE Dialysate BGK 4 / 2.5 5000 milliLiter(s) (1500 mL/Hr) CRRT <Continuous>  PrismaSOL Filtration BGK 4 / 2.5 5000 milliLiter(s) (800 mL/Hr) CRRT <Continuous>  PrismaSOL Filtration BGK 4 / 2.5 5000 milliLiter(s) (200 mL/Hr) CRRT <Continuous>  propofol Infusion 20 MICROgram(s)/kG/Min (8.66 mL/Hr) IV Continuous <Continuous>  senna Syrup 15 milliLiter(s) Oral daily  vasopressin Infusion 0.02 Unit(s)/Min (1.2 mL/Hr) IV Continuous <Continuous>      Pertinent Labs:  03-27 Na131 mmol/L<L> Glu 103 mg/dL<H> K+ 4.5 mmol/L Cr  1.25 mg/dL BUN 15 mg/dL 03-27 Phos 3.4 mg/dL 03-27 Alb 2.0 g/dL<L> 03-26 Chol --    LDL --    HDL --    Trig 159 mg/dL<H>      Skin: intact    Estimated Needs:   no change since previous assessment ( 1440 - 1800 kcal/d & 72 - 86 gm protein/d. )      Recommend  - Jevity 1.2 @ 45 ml/hr x24hrs daily = 1296 kcal /d & 60 gm protein/d with propofol kcal pt. will [id number] kcal , i.e. 22.7 kcal/kg dry wt. & 0.8 gm protein/kg dry wt.      Pt. will benefit from additional protein - Add No Carb Pro source x 1 pack daily.        Monitoring and Evaluation:  Tolerance to diet prescription , weights & follow up per protocol

## 2020-03-28 LAB
ALBUMIN SERPL ELPH-MCNC: 2.2 G/DL — LOW (ref 3.3–5)
ALBUMIN SERPL ELPH-MCNC: 2.3 G/DL — LOW (ref 3.3–5)
ALBUMIN SERPL ELPH-MCNC: 2.4 G/DL — LOW (ref 3.3–5)
ALBUMIN SERPL ELPH-MCNC: 2.4 G/DL — LOW (ref 3.3–5)
ALP SERPL-CCNC: 141 U/L — HIGH (ref 40–120)
ALP SERPL-CCNC: 145 U/L — HIGH (ref 40–120)
ALP SERPL-CCNC: 147 U/L — HIGH (ref 40–120)
ALP SERPL-CCNC: 157 U/L — HIGH (ref 40–120)
ALT FLD-CCNC: 41 U/L — HIGH (ref 4–33)
ALT FLD-CCNC: 42 U/L — HIGH (ref 4–33)
ALT FLD-CCNC: 43 U/L — HIGH (ref 4–33)
ALT FLD-CCNC: 46 U/L — HIGH (ref 4–33)
ANION GAP SERPL CALC-SCNC: 11 MMO/L — SIGNIFICANT CHANGE UP (ref 7–14)
ANION GAP SERPL CALC-SCNC: 11 MMO/L — SIGNIFICANT CHANGE UP (ref 7–14)
ANION GAP SERPL CALC-SCNC: 12 MMO/L — SIGNIFICANT CHANGE UP (ref 7–14)
ANION GAP SERPL CALC-SCNC: 17 MMO/L — HIGH (ref 7–14)
ANISOCYTOSIS BLD QL: SLIGHT — SIGNIFICANT CHANGE UP
APTT BLD: 61.2 SEC — HIGH (ref 27.5–36.3)
APTT BLD: 64.3 SEC — HIGH (ref 27.5–36.3)
APTT BLD: 66.4 SEC — HIGH (ref 27.5–36.3)
APTT BLD: 76.7 SEC — HIGH (ref 27.5–36.3)
AST SERPL-CCNC: 42 U/L — HIGH (ref 4–32)
AST SERPL-CCNC: 47 U/L — HIGH (ref 4–32)
AST SERPL-CCNC: 49 U/L — HIGH (ref 4–32)
AST SERPL-CCNC: 58 U/L — HIGH (ref 4–32)
BASE EXCESS BLDA CALC-SCNC: 0.2 MMOL/L — SIGNIFICANT CHANGE UP
BASE EXCESS BLDA CALC-SCNC: 2 MMOL/L — SIGNIFICANT CHANGE UP
BASE EXCESS BLDA CALC-SCNC: 2.2 MMOL/L — SIGNIFICANT CHANGE UP
BASE EXCESS BLDA CALC-SCNC: 2.7 MMOL/L — SIGNIFICANT CHANGE UP
BASOPHILS # BLD AUTO: 0.13 K/UL — SIGNIFICANT CHANGE UP (ref 0–0.2)
BASOPHILS NFR BLD AUTO: 0.7 % — SIGNIFICANT CHANGE UP (ref 0–2)
BASOPHILS NFR SPEC: 0 % — SIGNIFICANT CHANGE UP (ref 0–2)
BILIRUB SERPL-MCNC: 0.4 MG/DL — SIGNIFICANT CHANGE UP (ref 0.2–1.2)
BLASTS # FLD: 0 % — SIGNIFICANT CHANGE UP (ref 0–0)
BUN SERPL-MCNC: 13 MG/DL — SIGNIFICANT CHANGE UP (ref 7–23)
BUN SERPL-MCNC: 15 MG/DL — SIGNIFICANT CHANGE UP (ref 7–23)
BUN SERPL-MCNC: 15 MG/DL — SIGNIFICANT CHANGE UP (ref 7–23)
BUN SERPL-MCNC: 16 MG/DL — SIGNIFICANT CHANGE UP (ref 7–23)
CA-I BLDA-SCNC: 1.3 MMOL/L — HIGH (ref 1.15–1.29)
CA-I BLDA-SCNC: 1.3 MMOL/L — HIGH (ref 1.15–1.29)
CA-I BLDA-SCNC: 1.33 MMOL/L — HIGH (ref 1.15–1.29)
CALCIUM SERPL-MCNC: 10 MG/DL — SIGNIFICANT CHANGE UP (ref 8.4–10.5)
CALCIUM SERPL-MCNC: 9.6 MG/DL — SIGNIFICANT CHANGE UP (ref 8.4–10.5)
CALCIUM SERPL-MCNC: 9.8 MG/DL — SIGNIFICANT CHANGE UP (ref 8.4–10.5)
CALCIUM SERPL-MCNC: 9.9 MG/DL — SIGNIFICANT CHANGE UP (ref 8.4–10.5)
CHLORIDE BLDA-SCNC: 104 MMOL/L — SIGNIFICANT CHANGE UP (ref 96–108)
CHLORIDE SERPL-SCNC: 100 MMOL/L — SIGNIFICANT CHANGE UP (ref 98–107)
CHLORIDE SERPL-SCNC: 96 MMOL/L — LOW (ref 98–107)
CHLORIDE SERPL-SCNC: 97 MMOL/L — LOW (ref 98–107)
CHLORIDE SERPL-SCNC: 97 MMOL/L — LOW (ref 98–107)
CK MB BLD-MCNC: 0.9 — SIGNIFICANT CHANGE UP (ref 0–2.5)
CK MB BLD-MCNC: 3.09 NG/ML — SIGNIFICANT CHANGE UP (ref 1–4.7)
CK SERPL-CCNC: 335 U/L — HIGH (ref 25–170)
CO2 SERPL-SCNC: 20 MMOL/L — LOW (ref 22–31)
CO2 SERPL-SCNC: 21 MMOL/L — LOW (ref 22–31)
CO2 SERPL-SCNC: 22 MMOL/L — SIGNIFICANT CHANGE UP (ref 22–31)
CO2 SERPL-SCNC: 23 MMOL/L — SIGNIFICANT CHANGE UP (ref 22–31)
CREAT SERPL-MCNC: 1 MG/DL — SIGNIFICANT CHANGE UP (ref 0.5–1.3)
CREAT SERPL-MCNC: 1.05 MG/DL — SIGNIFICANT CHANGE UP (ref 0.5–1.3)
CREAT SERPL-MCNC: 1.05 MG/DL — SIGNIFICANT CHANGE UP (ref 0.5–1.3)
CREAT SERPL-MCNC: 1.1 MG/DL — SIGNIFICANT CHANGE UP (ref 0.5–1.3)
CRP SERPL-MCNC: 313.3 MG/L — HIGH
D DIMER BLD IA.RAPID-MCNC: 3256 NG/ML — SIGNIFICANT CHANGE UP
EOSINOPHIL # BLD AUTO: 0.39 K/UL — SIGNIFICANT CHANGE UP (ref 0–0.5)
EOSINOPHIL NFR BLD AUTO: 2 % — SIGNIFICANT CHANGE UP (ref 0–6)
EOSINOPHIL NFR FLD: 2.9 % — SIGNIFICANT CHANGE UP (ref 0–6)
ERYTHROCYTE [SEDIMENTATION RATE] IN BLOOD: 140 MM/HR — HIGH (ref 4–25)
FERRITIN SERPL-MCNC: 1576 NG/ML — HIGH (ref 15–150)
GIANT PLATELETS BLD QL SMEAR: PRESENT — SIGNIFICANT CHANGE UP
GLUCOSE BLDA-MCNC: 100 MG/DL — HIGH (ref 70–99)
GLUCOSE BLDA-MCNC: 101 MG/DL — HIGH (ref 70–99)
GLUCOSE BLDA-MCNC: 110 MG/DL — HIGH (ref 70–99)
GLUCOSE BLDA-MCNC: 94 MG/DL — SIGNIFICANT CHANGE UP (ref 70–99)
GLUCOSE SERPL-MCNC: 101 MG/DL — HIGH (ref 70–99)
GLUCOSE SERPL-MCNC: 101 MG/DL — HIGH (ref 70–99)
GLUCOSE SERPL-MCNC: 87 MG/DL — SIGNIFICANT CHANGE UP (ref 70–99)
GLUCOSE SERPL-MCNC: 99 MG/DL — SIGNIFICANT CHANGE UP (ref 70–99)
HCO3 BLDA-SCNC: 25 MMOL/L — SIGNIFICANT CHANGE UP (ref 22–26)
HCO3 BLDA-SCNC: 26 MMOL/L — SIGNIFICANT CHANGE UP (ref 22–26)
HCO3 BLDA-SCNC: 26 MMOL/L — SIGNIFICANT CHANGE UP (ref 22–26)
HCO3 BLDA-SCNC: 27 MMOL/L — HIGH (ref 22–26)
HCT VFR BLD CALC: 22.4 % — LOW (ref 34.5–45)
HCT VFR BLD CALC: 22.5 % — LOW (ref 34.5–45)
HCT VFR BLD CALC: 23.3 % — LOW (ref 34.5–45)
HCT VFR BLD CALC: 23.7 % — LOW (ref 34.5–45)
HCT VFR BLDA CALC: 23.2 % — LOW (ref 34.5–46.5)
HCT VFR BLDA CALC: 24.4 % — LOW (ref 34.5–46.5)
HCT VFR BLDA CALC: 24.5 % — LOW (ref 34.5–46.5)
HCT VFR BLDA CALC: 24.9 % — LOW (ref 34.5–46.5)
HGB BLD-MCNC: 7.2 G/DL — LOW (ref 11.5–15.5)
HGB BLD-MCNC: 7.4 G/DL — LOW (ref 11.5–15.5)
HGB BLD-MCNC: 7.6 G/DL — LOW (ref 11.5–15.5)
HGB BLD-MCNC: 7.9 G/DL — LOW (ref 11.5–15.5)
HGB BLDA-MCNC: 7.4 G/DL — LOW (ref 11.5–15.5)
HGB BLDA-MCNC: 7.8 G/DL — LOW (ref 11.5–15.5)
HGB BLDA-MCNC: 7.9 G/DL — LOW (ref 11.5–15.5)
HGB BLDA-MCNC: 8 G/DL — LOW (ref 11.5–15.5)
HYPOCHROMIA BLD QL: SLIGHT — SIGNIFICANT CHANGE UP
IMM GRANULOCYTES NFR BLD AUTO: 17.3 % — HIGH (ref 0–1.5)
INR BLD: 1.07 — SIGNIFICANT CHANGE UP (ref 0.88–1.17)
LACTATE BLDA-SCNC: 0.8 MMOL/L — SIGNIFICANT CHANGE UP (ref 0.5–2)
LACTATE BLDA-SCNC: 1 MMOL/L — SIGNIFICANT CHANGE UP (ref 0.5–2)
LDH SERPL L TO P-CCNC: 567 U/L — HIGH (ref 135–225)
LYMPHOCYTES # BLD AUTO: 1.33 K/UL — SIGNIFICANT CHANGE UP (ref 1–3.3)
LYMPHOCYTES # BLD AUTO: 6.8 % — LOW (ref 13–44)
LYMPHOCYTES NFR SPEC AUTO: 10.6 % — LOW (ref 13–44)
MAGNESIUM SERPL-MCNC: 2.6 MG/DL — SIGNIFICANT CHANGE UP (ref 1.6–2.6)
MAGNESIUM SERPL-MCNC: 2.7 MG/DL — HIGH (ref 1.6–2.6)
MAGNESIUM SERPL-MCNC: 2.7 MG/DL — HIGH (ref 1.6–2.6)
MAGNESIUM SERPL-MCNC: 2.8 MG/DL — HIGH (ref 1.6–2.6)
MANUAL SMEAR VERIFICATION: SIGNIFICANT CHANGE UP
MCHC RBC-ENTMCNC: 27 PG — SIGNIFICANT CHANGE UP (ref 27–34)
MCHC RBC-ENTMCNC: 27.3 PG — SIGNIFICANT CHANGE UP (ref 27–34)
MCHC RBC-ENTMCNC: 27.5 PG — SIGNIFICANT CHANGE UP (ref 27–34)
MCHC RBC-ENTMCNC: 28 PG — SIGNIFICANT CHANGE UP (ref 27–34)
MCHC RBC-ENTMCNC: 32.1 % — SIGNIFICANT CHANGE UP (ref 32–36)
MCHC RBC-ENTMCNC: 32.6 % — SIGNIFICANT CHANGE UP (ref 32–36)
MCHC RBC-ENTMCNC: 32.9 % — SIGNIFICANT CHANGE UP (ref 32–36)
MCHC RBC-ENTMCNC: 33.3 % — SIGNIFICANT CHANGE UP (ref 32–36)
MCV RBC AUTO: 83.6 FL — SIGNIFICANT CHANGE UP (ref 80–100)
MCV RBC AUTO: 83.8 FL — SIGNIFICANT CHANGE UP (ref 80–100)
MCV RBC AUTO: 83.9 FL — SIGNIFICANT CHANGE UP (ref 80–100)
MCV RBC AUTO: 84 FL — SIGNIFICANT CHANGE UP (ref 80–100)
METAMYELOCYTES # FLD: 3.8 % — HIGH (ref 0–1)
MICROCYTES BLD QL: SLIGHT — SIGNIFICANT CHANGE UP
MONOCYTES # BLD AUTO: 0.96 K/UL — HIGH (ref 0–0.9)
MONOCYTES NFR BLD AUTO: 4.9 % — SIGNIFICANT CHANGE UP (ref 2–14)
MONOCYTES NFR BLD: 6.7 % — SIGNIFICANT CHANGE UP (ref 2–9)
MYELOCYTES NFR BLD: 8.7 % — HIGH (ref 0–0)
NEUTROPHIL AB SER-ACNC: 53.8 % — SIGNIFICANT CHANGE UP (ref 43–77)
NEUTROPHILS # BLD AUTO: 13.29 K/UL — HIGH (ref 1.8–7.4)
NEUTROPHILS NFR BLD AUTO: 68.3 % — SIGNIFICANT CHANGE UP (ref 43–77)
NEUTS BAND # BLD: 9.6 % — HIGH (ref 0–6)
NRBC # FLD: 0.06 K/UL — SIGNIFICANT CHANGE UP (ref 0–0)
NRBC # FLD: 0.08 K/UL — SIGNIFICANT CHANGE UP (ref 0–0)
NRBC # FLD: 0.1 K/UL — SIGNIFICANT CHANGE UP (ref 0–0)
NRBC # FLD: 0.16 K/UL — SIGNIFICANT CHANGE UP (ref 0–0)
OTHER - HEMATOLOGY %: 1 — SIGNIFICANT CHANGE UP
OVALOCYTES BLD QL SMEAR: SLIGHT — SIGNIFICANT CHANGE UP
PCO2 BLDA: 41 MMHG — SIGNIFICANT CHANGE UP (ref 32–48)
PCO2 BLDA: 44 MMHG — SIGNIFICANT CHANGE UP (ref 32–48)
PCO2 BLDA: 46 MMHG — SIGNIFICANT CHANGE UP (ref 32–48)
PCO2 BLDA: 47 MMHG — SIGNIFICANT CHANGE UP (ref 32–48)
PH BLDA: 7.38 PH — SIGNIFICANT CHANGE UP (ref 7.35–7.45)
PH BLDA: 7.39 PH — SIGNIFICANT CHANGE UP (ref 7.35–7.45)
PH BLDA: 7.39 PH — SIGNIFICANT CHANGE UP (ref 7.35–7.45)
PH BLDA: 7.4 PH — SIGNIFICANT CHANGE UP (ref 7.35–7.45)
PHOSPHATE SERPL-MCNC: 2.1 MG/DL — LOW (ref 2.5–4.5)
PHOSPHATE SERPL-MCNC: 2.3 MG/DL — LOW (ref 2.5–4.5)
PHOSPHATE SERPL-MCNC: 2.5 MG/DL — SIGNIFICANT CHANGE UP (ref 2.5–4.5)
PHOSPHATE SERPL-MCNC: 4.6 MG/DL — HIGH (ref 2.5–4.5)
PLATELET # BLD AUTO: 260 K/UL — SIGNIFICANT CHANGE UP (ref 150–400)
PLATELET # BLD AUTO: 281 K/UL — SIGNIFICANT CHANGE UP (ref 150–400)
PLATELET # BLD AUTO: 282 K/UL — SIGNIFICANT CHANGE UP (ref 150–400)
PLATELET # BLD AUTO: 291 K/UL — SIGNIFICANT CHANGE UP (ref 150–400)
PLATELET COUNT - ESTIMATE: NORMAL — SIGNIFICANT CHANGE UP
PMV BLD: 10.5 FL — SIGNIFICANT CHANGE UP (ref 7–13)
PMV BLD: 10.7 FL — SIGNIFICANT CHANGE UP (ref 7–13)
PO2 BLDA: 124 MMHG — HIGH (ref 83–108)
PO2 BLDA: 75 MMHG — LOW (ref 83–108)
PO2 BLDA: 77 MMHG — LOW (ref 83–108)
PO2 BLDA: 99 MMHG — SIGNIFICANT CHANGE UP (ref 83–108)
POIKILOCYTOSIS BLD QL AUTO: SLIGHT — SIGNIFICANT CHANGE UP
POTASSIUM BLDA-SCNC: 4.2 MMOL/L — SIGNIFICANT CHANGE UP (ref 3.4–4.5)
POTASSIUM BLDA-SCNC: 4.3 MMOL/L — SIGNIFICANT CHANGE UP (ref 3.4–4.5)
POTASSIUM BLDA-SCNC: 4.3 MMOL/L — SIGNIFICANT CHANGE UP (ref 3.4–4.5)
POTASSIUM BLDA-SCNC: 4.5 MMOL/L — SIGNIFICANT CHANGE UP (ref 3.4–4.5)
POTASSIUM SERPL-MCNC: 4.5 MMOL/L — SIGNIFICANT CHANGE UP (ref 3.5–5.3)
POTASSIUM SERPL-MCNC: 4.5 MMOL/L — SIGNIFICANT CHANGE UP (ref 3.5–5.3)
POTASSIUM SERPL-MCNC: 4.6 MMOL/L — SIGNIFICANT CHANGE UP (ref 3.5–5.3)
POTASSIUM SERPL-MCNC: 4.8 MMOL/L — SIGNIFICANT CHANGE UP (ref 3.5–5.3)
POTASSIUM SERPL-SCNC: 4.5 MMOL/L — SIGNIFICANT CHANGE UP (ref 3.5–5.3)
POTASSIUM SERPL-SCNC: 4.5 MMOL/L — SIGNIFICANT CHANGE UP (ref 3.5–5.3)
POTASSIUM SERPL-SCNC: 4.6 MMOL/L — SIGNIFICANT CHANGE UP (ref 3.5–5.3)
POTASSIUM SERPL-SCNC: 4.8 MMOL/L — SIGNIFICANT CHANGE UP (ref 3.5–5.3)
PROCALCITONIN SERPL-MCNC: 2.18 NG/ML — HIGH (ref 0.02–0.1)
PROMYELOCYTES # FLD: 0 % — SIGNIFICANT CHANGE UP (ref 0–0)
PROT SERPL-MCNC: 7.2 G/DL — SIGNIFICANT CHANGE UP (ref 6–8.3)
PROT SERPL-MCNC: 7.4 G/DL — SIGNIFICANT CHANGE UP (ref 6–8.3)
PROT SERPL-MCNC: 7.6 G/DL — SIGNIFICANT CHANGE UP (ref 6–8.3)
PROT SERPL-MCNC: 7.8 G/DL — SIGNIFICANT CHANGE UP (ref 6–8.3)
PROTHROM AB SERPL-ACNC: 12.3 SEC — SIGNIFICANT CHANGE UP (ref 9.8–13.1)
RBC # BLD: 2.67 M/UL — LOW (ref 3.8–5.2)
RBC # BLD: 2.69 M/UL — LOW (ref 3.8–5.2)
RBC # BLD: 2.78 M/UL — LOW (ref 3.8–5.2)
RBC # BLD: 2.82 M/UL — LOW (ref 3.8–5.2)
RBC # FLD: 16.5 % — HIGH (ref 10.3–14.5)
RBC # FLD: 16.8 % — HIGH (ref 10.3–14.5)
RBC # FLD: 17 % — HIGH (ref 10.3–14.5)
RBC # FLD: 17.1 % — HIGH (ref 10.3–14.5)
SAO2 % BLDA: 95 % — SIGNIFICANT CHANGE UP (ref 95–99)
SAO2 % BLDA: 95.1 % — SIGNIFICANT CHANGE UP (ref 95–99)
SAO2 % BLDA: 97.4 % — SIGNIFICANT CHANGE UP (ref 95–99)
SAO2 % BLDA: 98.7 % — SIGNIFICANT CHANGE UP (ref 95–99)
SMUDGE CELLS # BLD: PRESENT — SIGNIFICANT CHANGE UP
SODIUM BLDA-SCNC: 133 MMOL/L — LOW (ref 136–146)
SODIUM BLDA-SCNC: 133 MMOL/L — LOW (ref 136–146)
SODIUM BLDA-SCNC: 134 MMOL/L — LOW (ref 136–146)
SODIUM BLDA-SCNC: 134 MMOL/L — LOW (ref 136–146)
SODIUM SERPL-SCNC: 130 MMOL/L — LOW (ref 135–145)
SODIUM SERPL-SCNC: 130 MMOL/L — LOW (ref 135–145)
SODIUM SERPL-SCNC: 133 MMOL/L — LOW (ref 135–145)
SODIUM SERPL-SCNC: 134 MMOL/L — LOW (ref 135–145)
TARGETS BLD QL SMEAR: SLIGHT — SIGNIFICANT CHANGE UP
TRIGL SERPL-MCNC: 306 MG/DL — HIGH (ref 10–149)
TROPONIN T, HIGH SENSITIVITY: 6 NG/L — SIGNIFICANT CHANGE UP (ref ?–14)
VARIANT LYMPHS # BLD: 2.9 % — SIGNIFICANT CHANGE UP
WBC # BLD: 15.46 K/UL — HIGH (ref 3.8–10.5)
WBC # BLD: 18.67 K/UL — HIGH (ref 3.8–10.5)
WBC # BLD: 19.47 K/UL — HIGH (ref 3.8–10.5)
WBC # BLD: 19.6 K/UL — HIGH (ref 3.8–10.5)
WBC # FLD AUTO: 15.46 K/UL — HIGH (ref 3.8–10.5)
WBC # FLD AUTO: 18.67 K/UL — HIGH (ref 3.8–10.5)
WBC # FLD AUTO: 19.47 K/UL — HIGH (ref 3.8–10.5)
WBC # FLD AUTO: 19.6 K/UL — HIGH (ref 3.8–10.5)

## 2020-03-28 PROCEDURE — 99292 CRITICAL CARE ADDL 30 MIN: CPT

## 2020-03-28 PROCEDURE — 71045 X-RAY EXAM CHEST 1 VIEW: CPT | Mod: 26

## 2020-03-28 PROCEDURE — 90945 DIALYSIS ONE EVALUATION: CPT | Mod: GC

## 2020-03-28 PROCEDURE — 85060 BLOOD SMEAR INTERPRETATION: CPT

## 2020-03-28 PROCEDURE — 99291 CRITICAL CARE FIRST HOUR: CPT

## 2020-03-28 RX ORDER — FENTANYL CITRATE 50 UG/ML
0.5 INJECTION INTRAVENOUS
Qty: 2500 | Refills: 0 | Status: DISCONTINUED | OUTPATIENT
Start: 2020-03-28 | End: 2020-03-29

## 2020-03-28 RX ADMIN — FAMOTIDINE 20 MILLIGRAM(S): 10 INJECTION INTRAVENOUS at 11:11

## 2020-03-28 RX ADMIN — Medication 325 MILLIGRAM(S): at 11:11

## 2020-03-28 RX ADMIN — CHLORHEXIDINE GLUCONATE 1 APPLICATION(S): 213 SOLUTION TOPICAL at 06:47

## 2020-03-28 RX ADMIN — POLYETHYLENE GLYCOL 3350 17 GRAM(S): 17 POWDER, FOR SOLUTION ORAL at 11:11

## 2020-03-28 RX ADMIN — PROPOFOL 8.66 MICROGRAM(S)/KG/MIN: 10 INJECTION, EMULSION INTRAVENOUS at 07:59

## 2020-03-28 RX ADMIN — HEPARIN SODIUM 12 UNIT(S)/HR: 5000 INJECTION INTRAVENOUS; SUBCUTANEOUS at 07:58

## 2020-03-28 RX ADMIN — VASOPRESSIN 1.2 UNIT(S)/MIN: 20 INJECTION INTRAVENOUS at 07:58

## 2020-03-28 RX ADMIN — CHLORHEXIDINE GLUCONATE 15 MILLILITER(S): 213 SOLUTION TOPICAL at 18:24

## 2020-03-28 RX ADMIN — Medication 3.38 MICROGRAM(S)/KG/MIN: at 20:03

## 2020-03-28 RX ADMIN — HEPARIN SODIUM 12 UNIT(S)/HR: 5000 INJECTION INTRAVENOUS; SUBCUTANEOUS at 20:04

## 2020-03-28 RX ADMIN — MIDAZOLAM HYDROCHLORIDE 1.44 MG/KG/HR: 1 INJECTION, SOLUTION INTRAMUSCULAR; INTRAVENOUS at 20:04

## 2020-03-28 RX ADMIN — PROPOFOL 8.66 MICROGRAM(S)/KG/MIN: 10 INJECTION, EMULSION INTRAVENOUS at 20:05

## 2020-03-28 RX ADMIN — FENTANYL CITRATE 3.61 MICROGRAM(S)/KG/HR: 50 INJECTION INTRAVENOUS at 20:04

## 2020-03-28 RX ADMIN — SENNA PLUS 15 MILLILITER(S): 8.6 TABLET ORAL at 11:11

## 2020-03-28 RX ADMIN — VASOPRESSIN 1.2 UNIT(S)/MIN: 20 INJECTION INTRAVENOUS at 20:04

## 2020-03-28 RX ADMIN — Medication 85 MILLIMOLE(S): at 15:55

## 2020-03-28 RX ADMIN — MIDAZOLAM HYDROCHLORIDE 1.44 MG/KG/HR: 1 INJECTION, SOLUTION INTRAMUSCULAR; INTRAVENOUS at 07:58

## 2020-03-28 RX ADMIN — CHLORHEXIDINE GLUCONATE 15 MILLILITER(S): 213 SOLUTION TOPICAL at 06:47

## 2020-03-28 RX ADMIN — Medication 40 MILLIGRAM(S): at 23:07

## 2020-03-28 RX ADMIN — FENTANYL CITRATE 3.61 MICROGRAM(S)/KG/HR: 50 INJECTION INTRAVENOUS at 07:58

## 2020-03-28 NOTE — PROGRESS NOTE ADULT - SUBJECTIVE AND OBJECTIVE BOX
ISSUES:   Acute hypoxic respiratory failure              ARDS              Septic shock              COVID-19+              SAM              Mixed metabolic / respiratory acidosis              Anemia    INTERVAL EVENTS:   Ventilator adjusted with decreased PEEP. blood gas monitored  Remains on CRRT for renal failure  Remains on levophed for septic shock. This was titrated.    HISTORY:   Unable to obtain history or ROS due to intubation and sedation    PHYSICAL EXAM:   Gen: Comfortable, No acute distress  Eyes: Sclera white, Conjunctiva normal, Eyelids normal, Pupils symmetrical   ENT: Mucous membranes moist, OG tube in place,  ,    Neck: Trachea midline,  ,  ,  ,  ,    CV: Rate regular, Rhythm regular,  ,    Resp: Breath sounds coarse, No accessory muscles use,  ,    Abd: Soft, Mildly distended, Non-tender, Bowel sounds normal,  ,    Skin: Warm, 1+ edema of lower extremities,  ,    : Frances in place  Neuro: Unable to elicit motor response.,    Psych: RASS -5      ASSESSMENT AND PLAN:     NEURO:  Vent sedation - Continue propofol, midazolam, fentanyl for ventilator synchrony.                          RESPIRATORY:  Acute respiratory failure - Mechanical ventilation. Monitor ABG. Wean FIO2 for goal O2sat above 92. Vent bundle.                                 CARDIOVASCULAR:  Septic shock requiring IV pressors - Wean pressors for MAP goal of 65.                               RENAL:  SAM requiring dialysis – Dialysis. Renally dose medications. Monitor for hyperkalemia and uremia. Avoid nephrotoxic medications. Monitor IOs and electrolytes. Discussed with Renal Consult.         GASTROINTESTINAL:  GI prophylaxis with famotidine  Zofran and Reglan IV PRN for nausea  Tube feed              HEMATOLOGIC:  No signs of active bleeding. Monitor Hgb in CBC in AM  On therapeutic anticoagulation.  COVID hypercoagulability - Continue aspirin and heparin gtt      INFECTIOUS DISEASE:  COVID-19 infection - hydroxychloroquine, azithromycin, thiamine, vitamin C                                                 ENDOCRINE:  Stable – Monitor glucose fingersticks for goal 120-180.            Pertinent clinical, laboratory, radiographic, hemodynamic, echocardiographic, respiratory data, microbiologic data and chart were reviewed by myself and analyzed frequently throughout the course of the day and night by myself.    Plan discussed at length with the CTICU staff     Patient unable to participate with discussion of plan.    Patient required critical care management.  80 minutes were spent evaluating, managing, providing, coordinating, and documenting care for this patient, exclusive of procedures from  8AM to 1159PM.      ________________________________________________  _________________________  VITAL SIGNS:  Vital Signs Last 24 Hrs  T(C): 36.9 (27 Mar 2020 20:00), Max: 37.2 (27 Mar 2020 04:00)  T(F): 98.4 (27 Mar 2020 20:00), Max: 98.9 (27 Mar 2020 04:00)  HR: 95 (28 Mar 2020 00:00) (89 - 99)  BP: 85/59 (27 Mar 2020 22:00) (78/52 - 106/75)  BP(mean): 66 (27 Mar 2020 22:00) (58 - 81)  RR: 29 (28 Mar 2020 00:00) (28 - 31)  SpO2: 100% (27 Mar 2020 23:59) (94% - 100%)  I/Os:   I&O's Detail    26 Mar 2020 07:01  -  27 Mar 2020 07:00  --------------------------------------------------------  IN:    Enteral Tube Flush: 140 mL    fentaNYL Infusion.: 519.6 mL    heparin Infusion: 310 mL    IV PiggyBack: 300 mL    midazolam Infusion: 28.8 mL    Nepro with Carb Steady: 100 mL    norepinephrine Infusion: 274.1 mL    ns in tub fed  [id number]: 180 mL    propofol Infusion: 312 mL    vasopressin Infusion: 13.2 mL  Total IN: 2177.7 mL    OUT:    Indwelling Catheter - Urethral: 10 mL    Other: 3717 mL    Rectal Tube: 750 mL    Stool: 500 mL  Total OUT: 4977 mL    Total NET: -2799.3 mL      27 Mar 2020 07:01  -  28 Mar 2020 00:08  --------------------------------------------------------  IN:    Enteral Tube Flush: 120 mL    fentaNYL Infusion.: 273.6 mL    heparin Infusion: 112 mL    heparin Infusion: 72 mL    midazolam Infusion: 5.6 mL    midazolam Infusion: 20.4 mL    Nepro with Carb Steady: 100 mL    norepinephrine Infusion: 29 mL    ns in tub fed  [id number]: 350 mL    propofol Infusion: 194.8 mL    vasopressin Infusion: 15.6 mL  Total IN: 1293 mL    OUT:    Other: 2231 mL  Total OUT: 2231 mL    Total NET: -938 mL          Mode: AC/ CMV (Assist Control/ Continuous Mandatory Ventilation)  RR (machine): 28  TV (machine): 330  FiO2: 40  PEEP: 10  MAP: 20  PIP: 31      MEDICATIONS:  MEDICATIONS  (STANDING):  aspirin 325 milliGRAM(s) Oral daily  chlorhexidine 0.12% Liquid 15 milliLiter(s) Oral Mucosa every 12 hours  chlorhexidine 4% Liquid 1 Application(s) Topical <User Schedule>  CRRT Treatment    <Continuous>  famotidine Injectable 20 milliGRAM(s) IV Push daily  fentaNYL   Infusion. 0.5 MICROgram(s)/kG/Hr (3.61 mL/Hr) IV Continuous <Continuous>  heparin  Infusion 1200 Unit(s)/Hr (12 mL/Hr) IV Continuous <Continuous>  midazolam Infusion 0.02 mG/kG/Hr (1.44 mL/Hr) IV Continuous <Continuous>  norepinephrine Infusion 0.05 MICROgram(s)/kG/Min (3.38 mL/Hr) IV Continuous <Continuous>  polyethylene glycol 3350 17 Gram(s) Oral daily  PrismaSATE Dialysate BGK 4 / 2.5 5000 milliLiter(s) (1500 mL/Hr) CRRT <Continuous>  PrismaSOL Filtration BGK 4 / 2.5 5000 milliLiter(s) (800 mL/Hr) CRRT <Continuous>  PrismaSOL Filtration BGK 4 / 2.5 5000 milliLiter(s) (200 mL/Hr) CRRT <Continuous>  propofol Infusion 20 MICROgram(s)/kG/Min (8.66 mL/Hr) IV Continuous <Continuous>  senna Syrup 15 milliLiter(s) Oral daily  vasopressin Infusion 0.02 Unit(s)/Min (1.2 mL/Hr) IV Continuous <Continuous>    MEDICATIONS  (PRN):  acetaminophen    Suspension .. 650 milliGRAM(s) Oral every 6 hours PRN Temp greater or equal to 38C (100.4F)  HYDROmorphone  Injectable 0.5 milliGRAM(s) IV Push every 2 hours PRN For Respiratory Rate > 30 breaths per minute      LABS:                        7.6    16.14 )-----------( 257      ( 27 Mar 2020 18:30 )             23.2     03-27    134<L>  |  100  |  15  ----------------------------<  94  4.4   |  25  |  1.16    Ca    9.8      27 Mar 2020 18:30  Phos  2.3     03-27  Mg     2.7     03-27    TPro  6.8  /  Alb  2.0<L>  /  TBili  0.4  /  DBili  x   /  AST  66<H>  /  ALT  47<H>  /  AlkPhos  130<H>  03-27    LIVER FUNCTIONS - ( 27 Mar 2020 04:30 )  Alb: 2.0 g/dL / Pro: 6.8 g/dL / ALK PHOS: 130 u/L / ALT: 47 u/L / AST: 66 u/L / GGT: x           PT/INR - ( 27 Mar 2020 04:30 )   PT: 12.5 SEC;   INR: 1.08          PTT - ( 27 Mar 2020 18:30 )  PTT:75.6 SEC  ABG - ( 27 Mar 2020 18:30 )  pH, Arterial: 7.41  pH, Blood: x     /  pCO2: 43    /  pO2: 77    / HCO3: 27    / Base Excess: 2.8   /  SaO2: 96.1                _________________________

## 2020-03-28 NOTE — PROGRESS NOTE ADULT - PROBLEM SELECTOR PLAN 1
Pt. with oliguric SAM in the setting of hypotension and COVID-19 infection. Pt. with likely ATN. Scr increased to 4.6 on 3/23/20, started on CRRT/CVVHDF. Pt. tolerating CRRT/CVVHDF. BP being maintained on IV vasopressors. HD catheter functioning during rounds. Continue with systemic heparin to decrease risk of circuit clotting. Plan is to continue with CRRT/CVVHDF. Check renal sonogram with doppler (when feasible). Monitor labs and urine output. Avoid any potential nephrotoxins

## 2020-03-28 NOTE — PROGRESS NOTE ADULT - SUBJECTIVE AND OBJECTIVE BOX
ALEXA GARCIA                     MRN-[id number]    HPI:  60 y/o F with no PMH p/w fever and cough since Thursday. Tmax 103. She presented to urgent care initially and was treated with tamiflu for presumed flu. Despite this she continued to have fevers. Pt denies recent travel, sick contacts or any other sx or acute complaints. Endorses pinching pain in chest when she coughs. Also with nausea and poor PO intake. Denies shortness of breath, abdominal pain, dysuria or LE edema. (16 Mar 2020 02:05)    ISSUES:   Acute hypoxic respiratory failure              ARDS              Septic shock              COVID-19+              SAM              Mixed metabolic / respiratory acidosis              Anemia      PAST MEDICAL & SURGICAL HISTORY:  No pertinent past medical history  No significant past surgical history            VITAL SIGNS:  Vital Signs Last 24 Hrs  T(C): 36.4 (28 Mar 2020 12:00), Max: 36.9 (27 Mar 2020 20:00)  T(F): 97.6 (28 Mar 2020 12:00), Max: 98.4 (27 Mar 2020 20:00)  HR: 88 (28 Mar 2020 16:35) (82 - 104)  BP: 85/59 (27 Mar 2020 22:00) (85/59 - 103/62)  BP(mean): 66 (27 Mar 2020 22:00) (66 - 72)  RR: 28 (28 Mar 2020 16:00) (26 - 31)  SpO2: 98% (28 Mar 2020 16:35) (94% - 100%)    I/Os:   I&O's Detail    27 Mar 2020 07:01  -  28 Mar 2020 07:00  --------------------------------------------------------  IN:    Enteral Tube Flush: 150 mL    fentaNYL Infusion.: 417.6 mL    fentaNYL Infusion.: 43.2 mL    heparin Infusion: 156 mL    heparin Infusion: 112 mL    midazolam Infusion: 41.4 mL    midazolam Infusion: 5.6 mL    Nepro with Carb Steady: 100 mL    norepinephrine Infusion: 36.1 mL    ns in tub fed  [id number]: 395 mL    propofol Infusion: 285.8 mL    vasopressin Infusion: 24 mL  Total IN: 1766.7 mL    OUT:    Indwelling Catheter - Urethral: 5 mL    Other: 3672 mL  Total OUT: 3677 mL    Total NET: -1910.3 mL      28 Mar 2020 07:01  -  28 Mar 2020 17:20  --------------------------------------------------------  IN:    Enteral Tube Flush: 90 mL    fentaNYL Infusion.: 136.8 mL    heparin Infusion: 108 mL    IV PiggyBack: 83 mL    midazolam Infusion: 27 mL    propofol Infusion: 146.2 mL    vasopressin Infusion: 7.2 mL  Total IN: 598.2 mL    OUT:    Other: 891 mL  Total OUT: 891 mL    Total NET: -292.8 mL          CAPILLARY BLOOD GLUCOSE          =======================MEDICATIONS===================  MEDICATIONS  (STANDING):  aspirin 325 milliGRAM(s) Oral daily  chlorhexidine 0.12% Liquid 15 milliLiter(s) Oral Mucosa every 12 hours  chlorhexidine 4% Liquid 1 Application(s) Topical <User Schedule>  CRRT Treatment    <Continuous>  famotidine Injectable 20 milliGRAM(s) IV Push daily  fentaNYL   Infusion. 0.5 MICROgram(s)/kG/Hr (3.61 mL/Hr) IV Continuous <Continuous>  heparin  Infusion 1200 Unit(s)/Hr (12 mL/Hr) IV Continuous <Continuous>  midazolam Infusion 0.02 mG/kG/Hr (1.44 mL/Hr) IV Continuous <Continuous>  norepinephrine Infusion 0.05 MICROgram(s)/kG/Min (3.38 mL/Hr) IV Continuous <Continuous>  Phoxillum Filtration BK 4 / 2.5 5000 milliLiter(s) (1500 mL/Hr) CRRT <Continuous>  Phoxillum Filtration BK 4 / 2.5 5000 milliLiter(s) (800 mL/Hr) CRRT <Continuous>  polyethylene glycol 3350 17 Gram(s) Oral daily  PrismaSOL Filtration BGK 4 / 2.5 5000 milliLiter(s) (200 mL/Hr) CRRT <Continuous>  propofol Infusion 20 MICROgram(s)/kG/Min (8.66 mL/Hr) IV Continuous <Continuous>  senna Syrup 15 milliLiter(s) Oral daily  vasopressin Infusion 0.02 Unit(s)/Min (1.2 mL/Hr) IV Continuous <Continuous>    MEDICATIONS  (PRN):  acetaminophen    Suspension .. 650 milliGRAM(s) Oral every 6 hours PRN Temp greater or equal to 38C (100.4F)  HYDROmorphone  Injectable 0.5 milliGRAM(s) IV Push every 2 hours PRN For Respiratory Rate > 30 breaths per minute      =======================VENTILATOR SETTINGS===================  Mode: AC/ CMV (Assist Control/ Continuous Mandatory Ventilation)  RR (machine): 28  TV (machine): 330  FiO2: 40  PEEP: 6  MAP: 16  PIP: 25      PHYSICAL EXAM============================  General:                Sedated and intubated                                                Neuro:                  sedated, vented                          Cardiovascular:   S1 & S2, regular                           Respiratory:         Coarse breath sounds                          GI:                          Soft, nondistended, Bowel sounds +                            Ext:                       + Edema                             ============================LABS=========================                        7.6    18.67 )-----------( 282      ( 28 Mar 2020 11:00 )             23.3     03-28    130<L>  |  97<L>  |  15  ----------------------------<  87  4.5   |  22  |  1.05    Ca    9.8      28 Mar 2020 11:00  Phos  2.1     03-28  Mg     2.7     03-28    TPro  7.6  /  Alb  2.4<L>  /  TBili  0.4  /  DBili  x   /  AST  47<H>  /  ALT  42<H>  /  AlkPhos  147<H>  03-28    LIVER FUNCTIONS - ( 28 Mar 2020 11:00 )  Alb: 2.4 g/dL / Pro: 7.6 g/dL / ALK PHOS: 147 u/L / ALT: 42 u/L / AST: 47 u/L / GGT: x           PT/INR - ( 28 Mar 2020 02:40 )   PT: 12.3 SEC;   INR: 1.07          PTT - ( 28 Mar 2020 11:00 )  PTT:64.3 SEC  ABG - ( 28 Mar 2020 11:00 )  pH, Arterial: 7.38  pH, Blood: x     /  pCO2: 47    /  pO2: 99    / HCO3: 26    / Base Excess: 2.0   /  SaO2: 97.4          CXR:  < from: Xray Chest 1 View- PORTABLE-Urgent (03.23.20 @ 23:17) >  Since the last study, right-sided IJ hemodialysis catheter has been placed and its tip is at the SVC/right atrial junction.    The endotracheal and enteric tubes remain in place in addition to a left subclavian line.    Increasing bilateral and diffuse granular opacities consistent with ARDS may be secondary to covid infection if present. The heart is not enlarged and there are no effusions.      COMPARISON:  March 20      IMPRESSION:    1. Status post hemodialysis catheter placement.  2. Perihilar diffuse airspace opacities consistent with ARDS secondary to covid infection.        A/P:   60 y/o F with no PMH p/w fever and cough since Thursday. Tmax 103. She presented to urgent care initially and was treated with tamiflu for presumed flu. Despite this she continued to have fevers. Pt denies recent travel, sick contacts or any other sx or acute complaints. Endorses pinching pain in chest when she coughs. Also with nausea and poor PO intake. Denies shortness of breath, abdominal pain, dysuria or LE edema. (16 Mar 2020 02:05)                              Neuro:                                         Sedated with Propofol, Versed and Fentanyl                                                                  Cardiovascular:                                          Continue hemodynamic monitoring.                                        Titrate Levophed / Vasopressin  to MAP>65                                        Daily EKGs, monitor QTc                                        Continue ASA 325mg daily + Heparin htt - Target PTT 50-80                             Respiratory:                                         Acute hypoxemic respiratory failure due to Pneumonia / COVID-19+                                         On full mechanical vent support AC                                                Wean FiO2 as tolerated                                                Follow ABGs                                                Monitor Plateau pressure / driving pressure daily                                                Initiation and maintenance of vent settings as per ARDSnet protocol                                                             Continue bronchodilators, pulmonary toilet                            GI                                         Will cont TF                                         Continue Pepcid                                         Bowel regimen / Miralax + Lactulose PRN	                                                                 Renal:    SAM. ON CVVHD                                          CRRT in progress, tolerating -100cc /hr fluid removal     Strict I/Os                                         Renal f/u                                         Monitor BUN / Cr                                                 Hem/ Onc:                                                                                  DVT prophylaxis with SCDs / on Heparin gtt                                         Follow CBC  & signs of bleeding                           Infectious disease:                                            Pneumonia                                           COVID-19 +                                          Follow cultures                                          Completed Plaquenil , Vit-C, Thiamin, Azithromycin  x 5 days  3/20-25                                          Completed  Ceftriaxone 3/16-22                            Endocrine      Episodes of hypoglycemia - Off Insulin. Received D50.                                           Continue Accu-Checks with coverage    Pt is on Lovinox  and Venodyne boots for DVT prophylaxis.     Pertinent clinical, laboratory, radiographic, hemodynamic, echocardiographic, respiratory data, microbiologic data and chart were reviewed and analyzed frequently throughout the course of the day and night  Patient seen, examined and plan discussed with  CTICU team during rounds.    I have spent  75  minutes of critical care time with this pt between  7am  and 11.59 pm        Leslye Juárez DO, FACEP

## 2020-03-28 NOTE — PROGRESS NOTE ADULT - SUBJECTIVE AND OBJECTIVE BOX
[facility] DIVISION OF KIDNEY DISEASES AND HYPERTENSION -- FOLLOW UP NOTE  --------------------------------------------------------------------------------  HPI: 59-year-old female admitted to ICU for respiratory failure, pneumonia, SAM and COVID-19. Scr increased to 4.6 on 3/23/20. Pt. initiated on CRRT/CVVHDF on 3/23/20.    Pt. was seen and examined in ICU. Pt. intubated, on IV pressor support, remains anuric.  FiO2 40%, PEEP 8 (improving0. Pt. tolerating ~100cc/hr of net UF with CRRT, no clotting.       PAST HISTORY  --------------------------------------------------------------------------------  No significant changes to PMH, PSH, FHx, SHx, unless otherwise noted    ALLERGIES & MEDICATIONS  --------------------------------------------------------------------------------  Allergies    No Known Allergies    Intolerances      Standing Inpatient Medications  aspirin 325 milliGRAM(s) Oral daily  chlorhexidine 0.12% Liquid 15 milliLiter(s) Oral Mucosa every 12 hours  chlorhexidine 4% Liquid 1 Application(s) Topical <User Schedule>  CRRT Treatment    <Continuous>  famotidine Injectable 20 milliGRAM(s) IV Push daily  fentaNYL   Infusion. 0.5 MICROgram(s)/kG/Hr IV Continuous <Continuous>  heparin  Infusion 1200 Unit(s)/Hr IV Continuous <Continuous>  midazolam Infusion 0.02 mG/kG/Hr IV Continuous <Continuous>  norepinephrine Infusion 0.05 MICROgram(s)/kG/Min IV Continuous <Continuous>  Phoxillum Filtration BK 4 / 2.5 5000 milliLiter(s) CRRT <Continuous>  Phoxillum Filtration BK 4 / 2.5 5000 milliLiter(s) CRRT <Continuous>  polyethylene glycol 3350 17 Gram(s) Oral daily  PrismaSOL Filtration BGK 4 / 2.5 5000 milliLiter(s) CRRT <Continuous>  propofol Infusion 20 MICROgram(s)/kG/Min IV Continuous <Continuous>  senna Syrup 15 milliLiter(s) Oral daily  vasopressin Infusion 0.02 Unit(s)/Min IV Continuous <Continuous>    PRN Inpatient Medications  acetaminophen    Suspension .. 650 milliGRAM(s) Oral every 6 hours PRN  HYDROmorphone  Injectable 0.5 milliGRAM(s) IV Push every 2 hours PRN      REVIEW OF SYSTEMS  --------------------------------------------------------------------------------  not able to obtain.     VITALS/PHYSICAL EXAM  --------------------------------------------------------------------------------  T(C): 36.4 (03-28-20 @ 08:00), Max: 36.9 (03-27-20 @ 20:00)  HR: 96 (03-28-20 @ 10:00) (94 - 104)  BP: 85/59 (03-27-20 @ 22:00) (78/52 - 103/62)  RR: 28 (03-28-20 @ 10:00) (26 - 31)  SpO2: 96% (03-28-20 @ 10:00) (94% - 100%)  Wt(kg): --        03-27-20 @ 07:01  -  03-28-20 @ 07:00  --------------------------------------------------------  IN: 1766.7 mL / OUT: 3677 mL / NET: -1910.3 mL    03-28-20 @ 07:01  -  03-28-20 @ 10:26  --------------------------------------------------------  IN: 218.8 mL / OUT: 277 mL / NET: -58.2 mL    Physical Exam (limited):  	Gen: Intubated  	HEENT: +ETT  	Pulm: Coarse breath sounds B/L  	CV: S1S2+  	Abd: Soft, non distended   	Ext: No LE edema B/L  	Neuro: Sedated  	Skin: Warm and dry              Access: Right IJ non tunneled catheter+    LABS/STUDIES  --------------------------------------------------------------------------------              7.9    19.47 >-----------<  291      [03-28-20 @ 02:40]              23.7     134  |  97  |  16  ----------------------------<  101      [03-28-20 @ 02:40]  4.8   |  20  |  1.10        Ca     10.0     [03-28-20 @ 02:40]      iCa    1.17     [03-26 @ 21:45]      Mg     2.8     [03-28-20 @ 02:40]      Phos  2.5     [03-28-20 @ 02:40]    TPro  7.8  /  Alb  2.3  /  TBili  0.4  /  DBili  x   /  AST  58  /  ALT  46  /  AlkPhos  157  [03-28-20 @ 02:40]    PT/INR: PT 12.3 , INR 1.07       [03-28-20 @ 02:40]  PTT: 66.4       [03-28-20 @ 02:40]          [03-28-20 @ 02:40]        [03-28-20 @ 02:40]    Creatinine Trend:  SCr 1.10 [03-28 @ 02:40]  SCr 1.05 [03-28 @ 00:00]  SCr 1.16 [03-27 @ 18:30]  SCr 1.21 [03-27 @ 13:00]  SCr 1.25 [03-27 @ 04:30]    Urinalysis - [03-15-20 @ 22:27]      Color YELLOW / Appearance CLEAR / SG 1.037 / pH 6.0      Gluc NEGATIVE / Ketone TRACE  / Bili NEGATIVE / Urobili TRACE       Blood MODERATE / Protein 200 / Leuk Est NEGATIVE / Nitrite NEGATIVE      RBC 11-25 / WBC 3-5 / Hyaline 1+ / Gran  / Sq Epi FEW / Non Sq Epi  / Bacteria NEGATIVE      Ferritin 1576      [03-28-20 @ 02:40]  Lipid: chol --, , HDL --, LDL --      [03-28-20 @ 02:40]    HCV 0.12, Nonreactive Hepatitis C AB  S/CO Ratio                        Interpretation  < 1.00                                   Non-Reactive  1.00 - 4.99                         Weakly-Reactive  >= 5.00                                Reactive  Non-Reactive: Aperson with a non-reactive HCV antibody  result is considered uninfected.  No further action is  needed unless recent infection is suspected.  In these  cases, consider repeat testing later to detect  seroconversion..  Weakly-Reactive: HCV antibody test is abnormal, HCV RNA  Qualitative test will follow.  Reactive: HCV antibody test is abnormal, HCV RNA  Qualitative test will follow.  Note: HCV antibody testing is performed on the [employer]   system.      [03-16-20 @ 05:30]

## 2020-03-29 LAB
ALBUMIN SERPL ELPH-MCNC: 2.5 G/DL — LOW (ref 3.3–5)
ALBUMIN SERPL ELPH-MCNC: 2.6 G/DL — LOW (ref 3.3–5)
ALBUMIN SERPL ELPH-MCNC: 2.9 G/DL — LOW (ref 3.3–5)
ALP SERPL-CCNC: 146 U/L — HIGH (ref 40–120)
ALP SERPL-CCNC: 156 U/L — HIGH (ref 40–120)
ALP SERPL-CCNC: 191 U/L — HIGH (ref 40–120)
ALT FLD-CCNC: 41 U/L — HIGH (ref 4–33)
ALT FLD-CCNC: 41 U/L — HIGH (ref 4–33)
ALT FLD-CCNC: 52 U/L — HIGH (ref 4–33)
ANION GAP SERPL CALC-SCNC: 14 MMO/L — SIGNIFICANT CHANGE UP (ref 7–14)
ANION GAP SERPL CALC-SCNC: 14 MMO/L — SIGNIFICANT CHANGE UP (ref 7–14)
ANION GAP SERPL CALC-SCNC: 15 MMO/L — HIGH (ref 7–14)
ANION GAP SERPL CALC-SCNC: 15 MMO/L — HIGH (ref 7–14)
APPEARANCE UR: SIGNIFICANT CHANGE UP
APTT BLD: 51.9 SEC — HIGH (ref 27.5–36.3)
APTT BLD: 60.8 SEC — HIGH (ref 27.5–36.3)
APTT BLD: 63.6 SEC — HIGH (ref 27.5–36.3)
AST SERPL-CCNC: 46 U/L — HIGH (ref 4–32)
AST SERPL-CCNC: 48 U/L — HIGH (ref 4–32)
AST SERPL-CCNC: 63 U/L — HIGH (ref 4–32)
BACTERIA # UR AUTO: HIGH
BASE EXCESS BLDA CALC-SCNC: -0.2 MMOL/L — SIGNIFICANT CHANGE UP
BASE EXCESS BLDA CALC-SCNC: -0.8 MMOL/L — SIGNIFICANT CHANGE UP
BASE EXCESS BLDA CALC-SCNC: -0.9 MMOL/L — SIGNIFICANT CHANGE UP
BASE EXCESS BLDA CALC-SCNC: -1.8 MMOL/L — SIGNIFICANT CHANGE UP
BASE EXCESS BLDA CALC-SCNC: -3.1 MMOL/L — SIGNIFICANT CHANGE UP
BASOPHILS # BLD AUTO: 0.06 K/UL — SIGNIFICANT CHANGE UP (ref 0–0.2)
BASOPHILS NFR BLD AUTO: 0.3 % — SIGNIFICANT CHANGE UP (ref 0–2)
BILIRUB SERPL-MCNC: 0.4 MG/DL — SIGNIFICANT CHANGE UP (ref 0.2–1.2)
BILIRUB UR-MCNC: SIGNIFICANT CHANGE UP
BLOOD GAS ARTERIAL - FIO2: 50 — SIGNIFICANT CHANGE UP
BLOOD UR QL VISUAL: HIGH
BUN SERPL-MCNC: 14 MG/DL — SIGNIFICANT CHANGE UP (ref 7–23)
BUN SERPL-MCNC: 15 MG/DL — SIGNIFICANT CHANGE UP (ref 7–23)
BUN SERPL-MCNC: 18 MG/DL — SIGNIFICANT CHANGE UP (ref 7–23)
BUN SERPL-MCNC: 20 MG/DL — SIGNIFICANT CHANGE UP (ref 7–23)
CA-I BLDA-SCNC: 1.24 MMOL/L — SIGNIFICANT CHANGE UP (ref 1.15–1.29)
CA-I BLDA-SCNC: 1.26 MMOL/L — SIGNIFICANT CHANGE UP (ref 1.15–1.29)
CA-I BLDA-SCNC: 1.28 MMOL/L — SIGNIFICANT CHANGE UP (ref 1.15–1.29)
CA-I BLDA-SCNC: 1.3 MMOL/L — HIGH (ref 1.15–1.29)
CALCIUM SERPL-MCNC: 10.2 MG/DL — SIGNIFICANT CHANGE UP (ref 8.4–10.5)
CALCIUM SERPL-MCNC: 9.6 MG/DL — SIGNIFICANT CHANGE UP (ref 8.4–10.5)
CALCIUM SERPL-MCNC: 9.8 MG/DL — SIGNIFICANT CHANGE UP (ref 8.4–10.5)
CALCIUM SERPL-MCNC: 9.8 MG/DL — SIGNIFICANT CHANGE UP (ref 8.4–10.5)
CHLORIDE SERPL-SCNC: 100 MMOL/L — SIGNIFICANT CHANGE UP (ref 98–107)
CHLORIDE SERPL-SCNC: 96 MMOL/L — LOW (ref 98–107)
CHLORIDE SERPL-SCNC: 97 MMOL/L — LOW (ref 98–107)
CHLORIDE SERPL-SCNC: 99 MMOL/L — SIGNIFICANT CHANGE UP (ref 98–107)
CO2 SERPL-SCNC: 20 MMOL/L — LOW (ref 22–31)
CO2 SERPL-SCNC: 20 MMOL/L — LOW (ref 22–31)
CO2 SERPL-SCNC: 21 MMOL/L — LOW (ref 22–31)
CO2 SERPL-SCNC: 22 MMOL/L — SIGNIFICANT CHANGE UP (ref 22–31)
COLOR SPEC: SIGNIFICANT CHANGE UP
CREAT SERPL-MCNC: 1.01 MG/DL — SIGNIFICANT CHANGE UP (ref 0.5–1.3)
CREAT SERPL-MCNC: 1.08 MG/DL — SIGNIFICANT CHANGE UP (ref 0.5–1.3)
CREAT SERPL-MCNC: 1.16 MG/DL — SIGNIFICANT CHANGE UP (ref 0.5–1.3)
CREAT SERPL-MCNC: 1.19 MG/DL — SIGNIFICANT CHANGE UP (ref 0.5–1.3)
CRP SERPL-MCNC: 234.1 MG/L — HIGH
D DIMER BLD IA.RAPID-MCNC: 2967 NG/ML — SIGNIFICANT CHANGE UP
EOSINOPHIL # BLD AUTO: 0.19 K/UL — SIGNIFICANT CHANGE UP (ref 0–0.5)
EOSINOPHIL NFR BLD AUTO: 0.9 % — SIGNIFICANT CHANGE UP (ref 0–6)
GLUCOSE BLDA-MCNC: 68 MG/DL — LOW (ref 70–99)
GLUCOSE BLDA-MCNC: 81 MG/DL — SIGNIFICANT CHANGE UP (ref 70–99)
GLUCOSE BLDA-MCNC: 82 MG/DL — SIGNIFICANT CHANGE UP (ref 70–99)
GLUCOSE BLDA-MCNC: 90 MG/DL — SIGNIFICANT CHANGE UP (ref 70–99)
GLUCOSE SERPL-MCNC: 61 MG/DL — LOW (ref 70–99)
GLUCOSE SERPL-MCNC: 76 MG/DL — SIGNIFICANT CHANGE UP (ref 70–99)
GLUCOSE SERPL-MCNC: 81 MG/DL — SIGNIFICANT CHANGE UP (ref 70–99)
GLUCOSE SERPL-MCNC: 82 MG/DL — SIGNIFICANT CHANGE UP (ref 70–99)
GLUCOSE UR-MCNC: NEGATIVE — SIGNIFICANT CHANGE UP
GRAM STN FLD: SIGNIFICANT CHANGE UP
HCO3 BLDA-SCNC: 22 MMOL/L — SIGNIFICANT CHANGE UP (ref 22–26)
HCO3 BLDA-SCNC: 23 MMOL/L — SIGNIFICANT CHANGE UP (ref 22–26)
HCO3 BLDA-SCNC: 24 MMOL/L — SIGNIFICANT CHANGE UP (ref 22–26)
HCT VFR BLD CALC: 21.9 % — LOW (ref 34.5–45)
HCT VFR BLD CALC: 22.4 % — LOW (ref 34.5–45)
HCT VFR BLDA CALC: 23.6 % — LOW (ref 34.5–46.5)
HCT VFR BLDA CALC: 23.9 % — LOW (ref 34.5–46.5)
HCT VFR BLDA CALC: 24 % — LOW (ref 34.5–46.5)
HCT VFR BLDA CALC: 24.9 % — LOW (ref 34.5–46.5)
HGB BLD-MCNC: 7.3 G/DL — LOW (ref 11.5–15.5)
HGB BLD-MCNC: 7.4 G/DL — LOW (ref 11.5–15.5)
HGB BLDA-MCNC: 7.6 G/DL — LOW (ref 11.5–15.5)
HGB BLDA-MCNC: 7.7 G/DL — LOW (ref 11.5–15.5)
HGB BLDA-MCNC: 7.7 G/DL — LOW (ref 11.5–15.5)
HGB BLDA-MCNC: 8 G/DL — LOW (ref 11.5–15.5)
IMM GRANULOCYTES NFR BLD AUTO: 17.9 % — HIGH (ref 0–1.5)
INR BLD: 1.09 — SIGNIFICANT CHANGE UP (ref 0.88–1.17)
KETONES UR-MCNC: NEGATIVE — SIGNIFICANT CHANGE UP
LACTATE BLDA-SCNC: 0.8 MMOL/L — SIGNIFICANT CHANGE UP (ref 0.5–2)
LACTATE BLDA-SCNC: 1 MMOL/L — SIGNIFICANT CHANGE UP (ref 0.5–2)
LACTATE BLDA-SCNC: 1.1 MMOL/L — SIGNIFICANT CHANGE UP (ref 0.5–2)
LACTATE BLDA-SCNC: 1.9 MMOL/L — SIGNIFICANT CHANGE UP (ref 0.5–2)
LDH SERPL L TO P-CCNC: 309 U/L — HIGH (ref 135–225)
LEUKOCYTE ESTERASE UR-ACNC: HIGH
LYMPHOCYTES # BLD AUTO: 1.52 K/UL — SIGNIFICANT CHANGE UP (ref 1–3.3)
LYMPHOCYTES # BLD AUTO: 7.5 % — LOW (ref 13–44)
MAGNESIUM SERPL-MCNC: 2.4 MG/DL — SIGNIFICANT CHANGE UP (ref 1.6–2.6)
MAGNESIUM SERPL-MCNC: 2.6 MG/DL — SIGNIFICANT CHANGE UP (ref 1.6–2.6)
MAGNESIUM SERPL-MCNC: 2.6 MG/DL — SIGNIFICANT CHANGE UP (ref 1.6–2.6)
MAGNESIUM SERPL-MCNC: 2.7 MG/DL — HIGH (ref 1.6–2.6)
MCHC RBC-ENTMCNC: 27.2 PG — SIGNIFICANT CHANGE UP (ref 27–34)
MCHC RBC-ENTMCNC: 27.7 PG — SIGNIFICANT CHANGE UP (ref 27–34)
MCHC RBC-ENTMCNC: 33 % — SIGNIFICANT CHANGE UP (ref 32–36)
MCHC RBC-ENTMCNC: 33.3 % — SIGNIFICANT CHANGE UP (ref 32–36)
MCV RBC AUTO: 82.4 FL — SIGNIFICANT CHANGE UP (ref 80–100)
MCV RBC AUTO: 83 FL — SIGNIFICANT CHANGE UP (ref 80–100)
MONOCYTES # BLD AUTO: 1.02 K/UL — HIGH (ref 0–0.9)
MONOCYTES NFR BLD AUTO: 5 % — SIGNIFICANT CHANGE UP (ref 2–14)
NEUTROPHILS # BLD AUTO: 13.86 K/UL — HIGH (ref 1.8–7.4)
NEUTROPHILS NFR BLD AUTO: 68.4 % — SIGNIFICANT CHANGE UP (ref 43–77)
NITRITE UR-MCNC: NEGATIVE — SIGNIFICANT CHANGE UP
NRBC # FLD: 0.05 K/UL — SIGNIFICANT CHANGE UP (ref 0–0)
NRBC # FLD: 0.06 K/UL — SIGNIFICANT CHANGE UP (ref 0–0)
PCO2 BLDA: 41 MMHG — SIGNIFICANT CHANGE UP (ref 32–48)
PCO2 BLDA: 42 MMHG — SIGNIFICANT CHANGE UP (ref 32–48)
PCO2 BLDA: 42 MMHG — SIGNIFICANT CHANGE UP (ref 32–48)
PCO2 BLDA: 43 MMHG — SIGNIFICANT CHANGE UP (ref 32–48)
PCO2 BLDA: 45 MMHG — SIGNIFICANT CHANGE UP (ref 32–48)
PH BLDA: 7.31 PH — LOW (ref 7.35–7.45)
PH BLDA: 7.35 PH — SIGNIFICANT CHANGE UP (ref 7.35–7.45)
PH BLDA: 7.37 PH — SIGNIFICANT CHANGE UP (ref 7.35–7.45)
PH BLDA: 7.37 PH — SIGNIFICANT CHANGE UP (ref 7.35–7.45)
PH BLDA: 7.38 PH — SIGNIFICANT CHANGE UP (ref 7.35–7.45)
PH UR: 6.5 — SIGNIFICANT CHANGE UP (ref 5–8)
PHOSPHATE SERPL-MCNC: 4.4 MG/DL — SIGNIFICANT CHANGE UP (ref 2.5–4.5)
PHOSPHATE SERPL-MCNC: 4.5 MG/DL — SIGNIFICANT CHANGE UP (ref 2.5–4.5)
PHOSPHATE SERPL-MCNC: 5.1 MG/DL — HIGH (ref 2.5–4.5)
PHOSPHATE SERPL-MCNC: 5.2 MG/DL — HIGH (ref 2.5–4.5)
PLATELET # BLD AUTO: 309 K/UL — SIGNIFICANT CHANGE UP (ref 150–400)
PLATELET # BLD AUTO: 356 K/UL — SIGNIFICANT CHANGE UP (ref 150–400)
PMV BLD: 10.7 FL — SIGNIFICANT CHANGE UP (ref 7–13)
PMV BLD: 10.8 FL — SIGNIFICANT CHANGE UP (ref 7–13)
PO2 BLDA: 104 MMHG — SIGNIFICANT CHANGE UP (ref 83–108)
PO2 BLDA: 122 MMHG — HIGH (ref 83–108)
PO2 BLDA: 63 MMHG — LOW (ref 83–108)
PO2 BLDA: 64 MMHG — LOW (ref 83–108)
PO2 BLDA: 94 MMHG — SIGNIFICANT CHANGE UP (ref 83–108)
POTASSIUM BLDA-SCNC: 4.1 MMOL/L — SIGNIFICANT CHANGE UP (ref 3.4–4.5)
POTASSIUM BLDA-SCNC: 4.4 MMOL/L — SIGNIFICANT CHANGE UP (ref 3.4–4.5)
POTASSIUM BLDA-SCNC: 4.8 MMOL/L — HIGH (ref 3.4–4.5)
POTASSIUM BLDA-SCNC: 5 MMOL/L — HIGH (ref 3.4–4.5)
POTASSIUM SERPL-MCNC: 4.3 MMOL/L — SIGNIFICANT CHANGE UP (ref 3.5–5.3)
POTASSIUM SERPL-MCNC: 4.7 MMOL/L — SIGNIFICANT CHANGE UP (ref 3.5–5.3)
POTASSIUM SERPL-MCNC: 5.1 MMOL/L — SIGNIFICANT CHANGE UP (ref 3.5–5.3)
POTASSIUM SERPL-MCNC: 5.1 MMOL/L — SIGNIFICANT CHANGE UP (ref 3.5–5.3)
POTASSIUM SERPL-SCNC: 4.3 MMOL/L — SIGNIFICANT CHANGE UP (ref 3.5–5.3)
POTASSIUM SERPL-SCNC: 4.7 MMOL/L — SIGNIFICANT CHANGE UP (ref 3.5–5.3)
POTASSIUM SERPL-SCNC: 5.1 MMOL/L — SIGNIFICANT CHANGE UP (ref 3.5–5.3)
POTASSIUM SERPL-SCNC: 5.1 MMOL/L — SIGNIFICANT CHANGE UP (ref 3.5–5.3)
PROCALCITONIN SERPL-MCNC: 1.66 NG/ML — HIGH (ref 0.02–0.1)
PROT SERPL-MCNC: 7.4 G/DL — SIGNIFICANT CHANGE UP (ref 6–8.3)
PROT SERPL-MCNC: 7.7 G/DL — SIGNIFICANT CHANGE UP (ref 6–8.3)
PROT SERPL-MCNC: 8.1 G/DL — SIGNIFICANT CHANGE UP (ref 6–8.3)
PROT UR-MCNC: 100 — HIGH
PROTHROM AB SERPL-ACNC: 12.5 SEC — SIGNIFICANT CHANGE UP (ref 9.8–13.1)
RBC # BLD: 2.64 M/UL — LOW (ref 3.8–5.2)
RBC # BLD: 2.72 M/UL — LOW (ref 3.8–5.2)
RBC # FLD: 16.3 % — HIGH (ref 10.3–14.5)
RBC # FLD: 16.5 % — HIGH (ref 10.3–14.5)
RBC CASTS # UR COMP ASSIST: SIGNIFICANT CHANGE UP (ref 0–?)
SAO2 % BLDA: 89.4 % — LOW (ref 95–99)
SAO2 % BLDA: 92.2 % — LOW (ref 95–99)
SAO2 % BLDA: 97.3 % — SIGNIFICANT CHANGE UP (ref 95–99)
SAO2 % BLDA: 97.9 % — SIGNIFICANT CHANGE UP (ref 95–99)
SAO2 % BLDA: 98.1 % — SIGNIFICANT CHANGE UP (ref 95–99)
SODIUM BLDA-SCNC: 133 MMOL/L — LOW (ref 136–146)
SODIUM BLDA-SCNC: 134 MMOL/L — LOW (ref 136–146)
SODIUM SERPL-SCNC: 131 MMOL/L — LOW (ref 135–145)
SODIUM SERPL-SCNC: 133 MMOL/L — LOW (ref 135–145)
SODIUM SERPL-SCNC: 134 MMOL/L — LOW (ref 135–145)
SODIUM SERPL-SCNC: 135 MMOL/L — SIGNIFICANT CHANGE UP (ref 135–145)
SP GR SPEC: 1.02 — SIGNIFICANT CHANGE UP (ref 1–1.04)
SPECIMEN SOURCE: SIGNIFICANT CHANGE UP
SQUAMOUS # UR AUTO: SIGNIFICANT CHANGE UP
UROBILINOGEN FLD QL: NORMAL — SIGNIFICANT CHANGE UP
WBC # BLD: 20.28 K/UL — HIGH (ref 3.8–10.5)
WBC # BLD: 21.8 K/UL — HIGH (ref 3.8–10.5)
WBC # FLD AUTO: 20.28 K/UL — HIGH (ref 3.8–10.5)
WBC # FLD AUTO: 21.8 K/UL — HIGH (ref 3.8–10.5)
WBC UR QL: >50 — HIGH (ref 0–?)

## 2020-03-29 PROCEDURE — 99292 CRITICAL CARE ADDL 30 MIN: CPT

## 2020-03-29 PROCEDURE — 90945 DIALYSIS ONE EVALUATION: CPT | Mod: GC

## 2020-03-29 PROCEDURE — 99291 CRITICAL CARE FIRST HOUR: CPT

## 2020-03-29 RX ORDER — HYDROMORPHONE HYDROCHLORIDE 2 MG/ML
1 INJECTION INTRAMUSCULAR; INTRAVENOUS; SUBCUTANEOUS EVERY 6 HOURS
Refills: 0 | Status: DISCONTINUED | OUTPATIENT
Start: 2020-03-29 | End: 2020-03-29

## 2020-03-29 RX ORDER — HYDROMORPHONE HYDROCHLORIDE 2 MG/ML
2 INJECTION INTRAMUSCULAR; INTRAVENOUS; SUBCUTANEOUS EVERY 6 HOURS
Refills: 0 | Status: DISCONTINUED | OUTPATIENT
Start: 2020-03-29 | End: 2020-03-30

## 2020-03-29 RX ADMIN — HYDROMORPHONE HYDROCHLORIDE 2 MILLIGRAM(S): 2 INJECTION INTRAMUSCULAR; INTRAVENOUS; SUBCUTANEOUS at 17:45

## 2020-03-29 RX ADMIN — HYDROMORPHONE HYDROCHLORIDE 2 MILLIGRAM(S): 2 INJECTION INTRAMUSCULAR; INTRAVENOUS; SUBCUTANEOUS at 23:30

## 2020-03-29 RX ADMIN — HYDROMORPHONE HYDROCHLORIDE 2 MILLIGRAM(S): 2 INJECTION INTRAMUSCULAR; INTRAVENOUS; SUBCUTANEOUS at 17:30

## 2020-03-29 RX ADMIN — CHLORHEXIDINE GLUCONATE 1 APPLICATION(S): 213 SOLUTION TOPICAL at 06:19

## 2020-03-29 RX ADMIN — Medication 40 MILLIGRAM(S): at 06:18

## 2020-03-29 RX ADMIN — Medication 325 MILLIGRAM(S): at 12:21

## 2020-03-29 RX ADMIN — CHLORHEXIDINE GLUCONATE 15 MILLILITER(S): 213 SOLUTION TOPICAL at 06:19

## 2020-03-29 RX ADMIN — FAMOTIDINE 20 MILLIGRAM(S): 10 INJECTION INTRAVENOUS at 12:13

## 2020-03-29 RX ADMIN — CHLORHEXIDINE GLUCONATE 15 MILLILITER(S): 213 SOLUTION TOPICAL at 17:24

## 2020-03-29 RX ADMIN — Medication 40 MILLIGRAM(S): at 17:24

## 2020-03-29 NOTE — PROGRESS NOTE ADULT - SUBJECTIVE AND OBJECTIVE BOX
ISSUES:   Acute hypoxic respiratory failure              ARDS              Septic shock              COVID-19+              SAM              Mixed metabolic / respiratory acidosis              Anemia    INTERVAL EVENTS:   Remains intubated. FIO2 decreased to 40%. PEEP 5.  Remains on CRRT for renal failure  Remains on levophed for septic shock. This was titrated.    HISTORY:   Unable to obtain history or ROS due to intubation and sedation    PHYSICAL EXAM:   Gen: Comfortable, No acute distress  Eyes: Sclera white, Conjunctiva normal, Eyelids normal, Pupils symmetrical   ENT: Mucous membranes moist, OG tube in place,  ,    Neck: Trachea midline,  ,  ,  ,  ,    CV: Rate regular, Rhythm regular,  ,    Resp: Breath sounds coarse, No accessory muscles use,  ,    Abd: Soft, Mildly distended, Non-tender, Bowel sounds normal,  ,    Skin: Warm, 1+ edema of lower extremities,  ,    : Frances in place  Neuro: Unable to elicit motor response.,    Psych: RASS -5      ASSESSMENT AND PLAN:     NEURO:  Vent sedation - Continue propofol, midazolam, fentanyl for ventilator synchrony.                          RESPIRATORY:  Acute respiratory failure - Mechanical ventilation. Monitor ABG. Wean FIO2 for goal O2sat above 92. Vent bundle.                                 CARDIOVASCULAR:  Septic shock requiring IV pressors - Wean pressors for MAP goal of 65.                               RENAL:  SAM requiring dialysis – Dialysis. Renally dose medications. Monitor for hyperkalemia and uremia. Avoid nephrotoxic medications. Monitor IOs and electrolytes. Discussed with Renal Consult.         GASTROINTESTINAL:  GI prophylaxis with famotidine  Zofran and Reglan IV PRN for nausea  Tube feed              HEMATOLOGIC:  No signs of active bleeding. Monitor Hgb in CBC in AM  On therapeutic anticoagulation.  COVID hypercoagulability - Continue aspirin and heparin gtt      INFECTIOUS DISEASE:  COVID-19 infection - s/p hydroxychloroquine, azithromycin                                                 ENDOCRINE:  Stable – Monitor glucose fingersticks for goal 120-180.            Pertinent clinical, laboratory, radiographic, hemodynamic, echocardiographic, respiratory data, microbiologic data and chart were reviewed by myself and analyzed frequently throughout the course of the day and night by myself.    Plan discussed at length with the CTICU staff     Patient unable to participate with discussion of plan.    Patient required critical care management.  75 minutes were spent evaluating, managing, providing, coordinating, and documenting care for this patient, exclusive of procedures from  8AM to 1159PM.    _________________________  VITAL SIGNS:  Vital Signs Last 24 Hrs  T(C): 36.9 (29 Mar 2020 16:00), Max: 37.2 (29 Mar 2020 12:00)  T(F): 98.5 (29 Mar 2020 16:00), Max: 98.9 (29 Mar 2020 12:00)  HR: 104 (29 Mar 2020 19:40) (92 - 120)  BP: --  BP(mean): --  RR: 24 (29 Mar 2020 19:00) (24 - 28)  SpO2: 99% (29 Mar 2020 19:40) (95% - 100%)  I/Os:   I&O's Detail    28 Mar 2020 07:01  -  29 Mar 2020 07:00  --------------------------------------------------------  IN:    Enteral Tube Flush: 90 mL    fentaNYL Infusion.: 298.8 mL    heparin Infusion: 288 mL    IV PiggyBack: 498 mL    midazolam Infusion: 70.8 mL    norepinephrine Infusion: 10.2 mL    propofol Infusion: 405.7 mL    vasopressin Infusion: 19.2 mL  Total IN: 1680.7 mL    OUT:    Other: 2180 mL    Rectal Tube: 150 mL  Total OUT: 2330 mL    Total NET: -649.3 mL      29 Mar 2020 07:01  -  29 Mar 2020 19:55  --------------------------------------------------------  IN:    Enteral Tube Flush: 90 mL    fentaNYL Infusion.: 64.5 mL    heparin Infusion: 144 mL    midazolam Infusion: 34.8 mL    norepinephrine Infusion: 30.6 mL    propofol Infusion: 218.7 mL    vasopressin Infusion: 16.8 mL  Total IN: 599.4 mL    OUT:    Indwelling Catheter - Urethral: 10 mL    Other: 991 mL    Rectal Tube: 200 mL  Total OUT: 1201 mL    Total NET: -601.6 mL          Mode: AC/ CMV (Assist Control/ Continuous Mandatory Ventilation)  RR (machine): 28  TV (machine): 330  FiO2: 40  PEEP: 6  MAP: 18  PIP: 28      MEDICATIONS:  MEDICATIONS  (STANDING):  aspirin 325 milliGRAM(s) Oral daily  chlorhexidine 0.12% Liquid 15 milliLiter(s) Oral Mucosa every 12 hours  chlorhexidine 4% Liquid 1 Application(s) Topical <User Schedule>  CRRT Treatment    <Continuous>  famotidine Injectable 20 milliGRAM(s) IV Push daily  heparin  Infusion 1200 Unit(s)/Hr (12 mL/Hr) IV Continuous <Continuous>  HYDROmorphone  Injectable 2 milliGRAM(s) IV Push every 6 hours  methylPREDNISolone sodium succinate Injectable 40 milliGRAM(s) IV Push two times a day  midazolam Infusion 0.02 mG/kG/Hr (1.44 mL/Hr) IV Continuous <Continuous>  norepinephrine Infusion 0.05 MICROgram(s)/kG/Min (3.38 mL/Hr) IV Continuous <Continuous>  Phoxillum Filtration BK 4 / 2.5 5000 milliLiter(s) (1500 mL/Hr) CRRT <Continuous>  polyethylene glycol 3350 17 Gram(s) Oral daily  PrismaSOL Filtration BGK 0 / 2.5 5000 milliLiter(s) (800 mL/Hr) CRRT <Continuous>  PrismaSOL Filtration BGK 0 / 2.5 5000 milliLiter(s) (200 mL/Hr) CRRT <Continuous>  propofol Infusion 20 MICROgram(s)/kG/Min (8.66 mL/Hr) IV Continuous <Continuous>  senna Syrup 15 milliLiter(s) Oral daily  vasopressin Infusion 0.02 Unit(s)/Min (1.2 mL/Hr) IV Continuous <Continuous>    MEDICATIONS  (PRN):  acetaminophen    Suspension .. 650 milliGRAM(s) Oral every 6 hours PRN Temp greater or equal to 38C (100.4F)  HYDROmorphone  Injectable 0.5 milliGRAM(s) IV Push every 2 hours PRN For Respiratory Rate > 30 breaths per minute      LABS:                        7.3    21.80 )-----------( 356      ( 29 Mar 2020 12:15 )             21.9     03-29    131<L>  |  96<L>  |  18  ----------------------------<  82  5.1   |  20<L>  |  1.16    Ca    9.8      29 Mar 2020 12:15  Phos  5.2     03-29  Mg     2.6     03-29    TPro  7.7  /  Alb  2.6<L>  /  TBili  0.4  /  DBili  x   /  AST  48<H>  /  ALT  41<H>  /  AlkPhos  156<H>      LIVER FUNCTIONS - ( 29 Mar 2020 05:45 )  Alb: 2.6 g/dL / Pro: 7.7 g/dL / ALK PHOS: 156 u/L / ALT: 41 u/L / AST: 48 u/L / GGT: x           PT/INR - ( 29 Mar 2020 05:45 )   PT: 12.5 SEC;   INR: 1.09          PTT - ( 29 Mar 2020 12:15 )  PTT:63.6 SEC  ABG - ( 29 Mar 2020 17:30 )  pH, Arterial: 7.31  pH, Blood: x     /  pCO2: 45    /  pO2: 63    / HCO3: 22    / Base Excess: -3.1  /  SaO2: 89.4              Urinalysis Basic - ( 29 Mar 2020 10:00 )    Color: BROWN / Appearance: TURBID / S.020 / pH: 6.5  Gluc: NEGATIVE / Ketone: NEGATIVE  / Bili: SMALL / Urobili: NORMAL   Blood: MODERATE / Protein: 100 / Nitrite: NEGATIVE   Leuk Esterase: LARGE / RBC: 10-20 / WBC >50   Sq Epi: FEW / Non Sq Epi: x / Bacteria: MODERATE      _________________________

## 2020-03-29 NOTE — PROGRESS NOTE ADULT - PROBLEM SELECTOR PLAN 1
Pt. with oliguric SAM in the setting of hypotension and COVID-19 infection. Pt. with likely ATN. Scr increased to 4.6 on 3/23/20, started on CRRT/CVVHDF. Pt. tolerating CRRT/CVVHDF. BP being maintained on IV vasopressors. HD catheter functioning during rounds. Continue with systemic heparin to decrease risk of circuit clotting. Plan is to continue with CRRT/CVVHDF. Check renal sonogram with doppler (when feasible). Monitor labs and urine output. Avoid any potential nephrotoxins    K noted to be 5 tody, dialysis solutions adjusted. Monitor K.

## 2020-03-29 NOTE — PROGRESS NOTE ADULT - SUBJECTIVE AND OBJECTIVE BOX
[facility] DIVISION OF KIDNEY DISEASES AND HYPERTENSION -- FOLLOW UP NOTE  --------------------------------------------------------------------------------  HPI: 59-year-old female admitted to ICU for respiratory failure, pneumonia, SAM and COVID-19. Scr increased to 4.6 on 3/23/20. Pt. initiated on CRRT/CVVHDF on 3/23/20.    Pt. was seen and examined in ICU. Pt. intubated, on IV pressor support, remains anuric.  FiO2 50%, PEEP 6. Pt. tolerating ~100cc/hr of net UF with CRRT, no clotting.         PAST HISTORY  --------------------------------------------------------------------------------  No significant changes to PMH, PSH, FHx, SHx, unless otherwise noted    ALLERGIES & MEDICATIONS  --------------------------------------------------------------------------------  Allergies    No Known Allergies    Intolerances      Standing Inpatient Medications  aspirin 325 milliGRAM(s) Oral daily  chlorhexidine 0.12% Liquid 15 milliLiter(s) Oral Mucosa every 12 hours  chlorhexidine 4% Liquid 1 Application(s) Topical <User Schedule>  CRRT Treatment    <Continuous>  famotidine Injectable 20 milliGRAM(s) IV Push daily  fentaNYL   Infusion. 0.5 MICROgram(s)/kG/Hr IV Continuous <Continuous>  heparin  Infusion 1200 Unit(s)/Hr IV Continuous <Continuous>  methylPREDNISolone sodium succinate Injectable 40 milliGRAM(s) IV Push two times a day  midazolam Infusion 0.02 mG/kG/Hr IV Continuous <Continuous>  norepinephrine Infusion 0.05 MICROgram(s)/kG/Min IV Continuous <Continuous>  Phoxillum Filtration BK 4 / 2.5 5000 milliLiter(s) CRRT <Continuous>  polyethylene glycol 3350 17 Gram(s) Oral daily  PrismaSOL Filtration BGK 0 / 2.5 5000 milliLiter(s) CRRT <Continuous>  PrismaSOL Filtration BGK 0 / 2.5 5000 milliLiter(s) CRRT <Continuous>  propofol Infusion 20 MICROgram(s)/kG/Min IV Continuous <Continuous>  senna Syrup 15 milliLiter(s) Oral daily  vasopressin Infusion 0.02 Unit(s)/Min IV Continuous <Continuous>    PRN Inpatient Medications  acetaminophen    Suspension .. 650 milliGRAM(s) Oral every 6 hours PRN  HYDROmorphone  Injectable 0.5 milliGRAM(s) IV Push every 2 hours PRN      REVIEW OF SYSTEMS  --------------------------------------------------------------------------------  not able to obtain.     VITALS/PHYSICAL EXAM  --------------------------------------------------------------------------------  T(C): 36.9 (03-29-20 @ 08:00), Max: 36.9 (03-29-20 @ 08:00)  HR: 102 (03-29-20 @ 08:00) (75 - 120)  BP: --  RR: 28 (03-29-20 @ 08:00) (26 - 28)  SpO2: 100% (03-29-20 @ 08:00) (94% - 100%)  Wt(kg): --        03-28-20 @ 07:01  -  03-29-20 @ 07:00  --------------------------------------------------------  IN: 1680.7 mL / OUT: 2121 mL / NET: -440.3 mL        Physical Exam (limited):  	Gen: Intubated  	HEENT: +ETT  	Pulm: Coarse breath sounds B/L  	CV: S1S2+  	Abd: Soft, non distended   	Ext: No LE edema B/L  	Neuro: Sedated  	Skin: Warm and dry              Access: Right IJ non tunneled catheter+    LABS/STUDIES  --------------------------------------------------------------------------------              7.4    20.28 >-----------<  309      [03-29-20 @ 05:45]              22.4     133  |  97  |  15  ----------------------------<  81      [03-29-20 @ 05:45]  5.1   |  21  |  1.08        Ca     9.8     [03-29-20 @ 05:45]      Mg     2.6     [03-29-20 @ 05:45]      Phos  5.1     [03-29-20 @ 05:45]    TPro  7.7  /  Alb  2.6  /  TBili  0.4  /  DBili  x   /  AST  48  /  ALT  41  /  AlkPhos  156  [03-29-20 @ 05:45]    PT/INR: PT 12.5 , INR 1.09       [03-29-20 @ 05:45]  PTT: 51.9       [03-29-20 @ 05:45]          [03-28-20 @ 02:40]        [03-29-20 @ 05:45]    Creatinine Trend:  SCr 1.08 [03-29 @ 05:45]  SCr 1.01 [03-28 @ 23:34]  SCr 1.00 [03-28 @ 17:30]  SCr 1.05 [03-28 @ 11:00]  SCr 1.10 [03-28 @ 02:40]    Urinalysis - [03-15-20 @ 22:27]      Color YELLOW / Appearance CLEAR / SG 1.037 / pH 6.0      Gluc NEGATIVE / Ketone TRACE  / Bili NEGATIVE / Urobili TRACE       Blood MODERATE / Protein 200 / Leuk Est NEGATIVE / Nitrite NEGATIVE      RBC 11-25 / WBC 3-5 / Hyaline 1+ / Gran  / Sq Epi FEW / Non Sq Epi  / Bacteria NEGATIVE      Ferritin 1576      [03-28-20 @ 02:40]  Lipid: chol --, , HDL --, LDL --      [03-28-20 @ 02:40]    HCV 0.12, Nonreactive Hepatitis C AB  S/CO Ratio                        Interpretation  < 1.00                                   Non-Reactive  1.00 - 4.99                         Weakly-Reactive  >= 5.00                                Reactive  Non-Reactive: Aperson with a non-reactive HCV antibody  result is considered uninfected.  No further action is  needed unless recent infection is suspected.  In these  cases, consider repeat testing later to detect  seroconversion..  Weakly-Reactive: HCV antibody test is abnormal, HCV RNA  Qualitative test will follow.  Reactive: HCV antibody test is abnormal, HCV RNA  Qualitative test will follow.  Note: HCV antibody testing is performed on the [email or web address] system.      [03-16-20 @ 05:30]

## 2020-03-30 LAB
ALBUMIN SERPL ELPH-MCNC: 2.9 G/DL — LOW (ref 3.3–5)
ALP SERPL-CCNC: 187 U/L — HIGH (ref 40–120)
ALT FLD-CCNC: 71 U/L — HIGH (ref 4–33)
ANION GAP SERPL CALC-SCNC: 16 MMO/L — HIGH (ref 7–14)
ANION GAP SERPL CALC-SCNC: 17 MMO/L — HIGH (ref 7–14)
APTT BLD: 61 SEC — HIGH (ref 27.5–36.3)
AST SERPL-CCNC: 75 U/L — HIGH (ref 4–32)
BASE EXCESS BLDA CALC-SCNC: -3.6 MMOL/L — SIGNIFICANT CHANGE UP
BASE EXCESS BLDA CALC-SCNC: -3.7 MMOL/L — SIGNIFICANT CHANGE UP
BILIRUB SERPL-MCNC: 0.4 MG/DL — SIGNIFICANT CHANGE UP (ref 0.2–1.2)
BLD GP AB SCN SERPL QL: NEGATIVE — SIGNIFICANT CHANGE UP
BLOOD GAS ARTERIAL - FIO2: 40 — SIGNIFICANT CHANGE UP
BLOOD GAS ARTERIAL - FIO2: 40 — SIGNIFICANT CHANGE UP
BUN SERPL-MCNC: 20 MG/DL — SIGNIFICANT CHANGE UP (ref 7–23)
BUN SERPL-MCNC: 21 MG/DL — SIGNIFICANT CHANGE UP (ref 7–23)
CA-I BLDA-SCNC: 1.28 MMOL/L — SIGNIFICANT CHANGE UP (ref 1.15–1.29)
CA-I BLDA-SCNC: 1.29 MMOL/L — SIGNIFICANT CHANGE UP (ref 1.15–1.29)
CALCIUM SERPL-MCNC: 10 MG/DL — SIGNIFICANT CHANGE UP (ref 8.4–10.5)
CALCIUM SERPL-MCNC: 10.3 MG/DL — SIGNIFICANT CHANGE UP (ref 8.4–10.5)
CHLORIDE SERPL-SCNC: 97 MMOL/L — LOW (ref 98–107)
CHLORIDE SERPL-SCNC: 98 MMOL/L — SIGNIFICANT CHANGE UP (ref 98–107)
CO2 SERPL-SCNC: 19 MMOL/L — LOW (ref 22–31)
CO2 SERPL-SCNC: 20 MMOL/L — LOW (ref 22–31)
CREAT SERPL-MCNC: 1.04 MG/DL — SIGNIFICANT CHANGE UP (ref 0.5–1.3)
CREAT SERPL-MCNC: 1.09 MG/DL — SIGNIFICANT CHANGE UP (ref 0.5–1.3)
CRP SERPL-MCNC: 152.2 MG/L — HIGH
D DIMER BLD IA.RAPID-MCNC: 2176 NG/ML — SIGNIFICANT CHANGE UP
ENTEROCOC DNA BLD POS QL NAA+NON-PROBE: SIGNIFICANT CHANGE UP
ERYTHROCYTE [SEDIMENTATION RATE] IN BLOOD: 49 MM/HR — HIGH (ref 4–25)
FERRITIN SERPL-MCNC: 1705 NG/ML — HIGH (ref 15–150)
GLUCOSE BLDA-MCNC: 62 MG/DL — LOW (ref 70–99)
GLUCOSE BLDA-MCNC: 71 MG/DL — SIGNIFICANT CHANGE UP (ref 70–99)
GLUCOSE BLDC GLUCOMTR-MCNC: 107 MG/DL — HIGH (ref 70–99)
GLUCOSE BLDC GLUCOMTR-MCNC: 255 MG/DL — HIGH (ref 70–99)
GLUCOSE BLDC GLUCOMTR-MCNC: 31 MG/DL — CRITICAL LOW (ref 70–99)
GLUCOSE BLDC GLUCOMTR-MCNC: 49 MG/DL — LOW (ref 70–99)
GLUCOSE BLDC GLUCOMTR-MCNC: 93 MG/DL — SIGNIFICANT CHANGE UP (ref 70–99)
GLUCOSE BLDC GLUCOMTR-MCNC: <25 MG/DL — CRITICAL LOW (ref 70–99)
GLUCOSE SERPL-MCNC: 78 MG/DL — SIGNIFICANT CHANGE UP (ref 70–99)
GLUCOSE SERPL-MCNC: 88 MG/DL — SIGNIFICANT CHANGE UP (ref 70–99)
GRAM STN FLD: SIGNIFICANT CHANGE UP
GRAM STN FLD: SIGNIFICANT CHANGE UP
HCO3 BLDA-SCNC: 21 MMOL/L — LOW (ref 22–26)
HCO3 BLDA-SCNC: 21 MMOL/L — LOW (ref 22–26)
HCT VFR BLDA CALC: 18 % — CRITICAL LOW (ref 34.5–46.5)
HCT VFR BLDA CALC: 25.1 % — LOW (ref 34.5–46.5)
HGB BLDA-MCNC: 5.7 G/DL — CRITICAL LOW (ref 11.5–15.5)
HGB BLDA-MCNC: 8.1 G/DL — LOW (ref 11.5–15.5)
INR BLD: 1.03 — SIGNIFICANT CHANGE UP (ref 0.88–1.17)
LACTATE BLDA-SCNC: 1.4 MMOL/L — SIGNIFICANT CHANGE UP (ref 0.5–2)
LACTATE BLDA-SCNC: 2.3 MMOL/L — HIGH (ref 0.5–2)
LACTATE SERPL-SCNC: 0.8 MMOL/L — SIGNIFICANT CHANGE UP (ref 0.5–2)
LDH SERPL L TO P-CCNC: 329 U/L — HIGH (ref 135–225)
MAGNESIUM SERPL-MCNC: 2.6 MG/DL — SIGNIFICANT CHANGE UP (ref 1.6–2.6)
MAGNESIUM SERPL-MCNC: 2.7 MG/DL — HIGH (ref 1.6–2.6)
METHOD TYPE: SIGNIFICANT CHANGE UP
PCO2 BLDA: 47 MMHG — SIGNIFICANT CHANGE UP (ref 32–48)
PCO2 BLDA: 51 MMHG — HIGH (ref 32–48)
PH BLDA: 7.26 PH — LOW (ref 7.35–7.45)
PH BLDA: 7.29 PH — LOW (ref 7.35–7.45)
PHOSPHATE SERPL-MCNC: 5.1 MG/DL — HIGH (ref 2.5–4.5)
PHOSPHATE SERPL-MCNC: 5.5 MG/DL — HIGH (ref 2.5–4.5)
PO2 BLDA: 93 MMHG — SIGNIFICANT CHANGE UP (ref 83–108)
PO2 BLDA: 96 MMHG — SIGNIFICANT CHANGE UP (ref 83–108)
POTASSIUM BLDA-SCNC: 4 MMOL/L — SIGNIFICANT CHANGE UP (ref 3.4–4.5)
POTASSIUM BLDA-SCNC: 4 MMOL/L — SIGNIFICANT CHANGE UP (ref 3.4–4.5)
POTASSIUM SERPL-MCNC: 4.3 MMOL/L — SIGNIFICANT CHANGE UP (ref 3.5–5.3)
POTASSIUM SERPL-MCNC: 4.3 MMOL/L — SIGNIFICANT CHANGE UP (ref 3.5–5.3)
POTASSIUM SERPL-SCNC: 4.3 MMOL/L — SIGNIFICANT CHANGE UP (ref 3.5–5.3)
POTASSIUM SERPL-SCNC: 4.3 MMOL/L — SIGNIFICANT CHANGE UP (ref 3.5–5.3)
PROCALCITONIN SERPL-MCNC: 1.97 NG/ML — HIGH (ref 0.02–0.1)
PROT SERPL-MCNC: 8.3 G/DL — SIGNIFICANT CHANGE UP (ref 6–8.3)
PROTHROM AB SERPL-ACNC: 11.8 SEC — SIGNIFICANT CHANGE UP (ref 9.8–13.1)
RH IG SCN BLD-IMP: POSITIVE — SIGNIFICANT CHANGE UP
SAO2 % BLDA: 96.2 % — SIGNIFICANT CHANGE UP (ref 95–99)
SAO2 % BLDA: 96.3 % — SIGNIFICANT CHANGE UP (ref 95–99)
SODIUM BLDA-SCNC: 134 MMOL/L — LOW (ref 136–146)
SODIUM BLDA-SCNC: 135 MMOL/L — LOW (ref 136–146)
SODIUM SERPL-SCNC: 133 MMOL/L — LOW (ref 135–145)
SODIUM SERPL-SCNC: 134 MMOL/L — LOW (ref 135–145)
SPECIMEN SOURCE: SIGNIFICANT CHANGE UP

## 2020-03-30 PROCEDURE — 90945 DIALYSIS ONE EVALUATION: CPT | Mod: GC

## 2020-03-30 PROCEDURE — 99292 CRITICAL CARE ADDL 30 MIN: CPT

## 2020-03-30 PROCEDURE — 99291 CRITICAL CARE FIRST HOUR: CPT

## 2020-03-30 RX ORDER — DEXMEDETOMIDINE HYDROCHLORIDE IN 0.9% SODIUM CHLORIDE 4 UG/ML
0.5 INJECTION INTRAVENOUS
Qty: 200 | Refills: 0 | Status: DISCONTINUED | OUTPATIENT
Start: 2020-03-30 | End: 2020-04-04

## 2020-03-30 RX ADMIN — Medication 40 MILLIGRAM(S): at 17:01

## 2020-03-30 RX ADMIN — FAMOTIDINE 20 MILLIGRAM(S): 10 INJECTION INTRAVENOUS at 12:01

## 2020-03-30 RX ADMIN — CHLORHEXIDINE GLUCONATE 15 MILLILITER(S): 213 SOLUTION TOPICAL at 17:02

## 2020-03-30 RX ADMIN — MIDAZOLAM HYDROCHLORIDE 1.44 MG/KG/HR: 1 INJECTION, SOLUTION INTRAMUSCULAR; INTRAVENOUS at 08:28

## 2020-03-30 RX ADMIN — Medication 40 MILLIGRAM(S): at 06:53

## 2020-03-30 RX ADMIN — HEPARIN SODIUM 12 UNIT(S)/HR: 5000 INJECTION INTRAVENOUS; SUBCUTANEOUS at 19:54

## 2020-03-30 RX ADMIN — Medication 3.38 MICROGRAM(S)/KG/MIN: at 08:27

## 2020-03-30 RX ADMIN — Medication 3.38 MICROGRAM(S)/KG/MIN: at 19:54

## 2020-03-30 RX ADMIN — SENNA PLUS 15 MILLILITER(S): 8.6 TABLET ORAL at 12:00

## 2020-03-30 RX ADMIN — DEXMEDETOMIDINE HYDROCHLORIDE IN 0.9% SODIUM CHLORIDE 9.02 MICROGRAM(S)/KG/HR: 4 INJECTION INTRAVENOUS at 19:55

## 2020-03-30 RX ADMIN — VASOPRESSIN 1.2 UNIT(S)/MIN: 20 INJECTION INTRAVENOUS at 08:28

## 2020-03-30 RX ADMIN — HYDROMORPHONE HYDROCHLORIDE 2 MILLIGRAM(S): 2 INJECTION INTRAMUSCULAR; INTRAVENOUS; SUBCUTANEOUS at 00:00

## 2020-03-30 RX ADMIN — POLYETHYLENE GLYCOL 3350 17 GRAM(S): 17 POWDER, FOR SOLUTION ORAL at 12:00

## 2020-03-30 RX ADMIN — HEPARIN SODIUM 12 UNIT(S)/HR: 5000 INJECTION INTRAVENOUS; SUBCUTANEOUS at 08:27

## 2020-03-30 RX ADMIN — PROPOFOL 8.66 MICROGRAM(S)/KG/MIN: 10 INJECTION, EMULSION INTRAVENOUS at 08:27

## 2020-03-30 RX ADMIN — CHLORHEXIDINE GLUCONATE 15 MILLILITER(S): 213 SOLUTION TOPICAL at 06:39

## 2020-03-30 RX ADMIN — HYDROMORPHONE HYDROCHLORIDE 2 MILLIGRAM(S): 2 INJECTION INTRAMUSCULAR; INTRAVENOUS; SUBCUTANEOUS at 07:00

## 2020-03-30 RX ADMIN — DEXMEDETOMIDINE HYDROCHLORIDE IN 0.9% SODIUM CHLORIDE 9.02 MICROGRAM(S)/KG/HR: 4 INJECTION INTRAVENOUS at 12:58

## 2020-03-30 RX ADMIN — HYDROMORPHONE HYDROCHLORIDE 0.5 MILLIGRAM(S): 2 INJECTION INTRAMUSCULAR; INTRAVENOUS; SUBCUTANEOUS at 12:01

## 2020-03-30 RX ADMIN — CHLORHEXIDINE GLUCONATE 1 APPLICATION(S): 213 SOLUTION TOPICAL at 06:39

## 2020-03-30 RX ADMIN — HYDROMORPHONE HYDROCHLORIDE 2 MILLIGRAM(S): 2 INJECTION INTRAMUSCULAR; INTRAVENOUS; SUBCUTANEOUS at 06:45

## 2020-03-30 NOTE — PROGRESS NOTE ADULT - SUBJECTIVE AND OBJECTIVE BOX
[facility] DIVISION OF KIDNEY DISEASES AND HYPERTENSION -- FOLLOW UP NOTE  --------------------------------------------------------------------------------  HPI: 58 yo F admitted to ICU for respiratory failure, pneumonia, SAM and COVID-19 infection. Scr increased to 4.6 on 3/23/20. Pt. initiated on CRRT/CVVHDF on 3/23/20.    Pt. was seen and examined in ICU. Pt. intubated (FiO2 slightly increased to 50%, PEEP stable at 5), not on IV vasopressor support (however with lower BP this AM), and is anuric. Pt. tolerating CRRT with fluid removal of 100 cc/hr.    PAST HISTORY  --------------------------------------------------------------------------------  No significant changes to PMH, PSH, FHx, SHx, unless otherwise noted    ALLERGIES & MEDICATIONS  --------------------------------------------------------------------------------  Allergies    No Known Allergies    Intolerances    Standing Inpatient Medications  chlorhexidine 0.12% Liquid 15 milliLiter(s) Oral Mucosa every 12 hours  chlorhexidine 4% Liquid 1 Application(s) Topical <User Schedule>  CRRT Treatment    <Continuous>  dexMEDEtomidine Infusion 0.5 MICROgram(s)/kG/Hr IV Continuous <Continuous>  famotidine Injectable 20 milliGRAM(s) IV Push daily  heparin  Infusion 1200 Unit(s)/Hr IV Continuous <Continuous>  methylPREDNISolone sodium succinate Injectable 40 milliGRAM(s) IV Push two times a day  midazolam Infusion 0.02 mG/kG/Hr IV Continuous <Continuous>  norepinephrine Infusion 0.05 MICROgram(s)/kG/Min IV Continuous <Continuous>  Phoxillum Filtration BK 4 / 2.5 5000 milliLiter(s) CRRT <Continuous>  polyethylene glycol 3350 17 Gram(s) Oral daily  PrismaSOL Filtration BGK 0 / 2.5 5000 milliLiter(s) CRRT <Continuous>  PrismaSOL Filtration BGK 0 / 2.5 5000 milliLiter(s) CRRT <Continuous>  propofol Infusion 20 MICROgram(s)/kG/Min IV Continuous <Continuous>  senna Syrup 15 milliLiter(s) Oral daily  vasopressin Infusion 0.02 Unit(s)/Min IV Continuous <Continuous>    REVIEW OF SYSTEMS  --------------------------------------------------------------------------------  Unable to obtain.    VITALS/PHYSICAL EXAM  --------------------------------------------------------------------------------  T(C): 36.2 (03-30-20 @ 12:00), Max: 36.9 (03-29-20 @ 16:00)  HR: 94 (03-30-20 @ 14:00) (83 - 112)  BP: 96/56 (03-30-20 @ 12:00) (91/55 - 101/55)  RR: 30 (03-30-20 @ 14:00) (23 - 30)  SpO2: 100% (03-30-20 @ 14:00) (96% - 100%)  Wt(kg): --    03-29-20 @ 07:01  -  03-30-20 @ 07:00  --------------------------------------------------------  IN: 1033.6 mL / OUT: 2979 mL / NET: -1945.4 mL    03-30-20 @ 07:01  -  03-30-20 @ 14:15  --------------------------------------------------------  IN: 377.4 mL / OUT: 0 mL / NET: 377.4 mL    Physical Exam:  	Gen: Sedated, intubated  	HEENT: + ETT  	Pulm: + mechanical breath sounds  	CV: S1S2+  	Abd: Soft, non distended   	Ext: No LE edema B/L  	Neuro: Sedated  	Skin: Warm and dry              Access: Right IJ non tunneled catheter without bleeding    LABS/STUDIES  --------------------------------------------------------------------------------              7.3    21.80 >-----------<  356      [03-29-20 @ 12:15]              21.9     134  |  97  |  20  ----------------------------<  78      [03-30-20 @ 02:15]  4.3   |  20  |  1.09        Ca     10.0     [03-30-20 @ 02:15]      Mg     2.7     [03-30-20 @ 02:15]      Phos  5.5     [03-30-20 @ 02:15]    Creatinine Trend:  SCr 1.09 [03-30 @ 02:15]  SCr 1.19 [03-29 @ 17:30]  SCr 1.16 [03-29 @ 12:15]  SCr 1.08 [03-29 @ 05:45]  SCr 1.01 [03-28 @ 23:34]    Urinalysis - [03-29-20 @ 10:00]      Color BROWN / Appearance TURBID / SG 1.020 / pH 6.5      Gluc NEGATIVE / Ketone NEGATIVE  / Bili SMALL / Urobili NORMAL       Blood MODERATE / Protein 100 / Leuk Est LARGE / Nitrite NEGATIVE      RBC 10-20 / WBC >50 / Hyaline  / Gran  / Sq Epi FEW / Non Sq Epi  / Bacteria MODERATE

## 2020-03-30 NOTE — PROGRESS NOTE ADULT - PROBLEM SELECTOR PLAN 1
Pt. with oliguric SAM in the setting of hypotension and COVID-19 infection. No prior labs for review on [facility]. Scr on admission (3/15/20) was 0.84. Scr however worsened to 4.6 on 3/26/20. Pt. likely with ATN. Pt. was started on CRRT/CVVHDF on 3/26/20. Pt. tolerating CRRT/CVVHDF. Plan to continue CRRT/CVVHDF with fluid removal. Continue with systemic heparin to decrease risk of circuit clotting. Check renal sonogram with doppler (when feasible). Monitor labs and urine output. Avoid any potential nephrotoxins

## 2020-03-30 NOTE — PROGRESS NOTE ADULT - SUBJECTIVE AND OBJECTIVE BOX
ALEXA GARCIA   MRN#: [id number]     The patient is a 59y Female who was seen, evaluated, & examined with the ICU staff with a multidisciplinary care plan formulated & implemented. All available clinical, laboratory, radiographic, pharmacologic, and electrocardiographic data were reviewed & analyzed.      The patient was in the ICU in critical condition secondary to:     ARDS  actue hypoxic/hypercarbic respiratory failure  vasodilatory shock    For support and evaluation & prevention of further decompensation secondary to persistent cardiopulmonary dysfunction, respiratory status required:     supplemental oxygen with full ventilatory support / mechanical ventilation  continuous pulse oximetry monitoring  following ABGs with A-line monitoring  IV Midazolam infusion  IV Propofol infusion    Invasive hemodynamic monitoring with     an A-line was required for continuous MAP/BP monitoring     to ensure adequate cardiovascular support and to evaluate for & help prevent decompensation while receiving     IV Levophed infusion  CVVH    secondary to     vasodilatory shock    In addition:  COVID+ admitted with respiratory failure/ARDS, intubation day 11  Moderate ARDS with P/F 200s and peak airway pressure 23  Driving pressure 15, tidal volume 6 cc/kg  Sedated with Propofol and Versed - will wean and attempt to CPAP  Minimal pressor requirement, like due to vasoplegia from sedation   Start trickle feeds; daily bowel movements via rectal tube  SAM on CVVH with 100 cc/hr off - target 500 cc to 1L overall negative - Renal following  Will discuss transitioning CVVH to intermittent HD as BP is stable on minimal pressors and there are a lack of CVVH machines  S/p Plaquenil; on Solumedrol (day 3); check cytokine levels (IL-1, IL-6) and quantiferon gold  No antibiotics, cultures negative  H/H low but stable on heparin gtt - no signs of active bleeding, will trend  Sugars controlled without medication  TLC and wong day 11    The patient required critical care management and I personally provided 75 minutes of non-continuous care to the patient, excluding separate procedures, in addition to discussing the patient and plan at length with the ICU staff and helping coordinate care.

## 2020-03-31 LAB
4/8 RATIO: 3.14 RATIO — SIGNIFICANT CHANGE UP (ref 0.9–3.6)
ABS CD8: 339 /UL — SIGNIFICANT CHANGE UP (ref 142–740)
ALBUMIN SERPL ELPH-MCNC: 3.1 G/DL — LOW (ref 3.3–5)
ALBUMIN SERPL ELPH-MCNC: 3.3 G/DL — SIGNIFICANT CHANGE UP (ref 3.3–5)
ALBUMIN SERPL ELPH-MCNC: 3.5 G/DL — SIGNIFICANT CHANGE UP (ref 3.3–5)
ALP SERPL-CCNC: 162 U/L — HIGH (ref 40–120)
ALP SERPL-CCNC: 185 U/L — HIGH (ref 40–120)
ALP SERPL-CCNC: 200 U/L — HIGH (ref 40–120)
ALT FLD-CCNC: 62 U/L — HIGH (ref 4–33)
ALT FLD-CCNC: 71 U/L — HIGH (ref 4–33)
ALT FLD-CCNC: 80 U/L — HIGH (ref 4–33)
ANION GAP SERPL CALC-SCNC: 14 MMO/L — SIGNIFICANT CHANGE UP (ref 7–14)
ANION GAP SERPL CALC-SCNC: 15 MMO/L — HIGH (ref 7–14)
ANION GAP SERPL CALC-SCNC: 16 MMO/L — HIGH (ref 7–14)
ANISOCYTOSIS BLD QL: SIGNIFICANT CHANGE UP
APTT BLD: 62.6 SEC — HIGH (ref 27.5–36.3)
AST SERPL-CCNC: 38 U/L — HIGH (ref 4–32)
AST SERPL-CCNC: 45 U/L — HIGH (ref 4–32)
AST SERPL-CCNC: 69 U/L — HIGH (ref 4–32)
BASE EXCESS BLDA CALC-SCNC: -0.2 MMOL/L — SIGNIFICANT CHANGE UP
BASE EXCESS BLDA CALC-SCNC: -2.7 MMOL/L — SIGNIFICANT CHANGE UP
BASOPHILS # BLD AUTO: 0.05 K/UL — SIGNIFICANT CHANGE UP (ref 0–0.2)
BASOPHILS # BLD AUTO: 0.08 K/UL — SIGNIFICANT CHANGE UP (ref 0–0.2)
BASOPHILS NFR BLD AUTO: 0.2 % — SIGNIFICANT CHANGE UP (ref 0–2)
BASOPHILS NFR BLD AUTO: 0.3 % — SIGNIFICANT CHANGE UP (ref 0–2)
BASOPHILS NFR SPEC: 0 % — SIGNIFICANT CHANGE UP (ref 0–2)
BILIRUB SERPL-MCNC: 0.4 MG/DL — SIGNIFICANT CHANGE UP (ref 0.2–1.2)
BLASTS # FLD: 0 % — SIGNIFICANT CHANGE UP (ref 0–0)
BUN SERPL-MCNC: 23 MG/DL — SIGNIFICANT CHANGE UP (ref 7–23)
BUN SERPL-MCNC: 25 MG/DL — HIGH (ref 7–23)
BUN SERPL-MCNC: 35 MG/DL — HIGH (ref 7–23)
CA-I BLDA-SCNC: 1.26 MMOL/L — SIGNIFICANT CHANGE UP (ref 1.15–1.29)
CA-I BLDA-SCNC: 1.35 MMOL/L — HIGH (ref 1.15–1.29)
CALCIUM SERPL-MCNC: 10.4 MG/DL — SIGNIFICANT CHANGE UP (ref 8.4–10.5)
CALCIUM SERPL-MCNC: 10.7 MG/DL — HIGH (ref 8.4–10.5)
CALCIUM SERPL-MCNC: 10.7 MG/DL — HIGH (ref 8.4–10.5)
CD16+CD56+ CELLS NFR BLD: 23 % — SIGNIFICANT CHANGE UP (ref 5–23)
CD16+CD56+ CELLS NFR SPEC: 488 /UL — HIGH (ref 71–410)
CD19 BLASTS SPEC-ACNC: 10 % — SIGNIFICANT CHANGE UP (ref 6–24)
CD19 BLASTS SPEC-ACNC: 214 /UL — SIGNIFICANT CHANGE UP (ref 84–469)
CD3 BLASTS SPEC-ACNC: 1360 /UL — SIGNIFICANT CHANGE UP (ref 672–1870)
CD3 BLASTS SPEC-ACNC: 64 % — SIGNIFICANT CHANGE UP (ref 59–83)
CD4 %: 49 % — SIGNIFICANT CHANGE UP (ref 30–62)
CD8 %: 16 % — SIGNIFICANT CHANGE UP (ref 12–36)
CHLORIDE SERPL-SCNC: 94 MMOL/L — LOW (ref 98–107)
CHLORIDE SERPL-SCNC: 95 MMOL/L — LOW (ref 98–107)
CHLORIDE SERPL-SCNC: 99 MMOL/L — SIGNIFICANT CHANGE UP (ref 98–107)
CO2 SERPL-SCNC: 20 MMOL/L — LOW (ref 22–31)
CO2 SERPL-SCNC: 21 MMOL/L — LOW (ref 22–31)
CO2 SERPL-SCNC: 21 MMOL/L — LOW (ref 22–31)
CREAT SERPL-MCNC: 1.19 MG/DL — SIGNIFICANT CHANGE UP (ref 0.5–1.3)
CREAT SERPL-MCNC: 1.25 MG/DL — SIGNIFICANT CHANGE UP (ref 0.5–1.3)
CREAT SERPL-MCNC: 1.94 MG/DL — HIGH (ref 0.5–1.3)
CRP SERPL-MCNC: 117.6 MG/L — HIGH
CULTURE RESULTS: SIGNIFICANT CHANGE UP
EOSINOPHIL # BLD AUTO: 0.09 K/UL — SIGNIFICANT CHANGE UP (ref 0–0.5)
EOSINOPHIL # BLD AUTO: 0.09 K/UL — SIGNIFICANT CHANGE UP (ref 0–0.5)
EOSINOPHIL NFR BLD AUTO: 0.3 % — SIGNIFICANT CHANGE UP (ref 0–6)
EOSINOPHIL NFR BLD AUTO: 0.3 % — SIGNIFICANT CHANGE UP (ref 0–6)
EOSINOPHIL NFR FLD: 0 % — SIGNIFICANT CHANGE UP (ref 0–6)
GIANT PLATELETS BLD QL SMEAR: PRESENT — SIGNIFICANT CHANGE UP
GLUCOSE BLDA-MCNC: 117 MG/DL — HIGH (ref 70–99)
GLUCOSE BLDA-MCNC: 98 MG/DL — SIGNIFICANT CHANGE UP (ref 70–99)
GLUCOSE BLDC GLUCOMTR-MCNC: 100 MG/DL — HIGH (ref 70–99)
GLUCOSE BLDC GLUCOMTR-MCNC: 101 MG/DL — HIGH (ref 70–99)
GLUCOSE SERPL-MCNC: 101 MG/DL — HIGH (ref 70–99)
GLUCOSE SERPL-MCNC: 103 MG/DL — HIGH (ref 70–99)
GLUCOSE SERPL-MCNC: 105 MG/DL — HIGH (ref 70–99)
HCO3 BLDA-SCNC: 22 MMOL/L — SIGNIFICANT CHANGE UP (ref 22–26)
HCO3 BLDA-SCNC: 24 MMOL/L — SIGNIFICANT CHANGE UP (ref 22–26)
HCT VFR BLD CALC: 25.6 % — LOW (ref 34.5–45)
HCT VFR BLD CALC: 26.3 % — LOW (ref 34.5–45)
HCT VFR BLDA CALC: 22.7 % — LOW (ref 34.5–46.5)
HCT VFR BLDA CALC: 25.8 % — LOW (ref 34.5–46.5)
HGB BLD-MCNC: 8.3 G/DL — LOW (ref 11.5–15.5)
HGB BLD-MCNC: 8.6 G/DL — LOW (ref 11.5–15.5)
HGB BLDA-MCNC: 7.3 G/DL — LOW (ref 11.5–15.5)
HGB BLDA-MCNC: 8.3 G/DL — LOW (ref 11.5–15.5)
IMM GRANULOCYTES NFR BLD AUTO: 16.4 % — HIGH (ref 0–1.5)
IMM GRANULOCYTES NFR BLD AUTO: 16.9 % — HIGH (ref 0–1.5)
LACTATE BLDA-SCNC: 1.1 MMOL/L — SIGNIFICANT CHANGE UP (ref 0.5–2)
LACTATE BLDA-SCNC: 1.3 MMOL/L — SIGNIFICANT CHANGE UP (ref 0.5–2)
LYMPHOCYTES # BLD AUTO: 10.1 % — LOW (ref 13–44)
LYMPHOCYTES # BLD AUTO: 2.61 K/UL — SIGNIFICANT CHANGE UP (ref 1–3.3)
LYMPHOCYTES # BLD AUTO: 2.71 K/UL — SIGNIFICANT CHANGE UP (ref 1–3.3)
LYMPHOCYTES # BLD AUTO: 9.2 % — LOW (ref 13–44)
LYMPHOCYTES NFR SPEC AUTO: 6.1 % — LOW (ref 13–44)
MACROCYTES BLD QL: SLIGHT — SIGNIFICANT CHANGE UP
MAGNESIUM SERPL-MCNC: 2.7 MG/DL — HIGH (ref 1.6–2.6)
MAGNESIUM SERPL-MCNC: 2.7 MG/DL — HIGH (ref 1.6–2.6)
MAGNESIUM SERPL-MCNC: 2.9 MG/DL — HIGH (ref 1.6–2.6)
MANUAL SMEAR VERIFICATION: SIGNIFICANT CHANGE UP
MCHC RBC-ENTMCNC: 26.9 PG — LOW (ref 27–34)
MCHC RBC-ENTMCNC: 27.7 PG — SIGNIFICANT CHANGE UP (ref 27–34)
MCHC RBC-ENTMCNC: 32.4 % — SIGNIFICANT CHANGE UP (ref 32–36)
MCHC RBC-ENTMCNC: 32.7 % — SIGNIFICANT CHANGE UP (ref 32–36)
MCV RBC AUTO: 83.1 FL — SIGNIFICANT CHANGE UP (ref 80–100)
MCV RBC AUTO: 84.6 FL — SIGNIFICANT CHANGE UP (ref 80–100)
METAMYELOCYTES # FLD: 5.2 % — HIGH (ref 0–1)
MICROCYTES BLD QL: SLIGHT — SIGNIFICANT CHANGE UP
MONOCYTES # BLD AUTO: 2.24 K/UL — HIGH (ref 0–0.9)
MONOCYTES # BLD AUTO: 2.29 K/UL — HIGH (ref 0–0.9)
MONOCYTES NFR BLD AUTO: 7.9 % — SIGNIFICANT CHANGE UP (ref 2–14)
MONOCYTES NFR BLD AUTO: 8.6 % — SIGNIFICANT CHANGE UP (ref 2–14)
MONOCYTES NFR BLD: 7.8 % — SIGNIFICANT CHANGE UP (ref 2–9)
MYELOCYTES NFR BLD: 9.6 % — HIGH (ref 0–0)
NEUTROPHIL AB SER-ACNC: 65.2 % — SIGNIFICANT CHANGE UP (ref 43–77)
NEUTROPHILS # BLD AUTO: 17.07 K/UL — HIGH (ref 1.8–7.4)
NEUTROPHILS # BLD AUTO: 18.81 K/UL — HIGH (ref 1.8–7.4)
NEUTROPHILS NFR BLD AUTO: 63.9 % — SIGNIFICANT CHANGE UP (ref 43–77)
NEUTROPHILS NFR BLD AUTO: 65.9 % — SIGNIFICANT CHANGE UP (ref 43–77)
NEUTS BAND # BLD: 1.7 % — SIGNIFICANT CHANGE UP (ref 0–6)
NRBC # FLD: 0.04 K/UL — SIGNIFICANT CHANGE UP (ref 0–0)
NRBC # FLD: 0.04 K/UL — SIGNIFICANT CHANGE UP (ref 0–0)
OTHER - HEMATOLOGY %: 0 — SIGNIFICANT CHANGE UP
PCO2 BLDA: 37 MMHG — SIGNIFICANT CHANGE UP (ref 32–48)
PCO2 BLDA: 41 MMHG — SIGNIFICANT CHANGE UP (ref 32–48)
PH BLDA: 7.38 PH — SIGNIFICANT CHANGE UP (ref 7.35–7.45)
PH BLDA: 7.39 PH — SIGNIFICANT CHANGE UP (ref 7.35–7.45)
PHOSPHATE SERPL-MCNC: 4.7 MG/DL — HIGH (ref 2.5–4.5)
PHOSPHATE SERPL-MCNC: 5.1 MG/DL — HIGH (ref 2.5–4.5)
PHOSPHATE SERPL-MCNC: 5.4 MG/DL — HIGH (ref 2.5–4.5)
PLATELET # BLD AUTO: 529 K/UL — HIGH (ref 150–400)
PLATELET # BLD AUTO: 585 K/UL — HIGH (ref 150–400)
PMV BLD: 10.6 FL — SIGNIFICANT CHANGE UP (ref 7–13)
PMV BLD: 11.5 FL — SIGNIFICANT CHANGE UP (ref 7–13)
PO2 BLDA: 177 MMHG — HIGH (ref 83–108)
PO2 BLDA: 98 MMHG — SIGNIFICANT CHANGE UP (ref 83–108)
POIKILOCYTOSIS BLD QL AUTO: SLIGHT — SIGNIFICANT CHANGE UP
POLYCHROMASIA BLD QL SMEAR: SIGNIFICANT CHANGE UP
POTASSIUM BLDA-SCNC: 3.7 MMOL/L — SIGNIFICANT CHANGE UP (ref 3.4–4.5)
POTASSIUM BLDA-SCNC: 3.8 MMOL/L — SIGNIFICANT CHANGE UP (ref 3.4–4.5)
POTASSIUM SERPL-MCNC: 3.9 MMOL/L — SIGNIFICANT CHANGE UP (ref 3.5–5.3)
POTASSIUM SERPL-MCNC: 4 MMOL/L — SIGNIFICANT CHANGE UP (ref 3.5–5.3)
POTASSIUM SERPL-MCNC: 4.1 MMOL/L — SIGNIFICANT CHANGE UP (ref 3.5–5.3)
POTASSIUM SERPL-SCNC: 3.9 MMOL/L — SIGNIFICANT CHANGE UP (ref 3.5–5.3)
POTASSIUM SERPL-SCNC: 4 MMOL/L — SIGNIFICANT CHANGE UP (ref 3.5–5.3)
POTASSIUM SERPL-SCNC: 4.1 MMOL/L — SIGNIFICANT CHANGE UP (ref 3.5–5.3)
PROMYELOCYTES # FLD: 0 % — SIGNIFICANT CHANGE UP (ref 0–0)
PROT SERPL-MCNC: 7.8 G/DL — SIGNIFICANT CHANGE UP (ref 6–8.3)
PROT SERPL-MCNC: 8.5 G/DL — HIGH (ref 6–8.3)
PROT SERPL-MCNC: 9.1 G/DL — HIGH (ref 6–8.3)
RBC # BLD: 3.08 M/UL — LOW (ref 3.8–5.2)
RBC # BLD: 3.11 M/UL — LOW (ref 3.8–5.2)
RBC # FLD: 15.8 % — HIGH (ref 10.3–14.5)
RBC # FLD: 16.4 % — HIGH (ref 10.3–14.5)
REVIEW TO FOLLOW: YES — SIGNIFICANT CHANGE UP
REVIEW TO FOLLOW: YES — SIGNIFICANT CHANGE UP
SAO2 % BLDA: 97.6 % — SIGNIFICANT CHANGE UP (ref 95–99)
SAO2 % BLDA: 99.2 % — HIGH (ref 95–99)
SODIUM BLDA-SCNC: 133 MMOL/L — LOW (ref 136–146)
SODIUM BLDA-SCNC: 133 MMOL/L — LOW (ref 136–146)
SODIUM SERPL-SCNC: 131 MMOL/L — LOW (ref 135–145)
SODIUM SERPL-SCNC: 131 MMOL/L — LOW (ref 135–145)
SODIUM SERPL-SCNC: 133 MMOL/L — LOW (ref 135–145)
SPECIMEN SOURCE: SIGNIFICANT CHANGE UP
T-CELL CD4 SUBSET PNL BLD: 1062 /UL — SIGNIFICANT CHANGE UP (ref 489–1457)
TARGETS BLD QL SMEAR: SLIGHT — SIGNIFICANT CHANGE UP
VARIANT LYMPHS # BLD: 3.5 % — SIGNIFICANT CHANGE UP
WBC # BLD: 26.74 K/UL — HIGH (ref 3.8–10.5)
WBC # BLD: 28.51 K/UL — HIGH (ref 3.8–10.5)
WBC # FLD AUTO: 26.74 K/UL — HIGH (ref 3.8–10.5)
WBC # FLD AUTO: 28.51 K/UL — HIGH (ref 3.8–10.5)

## 2020-03-31 PROCEDURE — 36556 INSERT NON-TUNNEL CV CATH: CPT

## 2020-03-31 PROCEDURE — 90945 DIALYSIS ONE EVALUATION: CPT | Mod: GC

## 2020-03-31 PROCEDURE — 93306 TTE W/DOPPLER COMPLETE: CPT | Mod: 26

## 2020-03-31 PROCEDURE — 99292 CRITICAL CARE ADDL 30 MIN: CPT | Mod: 25

## 2020-03-31 PROCEDURE — 99291 CRITICAL CARE FIRST HOUR: CPT | Mod: 25

## 2020-03-31 PROCEDURE — 71045 X-RAY EXAM CHEST 1 VIEW: CPT | Mod: 26,76

## 2020-03-31 RX ORDER — VANCOMYCIN HCL 1 G
VIAL (EA) INTRAVENOUS
Refills: 0 | Status: DISCONTINUED | OUTPATIENT
Start: 2020-03-31 | End: 2020-04-01

## 2020-03-31 RX ORDER — PIPERACILLIN AND TAZOBACTAM 4; .5 G/20ML; G/20ML
3.38 INJECTION, POWDER, LYOPHILIZED, FOR SOLUTION INTRAVENOUS EVERY 8 HOURS
Refills: 0 | Status: DISCONTINUED | OUTPATIENT
Start: 2020-03-31 | End: 2020-04-02

## 2020-03-31 RX ORDER — HYDROMORPHONE HYDROCHLORIDE 2 MG/ML
2 INJECTION INTRAMUSCULAR; INTRAVENOUS; SUBCUTANEOUS ONCE
Refills: 0 | Status: DISCONTINUED | OUTPATIENT
Start: 2020-03-31 | End: 2020-03-31

## 2020-03-31 RX ORDER — VANCOMYCIN HCL 1 G
1000 VIAL (EA) INTRAVENOUS ONCE
Refills: 0 | Status: COMPLETED | OUTPATIENT
Start: 2020-03-31 | End: 2020-03-31

## 2020-03-31 RX ORDER — PIPERACILLIN AND TAZOBACTAM 4; .5 G/20ML; G/20ML
3.38 INJECTION, POWDER, LYOPHILIZED, FOR SOLUTION INTRAVENOUS ONCE
Refills: 0 | Status: COMPLETED | OUTPATIENT
Start: 2020-03-31 | End: 2020-03-31

## 2020-03-31 RX ORDER — VANCOMYCIN HCL 1 G
1000 VIAL (EA) INTRAVENOUS EVERY 12 HOURS
Refills: 0 | Status: DISCONTINUED | OUTPATIENT
Start: 2020-03-31 | End: 2020-04-01

## 2020-03-31 RX ADMIN — HEPARIN SODIUM 12 UNIT(S)/HR: 5000 INJECTION INTRAVENOUS; SUBCUTANEOUS at 07:00

## 2020-03-31 RX ADMIN — PIPERACILLIN AND TAZOBACTAM 25 GRAM(S): 4; .5 INJECTION, POWDER, LYOPHILIZED, FOR SOLUTION INTRAVENOUS at 23:24

## 2020-03-31 RX ADMIN — Medication 40 MILLIGRAM(S): at 04:57

## 2020-03-31 RX ADMIN — PIPERACILLIN AND TAZOBACTAM 25 GRAM(S): 4; .5 INJECTION, POWDER, LYOPHILIZED, FOR SOLUTION INTRAVENOUS at 17:05

## 2020-03-31 RX ADMIN — CHLORHEXIDINE GLUCONATE 15 MILLILITER(S): 213 SOLUTION TOPICAL at 04:57

## 2020-03-31 RX ADMIN — Medication 250 MILLIGRAM(S): at 17:04

## 2020-03-31 RX ADMIN — CHLORHEXIDINE GLUCONATE 15 MILLILITER(S): 213 SOLUTION TOPICAL at 17:04

## 2020-03-31 RX ADMIN — FAMOTIDINE 20 MILLIGRAM(S): 10 INJECTION INTRAVENOUS at 11:46

## 2020-03-31 RX ADMIN — CHLORHEXIDINE GLUCONATE 1 APPLICATION(S): 213 SOLUTION TOPICAL at 04:57

## 2020-03-31 RX ADMIN — PIPERACILLIN AND TAZOBACTAM 200 GRAM(S): 4; .5 INJECTION, POWDER, LYOPHILIZED, FOR SOLUTION INTRAVENOUS at 11:47

## 2020-03-31 RX ADMIN — Medication 250 MILLIGRAM(S): at 11:47

## 2020-03-31 RX ADMIN — HYDROMORPHONE HYDROCHLORIDE 2 MILLIGRAM(S): 2 INJECTION INTRAMUSCULAR; INTRAVENOUS; SUBCUTANEOUS at 04:56

## 2020-03-31 NOTE — PROGRESS NOTE ADULT - PROBLEM SELECTOR PLAN 1
Pt. with oliguric SAM in the setting of hypotension and COVID-19 infection. No prior labs for review on [facility]. Scr on admission (3/15/20) was 0.84. Scr however worsened to 4.6 on 3/26/20. Pt. likely with ATN. Pt. was started on CRRT/CVVHDF on 3/26/20. Pt. tolerating CRRT/CVVHDF. Plan to continue CRRT/CVVHDF with fluid removal. Continue with systemic heparin to decrease risk of circuit clotting. Check renal sonogram with doppler (when feasible). Monitor labs and urine output. Avoid any potential nephrotoxins

## 2020-03-31 NOTE — PROGRESS NOTE ADULT - SUBJECTIVE AND OBJECTIVE BOX
[facility] DIVISION OF KIDNEY DISEASES AND HYPERTENSION -- FOLLOW UP NOTE  --------------------------------------------------------------------------------  HPI: 58 yo F admitted to ICU for respiratory failure, pneumonia, SAM and COVID-19 infection. Scr increased to 4.6 on 3/23/20. Pt. initiated on CRRT/CVVHDF on 3/23/20.    Pt. was seen and examined in ICU. Pt. intubated (FiO2 slightly stable at 50%, PEEP stable at 5), not on IV vasopressor support (however with lower BP this AM), and is anuric. Pt. tolerating CRRT with fluid removal of 100 cc/hr.    PAST HISTORY  --------------------------------------------------------------------------------  No significant changes to PMH, PSH, FHx, SHx, unless otherwise noted    ALLERGIES & MEDICATIONS  --------------------------------------------------------------------------------  Allergies    No Known Allergies    Intolerances    Standing Inpatient Medications  chlorhexidine 0.12% Liquid 15 milliLiter(s) Oral Mucosa every 12 hours  chlorhexidine 4% Liquid 1 Application(s) Topical <User Schedule>  CRRT Treatment    <Continuous>  dexMEDEtomidine Infusion 0.5 MICROgram(s)/kG/Hr IV Continuous <Continuous>  famotidine Injectable 20 milliGRAM(s) IV Push daily  heparin  Infusion 1200 Unit(s)/Hr IV Continuous <Continuous>  methylPREDNISolone sodium succinate Injectable 40 milliGRAM(s) IV Push two times a day  norepinephrine Infusion 0.05 MICROgram(s)/kG/Min IV Continuous <Continuous>  Phoxillum Filtration BK 4 / 2.5 5000 milliLiter(s) CRRT <Continuous>  piperacillin/tazobactam IVPB.. 3.375 Gram(s) IV Intermittent every 8 hours  polyethylene glycol 3350 17 Gram(s) Oral daily  PrismaSOL Filtration BGK 0 / 2.5 5000 milliLiter(s) CRRT <Continuous>  PrismaSOL Filtration BGK 0 / 2.5 5000 milliLiter(s) CRRT <Continuous>  propofol Infusion 20 MICROgram(s)/kG/Min IV Continuous <Continuous>  senna Syrup 15 milliLiter(s) Oral daily  vancomycin  IVPB 1000 milliGRAM(s) IV Intermittent every 12 hours  vancomycin  IVPB      vasopressin Infusion 0.02 Unit(s)/Min IV Continuous <Continuous>    REVIEW OF SYSTEMS  --------------------------------------------------------------------------------  Unable to obtain.    VITALS/PHYSICAL EXAM  --------------------------------------------------------------------------------  T(C): 37.7 (03-31-20 @ 12:00), Max: 37.7 (03-31-20 @ 12:00)  HR: 116 (03-31-20 @ 12:00) (81 - 127)  BP: 112/80 (03-31-20 @ 02:00) (112/80 - 112/80)  RR: 18 (03-31-20 @ 12:00) (15 - 30)  SpO2: 100% (03-31-20 @ 12:00) (97% - 100%)  Wt(kg): --    03-30-20 @ 07:01  -  03-31-20 @ 07:00  --------------------------------------------------------  IN: 1009.4 mL / OUT: 1595 mL / NET: -585.6 mL    03-31-20 @ 07:01  -  03-31-20 @ 12:41  --------------------------------------------------------  IN: 25 mL / OUT: 0 mL / NET: 25 mL    Physical Exam:  	Gen: Sedated, intubated  	HEENT: + ETT  	Pulm: + mechanical breath sounds  	CV: S1S2+  	Abd: Soft, non distended   	Ext: No LE edema B/L  	Neuro: Sedated  	Skin: Warm and dry              Access: Right IJ non tunneled catheter without bleeding    LABS/STUDIES  --------------------------------------------------------------------------------              8.3    26.74 >-----------<  529      [03-31-20 @ 11:34]              25.6     133  |  99  |  25  ----------------------------<  103      [03-31-20 @ 11:34]  4.1   |  20  |  1.25        Ca     10.4     [03-31-20 @ 11:34]      Mg     2.7     [03-31-20 @ 11:34]      Phos  5.1     [03-31-20 @ 11:34]    Creatinine Trend:  SCr 1.25 [03-31 @ 11:34]  SCr 1.19 [03-31 @ 03:20]  SCr 1.04 [03-30 @ 15:20]  SCr 1.09 [03-30 @ 02:15]  SCr 1.19 [03-29 @ 17:30]    Urinalysis - [03-29-20 @ 10:00]      Color BROWN / Appearance TURBID / SG 1.020 / pH 6.5      Gluc NEGATIVE / Ketone NEGATIVE  / Bili SMALL / Urobili NORMAL       Blood MODERATE / Protein 100 / Leuk Est LARGE / Nitrite NEGATIVE      RBC 10-20 / WBC >50 / Hyaline  / Gran  / Sq Epi FEW / Non Sq Epi  / Bacteria MODERATE

## 2020-03-31 NOTE — PROCEDURE NOTE - NSPOSTCAREGUIDE_GEN_A_CORE
Care for catheter as per unit/ICU protocols
Care for catheter as per unit/ICU protocols
Verbal/written post procedure instructions were given to patient/caregiver

## 2020-03-31 NOTE — PROGRESS NOTE ADULT - SUBJECTIVE AND OBJECTIVE BOX
ISSUES:   Acute hypoxic respiratory failure              ARDS              Septic shock              COVID-19+              SAM              Mixed metabolic / respiratory acidosis              Anemia    INTERVAL EVENTS:   Remains intubated. FIO2 decreased to 40%. PEEP 5.  Remains on CRRT for renal failure  Remains on levophed for septic shock. This was titrated.    HISTORY:   Unable to obtain history or ROS due to intubation and sedation    PHYSICAL EXAM:   Gen: Comfortable, No acute distress  Eyes: Sclera white, Conjunctiva normal, Eyelids normal, Pupils symmetrical   ENT: Mucous membranes moist, OG tube in place,  ,    Neck: Trachea midline,  ,  ,  ,  ,    CV: Rate regular, Rhythm regular,  ,    Resp: Breath sounds coarse, No accessory muscles use,  ,    Abd: Soft, Mildly distended, Non-tender, Bowel sounds normal,  ,    Skin: Warm, 1+ edema of lower extremities,  ,    : Frances in place  Neuro: Unable to elicit motor response.,    Psych: RASS -5      ASSESSMENT AND PLAN:     NEURO:  Vent sedation - Continue propofol, midazolam, fentanyl for ventilator synchrony.                          RESPIRATORY:  Acute respiratory failure - Mechanical ventilation. Monitor ABG. Wean FIO2 for goal O2sat above 92. Vent bundle.                                 CARDIOVASCULAR:  Septic shock requiring IV pressors - Wean pressors for MAP goal of 65.                               RENAL:  SAM requiring dialysis – Dialysis. Renally dose medications. Monitor for hyperkalemia and uremia. Avoid nephrotoxic medications. Monitor IOs and electrolytes. Discussed with Renal Consult.         GASTROINTESTINAL:  GI prophylaxis with famotidine  Zofran and Reglan IV PRN for nausea  Tube feed              HEMATOLOGIC:  No signs of active bleeding. Monitor Hgb in CBC in AM  On therapeutic anticoagulation.  COVID hypercoagulability - Continue aspirin and heparin gtt      INFECTIOUS DISEASE:  COVID-19 infection - s/p hydroxychloroquine, azithromycin                                                 ENDOCRINE:  Stable – Monitor glucose fingersticks for goal 120-180.            Pertinent clinical, laboratory, radiographic, hemodynamic, echocardiographic, respiratory data, microbiologic data and chart were reviewed by myself and analyzed frequently throughout the course of the day and night by myself.    Plan discussed at length with the CTICU staff     Patient unable to participate with discussion of plan.    Patient required critical care management.  75 minutes were spent evaluating, managing, providing, coordinating, and documenting care for this patient, exclusive of procedures from  8AM to 1159PM.    _________________________  VITAL SIGNS:  Vital Signs Last 24 Hrs  T(C): 36.6 (31 Mar 2020 16:00), Max: 37.7 (31 Mar 2020 12:00)  T(F): 97.9 (31 Mar 2020 16:00), Max: 99.9 (31 Mar 2020 12:00)  HR: 110 (31 Mar 2020 17:15) (91 - 127)  BP: 114/66 (31 Mar 2020 16:00) (112/80 - 114/66)  BP(mean): 77 (31 Mar 2020 16:00) (77 - 87)  RR: 28 (31 Mar 2020 17:15) (15 - 30)  SpO2: 100% (31 Mar 2020 16:00) (95% - 100%)  I/Os:   I&O's Detail    30 Mar 2020 07:01  -  31 Mar 2020 07:00  --------------------------------------------------------  IN:    dexmedetomidine Infusion: 109.8 mL    Enteral Tube Flush: 150 mL    heparin Infusion: 288 mL    midazolam Infusion: 15.7 mL    norepinephrine Infusion: 102.7 mL    ns in tub fed  [id number]: 190 mL    propofol Infusion: 153.2 mL  Total IN: 1009.4 mL    OUT:    Other: 1595 mL  Total OUT: 1595 mL    Total NET: -585.6 mL      31 Mar 2020 07:01  -  31 Mar 2020 18:11  --------------------------------------------------------  IN:    dexmedetomidine Infusion: 69 mL    Enteral Tube Flush: 60 mL    heparin Infusion: 60 mL    ns in tub fed  [id number]: 100 mL  Total IN: 289 mL    OUT:    Estimated Blood Loss: 100 mL    Other: 4 mL  Total OUT: 104 mL    Total NET: 185 mL          Mode: AC/ CMV (Assist Control/ Continuous Mandatory Ventilation)  RR (machine): 28  TV (machine): 330  FiO2: 50  PEEP: 5  MAP: 14  PIP: 32      MEDICATIONS:  MEDICATIONS  (STANDING):  chlorhexidine 0.12% Liquid 15 milliLiter(s) Oral Mucosa every 12 hours  chlorhexidine 4% Liquid 1 Application(s) Topical <User Schedule>  dexMEDEtomidine Infusion 0.5 MICROgram(s)/kG/Hr (9.02 mL/Hr) IV Continuous <Continuous>  famotidine Injectable 20 milliGRAM(s) IV Push daily  heparin  Infusion 1200 Unit(s)/Hr (12 mL/Hr) IV Continuous <Continuous>  norepinephrine Infusion 0.05 MICROgram(s)/kG/Min (3.38 mL/Hr) IV Continuous <Continuous>  piperacillin/tazobactam IVPB.. 3.375 Gram(s) IV Intermittent every 8 hours  polyethylene glycol 3350 17 Gram(s) Oral daily  propofol Infusion 20 MICROgram(s)/kG/Min (8.66 mL/Hr) IV Continuous <Continuous>  senna Syrup 15 milliLiter(s) Oral daily  vancomycin  IVPB 1000 milliGRAM(s) IV Intermittent every 12 hours  vancomycin  IVPB      vasopressin Infusion 0.02 Unit(s)/Min (1.2 mL/Hr) IV Continuous <Continuous>    MEDICATIONS  (PRN):  acetaminophen    Suspension .. 650 milliGRAM(s) Oral every 6 hours PRN Temp greater or equal to 38C (100.4F)  HYDROmorphone  Injectable 0.5 milliGRAM(s) IV Push every 2 hours PRN For Respiratory Rate > 30 breaths per minute      LABS:                        8.3    26.74 )-----------( 529      ( 31 Mar 2020 11:34 )             25.6     03-31    133<L>  |  99  |  25<H>  ----------------------------<  103<H>  4.1   |  20<L>  |  1.25    Ca    10.4      31 Mar 2020 11:34  Phos  5.1     03-31  Mg     2.7     03-31    TPro  8.5<H>  /  Alb  3.1<L>  /  TBili  0.4  /  DBili  x   /  AST  45<H>  /  ALT  71<H>  /  AlkPhos  185<H>  03-31    LIVER FUNCTIONS - ( 31 Mar 2020 11:34 )  Alb: 3.1 g/dL / Pro: 8.5 g/dL / ALK PHOS: 185 u/L / ALT: 71 u/L / AST: 45 u/L / GGT: x           PT/INR - ( 30 Mar 2020 06:00 )   PT: 11.8 SEC;   INR: 1.03          PTT - ( 31 Mar 2020 03:20 )  PTT:62.6 SEC  ABG - ( 31 Mar 2020 11:34 )  pH, Arterial: 7.38  pH, Blood: x     /  pCO2: 37    /  pO2: 177   / HCO3: 22    / Base Excess: -2.7  /  SaO2: 99.2                _________________________ ISSUES:   Acute hypoxic respiratory failure              ARDS              Septic shock              COVID-19+              SAM              Mixed metabolic / respiratory acidosis              Anemia    INTERVAL EVENTS:   Remains intubated on FIO2 40%. PEEP 5.  PS 5/5 trial today with good lung volumes. But still sedated. Versed stopped.  Remains on CRRT for renal failure  Remains on levophed for septic shock. This was titrated.    HISTORY:   Unable to obtain history or ROS due to intubation and sedation    PHYSICAL EXAM:   Gen: Comfortable, No acute distress  Eyes: Sclera white, Conjunctiva normal, Eyelids normal, Pupils symmetrical   ENT: Mucous membranes moist, OG tube in place,  ,    Neck: Trachea midline,  ,  ,  ,  ,    CV: Rate regular, Rhythm regular,  ,    Resp: Breath sounds coarse, No accessory muscles use,  ,    Abd: Soft, Mildly distended, Non-tender, Bowel sounds normal,  ,    Skin: Warm, 1+ edema of lower extremities,  ,    : Frances in place  Neuro: Unable to elicit motor response.,    Psych: RASS -5      ASSESSMENT AND PLAN:     NEURO:  Vent sedation - Continue propofol, precedex for ventilator synchrony.                          RESPIRATORY:  Acute respiratory failure - Mechanical ventilation. Monitor ABG. Wean FIO2 for goal O2sat above 92. Vent bundle.                                 CARDIOVASCULAR:  Septic shock requiring IV pressors - Wean pressors for MAP goal of 65.                               RENAL:  SAM requiring dialysis – Dialysis. Renally dose medications. Monitor for hyperkalemia and uremia. Avoid nephrotoxic medications. Monitor IOs and electrolytes. Discussed with Renal Consult.         GASTROINTESTINAL:  GI prophylaxis with famotidine  Zofran and Reglan IV PRN for nausea  Tube feed              HEMATOLOGIC:  No signs of active bleeding. Monitor Hgb in CBC in AM  On therapeutic anticoagulation.  COVID hypercoagulability - Continue aspirin and heparin gtt      INFECTIOUS DISEASE:  COVID-19 infection - s/p hydroxychloroquine, azithromycin                                                 ENDOCRINE:  Stable – Monitor glucose fingersticks for goal 120-180.            Pertinent clinical, laboratory, radiographic, hemodynamic, echocardiographic, respiratory data, microbiologic data and chart were reviewed by myself and analyzed frequently throughout the course of the day and night by myself.    Plan discussed at length with the CTICU staff     Patient unable to participate with discussion of plan.    Patient required critical care management.  75 minutes were spent evaluating, managing, providing, coordinating, and documenting care for this patient, exclusive of procedures from  8AM to 1159PM.    _________________________  VITAL SIGNS:  Vital Signs Last 24 Hrs  T(C): 36.6 (31 Mar 2020 16:00), Max: 37.7 (31 Mar 2020 12:00)  T(F): 97.9 (31 Mar 2020 16:00), Max: 99.9 (31 Mar 2020 12:00)  HR: 110 (31 Mar 2020 17:15) (91 - 127)  BP: 114/66 (31 Mar 2020 16:00) (112/80 - 114/66)  BP(mean): 77 (31 Mar 2020 16:00) (77 - 87)  RR: 28 (31 Mar 2020 17:15) (15 - 30)  SpO2: 100% (31 Mar 2020 16:00) (95% - 100%)  I/Os:   I&O's Detail    30 Mar 2020 07:01  -  31 Mar 2020 07:00  --------------------------------------------------------  IN:    dexmedetomidine Infusion: 109.8 mL    Enteral Tube Flush: 150 mL    heparin Infusion: 288 mL    midazolam Infusion: 15.7 mL    norepinephrine Infusion: 102.7 mL    ns in tub fed  [id number]: 190 mL    propofol Infusion: 153.2 mL  Total IN: 1009.4 mL    OUT:    Other: 1595 mL  Total OUT: 1595 mL    Total NET: -585.6 mL      31 Mar 2020 07:01  -  31 Mar 2020 18:11  --------------------------------------------------------  IN:    dexmedetomidine Infusion: 69 mL    Enteral Tube Flush: 60 mL    heparin Infusion: 60 mL    ns in tub fed  [id number]: 100 mL  Total IN: 289 mL    OUT:    Estimated Blood Loss: 100 mL    Other: 4 mL  Total OUT: 104 mL    Total NET: 185 mL          Mode: AC/ CMV (Assist Control/ Continuous Mandatory Ventilation)  RR (machine): 28  TV (machine): 330  FiO2: 50  PEEP: 5  MAP: 14  PIP: 32      MEDICATIONS:  MEDICATIONS  (STANDING):  chlorhexidine 0.12% Liquid 15 milliLiter(s) Oral Mucosa every 12 hours  chlorhexidine 4% Liquid 1 Application(s) Topical <User Schedule>  dexMEDEtomidine Infusion 0.5 MICROgram(s)/kG/Hr (9.02 mL/Hr) IV Continuous <Continuous>  famotidine Injectable 20 milliGRAM(s) IV Push daily  heparin  Infusion 1200 Unit(s)/Hr (12 mL/Hr) IV Continuous <Continuous>  norepinephrine Infusion 0.05 MICROgram(s)/kG/Min (3.38 mL/Hr) IV Continuous <Continuous>  piperacillin/tazobactam IVPB.. 3.375 Gram(s) IV Intermittent every 8 hours  polyethylene glycol 3350 17 Gram(s) Oral daily  propofol Infusion 20 MICROgram(s)/kG/Min (8.66 mL/Hr) IV Continuous <Continuous>  senna Syrup 15 milliLiter(s) Oral daily  vancomycin  IVPB 1000 milliGRAM(s) IV Intermittent every 12 hours  vancomycin  IVPB      vasopressin Infusion 0.02 Unit(s)/Min (1.2 mL/Hr) IV Continuous <Continuous>    MEDICATIONS  (PRN):  acetaminophen    Suspension .. 650 milliGRAM(s) Oral every 6 hours PRN Temp greater or equal to 38C (100.4F)  HYDROmorphone  Injectable 0.5 milliGRAM(s) IV Push every 2 hours PRN For Respiratory Rate > 30 breaths per minute      LABS:                        8.3    26.74 )-----------( 529      ( 31 Mar 2020 11:34 )             25.6     03-31    133<L>  |  99  |  25<H>  ----------------------------<  103<H>  4.1   |  20<L>  |  1.25    Ca    10.4      31 Mar 2020 11:34  Phos  5.1     03-31  Mg     2.7     03-31    TPro  8.5<H>  /  Alb  3.1<L>  /  TBili  0.4  /  DBili  x   /  AST  45<H>  /  ALT  71<H>  /  AlkPhos  185<H>  03-31    LIVER FUNCTIONS - ( 31 Mar 2020 11:34 )  Alb: 3.1 g/dL / Pro: 8.5 g/dL / ALK PHOS: 185 u/L / ALT: 71 u/L / AST: 45 u/L / GGT: x           PT/INR - ( 30 Mar 2020 06:00 )   PT: 11.8 SEC;   INR: 1.03          PTT - ( 31 Mar 2020 03:20 )  PTT:62.6 SEC  ABG - ( 31 Mar 2020 11:34 )  pH, Arterial: 7.38  pH, Blood: x     /  pCO2: 37    /  pO2: 177   / HCO3: 22    / Base Excess: -2.7  /  SaO2: 99.2                _________________________ ISSUES:   Acute hypoxic respiratory failure              ARDS              Septic shock              COVID-19+              SAM              Mixed metabolic / respiratory acidosis              Anemia    INTERVAL EVENTS:   Remains intubated on FIO2 40%. PEEP 5.  PS 5/5 trial today with good lung volumes. But still sedated. Versed stopped.  Remains on CRRT for renal failure  Remains on levophed for septic shock. This was titrated.    HISTORY:   Unable to obtain history or ROS due to intubation and sedation    PHYSICAL EXAM:   Gen: Comfortable, No acute distress  Eyes: Sclera white, Conjunctiva normal, Eyelids normal, Pupils symmetrical   ENT: Mucous membranes moist, OG tube in place,  ,    Neck: Trachea midline,  ,  ,  ,  ,    CV: Rate regular, Rhythm regular,  ,    Resp: Breath sounds coarse, No accessory muscles use,  ,    Abd: Soft, Mildly distended, Non-tender, Bowel sounds normal,  ,    Skin: Warm, 1+ edema of lower extremities,  ,    : Frances in place  Neuro: Unable to elicit motor response.,    Psych: RASS -5      ASSESSMENT AND PLAN:     NEURO:  Vent sedation - Continue propofol, precedex for ventilator synchrony.                          RESPIRATORY:  Acute respiratory failure - Mechanical ventilation. Monitor ABG. Wean FIO2 for goal O2sat above 92. Vent bundle.                                 CARDIOVASCULAR:  Septic shock requiring IV pressors - Wean pressors for MAP goal of 65.                               RENAL:  SAM requiring dialysis – Dialysis. Renally dose medications. Monitor for hyperkalemia and uremia. Avoid nephrotoxic medications. Monitor IOs and electrolytes. Discussed with Renal Consult.         GASTROINTESTINAL:  GI prophylaxis with famotidine  Zofran and Reglan IV PRN for nausea  Tube feed              HEMATOLOGIC:  No signs of active bleeding. Monitor Hgb in CBC in AM  On therapeutic anticoagulation.  COVID hypercoagulability - Continue aspirin and heparin gtt      INFECTIOUS DISEASE:  COVID-19 infection - s/p hydroxychloroquine, azithromycin            Enterococcus bacteremia - vanc and zosyn. Aortic valve not well visualized on TTE. F/U Culture. New lines placed. Removing old lines.                                     ENDOCRINE:  Stable – Monitor glucose fingersticks for goal 120-180.            Pertinent clinical, laboratory, radiographic, hemodynamic, echocardiographic, respiratory data, microbiologic data and chart were reviewed by myself and analyzed frequently throughout the course of the day and night by myself.    Plan discussed at length with the CTICU staff     Patient unable to participate with discussion of plan.    Patient required critical care management.  75 minutes were spent evaluating, managing, providing, coordinating, and documenting care for this patient, exclusive of procedures from  8AM to 1159PM.    _________________________  VITAL SIGNS:  Vital Signs Last 24 Hrs  T(C): 36.6 (31 Mar 2020 16:00), Max: 37.7 (31 Mar 2020 12:00)  T(F): 97.9 (31 Mar 2020 16:00), Max: 99.9 (31 Mar 2020 12:00)  HR: 110 (31 Mar 2020 17:15) (91 - 127)  BP: 114/66 (31 Mar 2020 16:00) (112/80 - 114/66)  BP(mean): 77 (31 Mar 2020 16:00) (77 - 87)  RR: 28 (31 Mar 2020 17:15) (15 - 30)  SpO2: 100% (31 Mar 2020 16:00) (95% - 100%)  I/Os:   I&O's Detail    30 Mar 2020 07:01  -  31 Mar 2020 07:00  --------------------------------------------------------  IN:    dexmedetomidine Infusion: 109.8 mL    Enteral Tube Flush: 150 mL    heparin Infusion: 288 mL    midazolam Infusion: 15.7 mL    norepinephrine Infusion: 102.7 mL    ns in tub fed  [id number]: 190 mL    propofol Infusion: 153.2 mL  Total IN: 1009.4 mL    OUT:    Other: 1595 mL  Total OUT: 1595 mL    Total NET: -585.6 mL      31 Mar 2020 07:01  -  31 Mar 2020 18:11  --------------------------------------------------------  IN:    dexmedetomidine Infusion: 69 mL    Enteral Tube Flush: 60 mL    heparin Infusion: 60 mL    ns in tub fed  [id number]: 100 mL  Total IN: 289 mL    OUT:    Estimated Blood Loss: 100 mL    Other: 4 mL  Total OUT: 104 mL    Total NET: 185 mL          Mode: AC/ CMV (Assist Control/ Continuous Mandatory Ventilation)  RR (machine): 28  TV (machine): 330  FiO2: 50  PEEP: 5  MAP: 14  PIP: 32      MEDICATIONS:  MEDICATIONS  (STANDING):  chlorhexidine 0.12% Liquid 15 milliLiter(s) Oral Mucosa every 12 hours  chlorhexidine 4% Liquid 1 Application(s) Topical <User Schedule>  dexMEDEtomidine Infusion 0.5 MICROgram(s)/kG/Hr (9.02 mL/Hr) IV Continuous <Continuous>  famotidine Injectable 20 milliGRAM(s) IV Push daily  heparin  Infusion 1200 Unit(s)/Hr (12 mL/Hr) IV Continuous <Continuous>  norepinephrine Infusion 0.05 MICROgram(s)/kG/Min (3.38 mL/Hr) IV Continuous <Continuous>  piperacillin/tazobactam IVPB.. 3.375 Gram(s) IV Intermittent every 8 hours  polyethylene glycol 3350 17 Gram(s) Oral daily  propofol Infusion 20 MICROgram(s)/kG/Min (8.66 mL/Hr) IV Continuous <Continuous>  senna Syrup 15 milliLiter(s) Oral daily  vancomycin  IVPB 1000 milliGRAM(s) IV Intermittent every 12 hours  vancomycin  IVPB      vasopressin Infusion 0.02 Unit(s)/Min (1.2 mL/Hr) IV Continuous <Continuous>    MEDICATIONS  (PRN):  acetaminophen    Suspension .. 650 milliGRAM(s) Oral every 6 hours PRN Temp greater or equal to 38C (100.4F)  HYDROmorphone  Injectable 0.5 milliGRAM(s) IV Push every 2 hours PRN For Respiratory Rate > 30 breaths per minute      LABS:                        8.3    26.74 )-----------( 529      ( 31 Mar 2020 11:34 )             25.6     03-31    133<L>  |  99  |  25<H>  ----------------------------<  103<H>  4.1   |  20<L>  |  1.25    Ca    10.4      31 Mar 2020 11:34  Phos  5.1     03-31  Mg     2.7     03-31    TPro  8.5<H>  /  Alb  3.1<L>  /  TBili  0.4  /  DBili  x   /  AST  45<H>  /  ALT  71<H>  /  AlkPhos  185<H>  03-31    LIVER FUNCTIONS - ( 31 Mar 2020 11:34 )  Alb: 3.1 g/dL / Pro: 8.5 g/dL / ALK PHOS: 185 u/L / ALT: 71 u/L / AST: 45 u/L / GGT: x           PT/INR - ( 30 Mar 2020 06:00 )   PT: 11.8 SEC;   INR: 1.03          PTT - ( 31 Mar 2020 03:20 )  PTT:62.6 SEC  ABG - ( 31 Mar 2020 11:34 )  pH, Arterial: 7.38  pH, Blood: x     /  pCO2: 37    /  pO2: 177   / HCO3: 22    / Base Excess: -2.7  /  SaO2: 99.2                _________________________

## 2020-04-01 LAB
-  AMPICILLIN: SIGNIFICANT CHANGE UP
-  GENTAMICIN 500: SIGNIFICANT CHANGE UP
-  VANCOMYCIN: SIGNIFICANT CHANGE UP
ALBUMIN SERPL ELPH-MCNC: 2.7 G/DL — LOW (ref 3.3–5)
ALBUMIN SERPL ELPH-MCNC: 3 G/DL — LOW (ref 3.3–5)
ALP SERPL-CCNC: 146 U/L — HIGH (ref 40–120)
ALP SERPL-CCNC: 147 U/L — HIGH (ref 40–120)
ALT FLD-CCNC: 55 U/L — HIGH (ref 4–33)
ALT FLD-CCNC: 92 U/L — HIGH (ref 4–33)
ANION GAP SERPL CALC-SCNC: 14 MMO/L — SIGNIFICANT CHANGE UP (ref 7–14)
ANION GAP SERPL CALC-SCNC: 16 MMO/L — HIGH (ref 7–14)
APTT BLD: 53.6 SEC — HIGH (ref 27.5–36.3)
APTT BLD: 57.7 SEC — HIGH (ref 27.5–36.3)
AST SERPL-CCNC: 37 U/L — HIGH (ref 4–32)
AST SERPL-CCNC: 87 U/L — HIGH (ref 4–32)
BASE EXCESS BLDA CALC-SCNC: -3.5 MMOL/L — SIGNIFICANT CHANGE UP
BASE EXCESS BLDA CALC-SCNC: -4.1 MMOL/L — SIGNIFICANT CHANGE UP
BASOPHILS # BLD AUTO: 0.16 K/UL — SIGNIFICANT CHANGE UP (ref 0–0.2)
BASOPHILS # BLD AUTO: 0.16 K/UL — SIGNIFICANT CHANGE UP (ref 0–0.2)
BASOPHILS NFR BLD AUTO: 0.6 % — SIGNIFICANT CHANGE UP (ref 0–2)
BASOPHILS NFR BLD AUTO: 0.6 % — SIGNIFICANT CHANGE UP (ref 0–2)
BILIRUB SERPL-MCNC: 0.4 MG/DL — SIGNIFICANT CHANGE UP (ref 0.2–1.2)
BILIRUB SERPL-MCNC: 0.5 MG/DL — SIGNIFICANT CHANGE UP (ref 0.2–1.2)
BUN SERPL-MCNC: 38 MG/DL — HIGH (ref 7–23)
BUN SERPL-MCNC: 44 MG/DL — HIGH (ref 7–23)
CA-I BLDA-SCNC: 1.23 MMOL/L — SIGNIFICANT CHANGE UP (ref 1.15–1.29)
CALCIUM SERPL-MCNC: 10.3 MG/DL — SIGNIFICANT CHANGE UP (ref 8.4–10.5)
CALCIUM SERPL-MCNC: 9.8 MG/DL — SIGNIFICANT CHANGE UP (ref 8.4–10.5)
CHLORIDE SERPL-SCNC: 97 MMOL/L — LOW (ref 98–107)
CHLORIDE SERPL-SCNC: 99 MMOL/L — SIGNIFICANT CHANGE UP (ref 98–107)
CO2 SERPL-SCNC: 18 MMOL/L — LOW (ref 22–31)
CO2 SERPL-SCNC: 20 MMOL/L — LOW (ref 22–31)
CREAT SERPL-MCNC: 2.27 MG/DL — HIGH (ref 0.5–1.3)
CREAT SERPL-MCNC: 2.53 MG/DL — HIGH (ref 0.5–1.3)
CULTURE RESULTS: SIGNIFICANT CHANGE UP
EOSINOPHIL # BLD AUTO: 0.35 K/UL — SIGNIFICANT CHANGE UP (ref 0–0.5)
EOSINOPHIL # BLD AUTO: 0.41 K/UL — SIGNIFICANT CHANGE UP (ref 0–0.5)
EOSINOPHIL NFR BLD AUTO: 1.2 % — SIGNIFICANT CHANGE UP (ref 0–6)
EOSINOPHIL NFR BLD AUTO: 1.6 % — SIGNIFICANT CHANGE UP (ref 0–6)
GAMMA INTERFERON BACKGROUND BLD IA-ACNC: 0.01 IU/ML — SIGNIFICANT CHANGE UP
GLUCOSE BLDA-MCNC: 111 MG/DL — HIGH (ref 70–99)
GLUCOSE BLDA-MCNC: 122 MG/DL — HIGH (ref 70–99)
GLUCOSE BLDC GLUCOMTR-MCNC: 120 MG/DL — HIGH (ref 70–99)
GLUCOSE BLDC GLUCOMTR-MCNC: 124 MG/DL — HIGH (ref 70–99)
GLUCOSE SERPL-MCNC: 113 MG/DL — HIGH (ref 70–99)
GLUCOSE SERPL-MCNC: 119 MG/DL — HIGH (ref 70–99)
HCO3 BLDA-SCNC: 21 MMOL/L — LOW (ref 22–26)
HCO3 BLDA-SCNC: 22 MMOL/L — SIGNIFICANT CHANGE UP (ref 22–26)
HCT VFR BLD CALC: 21.1 % — LOW (ref 34.5–45)
HCT VFR BLD CALC: 21.5 % — LOW (ref 34.5–45)
HCT VFR BLDA CALC: 19.1 % — CRITICAL LOW (ref 34.5–46.5)
HCT VFR BLDA CALC: 21.9 % — LOW (ref 34.5–46.5)
HGB BLD-MCNC: 7 G/DL — CRITICAL LOW (ref 11.5–15.5)
HGB BLD-MCNC: 7.1 G/DL — LOW (ref 11.5–15.5)
HGB BLDA-MCNC: 6.1 G/DL — CRITICAL LOW (ref 11.5–15.5)
HGB BLDA-MCNC: 7 G/DL — CRITICAL LOW (ref 11.5–15.5)
IMM GRANULOCYTES NFR BLD AUTO: 13.8 % — HIGH (ref 0–1.5)
IMM GRANULOCYTES NFR BLD AUTO: 17.3 % — HIGH (ref 0–1.5)
LACTATE BLDA-SCNC: 0.9 MMOL/L — SIGNIFICANT CHANGE UP (ref 0.5–2)
LYMPHOCYTES # BLD AUTO: 11 % — LOW (ref 13–44)
LYMPHOCYTES # BLD AUTO: 2.33 K/UL — SIGNIFICANT CHANGE UP (ref 1–3.3)
LYMPHOCYTES # BLD AUTO: 3.12 K/UL — SIGNIFICANT CHANGE UP (ref 1–3.3)
LYMPHOCYTES # BLD AUTO: 9.4 % — LOW (ref 13–44)
M TB IFN-G BLD-IMP: SIGNIFICANT CHANGE UP
M TB IFN-G CD4+ BCKGRND COR BLD-ACNC: 0 IU/ML — SIGNIFICANT CHANGE UP
M TB IFN-G CD4+CD8+ BCKGRND COR BLD-ACNC: 0 IU/ML — SIGNIFICANT CHANGE UP
MAGNESIUM SERPL-MCNC: 2.8 MG/DL — HIGH (ref 1.6–2.6)
MAGNESIUM SERPL-MCNC: 2.9 MG/DL — HIGH (ref 1.6–2.6)
MANUAL SMEAR VERIFICATION: SIGNIFICANT CHANGE UP
MANUAL SMEAR VERIFICATION: SIGNIFICANT CHANGE UP
MCHC RBC-ENTMCNC: 27.7 PG — SIGNIFICANT CHANGE UP (ref 27–34)
MCHC RBC-ENTMCNC: 27.7 PG — SIGNIFICANT CHANGE UP (ref 27–34)
MCHC RBC-ENTMCNC: 33 % — SIGNIFICANT CHANGE UP (ref 32–36)
MCHC RBC-ENTMCNC: 33.2 % — SIGNIFICANT CHANGE UP (ref 32–36)
MCV RBC AUTO: 83.4 FL — SIGNIFICANT CHANGE UP (ref 80–100)
MCV RBC AUTO: 84 FL — SIGNIFICANT CHANGE UP (ref 80–100)
METHOD TYPE: SIGNIFICANT CHANGE UP
MONOCYTES # BLD AUTO: 2.7 K/UL — HIGH (ref 0–0.9)
MONOCYTES # BLD AUTO: 3.15 K/UL — HIGH (ref 0–0.9)
MONOCYTES NFR BLD AUTO: 10.9 % — SIGNIFICANT CHANGE UP (ref 2–14)
MONOCYTES NFR BLD AUTO: 11.1 % — SIGNIFICANT CHANGE UP (ref 2–14)
NEUTROPHILS # BLD AUTO: 15.82 K/UL — HIGH (ref 1.8–7.4)
NEUTROPHILS # BLD AUTO: 16.67 K/UL — HIGH (ref 1.8–7.4)
NEUTROPHILS NFR BLD AUTO: 58.8 % — SIGNIFICANT CHANGE UP (ref 43–77)
NEUTROPHILS NFR BLD AUTO: 63.7 % — SIGNIFICANT CHANGE UP (ref 43–77)
NRBC # FLD: 0.02 K/UL — SIGNIFICANT CHANGE UP (ref 0–0)
NRBC # FLD: 0.03 K/UL — SIGNIFICANT CHANGE UP (ref 0–0)
ORGANISM # SPEC MICROSCOPIC CNT: SIGNIFICANT CHANGE UP
PCO2 BLDA: 37 MMHG — SIGNIFICANT CHANGE UP (ref 32–48)
PCO2 BLDA: 42 MMHG — SIGNIFICANT CHANGE UP (ref 32–48)
PH BLDA: 7.32 PH — LOW (ref 7.35–7.45)
PH BLDA: 7.37 PH — SIGNIFICANT CHANGE UP (ref 7.35–7.45)
PHOSPHATE SERPL-MCNC: 5 MG/DL — HIGH (ref 2.5–4.5)
PHOSPHATE SERPL-MCNC: 5.2 MG/DL — HIGH (ref 2.5–4.5)
PLATELET # BLD AUTO: 552 K/UL — HIGH (ref 150–400)
PLATELET # BLD AUTO: 566 K/UL — HIGH (ref 150–400)
PMV BLD: 10.7 FL — SIGNIFICANT CHANGE UP (ref 7–13)
PMV BLD: 11.1 FL — SIGNIFICANT CHANGE UP (ref 7–13)
PO2 BLDA: 121 MMHG — HIGH (ref 83–108)
PO2 BLDA: 122 MMHG — HIGH (ref 83–108)
POTASSIUM BLDA-SCNC: 3.1 MMOL/L — LOW (ref 3.4–4.5)
POTASSIUM BLDA-SCNC: 3.6 MMOL/L — SIGNIFICANT CHANGE UP (ref 3.4–4.5)
POTASSIUM SERPL-MCNC: 3.4 MMOL/L — LOW (ref 3.5–5.3)
POTASSIUM SERPL-MCNC: 3.9 MMOL/L — SIGNIFICANT CHANGE UP (ref 3.5–5.3)
POTASSIUM SERPL-SCNC: 3.4 MMOL/L — LOW (ref 3.5–5.3)
POTASSIUM SERPL-SCNC: 3.9 MMOL/L — SIGNIFICANT CHANGE UP (ref 3.5–5.3)
PROT SERPL-MCNC: 7.3 G/DL — SIGNIFICANT CHANGE UP (ref 6–8.3)
PROT SERPL-MCNC: 7.4 G/DL — SIGNIFICANT CHANGE UP (ref 6–8.3)
QUANT TB PLUS MITOGEN MINUS NIL: 0.01 IU/ML — SIGNIFICANT CHANGE UP
RBC # BLD: 2.53 M/UL — LOW (ref 3.8–5.2)
RBC # BLD: 2.56 M/UL — LOW (ref 3.8–5.2)
RBC # FLD: 15.4 % — HIGH (ref 10.3–14.5)
RBC # FLD: 15.5 % — HIGH (ref 10.3–14.5)
SAO2 % BLDA: 98.2 % — SIGNIFICANT CHANGE UP (ref 95–99)
SAO2 % BLDA: 98.5 % — SIGNIFICANT CHANGE UP (ref 95–99)
SODIUM BLDA-SCNC: 132 MMOL/L — LOW (ref 136–146)
SODIUM BLDA-SCNC: 134 MMOL/L — LOW (ref 136–146)
SODIUM SERPL-SCNC: 131 MMOL/L — LOW (ref 135–145)
SODIUM SERPL-SCNC: 133 MMOL/L — LOW (ref 135–145)
SPECIMEN SOURCE: SIGNIFICANT CHANGE UP
VANCOMYCIN TROUGH SERPL-MCNC: 38.5 UG/ML — CRITICAL HIGH (ref 10–20)
VANCOMYCIN TROUGH SERPL-MCNC: 45.3 UG/ML — CRITICAL HIGH (ref 10–20)
WBC # BLD: 24.86 K/UL — HIGH (ref 3.8–10.5)
WBC # BLD: 28.34 K/UL — HIGH (ref 3.8–10.5)
WBC # FLD AUTO: 24.86 K/UL — HIGH (ref 3.8–10.5)
WBC # FLD AUTO: 28.34 K/UL — HIGH (ref 3.8–10.5)

## 2020-04-01 PROCEDURE — 99291 CRITICAL CARE FIRST HOUR: CPT

## 2020-04-01 PROCEDURE — 99233 SBSQ HOSP IP/OBS HIGH 50: CPT | Mod: GC

## 2020-04-01 PROCEDURE — 99292 CRITICAL CARE ADDL 30 MIN: CPT

## 2020-04-01 RX ORDER — LACTULOSE 10 G/15ML
20 SOLUTION ORAL
Refills: 0 | Status: DISCONTINUED | OUTPATIENT
Start: 2020-04-01 | End: 2020-04-07

## 2020-04-01 RX ORDER — POTASSIUM CHLORIDE 20 MEQ
20 PACKET (EA) ORAL
Refills: 0 | Status: COMPLETED | OUTPATIENT
Start: 2020-04-01 | End: 2020-04-01

## 2020-04-01 RX ORDER — ALTEPLASE 100 MG
2 KIT INTRAVENOUS ONCE
Refills: 0 | Status: COMPLETED | OUTPATIENT
Start: 2020-04-01 | End: 2020-04-01

## 2020-04-01 RX ADMIN — Medication 3.38 MICROGRAM(S)/KG/MIN: at 20:00

## 2020-04-01 RX ADMIN — CHLORHEXIDINE GLUCONATE 15 MILLILITER(S): 213 SOLUTION TOPICAL at 05:36

## 2020-04-01 RX ADMIN — Medication 50 MILLIEQUIVALENT(S): at 22:00

## 2020-04-01 RX ADMIN — CHLORHEXIDINE GLUCONATE 1 APPLICATION(S): 213 SOLUTION TOPICAL at 05:36

## 2020-04-01 RX ADMIN — POLYETHYLENE GLYCOL 3350 17 GRAM(S): 17 POWDER, FOR SOLUTION ORAL at 11:27

## 2020-04-01 RX ADMIN — PIPERACILLIN AND TAZOBACTAM 25 GRAM(S): 4; .5 INJECTION, POWDER, LYOPHILIZED, FOR SOLUTION INTRAVENOUS at 11:27

## 2020-04-01 RX ADMIN — CHLORHEXIDINE GLUCONATE 15 MILLILITER(S): 213 SOLUTION TOPICAL at 17:13

## 2020-04-01 RX ADMIN — SENNA PLUS 15 MILLILITER(S): 8.6 TABLET ORAL at 11:27

## 2020-04-01 RX ADMIN — FAMOTIDINE 20 MILLIGRAM(S): 10 INJECTION INTRAVENOUS at 11:16

## 2020-04-01 RX ADMIN — LACTULOSE 20 GRAM(S): 10 SOLUTION ORAL at 17:13

## 2020-04-01 RX ADMIN — ALTEPLASE 2 MILLIGRAM(S): KIT at 02:15

## 2020-04-01 RX ADMIN — HEPARIN SODIUM 12 UNIT(S)/HR: 5000 INJECTION INTRAVENOUS; SUBCUTANEOUS at 22:46

## 2020-04-01 RX ADMIN — PIPERACILLIN AND TAZOBACTAM 25 GRAM(S): 4; .5 INJECTION, POWDER, LYOPHILIZED, FOR SOLUTION INTRAVENOUS at 05:36

## 2020-04-01 RX ADMIN — Medication 650 MILLIGRAM(S): at 04:30

## 2020-04-01 RX ADMIN — LACTULOSE 20 GRAM(S): 10 SOLUTION ORAL at 13:10

## 2020-04-01 RX ADMIN — PIPERACILLIN AND TAZOBACTAM 25 GRAM(S): 4; .5 INJECTION, POWDER, LYOPHILIZED, FOR SOLUTION INTRAVENOUS at 22:44

## 2020-04-01 NOTE — PROGRESS NOTE ADULT - PROBLEM SELECTOR PLAN 1
Pt. with anuric SAM in the setting of hypotension and COVID-19. No prior labs for review on [facility]. Scr on admission (3/15/20) was 0.84. Scr however worsened to 4.6 on 3/26/20. Pt. likely with ATN. Pt. was started on CRRT/CVVHDF on 3/26/20. CRRT circuit with multiple clotting events overnight. Pt. restarted on systemic heparin gtt with blood return from HD catheter per ICU nurse. Plan to continue CRRT/CVVHDF with fluid removal. Continue with systemic heparin to decrease risk of circuit clotting. Check renal sonogram (when feasible). Monitor labs and urine output. Avoid any potential nephrotoxins Pt. with oliguric SAM in the setting of hypotension and COVID-19. No prior labs for review on [facility]. Scr on admission (3/15/20) was 0.84. Scr however worsened to 4.6 on 3/26/20. Pt. likely with ATN. Pt. was started on CRRT/CVVHDF on 3/26/20. CRRT circuit with multiple clotting events overnight. Pt. restarted on systemic heparin gtt with blood return from HD catheter per ICU nurse. Plan to resume CRRT/CVVHDF. Continue with systemic heparin to decrease risk of circuit clotting. Check renal sonogram (when feasible). Monitor labs and urine output. Avoid any potential nephrotoxins

## 2020-04-01 NOTE — PROGRESS NOTE ADULT - SUBJECTIVE AND OBJECTIVE BOX
ALEXA GARCIA   MRN#: [id number]     The patient is a 59y Female who was seen, evaluated, & examined with the ICU staff with a multidisciplinary care plan formulated & implemented. All available clinical, laboratory, radiographic, pharmacologic, and electrocardiographic data were reviewed & analyzed.      The patient was in the ICU in critical condition secondary to:     ARDS  actue hypoxic/hypercarbic respiratory failure  vasodilatory shock    For support and evaluation & prevention of further decompensation secondary to persistent cardiopulmonary dysfunction, respiratory status required:     supplemental oxygen with full ventilatory support / mechanical ventilation  continuous pulse oximetry monitoring  following ABGs with A-line monitoring  IV Precedex infusion  IV Propofol infusion    Invasive hemodynamic monitoring with     an A-line was required for continuous MAP/BP monitoring     to ensure adequate cardiovascular support and to evaluate for & help prevent decompensation while receiving     IV Levophed infusion  CVVH    secondary to     vasodilatory shock    In addition:  COVID+ admitted with respiratory failure/ARDS, intubation day 11  Moderate ARDS with P/F 200s; on CPAP 5/5 with normal ABG - will wean to 0/5    Driving pressure 15, tidal volume 6 cc/kg, airway pressures 23 on CMV  Sedated with Propofol and Precedex - will wean Propofol and attempt to CPAP  Minimal pressor requirement, like due to vasoplegia from sedation   Tolerating feeds; No output via rectal tube in 24 hours - will add Lactulose  SAM on CVVH with 100 cc/hr off - target 500 cc to 1L overall negative - Renal following  S/p Plaquenil, Azithromycin and Solumedrol; check cytokine levels (IL-1, IL-6) and quantiferon gold  Enterobacter in blood - on Vancomycin  H/H decreased on heparin gtt - no signs of active bleeding, likely due to blood loss from CVVH machine clotting - will trend  Sugars controlled without medication  Lines day 2    The patient required critical care management and I personally provided 75 minutes of non-continuous care to the patient, excluding separate procedures, in addition to discussing the patient and plan at length with the ICU staff and helping coordinate care.

## 2020-04-01 NOTE — PROGRESS NOTE ADULT - SUBJECTIVE AND OBJECTIVE BOX
[facility] DIVISION OF KIDNEY DISEASES AND HYPERTENSION -- FOLLOW UP NOTE  --------------------------------------------------------------------------------  HPI: 60 yo F admitted to ICU for respiratory failure, pneumonia, SAM and COVID-19 infection. Scr increased to 4.6 on 3/23/20. Pt. initiated on CRRT/CVVHDF on 3/23/20.    Pt. was seen and examined in ICU. Overnight, pt. had clotting episodes of CRRT circuit. Pt. was then started on systemic heparin gtt however continued to have clotting of CRRT circuit. Per ICU nurse, catheter now with blood return. Pt. is sedated,  intubated (FiO2 improved to 40%, PEEP stable at 5), not on IV vasopressor support, and is anuric.     PAST HISTORY  --------------------------------------------------------------------------------  No significant changes to PMH, PSH, FHx, SHx, unless otherwise noted    ALLERGIES & MEDICATIONS  --------------------------------------------------------------------------------  Allergies    No Known Allergies    Intolerances    Standing Inpatient Medications  chlorhexidine 0.12% Liquid 15 milliLiter(s) Oral Mucosa every 12 hours  chlorhexidine 4% Liquid 1 Application(s) Topical <User Schedule>  CRRT Treatment    <Continuous>  dexMEDEtomidine Infusion 0.5 MICROgram(s)/kG/Hr IV Continuous <Continuous>  famotidine Injectable 20 milliGRAM(s) IV Push daily  heparin  Infusion 1200 Unit(s)/Hr IV Continuous <Continuous>  lactulose Syrup 20 Gram(s) Oral four times a day  norepinephrine Infusion 0.05 MICROgram(s)/kG/Min IV Continuous <Continuous>  Phoxillum Filtration BK 4 / 2.5 5000 milliLiter(s) CRRT <Continuous>  piperacillin/tazobactam IVPB.. 3.375 Gram(s) IV Intermittent every 8 hours  polyethylene glycol 3350 17 Gram(s) Oral daily  PrismaSOL Filtration BGK 0 / 2.5 5000 milliLiter(s) CRRT <Continuous>  PrismaSOL Filtration BGK 0 / 2.5 5000 milliLiter(s) CRRT <Continuous>  propofol Infusion 20 MICROgram(s)/kG/Min IV Continuous <Continuous>  senna Syrup 15 milliLiter(s) Oral daily  vasopressin Infusion 0.02 Unit(s)/Min IV Continuous <Continuous>    REVIEW OF SYSTEMS  --------------------------------------------------------------------------------  Unable to obtain.    VITALS/PHYSICAL EXAM  --------------------------------------------------------------------------------  T(C): 37.2 (04-01-20 @ 08:00), Max: 38.3 (04-01-20 @ 04:00)  HR: 82 (04-01-20 @ 11:01) (82 - 142)  BP: 114/66 (03-31-20 @ 16:00) (114/66 - 114/66)  RR: 22 (04-01-20 @ 08:00) (18 - 29)  SpO2: 100% (04-01-20 @ 11:01) (95% - 100%)  Wt(kg): --    03-31-20 @ 07:01  -  04-01-20 @ 07:00  --------------------------------------------------------  IN: 929.2 mL / OUT: 104 mL / NET: 825.2 mL    04-01-20 @ 07:01  -  04-01-20 @ 11:07  --------------------------------------------------------  IN: 255.8 mL / OUT: 0 mL / NET: 255.8 mL    Physical Exam:  	Gen: Sedated, intubated  	HEENT: + ETT  	Pulm: + mechanical breath sounds  	CV: S1S2+  	Abd: Soft, non distended   	Ext: No LE edema B/L  	Neuro: Sedated  	Skin: Warm and dry              Access: Right IJ non tunneled catheter without bleeding    LABS/STUDIES  --------------------------------------------------------------------------------              7.1    28.34 >-----------<  552      [04-01-20 @ 06:00]              21.5     131  |  97  |  44  ----------------------------<  113      [04-01-20 @ 06:00]  3.9   |  18  |  2.53        Ca     10.3     [04-01-20 @ 06:00]      Mg     2.9     [04-01-20 @ 06:00]      Phos  5.2     [04-01-20 @ 06:00]    Creatinine Trend:  SCr 2.53 [04-01 @ 06:00]  SCr 1.94 [03-31 @ 22:20]  SCr 1.25 [03-31 @ 11:34]  SCr 1.19 [03-31 @ 03:20]  SCr 1.04 [03-30 @ 15:20] [facility] DIVISION OF KIDNEY DISEASES AND HYPERTENSION -- FOLLOW UP NOTE  --------------------------------------------------------------------------------  HPI: 60 yo F admitted to ICU for respiratory failure, pneumonia, SAM and COVID-19 infection. Scr increased to 4.6 on 3/23/20. Pt. initiated on CRRT/CVVHDF on 3/23/20.    Pt. was seen and examined in ICU. Overnight, pt. had clotting episodes of CRRT circuit. Pt. was then started on systemic heparin gtt however continued to have clotting of CRRT circuit. Per ICU nurse, catheter now with blood return. Pt. is sedated,  intubated (FiO2 improved to 40%, PEEP stable at 5), not on IV vasopressor support, and is anuric.     PAST HISTORY  --------------------------------------------------------------------------------  No significant changes to PMH, PSH, FHx, SHx, unless otherwise noted    ALLERGIES & MEDICATIONS  --------------------------------------------------------------------------------  Allergies    No Known Allergies    Intolerances    Standing Inpatient Medications  chlorhexidine 0.12% Liquid 15 milliLiter(s) Oral Mucosa every 12 hours  chlorhexidine 4% Liquid 1 Application(s) Topical <User Schedule>  CRRT Treatment    <Continuous>  dexMEDEtomidine Infusion 0.5 MICROgram(s)/kG/Hr IV Continuous <Continuous>  famotidine Injectable 20 milliGRAM(s) IV Push daily  heparin  Infusion 1200 Unit(s)/Hr IV Continuous <Continuous>  lactulose Syrup 20 Gram(s) Oral four times a day  norepinephrine Infusion 0.05 MICROgram(s)/kG/Min IV Continuous <Continuous>  Phoxillum Filtration BK 4 / 2.5 5000 milliLiter(s) CRRT <Continuous>  piperacillin/tazobactam IVPB.. 3.375 Gram(s) IV Intermittent every 8 hours  polyethylene glycol 3350 17 Gram(s) Oral daily  PrismaSOL Filtration BGK 0 / 2.5 5000 milliLiter(s) CRRT <Continuous>  PrismaSOL Filtration BGK 0 / 2.5 5000 milliLiter(s) CRRT <Continuous>  propofol Infusion 20 MICROgram(s)/kG/Min IV Continuous <Continuous>  senna Syrup 15 milliLiter(s) Oral daily  vasopressin Infusion 0.02 Unit(s)/Min IV Continuous <Continuous>    REVIEW OF SYSTEMS  --------------------------------------------------------------------------------  Unable to obtain.    VITALS/PHYSICAL EXAM  --------------------------------------------------------------------------------  T(C): 37.2 (04-01-20 @ 08:00), Max: 38.3 (04-01-20 @ 04:00)  HR: 82 (04-01-20 @ 11:01) (82 - 142)  BP: 114/66 (03-31-20 @ 16:00) (114/66 - 114/66)  RR: 22 (04-01-20 @ 08:00) (18 - 29)  SpO2: 100% (04-01-20 @ 11:01) (95% - 100%)  Wt(kg): --    03-31-20 @ 07:01  -  04-01-20 @ 07:00  --------------------------------------------------------  IN: 929.2 mL / OUT: 104 mL / NET: 825.2 mL    04-01-20 @ 07:01  -  04-01-20 @ 11:07  --------------------------------------------------------  IN: 255.8 mL / OUT: 0 mL / NET: 255.8 mL    Physical Exam:  	Gen: Sedated, intubated  	HEENT: + ETT  	Pulm: + mechanical breath sounds  	CV: S1S2+  	Abd: Soft, non distended   	Ext: No LE edema B/L  	Neuro: Sedated  	Skin: Warm and dry              Access: Right IJ non tunneled catheter without bleeding    LABS/STUDIES  --------------------------------------------------------------------------------              7.1    28.34 >-----------<  552      [04-01-20 @ 06:00]              21.5     131  |  97  |  44  ----------------------------<  113      [04-01-20 @ 06:00]  3.9   |  18  |  2.53        Ca     10.3     [04-01-20 @ 06:00]      Mg     2.9     [04-01-20 @ 06:00]      Phos  5.2     [04-01-20 @ 06:00]    Creatinine Trend:  SCr 2.53 [04-01 @ 06:00]  SCr 1.94 [03-31 @ 22:20]  SCr 1.25 [03-31 @ 11:34]  SCr 1.19 [03-31 @ 03:20]  SCr 1.04 [03-30 @ 15:20]

## 2020-04-02 DIAGNOSIS — E87.6 HYPOKALEMIA: ICD-10-CM

## 2020-04-02 LAB
ALBUMIN SERPL ELPH-MCNC: 3 G/DL — LOW (ref 3.3–5)
ALBUMIN SERPL ELPH-MCNC: 3.1 G/DL — LOW (ref 3.3–5)
ALBUMIN SERPL ELPH-MCNC: 3.6 G/DL — SIGNIFICANT CHANGE UP (ref 3.3–5)
ALP SERPL-CCNC: 140 U/L — HIGH (ref 40–120)
ALP SERPL-CCNC: 145 U/L — HIGH (ref 40–120)
ALP SERPL-CCNC: 146 U/L — HIGH (ref 40–120)
ALT FLD-CCNC: 78 U/L — HIGH (ref 4–33)
ALT FLD-CCNC: 84 U/L — HIGH (ref 4–33)
ALT FLD-CCNC: 90 U/L — HIGH (ref 4–33)
ANION GAP SERPL CALC-SCNC: 12 MMO/L — SIGNIFICANT CHANGE UP (ref 7–14)
ANION GAP SERPL CALC-SCNC: 12 MMO/L — SIGNIFICANT CHANGE UP (ref 7–14)
ANION GAP SERPL CALC-SCNC: 14 MMO/L — SIGNIFICANT CHANGE UP (ref 7–14)
APTT BLD: 60.1 SEC — HIGH (ref 27.5–36.3)
AST SERPL-CCNC: 46 U/L — HIGH (ref 4–32)
AST SERPL-CCNC: 48 U/L — HIGH (ref 4–32)
AST SERPL-CCNC: 65 U/L — HIGH (ref 4–32)
BASE EXCESS BLDA CALC-SCNC: -0.3 MMOL/L — SIGNIFICANT CHANGE UP
BASE EXCESS BLDA CALC-SCNC: -0.6 MMOL/L — SIGNIFICANT CHANGE UP
BASE EXCESS BLDA CALC-SCNC: -0.9 MMOL/L — SIGNIFICANT CHANGE UP
BASE EXCESS BLDA CALC-SCNC: -5.1 MMOL/L — SIGNIFICANT CHANGE UP
BASE EXCESS BLDA CALC-SCNC: 0.3 MMOL/L — SIGNIFICANT CHANGE UP
BASOPHILS # BLD AUTO: 0.06 K/UL — SIGNIFICANT CHANGE UP (ref 0–0.2)
BASOPHILS # BLD AUTO: 0.08 K/UL — SIGNIFICANT CHANGE UP (ref 0–0.2)
BASOPHILS # BLD AUTO: 0.09 K/UL — SIGNIFICANT CHANGE UP (ref 0–0.2)
BASOPHILS # BLD AUTO: 0.17 K/UL — SIGNIFICANT CHANGE UP (ref 0–0.2)
BASOPHILS NFR BLD AUTO: 0.2 % — SIGNIFICANT CHANGE UP (ref 0–2)
BASOPHILS NFR BLD AUTO: 0.4 % — SIGNIFICANT CHANGE UP (ref 0–2)
BASOPHILS NFR BLD AUTO: 0.4 % — SIGNIFICANT CHANGE UP (ref 0–2)
BASOPHILS NFR BLD AUTO: 0.8 % — SIGNIFICANT CHANGE UP (ref 0–2)
BILIRUB SERPL-MCNC: 0.5 MG/DL — SIGNIFICANT CHANGE UP (ref 0.2–1.2)
BLD GP AB SCN SERPL QL: NEGATIVE — SIGNIFICANT CHANGE UP
BLOOD GAS ARTERIAL - FIO2: 40 — SIGNIFICANT CHANGE UP
BUN SERPL-MCNC: 21 MG/DL — SIGNIFICANT CHANGE UP (ref 7–23)
BUN SERPL-MCNC: 22 MG/DL — SIGNIFICANT CHANGE UP (ref 7–23)
BUN SERPL-MCNC: 27 MG/DL — HIGH (ref 7–23)
CALCIUM SERPL-MCNC: 10 MG/DL — SIGNIFICANT CHANGE UP (ref 8.4–10.5)
CALCIUM SERPL-MCNC: 10.8 MG/DL — HIGH (ref 8.4–10.5)
CALCIUM SERPL-MCNC: 9.6 MG/DL — SIGNIFICANT CHANGE UP (ref 8.4–10.5)
CHLORIDE BLDA-SCNC: 102 MMOL/L — SIGNIFICANT CHANGE UP (ref 96–108)
CHLORIDE BLDA-SCNC: 103 MMOL/L — SIGNIFICANT CHANGE UP (ref 96–108)
CHLORIDE SERPL-SCNC: 90 MMOL/L — LOW (ref 98–107)
CHLORIDE SERPL-SCNC: 94 MMOL/L — LOW (ref 98–107)
CHLORIDE SERPL-SCNC: 95 MMOL/L — LOW (ref 98–107)
CO2 SERPL-SCNC: 22 MMOL/L — SIGNIFICANT CHANGE UP (ref 22–31)
CO2 SERPL-SCNC: 23 MMOL/L — SIGNIFICANT CHANGE UP (ref 22–31)
CO2 SERPL-SCNC: 23 MMOL/L — SIGNIFICANT CHANGE UP (ref 22–31)
CREAT BLDA-MCNC: 1.97 MG/DL — HIGH (ref 0.5–1.3)
CREAT BLDA-MCNC: SIGNIFICANT CHANGE UP MG/DL (ref 0.5–1.3)
CREAT SERPL-MCNC: 1.55 MG/DL — HIGH (ref 0.5–1.3)
CREAT SERPL-MCNC: 1.58 MG/DL — HIGH (ref 0.5–1.3)
CREAT SERPL-MCNC: 1.73 MG/DL — HIGH (ref 0.5–1.3)
EOSINOPHIL # BLD AUTO: 0.14 K/UL — SIGNIFICANT CHANGE UP (ref 0–0.5)
EOSINOPHIL # BLD AUTO: 0.16 K/UL — SIGNIFICANT CHANGE UP (ref 0–0.5)
EOSINOPHIL # BLD AUTO: 0.16 K/UL — SIGNIFICANT CHANGE UP (ref 0–0.5)
EOSINOPHIL # BLD AUTO: 0.23 K/UL — SIGNIFICANT CHANGE UP (ref 0–0.5)
EOSINOPHIL NFR BLD AUTO: 0.7 % — SIGNIFICANT CHANGE UP (ref 0–6)
EOSINOPHIL NFR BLD AUTO: 0.9 % — SIGNIFICANT CHANGE UP (ref 0–6)
GLUCOSE BLDA-MCNC: 117 MG/DL — HIGH (ref 70–99)
GLUCOSE BLDA-MCNC: 120 MG/DL — HIGH (ref 70–99)
GLUCOSE BLDA-MCNC: 121 MG/DL — HIGH (ref 70–99)
GLUCOSE BLDA-MCNC: 129 MG/DL — HIGH (ref 70–99)
GLUCOSE BLDA-MCNC: 151 MG/DL — HIGH (ref 70–99)
GLUCOSE BLDC GLUCOMTR-MCNC: 113 MG/DL — HIGH (ref 70–99)
GLUCOSE BLDC GLUCOMTR-MCNC: 145 MG/DL — HIGH (ref 70–99)
GLUCOSE BLDC GLUCOMTR-MCNC: 36 MG/DL — CRITICAL LOW (ref 70–99)
GLUCOSE BLDC GLUCOMTR-MCNC: 71 MG/DL — SIGNIFICANT CHANGE UP (ref 70–99)
GLUCOSE SERPL-MCNC: 120 MG/DL — HIGH (ref 70–99)
GLUCOSE SERPL-MCNC: 125 MG/DL — HIGH (ref 70–99)
GLUCOSE SERPL-MCNC: 135 MG/DL — HIGH (ref 70–99)
HCO3 BLDA-SCNC: 20 MMOL/L — LOW (ref 22–26)
HCO3 BLDA-SCNC: 24 MMOL/L — SIGNIFICANT CHANGE UP (ref 22–26)
HCO3 BLDA-SCNC: 25 MMOL/L — SIGNIFICANT CHANGE UP (ref 22–26)
HCT VFR BLD CALC: 24.9 % — LOW (ref 34.5–45)
HCT VFR BLD CALC: 25.2 % — LOW (ref 34.5–45)
HCT VFR BLD CALC: 26.7 % — LOW (ref 34.5–45)
HCT VFR BLD CALC: 27 % — LOW (ref 34.5–45)
HCT VFR BLDA CALC: 25.8 % — LOW (ref 34.5–46.5)
HCT VFR BLDA CALC: 26.9 % — LOW (ref 34.5–46.5)
HCT VFR BLDA CALC: 27.4 % — LOW (ref 34.5–46.5)
HCT VFR BLDA CALC: 28.6 % — LOW (ref 34.5–46.5)
HCT VFR BLDA CALC: 29.8 % — LOW (ref 34.5–46.5)
HGB BLD-MCNC: 8.4 G/DL — LOW (ref 11.5–15.5)
HGB BLD-MCNC: 8.6 G/DL — LOW (ref 11.5–15.5)
HGB BLD-MCNC: 8.9 G/DL — LOW (ref 11.5–15.5)
HGB BLD-MCNC: 8.9 G/DL — LOW (ref 11.5–15.5)
HGB BLDA-MCNC: 8.3 G/DL — LOW (ref 11.5–15.5)
HGB BLDA-MCNC: 8.7 G/DL — LOW (ref 11.5–15.5)
HGB BLDA-MCNC: 8.8 G/DL — LOW (ref 11.5–15.5)
HGB BLDA-MCNC: 9.2 G/DL — LOW (ref 11.5–15.5)
HGB BLDA-MCNC: 9.6 G/DL — LOW (ref 11.5–15.5)
IMM GRANULOCYTES NFR BLD AUTO: 12.3 % — HIGH (ref 0–1.5)
IMM GRANULOCYTES NFR BLD AUTO: 13.2 % — HIGH (ref 0–1.5)
IMM GRANULOCYTES NFR BLD AUTO: 13.8 % — HIGH (ref 0–1.5)
IMM GRANULOCYTES NFR BLD AUTO: 3.7 % — HIGH (ref 0–1.5)
LACTATE BLDA-SCNC: 1.1 MMOL/L — SIGNIFICANT CHANGE UP (ref 0.5–2)
LACTATE BLDA-SCNC: 1.1 MMOL/L — SIGNIFICANT CHANGE UP (ref 0.5–2)
LACTATE BLDA-SCNC: 1.4 MMOL/L — SIGNIFICANT CHANGE UP (ref 0.5–2)
LACTATE BLDA-SCNC: 1.4 MMOL/L — SIGNIFICANT CHANGE UP (ref 0.5–2)
LYMPHOCYTES # BLD AUTO: 1.43 K/UL — SIGNIFICANT CHANGE UP (ref 1–3.3)
LYMPHOCYTES # BLD AUTO: 1.78 K/UL — SIGNIFICANT CHANGE UP (ref 1–3.3)
LYMPHOCYTES # BLD AUTO: 10.4 % — LOW (ref 13–44)
LYMPHOCYTES # BLD AUTO: 2.06 K/UL — SIGNIFICANT CHANGE UP (ref 1–3.3)
LYMPHOCYTES # BLD AUTO: 2.3 K/UL — SIGNIFICANT CHANGE UP (ref 1–3.3)
LYMPHOCYTES # BLD AUTO: 5.8 % — LOW (ref 13–44)
LYMPHOCYTES # BLD AUTO: 8.6 % — LOW (ref 13–44)
LYMPHOCYTES # BLD AUTO: 9.5 % — LOW (ref 13–44)
MAGNESIUM SERPL-MCNC: 2.6 MG/DL — SIGNIFICANT CHANGE UP (ref 1.6–2.6)
MAGNESIUM SERPL-MCNC: 2.6 MG/DL — SIGNIFICANT CHANGE UP (ref 1.6–2.6)
MAGNESIUM SERPL-MCNC: 2.9 MG/DL — HIGH (ref 1.6–2.6)
MANUAL SMEAR VERIFICATION: SIGNIFICANT CHANGE UP
MCHC RBC-ENTMCNC: 24 PG — LOW (ref 27–34)
MCHC RBC-ENTMCNC: 27.7 PG — SIGNIFICANT CHANGE UP (ref 27–34)
MCHC RBC-ENTMCNC: 27.9 PG — SIGNIFICANT CHANGE UP (ref 27–34)
MCHC RBC-ENTMCNC: 28.5 PG — SIGNIFICANT CHANGE UP (ref 27–34)
MCHC RBC-ENTMCNC: 33 % — SIGNIFICANT CHANGE UP (ref 32–36)
MCHC RBC-ENTMCNC: 33.3 % — SIGNIFICANT CHANGE UP (ref 32–36)
MCHC RBC-ENTMCNC: 33.7 % — SIGNIFICANT CHANGE UP (ref 32–36)
MCHC RBC-ENTMCNC: 34.1 % — SIGNIFICANT CHANGE UP (ref 32–36)
MCV RBC AUTO: 72.8 FL — LOW (ref 80–100)
MCV RBC AUTO: 82.7 FL — SIGNIFICANT CHANGE UP (ref 80–100)
MCV RBC AUTO: 83.2 FL — SIGNIFICANT CHANGE UP (ref 80–100)
MCV RBC AUTO: 83.4 FL — SIGNIFICANT CHANGE UP (ref 80–100)
MONOCYTES # BLD AUTO: 1.89 K/UL — HIGH (ref 0–0.9)
MONOCYTES # BLD AUTO: 2.29 K/UL — HIGH (ref 0–0.9)
MONOCYTES # BLD AUTO: 2.53 K/UL — HIGH (ref 0–0.9)
MONOCYTES # BLD AUTO: 2.65 K/UL — HIGH (ref 0–0.9)
MONOCYTES NFR BLD AUTO: 11.1 % — SIGNIFICANT CHANGE UP (ref 2–14)
MONOCYTES NFR BLD AUTO: 11.7 % — SIGNIFICANT CHANGE UP (ref 2–14)
MONOCYTES NFR BLD AUTO: 12 % — SIGNIFICANT CHANGE UP (ref 2–14)
MONOCYTES NFR BLD AUTO: 7.6 % — SIGNIFICANT CHANGE UP (ref 2–14)
NEUTROPHILS # BLD AUTO: 13.79 K/UL — HIGH (ref 1.8–7.4)
NEUTROPHILS # BLD AUTO: 13.79 K/UL — HIGH (ref 1.8–7.4)
NEUTROPHILS # BLD AUTO: 14.01 K/UL — HIGH (ref 1.8–7.4)
NEUTROPHILS # BLD AUTO: 20.29 K/UL — HIGH (ref 1.8–7.4)
NEUTROPHILS NFR BLD AUTO: 62.7 % — SIGNIFICANT CHANGE UP (ref 43–77)
NEUTROPHILS NFR BLD AUTO: 64.5 % — SIGNIFICANT CHANGE UP (ref 43–77)
NEUTROPHILS NFR BLD AUTO: 66.5 % — SIGNIFICANT CHANGE UP (ref 43–77)
NEUTROPHILS NFR BLD AUTO: 81.8 % — HIGH (ref 43–77)
NRBC # FLD: 0.04 K/UL — SIGNIFICANT CHANGE UP (ref 0–0)
NRBC # FLD: 0.04 K/UL — SIGNIFICANT CHANGE UP (ref 0–0)
NRBC # FLD: 0.05 K/UL — SIGNIFICANT CHANGE UP (ref 0–0)
NRBC # FLD: 0.06 K/UL — SIGNIFICANT CHANGE UP (ref 0–0)
PCO2 BLDA: 39 MMHG — SIGNIFICANT CHANGE UP (ref 32–48)
PCO2 BLDA: 41 MMHG — SIGNIFICANT CHANGE UP (ref 32–48)
PCO2 BLDA: 42 MMHG — SIGNIFICANT CHANGE UP (ref 32–48)
PCO2 BLDA: 44 MMHG — SIGNIFICANT CHANGE UP (ref 32–48)
PCO2 BLDA: 48 MMHG — SIGNIFICANT CHANGE UP (ref 32–48)
PH BLDA: 7.26 PH — LOW (ref 7.35–7.45)
PH BLDA: 7.36 PH — SIGNIFICANT CHANGE UP (ref 7.35–7.45)
PH BLDA: 7.37 PH — SIGNIFICANT CHANGE UP (ref 7.35–7.45)
PH BLDA: 7.38 PH — SIGNIFICANT CHANGE UP (ref 7.35–7.45)
PH BLDA: 7.42 PH — SIGNIFICANT CHANGE UP (ref 7.35–7.45)
PHOSPHATE SERPL-MCNC: 3.5 MG/DL — SIGNIFICANT CHANGE UP (ref 2.5–4.5)
PHOSPHATE SERPL-MCNC: 3.8 MG/DL — SIGNIFICANT CHANGE UP (ref 2.5–4.5)
PHOSPHATE SERPL-MCNC: 3.8 MG/DL — SIGNIFICANT CHANGE UP (ref 2.5–4.5)
PLATELET # BLD AUTO: 437 K/UL — HIGH (ref 150–400)
PLATELET # BLD AUTO: 470 K/UL — HIGH (ref 150–400)
PLATELET # BLD AUTO: 496 K/UL — HIGH (ref 150–400)
PLATELET # BLD AUTO: 570 K/UL — HIGH (ref 150–400)
PMV BLD: 10.7 FL — SIGNIFICANT CHANGE UP (ref 7–13)
PMV BLD: 10.9 FL — SIGNIFICANT CHANGE UP (ref 7–13)
PMV BLD: 10.9 FL — SIGNIFICANT CHANGE UP (ref 7–13)
PMV BLD: 11 FL — SIGNIFICANT CHANGE UP (ref 7–13)
PO2 BLDA: 105 MMHG — SIGNIFICANT CHANGE UP (ref 83–108)
PO2 BLDA: 116 MMHG — HIGH (ref 83–108)
PO2 BLDA: 124 MMHG — HIGH (ref 83–108)
PO2 BLDA: 135 MMHG — HIGH (ref 83–108)
PO2 BLDA: 65 MMHG — LOW (ref 83–108)
POTASSIUM BLDA-SCNC: 3 MMOL/L — LOW (ref 3.4–4.5)
POTASSIUM BLDA-SCNC: 3.2 MMOL/L — LOW (ref 3.4–4.5)
POTASSIUM BLDA-SCNC: 4.8 MMOL/L — HIGH (ref 3.4–4.5)
POTASSIUM SERPL-MCNC: 3.1 MMOL/L — LOW (ref 3.5–5.3)
POTASSIUM SERPL-MCNC: 3.3 MMOL/L — LOW (ref 3.5–5.3)
POTASSIUM SERPL-MCNC: 3.3 MMOL/L — LOW (ref 3.5–5.3)
POTASSIUM SERPL-SCNC: 3.1 MMOL/L — LOW (ref 3.5–5.3)
POTASSIUM SERPL-SCNC: 3.3 MMOL/L — LOW (ref 3.5–5.3)
POTASSIUM SERPL-SCNC: 3.3 MMOL/L — LOW (ref 3.5–5.3)
PROT SERPL-MCNC: 7.5 G/DL — SIGNIFICANT CHANGE UP (ref 6–8.3)
PROT SERPL-MCNC: 7.6 G/DL — SIGNIFICANT CHANGE UP (ref 6–8.3)
PROT SERPL-MCNC: 8.6 G/DL — HIGH (ref 6–8.3)
RBC # BLD: 3.01 M/UL — LOW (ref 3.8–5.2)
RBC # BLD: 3.02 M/UL — LOW (ref 3.8–5.2)
RBC # BLD: 3.21 M/UL — LOW (ref 3.8–5.2)
RBC # BLD: 3.71 M/UL — LOW (ref 3.8–5.2)
RBC # FLD: 14.9 % — HIGH (ref 10.3–14.5)
RBC # FLD: 15.2 % — HIGH (ref 10.3–14.5)
RBC # FLD: 15.4 % — HIGH (ref 10.3–14.5)
RBC # FLD: 23.1 % — HIGH (ref 10.3–14.5)
RH IG SCN BLD-IMP: POSITIVE — SIGNIFICANT CHANGE UP
SAO2 % BLDA: 88.5 % — LOW (ref 95–99)
SAO2 % BLDA: 98.1 % — SIGNIFICANT CHANGE UP (ref 95–99)
SAO2 % BLDA: 98.5 % — SIGNIFICANT CHANGE UP (ref 95–99)
SAO2 % BLDA: 98.7 % — SIGNIFICANT CHANGE UP (ref 95–99)
SAO2 % BLDA: 98.7 % — SIGNIFICANT CHANGE UP (ref 95–99)
SODIUM BLDA-SCNC: 128 MMOL/L — LOW (ref 136–146)
SODIUM BLDA-SCNC: 132 MMOL/L — LOW (ref 136–146)
SODIUM BLDA-SCNC: 133 MMOL/L — LOW (ref 136–146)
SODIUM SERPL-SCNC: 127 MMOL/L — LOW (ref 135–145)
SODIUM SERPL-SCNC: 128 MMOL/L — LOW (ref 135–145)
SODIUM SERPL-SCNC: 130 MMOL/L — LOW (ref 135–145)
VANCOMYCIN TROUGH SERPL-MCNC: 16.1 UG/ML — SIGNIFICANT CHANGE UP (ref 10–20)
WBC # BLD: 20.72 K/UL — HIGH (ref 3.8–10.5)
WBC # BLD: 21.71 K/UL — HIGH (ref 3.8–10.5)
WBC # BLD: 22.03 K/UL — HIGH (ref 3.8–10.5)
WBC # BLD: 24.83 K/UL — HIGH (ref 3.8–10.5)
WBC # FLD AUTO: 20.72 K/UL — HIGH (ref 3.8–10.5)
WBC # FLD AUTO: 21.71 K/UL — HIGH (ref 3.8–10.5)
WBC # FLD AUTO: 22.03 K/UL — HIGH (ref 3.8–10.5)
WBC # FLD AUTO: 24.83 K/UL — HIGH (ref 3.8–10.5)

## 2020-04-02 PROCEDURE — 90945 DIALYSIS ONE EVALUATION: CPT | Mod: GC

## 2020-04-02 PROCEDURE — 99291 CRITICAL CARE FIRST HOUR: CPT

## 2020-04-02 PROCEDURE — 99292 CRITICAL CARE ADDL 30 MIN: CPT

## 2020-04-02 RX ORDER — AMPICILLIN TRIHYDRATE 250 MG
2 CAPSULE ORAL ONCE
Refills: 0 | Status: COMPLETED | OUTPATIENT
Start: 2020-04-02 | End: 2020-04-02

## 2020-04-02 RX ORDER — POTASSIUM CHLORIDE 20 MEQ
20 PACKET (EA) ORAL ONCE
Refills: 0 | Status: COMPLETED | OUTPATIENT
Start: 2020-04-02 | End: 2020-04-02

## 2020-04-02 RX ORDER — AMPICILLIN TRIHYDRATE 250 MG
2 CAPSULE ORAL EVERY 8 HOURS
Refills: 0 | Status: DISCONTINUED | OUTPATIENT
Start: 2020-04-03 | End: 2020-04-18

## 2020-04-02 RX ORDER — HYDROMORPHONE HYDROCHLORIDE 2 MG/ML
2 INJECTION INTRAMUSCULAR; INTRAVENOUS; SUBCUTANEOUS ONCE
Refills: 0 | Status: DISCONTINUED | OUTPATIENT
Start: 2020-04-02 | End: 2020-04-02

## 2020-04-02 RX ORDER — AMPICILLIN TRIHYDRATE 250 MG
CAPSULE ORAL
Refills: 0 | Status: DISCONTINUED | OUTPATIENT
Start: 2020-04-02 | End: 2020-04-18

## 2020-04-02 RX ADMIN — PIPERACILLIN AND TAZOBACTAM 25 GRAM(S): 4; .5 INJECTION, POWDER, LYOPHILIZED, FOR SOLUTION INTRAVENOUS at 06:10

## 2020-04-02 RX ADMIN — Medication 216 GRAM(S): at 22:44

## 2020-04-02 RX ADMIN — HYDROMORPHONE HYDROCHLORIDE 2 MILLIGRAM(S): 2 INJECTION INTRAMUSCULAR; INTRAVENOUS; SUBCUTANEOUS at 03:30

## 2020-04-02 RX ADMIN — Medication 3.38 MICROGRAM(S)/KG/MIN: at 20:00

## 2020-04-02 RX ADMIN — HEPARIN SODIUM 12 UNIT(S)/HR: 5000 INJECTION INTRAVENOUS; SUBCUTANEOUS at 07:56

## 2020-04-02 RX ADMIN — CHLORHEXIDINE GLUCONATE 15 MILLILITER(S): 213 SOLUTION TOPICAL at 04:53

## 2020-04-02 RX ADMIN — LACTULOSE 20 GRAM(S): 10 SOLUTION ORAL at 18:52

## 2020-04-02 RX ADMIN — PIPERACILLIN AND TAZOBACTAM 25 GRAM(S): 4; .5 INJECTION, POWDER, LYOPHILIZED, FOR SOLUTION INTRAVENOUS at 12:11

## 2020-04-02 RX ADMIN — SENNA PLUS 15 MILLILITER(S): 8.6 TABLET ORAL at 21:46

## 2020-04-02 RX ADMIN — FAMOTIDINE 20 MILLIGRAM(S): 10 INJECTION INTRAVENOUS at 10:02

## 2020-04-02 RX ADMIN — HYDROMORPHONE HYDROCHLORIDE 0.5 MILLIGRAM(S): 2 INJECTION INTRAMUSCULAR; INTRAVENOUS; SUBCUTANEOUS at 22:45

## 2020-04-02 RX ADMIN — CHLORHEXIDINE GLUCONATE 15 MILLILITER(S): 213 SOLUTION TOPICAL at 18:52

## 2020-04-02 RX ADMIN — Medication 100 MILLIEQUIVALENT(S): at 15:00

## 2020-04-02 RX ADMIN — CHLORHEXIDINE GLUCONATE 1 APPLICATION(S): 213 SOLUTION TOPICAL at 04:54

## 2020-04-02 RX ADMIN — HEPARIN SODIUM 12 UNIT(S)/HR: 5000 INJECTION INTRAVENOUS; SUBCUTANEOUS at 20:00

## 2020-04-02 RX ADMIN — Medication 100 MILLIEQUIVALENT(S): at 07:56

## 2020-04-02 NOTE — PROGRESS NOTE ADULT - PROBLEM SELECTOR PLAN 2
Pt. wit hypokalemia in the setting of CRRT/CVVHDF. Will change pre-filter fluid to 4K bath. Monitor serum potassium Pt. wit hypokalemia in the setting of CRRT/CVVHDF. Will increase potassium concentration in pre-filter fluid. Monitor serum potassium

## 2020-04-02 NOTE — PROGRESS NOTE ADULT - PROBLEM SELECTOR PLAN 1
Pt. with oliguric SAM in the setting of hypotension and COVID-19. No prior labs for review on [facility]. Scr on admission (3/15/20) was 0.84. Scr however worsened to 4.6 on 3/26/20. Pt. likely with ATN. Pt. was started on CRRT/CVVHDF on 3/26/20. Pt. tolerating CVVHDF at present. BP being maintained on IV vasopressors. HD catheter functioning during rounds. Plan to continue CRRT/CVVHDF today. Continue with systemic heparin to decrease risk of circuit clotting. Check renal sonogram (when feasible). Monitor labs and urine output. Avoid any potential nephrotoxins Pt. with oliguric SAM in the setting of hypotension and COVID-19. Scr on admission (3/15/20) was 0.84. Scr however worsened to 4.6 on 3/26/20. Pt. likely with ATN. Pt. was started on CRRT/CVVHDF on 3/26/20. Pt. tolerating CVVHDF at present. BP being maintained on IV vasopressors. HD catheter functioning during rounds. Plan to continue CRRT/CVVHDF today. Pt. on systemic heparin to decrease risk of circuit clotting. Check renal sonogram (when feasible). Monitor labs and urine output. Avoid any potential nephrotoxins

## 2020-04-02 NOTE — PROGRESS NOTE ADULT - SUBJECTIVE AND OBJECTIVE BOX
ALEXA GARCIA                     [id number]    HPI:  60 y/o F with no PMH p/w fever and cough since Thursday. Tmax 103. She presented to urgent care initially and was treated with tamiflu for presumed flu. Despite this she continued to have fevers. Pt denies recent travel, sick contacts or any other sx or acute complaints. Endorses pinching pain in chest when she coughs. Also with nausea and poor PO intake. Denies shortness of breath, abdominal pain, dysuria or LE edema. (16 Mar 2020 02:05)    ISSUES:   Acute hypoxic respiratory failure              ARDS              Septic shock              COVID-19+              SAM              Mixed metabolic / respiratory acidosis              Anemia        PAST MEDICAL & SURGICAL HISTORY:  No pertinent past medical history  No significant past surgical history            VITAL SIGNS:  Vital Signs Last 24 Hrs  T(C): 37.1 (02 Apr 2020 12:00), Max: 37.2 (02 Apr 2020 00:00)  T(F): 98.8 (02 Apr 2020 12:00), Max: 98.9 (02 Apr 2020 00:00)  HR: 123 (02 Apr 2020 12:00) (67 - 123)  BP: 105/54 (01 Apr 2020 16:00) (105/54 - 105/54)  BP(mean): 66 (01 Apr 2020 16:00) (66 - 66)  RR: 19 (02 Apr 2020 12:00) (14 - 28)  SpO2: 97% (02 Apr 2020 12:00) (90% - 100%)    I/Os:   I&O's Detail    01 Apr 2020 07:01  -  02 Apr 2020 07:00  --------------------------------------------------------  IN:    dexmedetomidine Infusion: 270 mL    Enteral Tube Flush: 140 mL    heparin Infusion: 276 mL    IV PiggyBack: 50 mL    norepinephrine Infusion: 242.4 mL    ns in tub fed  [id number]: 410 mL    propofol Infusion: 43.2 mL  Total IN: 1431.6 mL    OUT:    Other: 1554 mL    Rectal Tube: 900 mL  Total OUT: 2454 mL    Total NET: -1022.4 mL      02 Apr 2020 07:01  -  02 Apr 2020 14:42  --------------------------------------------------------  IN:    Enteral Tube Flush: 60 mL    heparin Infusion: 60 mL    IV PiggyBack: 200 mL    norepinephrine Infusion: 8 mL    ns in tub fed  [id number]: 120 mL  Total IN: 448 mL    OUT:    Other: 513 mL  Total OUT: 513 mL    Total NET: -65 mL          CAPILLARY BLOOD GLUCOSE      POCT Blood Glucose.: 71 mg/dL (02 Apr 2020 11:36)  POCT Blood Glucose.: 145 mg/dL (02 Apr 2020 05:41)      =======================MEDICATIONS===================  MEDICATIONS  (STANDING):  chlorhexidine 0.12% Liquid 15 milliLiter(s) Oral Mucosa every 12 hours  chlorhexidine 4% Liquid 1 Application(s) Topical <User Schedule>  CRRT Treatment    <Continuous>  dexMEDEtomidine Infusion 0.5 MICROgram(s)/kG/Hr (9.02 mL/Hr) IV Continuous <Continuous>  famotidine Injectable 20 milliGRAM(s) IV Push daily  heparin  Infusion 1200 Unit(s)/Hr (12 mL/Hr) IV Continuous <Continuous>  lactulose Syrup 20 Gram(s) Oral four times a day  norepinephrine Infusion 0.05 MICROgram(s)/kG/Min (3.38 mL/Hr) IV Continuous <Continuous>  Phoxillum Filtration BK 4 / 2.5 5000 milliLiter(s) (800 mL/Hr) CRRT <Continuous>  piperacillin/tazobactam IVPB.. 3.375 Gram(s) IV Intermittent every 8 hours  polyethylene glycol 3350 17 Gram(s) Oral daily  PrismaSOL Filtration BGK 0 / 2.5 5000 milliLiter(s) (200 mL/Hr) CRRT <Continuous>  PrismaSOL Filtration BGK 4 / 2.5 5000 milliLiter(s) (1500 mL/Hr) CRRT <Continuous>  propofol Infusion 20 MICROgram(s)/kG/Min (8.66 mL/Hr) IV Continuous <Continuous>  senna Syrup 15 milliLiter(s) Oral daily  vasopressin Infusion 0.02 Unit(s)/Min (1.2 mL/Hr) IV Continuous <Continuous>    MEDICATIONS  (PRN):  acetaminophen    Suspension .. 650 milliGRAM(s) Oral every 6 hours PRN Temp greater or equal to 38C (100.4F)  HYDROmorphone  Injectable 0.5 milliGRAM(s) IV Push every 2 hours PRN For Respiratory Rate > 30 breaths per minute      =======================VENTILATOR SETTINGS===================  Mode: CPAP with PS  FiO2: 40  PEEP: 5  PS: 5  MAP: 8      PHYSICAL EXAM============================    General:                Sedated and intubated                                                Neuro:                  sedated, vented                          Cardiovascular:   S1 & S2, regular                           Respiratory:         Coarse breath sounds                          GI:                          Soft, nondistended, Bowel sounds +                            Ext:                       + Edema                             ============================LABS=========================                        8.4    21.71 )-----------( 496      ( 02 Apr 2020 13:10 )             24.9     04-02    128<L>  |  94<L>  |  22  ----------------------------<  125<H>  3.1<L>   |  22  |  1.55<H>    Ca    10.0      02 Apr 2020 13:10  Phos  3.5     04-02  Mg     2.6     04-02    TPro  7.6  /  Alb  3.1<L>  /  TBili  0.5  /  DBili  x   /  AST  48<H>  /  ALT  78<H>  /  AlkPhos  140<H>  04-02    LIVER FUNCTIONS - ( 02 Apr 2020 13:10 )  Alb: 3.1 g/dL / Pro: 7.6 g/dL / ALK PHOS: 140 u/L / ALT: 78 u/L / AST: 48 u/L / GGT: x           PTT - ( 02 Apr 2020 04:30 )  PTT:60.1 SEC  ABG - ( 02 Apr 2020 09:20 )  pH, Arterial: 7.37  pH, Blood: x     /  pCO2: 42    /  pO2: 135   / HCO3: 24    / Base Excess: -0.6  /  SaO2: 98.7            A/P:   60 y/o F with no PMH p/w fever and cough since Thursday. Tmax 103. She presented to urgent care initially and was treated with tamiflu for presumed flu. Despite this she continued to have fevers. Pt denies recent travel, sick contacts or any other sx or acute complaints. Endorses pinching pain in chest when she coughs. Also with nausea and poor PO intake. Denies shortness of breath, abdominal pain, dysuria or LE edema. (16 Mar 2020 02:05)                              Neuro:                                         Sedated with Propofol, Versed and Fentanyl                                        Vented                          Cardiovascular:                                            Continue hemodynamic monitoring.                                        Titrate Levophed / Vasopressin  to MAP>65                                                                                Continue ASA 325mg daily + Heparin htt - Target PTT 50-80                             Respiratory:                                           Acute hypoxemic respiratory failure due to Pneumonia / COVID-19+                                         On full mechanical vent support AC                                                Wean FiO2 as tolerated                                                Follow ABGs                                                Monitor Plateau pressure / driving pressure daily                                                Initiation and maintenance of vent settings as per ARDSnet protocol                                                             Continue bronchodilators, pulmonary toilet                            GI                                         Will cont TF                                         Continue Pepcid                                         Bowel regimen / Miralax + Lactulose PRN	                                                                 Renal:    SAM. ON CVVHD                                          CRRT in progress, tolerating -100cc /hr fluid removal     Strict I/Os                                         Renal f/u                                         Monitor BUN / Cr                                                 Hem/ Onc:                                                                                  DVT prophylaxis with SCDs / on Heparin gtt                                         Follow CBC  & signs of bleeding                           Infectious disease:                                            Pneumonia                                           COVID-19 +                                          Follow cultures                                          Completed Plaquenil , Vit-C, Thiamin, Azithromycin  x 5 days  3/20-25                                          Completed  Ceftriaxone 3/16-22                            Endocrine      Episodes of hypoglycemia - Off Insulin. Received D50.                                           Continue Accu-Checks with coverage    Pt is on Lovinox  and Venodyne boots for DVT prophylaxis.     Pertinent clinical, laboratory, radiographic, hemodynamic, echocardiographic, respiratory data, microbiologic data and chart were reviewed and analyzed frequently throughout the course of the day and night  Patient seen, examined and plan discussed with  CTICU team during rounds.    I have spent  75  minutes of critical care time with this pt between  7am  and 11.59 pm        Leslye Juárez DO, FACEP

## 2020-04-02 NOTE — PROGRESS NOTE ADULT - SUBJECTIVE AND OBJECTIVE BOX
[facility] DIVISION OF KIDNEY DISEASES AND HYPERTENSION -- FOLLOW UP NOTE  --------------------------------------------------------------------------------  HPI: 60 yo F admitted to ICU for respiratory failure, pneumonia, SAM and COVID-19 infection. Scr increased to 4.6 on 3/23/20. Pt. initiated on CRRT/CVVHDF on 3/23/20.    Pt. was seen and examined in ICU. Pt. is sedated, intubated (FiO2 stable at 40%, PEEP stable at 5), on low-dose IV vasopressor support. Remains anuric. Pt. tolerating CRRT/CVVHDF well this AM with some fluid removal.    PAST HISTORY  --------------------------------------------------------------------------------  No significant changes to PMH, PSH, FHx, SHx, unless otherwise noted    ALLERGIES & MEDICATIONS  --------------------------------------------------------------------------------  Allergies    No Known Allergies    Intolerances    Standing Inpatient Medications  chlorhexidine 0.12% Liquid 15 milliLiter(s) Oral Mucosa every 12 hours  chlorhexidine 4% Liquid 1 Application(s) Topical <User Schedule>  CRRT Treatment    <Continuous>  dexMEDEtomidine Infusion 0.5 MICROgram(s)/kG/Hr IV Continuous <Continuous>  famotidine Injectable 20 milliGRAM(s) IV Push daily  heparin  Infusion 1200 Unit(s)/Hr IV Continuous <Continuous>  lactulose Syrup 20 Gram(s) Oral four times a day  norepinephrine Infusion 0.05 MICROgram(s)/kG/Min IV Continuous <Continuous>  piperacillin/tazobactam IVPB.. 3.375 Gram(s) IV Intermittent every 8 hours  polyethylene glycol 3350 17 Gram(s) Oral daily  PrismaSATE Dialysate BGK 4 / 2.5 5000 milliLiter(s) CRRT <Continuous>  PrismaSOL Filtration BGK 0 / 2.5 5000 milliLiter(s) CRRT <Continuous>  PrismaSOL Filtration BGK 0 / 2.5 5000 milliLiter(s) CRRT <Continuous>  propofol Infusion 20 MICROgram(s)/kG/Min IV Continuous <Continuous>  senna Syrup 15 milliLiter(s) Oral daily  vasopressin Infusion 0.02 Unit(s)/Min IV Continuous <Continuous>    REVIEW OF SYSTEMS  --------------------------------------------------------------------------------  Unable to obtain.    VITALS/PHYSICAL EXAM  --------------------------------------------------------------------------------  T(C): 36.9 (04-02-20 @ 08:00), Max: 37.2 (04-02-20 @ 00:00)  HR: 120 (04-02-20 @ 08:00) (67 - 120)  BP: 105/54 (04-01-20 @ 16:00) (102/48 - 105/54)  RR: 16 (04-02-20 @ 08:00) (14 - 28)  SpO2: 98% (04-02-20 @ 08:00) (90% - 100%)  Wt(kg): --    04-01-20 @ 07:01  -  04-02-20 @ 07:00  --------------------------------------------------------  IN: 1431.6 mL / OUT: 2325 mL / NET: -893.4 mL    04-02-20 @ 07:01  -  04-02-20 @ 10:26  --------------------------------------------------------  IN: 194 mL / OUT: 0 mL / NET: 194 mL    Physical Exam:  	Gen: Sedated, intubated  	HEENT: + ETT  	Pulm: + mechanical breath sounds  	CV: S1S2+  	Abd: Soft, non distended   	Ext: No LE edema B/L  	Neuro: Sedated  	Skin: Warm and dry              Access: Right IJ non tunneled catheter without bleeding    LABS/STUDIES  --------------------------------------------------------------------------------              8.9    24.83 >-----------<  437      [04-02-20 @ 06:00]              27.0     130  |  95  |  27  ----------------------------<  120      [04-02-20 @ 04:30]  3.3   |  23  |  1.73        Ca     9.6     [04-02-20 @ 04:30]      Mg     2.6     [04-02-20 @ 04:30]      Phos  3.8     [04-02-20 @ 04:30]    Creatinine Trend:  SCr 1.73 [04-02 @ 04:30]  SCr 2.27 [04-01 @ 20:00]  SCr 2.53 [04-01 @ 06:00]  SCr 1.94 [03-31 @ 22:20]  SCr 1.25 [03-31 @ 11:34]

## 2020-04-03 LAB
ALBUMIN SERPL ELPH-MCNC: 3.6 G/DL — SIGNIFICANT CHANGE UP (ref 3.3–5)
ALBUMIN SERPL ELPH-MCNC: 3.6 G/DL — SIGNIFICANT CHANGE UP (ref 3.3–5)
ALP SERPL-CCNC: 147 U/L — HIGH (ref 40–120)
ALP SERPL-CCNC: 147 U/L — HIGH (ref 40–120)
ALT FLD-CCNC: 86 U/L — HIGH (ref 4–33)
ALT FLD-CCNC: 86 U/L — HIGH (ref 4–33)
ANION GAP SERPL CALC-SCNC: 15 MMO/L — HIGH (ref 7–14)
ANION GAP SERPL CALC-SCNC: 15 MMO/L — HIGH (ref 7–14)
APTT BLD: 70 SEC — HIGH (ref 27.5–36.3)
AST SERPL-CCNC: 46 U/L — HIGH (ref 4–32)
AST SERPL-CCNC: 46 U/L — HIGH (ref 4–32)
BASE EXCESS BLDA CALC-SCNC: -0.7 MMOL/L — SIGNIFICANT CHANGE UP
BASE EXCESS BLDA CALC-SCNC: -2.1 MMOL/L — SIGNIFICANT CHANGE UP
BASE EXCESS BLDA CALC-SCNC: -2.2 MMOL/L — SIGNIFICANT CHANGE UP
BASOPHILS # BLD AUTO: 0.19 K/UL — SIGNIFICANT CHANGE UP (ref 0–0.2)
BASOPHILS NFR BLD AUTO: 1 % — SIGNIFICANT CHANGE UP (ref 0–2)
BILIRUB SERPL-MCNC: 0.4 MG/DL — SIGNIFICANT CHANGE UP (ref 0.2–1.2)
BILIRUB SERPL-MCNC: 0.4 MG/DL — SIGNIFICANT CHANGE UP (ref 0.2–1.2)
BUN SERPL-MCNC: 21 MG/DL — SIGNIFICANT CHANGE UP (ref 7–23)
BUN SERPL-MCNC: 21 MG/DL — SIGNIFICANT CHANGE UP (ref 7–23)
CA-I BLDA-SCNC: 1.34 MMOL/L — HIGH (ref 1.15–1.29)
CALCIUM SERPL-MCNC: 11.1 MG/DL — HIGH (ref 8.4–10.5)
CALCIUM SERPL-MCNC: 11.1 MG/DL — HIGH (ref 8.4–10.5)
CHLORIDE BLDA-SCNC: 103 MMOL/L — SIGNIFICANT CHANGE UP (ref 96–108)
CHLORIDE BLDA-SCNC: 105 MMOL/L — SIGNIFICANT CHANGE UP (ref 96–108)
CHLORIDE SERPL-SCNC: 93 MMOL/L — LOW (ref 98–107)
CHLORIDE SERPL-SCNC: 93 MMOL/L — LOW (ref 98–107)
CK SERPL-CCNC: 306 U/L — HIGH (ref 25–170)
CO2 SERPL-SCNC: 21 MMOL/L — LOW (ref 22–31)
CO2 SERPL-SCNC: 21 MMOL/L — LOW (ref 22–31)
CREAT SERPL-MCNC: 1.6 MG/DL — HIGH (ref 0.5–1.3)
CREAT SERPL-MCNC: 1.6 MG/DL — HIGH (ref 0.5–1.3)
CRP SERPL-MCNC: 128.1 MG/L — HIGH
D DIMER BLD IA.RAPID-MCNC: 3965 NG/ML — SIGNIFICANT CHANGE UP
EOSINOPHIL # BLD AUTO: 0.09 K/UL — SIGNIFICANT CHANGE UP (ref 0–0.5)
EOSINOPHIL NFR BLD AUTO: 0.5 % — SIGNIFICANT CHANGE UP (ref 0–6)
FIBRINOGEN PPP-MCNC: 828 MG/DL — HIGH (ref 300–520)
GLUCOSE BLDA-MCNC: 120 MG/DL — HIGH (ref 70–99)
GLUCOSE BLDA-MCNC: 129 MG/DL — HIGH (ref 70–99)
GLUCOSE BLDA-MCNC: 135 MG/DL — HIGH (ref 70–99)
GLUCOSE BLDC GLUCOMTR-MCNC: 131 MG/DL — HIGH (ref 70–99)
GLUCOSE SERPL-MCNC: 123 MG/DL — HIGH (ref 70–99)
GLUCOSE SERPL-MCNC: 123 MG/DL — HIGH (ref 70–99)
HCO3 BLDA-SCNC: 22 MMOL/L — SIGNIFICANT CHANGE UP (ref 22–26)
HCO3 BLDA-SCNC: 23 MMOL/L — SIGNIFICANT CHANGE UP (ref 22–26)
HCO3 BLDA-SCNC: 24 MMOL/L — SIGNIFICANT CHANGE UP (ref 22–26)
HCT VFR BLD CALC: 27.2 % — LOW (ref 34.5–45)
HCT VFR BLDA CALC: 23.6 % — LOW (ref 34.5–46.5)
HCT VFR BLDA CALC: 28.4 % — LOW (ref 34.5–46.5)
HCT VFR BLDA CALC: 34.1 % — LOW (ref 34.5–46.5)
HGB BLD-MCNC: 9 G/DL — LOW (ref 11.5–15.5)
HGB BLDA-MCNC: 11 G/DL — LOW (ref 11.5–15.5)
HGB BLDA-MCNC: 7.6 G/DL — LOW (ref 11.5–15.5)
HGB BLDA-MCNC: 9.2 G/DL — LOW (ref 11.5–15.5)
IMM GRANULOCYTES NFR BLD AUTO: 11.4 % — HIGH (ref 0–1.5)
INR BLD: 1.09 — SIGNIFICANT CHANGE UP (ref 0.88–1.17)
LACTATE BLDA-SCNC: 1.5 MMOL/L — SIGNIFICANT CHANGE UP (ref 0.5–2)
LACTATE BLDA-SCNC: 1.9 MMOL/L — SIGNIFICANT CHANGE UP (ref 0.5–2)
LACTATE BLDA-SCNC: 2.1 MMOL/L — HIGH (ref 0.5–2)
LYMPHOCYTES # BLD AUTO: 1.78 K/UL — SIGNIFICANT CHANGE UP (ref 1–3.3)
LYMPHOCYTES # BLD AUTO: 8.9 % — LOW (ref 13–44)
MAGNESIUM SERPL-MCNC: 3.1 MG/DL — HIGH (ref 1.6–2.6)
MANUAL SMEAR VERIFICATION: SIGNIFICANT CHANGE UP
MCHC RBC-ENTMCNC: 27.6 PG — SIGNIFICANT CHANGE UP (ref 27–34)
MCHC RBC-ENTMCNC: 33.1 % — SIGNIFICANT CHANGE UP (ref 32–36)
MCV RBC AUTO: 83.4 FL — SIGNIFICANT CHANGE UP (ref 80–100)
MONOCYTES # BLD AUTO: 2.61 K/UL — HIGH (ref 0–0.9)
MONOCYTES NFR BLD AUTO: 13.1 % — SIGNIFICANT CHANGE UP (ref 2–14)
NEUTROPHILS # BLD AUTO: 13.02 K/UL — HIGH (ref 1.8–7.4)
NEUTROPHILS NFR BLD AUTO: 65.1 % — SIGNIFICANT CHANGE UP (ref 43–77)
NRBC # FLD: 0.07 K/UL — SIGNIFICANT CHANGE UP (ref 0–0)
NT-PROBNP SERPL-SCNC: 3006 PG/ML — SIGNIFICANT CHANGE UP
PCO2 BLDA: 41 MMHG — SIGNIFICANT CHANGE UP (ref 32–48)
PCO2 BLDA: 43 MMHG — SIGNIFICANT CHANGE UP (ref 32–48)
PCO2 BLDA: 45 MMHG — SIGNIFICANT CHANGE UP (ref 32–48)
PH BLDA: 7.33 PH — LOW (ref 7.35–7.45)
PH BLDA: 7.34 PH — LOW (ref 7.35–7.45)
PH BLDA: 7.38 PH — SIGNIFICANT CHANGE UP (ref 7.35–7.45)
PHOSPHATE SERPL-MCNC: 4 MG/DL — SIGNIFICANT CHANGE UP (ref 2.5–4.5)
PLATELET # BLD AUTO: 598 K/UL — HIGH (ref 150–400)
PMV BLD: 11 FL — SIGNIFICANT CHANGE UP (ref 7–13)
PO2 BLDA: 102 MMHG — SIGNIFICANT CHANGE UP (ref 83–108)
PO2 BLDA: 131 MMHG — HIGH (ref 83–108)
PO2 BLDA: 99 MMHG — SIGNIFICANT CHANGE UP (ref 83–108)
POTASSIUM BLDA-SCNC: 3.7 MMOL/L — SIGNIFICANT CHANGE UP (ref 3.4–4.5)
POTASSIUM BLDA-SCNC: 4.1 MMOL/L — SIGNIFICANT CHANGE UP (ref 3.4–4.5)
POTASSIUM BLDA-SCNC: 4.1 MMOL/L — SIGNIFICANT CHANGE UP (ref 3.4–4.5)
POTASSIUM SERPL-MCNC: 3.7 MMOL/L — SIGNIFICANT CHANGE UP (ref 3.5–5.3)
POTASSIUM SERPL-MCNC: 3.7 MMOL/L — SIGNIFICANT CHANGE UP (ref 3.5–5.3)
POTASSIUM SERPL-SCNC: 3.7 MMOL/L — SIGNIFICANT CHANGE UP (ref 3.5–5.3)
POTASSIUM SERPL-SCNC: 3.7 MMOL/L — SIGNIFICANT CHANGE UP (ref 3.5–5.3)
PROT SERPL-MCNC: 8.7 G/DL — HIGH (ref 6–8.3)
PROT SERPL-MCNC: 8.7 G/DL — HIGH (ref 6–8.3)
PROTHROM AB SERPL-ACNC: 12.6 SEC — SIGNIFICANT CHANGE UP (ref 9.8–13.1)
RBC # BLD: 3.26 M/UL — LOW (ref 3.8–5.2)
RBC # FLD: 15.6 % — HIGH (ref 10.3–14.5)
SAO2 % BLDA: 97.4 % — SIGNIFICANT CHANGE UP (ref 95–99)
SAO2 % BLDA: 97.8 % — SIGNIFICANT CHANGE UP (ref 95–99)
SAO2 % BLDA: 98.4 % — SIGNIFICANT CHANGE UP (ref 95–99)
SODIUM BLDA-SCNC: 135 MMOL/L — LOW (ref 136–146)
SODIUM BLDA-SCNC: 135 MMOL/L — LOW (ref 136–146)
SODIUM BLDA-SCNC: 136 MMOL/L — SIGNIFICANT CHANGE UP (ref 136–146)
SODIUM SERPL-SCNC: 129 MMOL/L — LOW (ref 135–145)
SODIUM SERPL-SCNC: 129 MMOL/L — LOW (ref 135–145)
TROPONIN T, HIGH SENSITIVITY: 66 NG/L — CRITICAL HIGH (ref ?–14)
WBC # BLD: 19.96 K/UL — HIGH (ref 3.8–10.5)
WBC # FLD AUTO: 19.96 K/UL — HIGH (ref 3.8–10.5)

## 2020-04-03 PROCEDURE — 90945 DIALYSIS ONE EVALUATION: CPT | Mod: GC

## 2020-04-03 PROCEDURE — 99292 CRITICAL CARE ADDL 30 MIN: CPT

## 2020-04-03 PROCEDURE — 99291 CRITICAL CARE FIRST HOUR: CPT

## 2020-04-03 RX ORDER — METOPROLOL TARTRATE 50 MG
2.5 TABLET ORAL EVERY 6 HOURS
Refills: 0 | Status: DISCONTINUED | OUTPATIENT
Start: 2020-04-03 | End: 2020-04-10

## 2020-04-03 RX ORDER — METOPROLOL TARTRATE 50 MG
2.5 TABLET ORAL ONCE
Refills: 0 | Status: COMPLETED | OUTPATIENT
Start: 2020-04-03 | End: 2020-04-03

## 2020-04-03 RX ORDER — HYDROMORPHONE HYDROCHLORIDE 2 MG/ML
1 INJECTION INTRAMUSCULAR; INTRAVENOUS; SUBCUTANEOUS ONCE
Refills: 0 | Status: DISCONTINUED | OUTPATIENT
Start: 2020-04-03 | End: 2020-04-03

## 2020-04-03 RX ORDER — ALTEPLASE 100 MG
2 KIT INTRAVENOUS ONCE
Refills: 0 | Status: COMPLETED | OUTPATIENT
Start: 2020-04-03 | End: 2020-04-04

## 2020-04-03 RX ORDER — ACETAMINOPHEN 500 MG
1000 TABLET ORAL ONCE
Refills: 0 | Status: COMPLETED | OUTPATIENT
Start: 2020-04-03 | End: 2020-04-04

## 2020-04-03 RX ADMIN — FAMOTIDINE 20 MILLIGRAM(S): 10 INJECTION INTRAVENOUS at 14:16

## 2020-04-03 RX ADMIN — POLYETHYLENE GLYCOL 3350 17 GRAM(S): 17 POWDER, FOR SOLUTION ORAL at 14:18

## 2020-04-03 RX ADMIN — Medication 216 GRAM(S): at 22:00

## 2020-04-03 RX ADMIN — HYDROMORPHONE HYDROCHLORIDE 1 MILLIGRAM(S): 2 INJECTION INTRAMUSCULAR; INTRAVENOUS; SUBCUTANEOUS at 04:15

## 2020-04-03 RX ADMIN — Medication 2.5 MILLIGRAM(S): at 18:43

## 2020-04-03 RX ADMIN — LACTULOSE 20 GRAM(S): 10 SOLUTION ORAL at 14:16

## 2020-04-03 RX ADMIN — HYDROMORPHONE HYDROCHLORIDE 0.5 MILLIGRAM(S): 2 INJECTION INTRAMUSCULAR; INTRAVENOUS; SUBCUTANEOUS at 03:15

## 2020-04-03 RX ADMIN — Medication 216 GRAM(S): at 16:42

## 2020-04-03 RX ADMIN — CHLORHEXIDINE GLUCONATE 15 MILLILITER(S): 213 SOLUTION TOPICAL at 07:04

## 2020-04-03 RX ADMIN — CHLORHEXIDINE GLUCONATE 1 APPLICATION(S): 213 SOLUTION TOPICAL at 07:04

## 2020-04-03 RX ADMIN — Medication 2.5 MILLIGRAM(S): at 16:30

## 2020-04-03 RX ADMIN — LACTULOSE 20 GRAM(S): 10 SOLUTION ORAL at 18:44

## 2020-04-03 RX ADMIN — Medication 216 GRAM(S): at 07:04

## 2020-04-03 RX ADMIN — Medication 2.5 MILLIGRAM(S): at 16:42

## 2020-04-03 RX ADMIN — LACTULOSE 20 GRAM(S): 10 SOLUTION ORAL at 07:04

## 2020-04-03 RX ADMIN — HEPARIN SODIUM 12 UNIT(S)/HR: 5000 INJECTION INTRAVENOUS; SUBCUTANEOUS at 14:15

## 2020-04-03 RX ADMIN — SENNA PLUS 15 MILLILITER(S): 8.6 TABLET ORAL at 14:16

## 2020-04-03 NOTE — PROGRESS NOTE ADULT - SUBJECTIVE AND OBJECTIVE BOX
[facility] DIVISION OF KIDNEY DISEASES AND HYPERTENSION -- FOLLOW UP NOTE  --------------------------------------------------------------------------------  HPI: 60 yo F admitted to ICU for respiratory failure, pneumonia, SAM and COVID-19 infection. Scr increased to 4.6 on 3/23/20. Pt. initiated on CRRT/CVVHDF on 3/23/20.    Pt. was seen and examined in ICU. Pt. is sedated, intubated (FiO2 stable at 40%, PEEP stable at 5), not on IV vasopressor support. Noted to be tachycardic. Remains anuric. Pt. tolerating CRRT/CVVHDF well this AM with some fluid removal.    PAST HISTORY  --------------------------------------------------------------------------------  No significant changes to PMH, PSH, FHx, SHx, unless otherwise noted    ALLERGIES & MEDICATIONS  --------------------------------------------------------------------------------  Allergies    No Known Allergies    Intolerances    Standing Inpatient Medications  ampicillin  IVPB      ampicillin  IVPB 2 Gram(s) IV Intermittent every 8 hours  chlorhexidine 0.12% Liquid 15 milliLiter(s) Oral Mucosa every 12 hours  chlorhexidine 4% Liquid 1 Application(s) Topical <User Schedule>  CRRT Treatment    <Continuous>  dexMEDEtomidine Infusion 0.5 MICROgram(s)/kG/Hr IV Continuous <Continuous>  famotidine Injectable 20 milliGRAM(s) IV Push daily  heparin  Infusion 1200 Unit(s)/Hr IV Continuous <Continuous>  lactulose Syrup 20 Gram(s) Oral four times a day  norepinephrine Infusion 0.05 MICROgram(s)/kG/Min IV Continuous <Continuous>  Phoxillum Filtration BK 4 / 2.5 5000 milliLiter(s) CRRT <Continuous>  polyethylene glycol 3350 17 Gram(s) Oral daily  PrismaSOL Filtration BGK 4 / 2.5 5000 milliLiter(s) CRRT <Continuous>  PrismaSOL Filtration BGK 4 / 2.5 5000 milliLiter(s) CRRT <Continuous>  propofol Infusion 20 MICROgram(s)/kG/Min IV Continuous <Continuous>  senna Syrup 15 milliLiter(s) Oral daily  vasopressin Infusion 0.02 Unit(s)/Min IV Continuous <Continuous>    REVIEW OF SYSTEMS  --------------------------------------------------------------------------------  Unable to obtain.    VITALS/PHYSICAL EXAM  --------------------------------------------------------------------------------  T(C): 37.2 (04-03-20 @ 04:00), Max: 37.2 (04-02-20 @ 16:00)  HR: 129 (04-03-20 @ 08:00) (109 - 129)  BP: --  RR: 12 (04-03-20 @ 08:00) (12 - 19)  SpO2: 100% (04-03-20 @ 08:00) (96% - 100%)  Wt(kg): --    04-02-20 @ 07:01  -  04-03-20 @ 07:00  --------------------------------------------------------  IN: 949 mL / OUT: 2851 mL / NET: -1902 mL    Physical Exam:  	Gen: Sedated, intubated  	HEENT: + ETT  	Pulm: + mechanical breath sounds  	CV: S1S2+  	Abd: Soft, non distended   	Ext: No LE edema B/L  	Neuro: Sedated  	Skin: Warm and dry              Access: Right IJ non tunneled catheter without bleeding    LABS/STUDIES  --------------------------------------------------------------------------------              9.0    19.96 >-----------<  598      [04-03-20 @ 03:00]              27.2     129  |  93  |  21  ----------------------------<  123      [04-03-20 @ 03:00]  3.7   |  21  |  1.60        Ca     11.1     [04-03-20 @ 03:00]      Mg     3.1     [04-03-20 @ 03:00]      Phos  4.0     [04-03-20 @ 03:00]    Creatinine Trend:  SCr 1.60 [04-03 @ 03:00]  SCr 1.58 [04-02 @ 22:45]  SCr 1.55 [04-02 @ 13:10]  SCr 1.73 [04-02 @ 04:30]  SCr 2.27 [04-01 @ 20:00] 48 yo F with a history of bipolar disorder, last psychiatric admission at [facility] last Jan '17 presents accompanied by her sister with complaints of agitated behaviors at home and not eating for several days; in the context of nonadherence with medications. Currently, she is minimally cooperative, guarded and irritable on exam, while having auditory hallucinations and previously exhibiting agitated behaviors at home with her sister. Etiology of her overall presentation is most likely psychiatric decompensation in light of her not sleeping and not taking her medications. Pt requires inpatient admission for psychiatric stabilization; to be admitted on an involuntary status.

## 2020-04-03 NOTE — PROGRESS NOTE ADULT - PROBLEM SELECTOR PLAN 2
Pt. with hypokalemia in the setting of CRRT/CVVHDF. Serum potassium is 3.7 this AM. Will increase potassium bath with CRRT/CVVHDF. Monitor serum potassium

## 2020-04-03 NOTE — PROGRESS NOTE ADULT - PROBLEM SELECTOR PLAN 1
Pt. with oliguric SAM in the setting of hypotension and COVID-19. Scr on admission (3/15/20) was 0.84. Scr however worsened to 4.6 on 3/26/20. Pt. likely with ATN. Pt. was started on CRRT/CVVHDF on 3/26/20. Pt. tolerating CVVHDF at present. BP being maintained on IV vasopressors. HD catheter functioning during rounds. Plan to continue CRRT/CVVHDF today. Pt. on systemic heparin to decrease risk of circuit clotting. Check renal sonogram (when feasible). Monitor labs and urine output. Avoid any potential nephrotoxins

## 2020-04-03 NOTE — PROGRESS NOTE ADULT - SUBJECTIVE AND OBJECTIVE BOX
ALEXA GARCIA            MRN-[id number]         No Known Allergies                 58 y/o F with no PMH p/w fever and cough since Thursday. Tmax 103. She presented to urgent care initially and was treated with tamiflu for presumed flu. Despite this she continued to have fevers. Pt denies recent travel, sick contacts or any other sx or acute complaints. Endorses pinching pain in chest when she coughs. Also with nausea and poor PO intake. Denies shortness of breath, abdominal pain, dysuria or LE edema. (16 Mar 2020 02:05)          Issues:              Acute hypoxic respiratory failure              ARDS              Septic shock              COVID-19+              SAM - On CRRT              Anemia                 Home Medications:      PAST MEDICAL & SURGICAL HISTORY:  No pertinent past medical history  No significant past surgical history        ICU Vital Signs Last 24 Hrs  T(C): 36.7 (03 Apr 2020 12:00), Max: 37.2 (02 Apr 2020 16:00)  T(F): 98 (03 Apr 2020 12:00), Max: 99 (02 Apr 2020 16:00)  HR: 131 (03 Apr 2020 12:58) (109 - 134)  BP: --  BP(mean): --  ABP: 123/61 (03 Apr 2020 12:57) (110/53 - 123/61)  ABP(mean): 79 (03 Apr 2020 12:57) (69 - 79)  RR: 16 (03 Apr 2020 12:57) (12 - 18)  SpO2: 100% (03 Apr 2020 12:58) (96% - 100%)    I&O's Detail    02 Apr 2020 07:01  -  03 Apr 2020 07:00  --------------------------------------------------------  IN:    Enteral Tube Flush: 60 mL    heparin Infusion: 204 mL    IV PiggyBack: 300 mL    norepinephrine Infusion: 16 mL    ns in tub fed  [id number]: 405 mL  Total IN: 985 mL    OUT:    Other: 3485 mL  Total OUT: 3485 mL    Total NET: -2500 mL      03 Apr 2020 07:01  -  03 Apr 2020 15:03  --------------------------------------------------------  IN:    heparin Infusion: 60 mL  Total IN: 60 mL    OUT:    Other: 429 mL  Total OUT: 429 mL    Total NET: -369 mL        CAPILLARY BLOOD GLUCOSE      POCT Blood Glucose.: 131 mg/dL (03 Apr 2020 05:40)      Home Medications:      MEDICATIONS  (STANDING):  ampicillin  IVPB      ampicillin  IVPB 2 Gram(s) IV Intermittent every 8 hours  chlorhexidine 0.12% Liquid 15 milliLiter(s) Oral Mucosa every 12 hours  chlorhexidine 4% Liquid 1 Application(s) Topical <User Schedule>  CRRT Treatment    <Continuous>  dexMEDEtomidine Infusion 0.5 MICROgram(s)/kG/Hr (9.02 mL/Hr) IV Continuous <Continuous>  famotidine Injectable 20 milliGRAM(s) IV Push daily  heparin  Infusion 1200 Unit(s)/Hr (12 mL/Hr) IV Continuous <Continuous>  lactulose Syrup 20 Gram(s) Oral four times a day  norepinephrine Infusion 0.05 MICROgram(s)/kG/Min (3.38 mL/Hr) IV Continuous <Continuous>  Phoxillum Filtration BK 4 / 2.5 5000 milliLiter(s) (1000 mL/Hr) CRRT <Continuous>  polyethylene glycol 3350 17 Gram(s) Oral daily  PrismaSOL Filtration BGK 4 / 2.5 5000 milliLiter(s) (1700 mL/Hr) CRRT <Continuous>  PrismaSOL Filtration BGK 4 / 2.5 5000 milliLiter(s) (200 mL/Hr) CRRT <Continuous>  propofol Infusion 20 MICROgram(s)/kG/Min (8.66 mL/Hr) IV Continuous <Continuous>  senna Syrup 15 milliLiter(s) Oral daily  vasopressin Infusion 0.02 Unit(s)/Min (1.2 mL/Hr) IV Continuous <Continuous>    MEDICATIONS  (PRN):  acetaminophen    Suspension .. 650 milliGRAM(s) Oral every 6 hours PRN Temp greater or equal to 38C (100.4F)  HYDROmorphone  Injectable 0.5 milliGRAM(s) IV Push every 2 hours PRN For Respiratory Rate > 30 breaths per minute      Mode: standby      CAPILLARY BLOOD GLUCOSE      POCT Blood Glucose.: 131 mg/dL (03 Apr 2020 05:40)  POCT Blood Glucose.: 113 mg/dL (02 Apr 2020 17:00)  POCT Blood Glucose.: 36 mg/dL (02 Apr 2020 16:58)      Physical exam:     General:                Off sedation, following commands                                                Neuro:                  Off sedation, following commands and moved all ext but weak                          Cardiovascular:   S1 & S2, regular                           Respiratory:         Coarse breath sounds                          GI:                          Soft, nondistended, Bowel sounds +                            Ext:                        Edema                             Labs:                                                                           9.0    19.96 )-----------( 598      ( 03 Apr 2020 03:00 )             27.2             04-03    129<L>  |  93<L>  |  21  ----------------------------<  123<H>  3.7   |  21<L>  |  1.60<H>    Ca    11.1<H>      03 Apr 2020 03:00  Phos  4.0     04-03  Mg     3.1     04-03    TPro  8.7<H>  /  Alb  3.6  /  TBili  0.4  /  DBili  x   /  AST  46<H>  /  ALT  86<H>  /  AlkPhos  147<H>  04-03                  PT/INR - ( 03 Apr 2020 03:00 )   PT: 12.6 SEC;   INR: 1.09          PTT - ( 03 Apr 2020 03:00 )  PTT:70.0 SEC  LIVER FUNCTIONS - ( 03 Apr 2020 03:00 )  Alb: 3.6 g/dL / Pro: 8.7 g/dL / ALK PHOS: 147 u/L / ALT: 86 u/L / AST: 46 u/L / GGT: x          Transcutaneous Bilirubin    CARDIAC MARKERS ( 03 Apr 2020 03:00 )  x     / x     / 306 u/L / x     / x              CXR:    < from: Xray Chest 1 View- PORTABLE-Urgent (03.31.20 @ 18:33) >  Only minimal change in the position of left jugular central line, with tip still directed towards the right jugular. Additional 2 central lines, endotracheal and nasogastric tubes remain in place. Extensive bilateral pulmonary infiltrates are essentially unchanged.        Plan:    General:  HPI:  58 y/o F with no PMH p/w fever and cough since Thursday. Tmax 103. She presented to urgent care initially and was treated with tamiflu for presumed flu. Despite this she continued to have fevers. Pt denies recent travel, sick contacts or any other sx or acute complaints. Endorses pinching pain in chest when she coughs. Also with nausea and poor PO intake. Denies shortness of breath, abdominal pain, dysuria or LE edema. (16 Mar 2020 02:05)                              Neuro:                                         Off sedation, non-focal but has generalized weakness                            Cardiovascular:                                          Continue hemodynamic monitoring.                                         Titrate Levophed / Vasopressin to MAP>65                                        Daily EKGs, monitor QTc                                        Continue  Heparin gtt - Target PTT 50-80                            Respiratory:                                         Acute hypoxemic respiratory failure due to Pneumonia / COVID-19                                         Was on full mechanical vent support this AM. Did well on CPAP, extubated to N.C                                                Wean FiO2 as tolerated                                                Post-extubation ABG is OK                                                Continue bronchodilators, pulmonary toilet as tolerated                            GI                                         NGT feeds as tolerated                                         Continue Pepcid                                         Bowel regimen	                                                                 Renal:                                         Strict I/Os                                         SAM - CRRT in progress, goal -100cc/hr                                         Monitor BUN / Cr                                                 Hem/ Onc:                                                                                  DVT prophylaxis -  on Heparin gtt                                         Follow CBC  & signs of bleeding                           Infectious disease:                                            Pneumonia                                           COVID-19 +                                          Follow cultures                            Endocrine                                            DM-2: Continue Accu-Checks with coverage      Pertinent clinical, laboratory, radiographic, hemodynamic, echocardiographic, respiratory data, microbiologic data and chart were reviewed and analyzed frequently throughout the course of the day and night  Patient seen, examined and plan discussed with  CTICU team during rounds.    Pt's status &  goals of care  discussed with family       I have spent  75  minutes of critical care time with this pt between  7am  and 11.59 pm              Favian Moreland MD

## 2020-04-04 LAB
ALBUMIN SERPL ELPH-MCNC: 3.5 G/DL — SIGNIFICANT CHANGE UP (ref 3.3–5)
ALBUMIN SERPL ELPH-MCNC: 3.5 G/DL — SIGNIFICANT CHANGE UP (ref 3.3–5)
ALP SERPL-CCNC: 144 U/L — HIGH (ref 40–120)
ALP SERPL-CCNC: 144 U/L — HIGH (ref 40–120)
ALT FLD-CCNC: 90 U/L — HIGH (ref 4–33)
ALT FLD-CCNC: 90 U/L — HIGH (ref 4–33)
ANION GAP SERPL CALC-SCNC: 22 MMO/L — HIGH (ref 7–14)
ANION GAP SERPL CALC-SCNC: 22 MMO/L — HIGH (ref 7–14)
ANISOCYTOSIS BLD QL: SLIGHT — SIGNIFICANT CHANGE UP
APTT BLD: 88 SEC — HIGH (ref 27.5–36.3)
AST SERPL-CCNC: 74 U/L — HIGH (ref 4–32)
AST SERPL-CCNC: 74 U/L — HIGH (ref 4–32)
BASE EXCESS BLDA CALC-SCNC: -4.2 MMOL/L — SIGNIFICANT CHANGE UP
BASE EXCESS BLDA CALC-SCNC: -4.6 MMOL/L — SIGNIFICANT CHANGE UP
BASE EXCESS BLDA CALC-SCNC: -7 MMOL/L — SIGNIFICANT CHANGE UP
BASE EXCESS BLDA CALC-SCNC: -7.6 MMOL/L — SIGNIFICANT CHANGE UP
BASOPHILS # BLD AUTO: 0.09 K/UL — SIGNIFICANT CHANGE UP (ref 0–0.2)
BASOPHILS NFR BLD AUTO: 0.3 % — SIGNIFICANT CHANGE UP (ref 0–2)
BASOPHILS NFR SPEC: 0.9 % — SIGNIFICANT CHANGE UP (ref 0–2)
BILIRUB SERPL-MCNC: 0.3 MG/DL — SIGNIFICANT CHANGE UP (ref 0.2–1.2)
BILIRUB SERPL-MCNC: 0.3 MG/DL — SIGNIFICANT CHANGE UP (ref 0.2–1.2)
BLASTS # FLD: 0 % — SIGNIFICANT CHANGE UP (ref 0–0)
BUN SERPL-MCNC: 47 MG/DL — HIGH (ref 7–23)
BUN SERPL-MCNC: 47 MG/DL — HIGH (ref 7–23)
CA-I BLDA-SCNC: 1.37 MMOL/L — HIGH (ref 1.15–1.29)
CA-I BLDA-SCNC: 1.4 MMOL/L — HIGH (ref 1.15–1.29)
CA-I BLDA-SCNC: 1.42 MMOL/L — HIGH (ref 1.15–1.29)
CA-I BLDA-SCNC: 1.45 MMOL/L — HIGH (ref 1.15–1.29)
CALCIUM SERPL-MCNC: 11.4 MG/DL — HIGH (ref 8.4–10.5)
CALCIUM SERPL-MCNC: 11.4 MG/DL — HIGH (ref 8.4–10.5)
CHLORIDE SERPL-SCNC: 95 MMOL/L — LOW (ref 98–107)
CHLORIDE SERPL-SCNC: 95 MMOL/L — LOW (ref 98–107)
CK SERPL-CCNC: 1890 U/L — HIGH (ref 25–170)
CO2 SERPL-SCNC: 17 MMOL/L — LOW (ref 22–31)
CO2 SERPL-SCNC: 17 MMOL/L — LOW (ref 22–31)
CREAT SERPL-MCNC: 2.94 MG/DL — HIGH (ref 0.5–1.3)
CREAT SERPL-MCNC: 2.94 MG/DL — HIGH (ref 0.5–1.3)
CRP SERPL-MCNC: 88.9 MG/L — HIGH
D DIMER BLD IA.RAPID-MCNC: 3706 NG/ML — SIGNIFICANT CHANGE UP
EOSINOPHIL # BLD AUTO: 0.01 K/UL — SIGNIFICANT CHANGE UP (ref 0–0.5)
EOSINOPHIL NFR BLD AUTO: 0 % — SIGNIFICANT CHANGE UP (ref 0–6)
EOSINOPHIL NFR FLD: 0 % — SIGNIFICANT CHANGE UP (ref 0–6)
FERRITIN SERPL-MCNC: 1265 NG/ML — HIGH (ref 15–150)
FIBRINOGEN PPP-MCNC: 740 MG/DL — HIGH (ref 300–520)
GIANT PLATELETS BLD QL SMEAR: PRESENT — SIGNIFICANT CHANGE UP
GLUCOSE BLDA-MCNC: 109 MG/DL — HIGH (ref 70–99)
GLUCOSE BLDA-MCNC: 112 MG/DL — HIGH (ref 70–99)
GLUCOSE BLDA-MCNC: 117 MG/DL — HIGH (ref 70–99)
GLUCOSE BLDA-MCNC: 95 MG/DL — SIGNIFICANT CHANGE UP (ref 70–99)
GLUCOSE SERPL-MCNC: 113 MG/DL — HIGH (ref 70–99)
GLUCOSE SERPL-MCNC: 113 MG/DL — HIGH (ref 70–99)
HCO3 BLDA-SCNC: 18 MMOL/L — LOW (ref 22–26)
HCO3 BLDA-SCNC: 18 MMOL/L — LOW (ref 22–26)
HCO3 BLDA-SCNC: 21 MMOL/L — LOW (ref 22–26)
HCO3 BLDA-SCNC: 21 MMOL/L — LOW (ref 22–26)
HCT VFR BLD CALC: 25.3 % — LOW (ref 34.5–45)
HCT VFR BLDA CALC: 23.3 % — LOW (ref 34.5–46.5)
HCT VFR BLDA CALC: 25.7 % — LOW (ref 34.5–46.5)
HCT VFR BLDA CALC: 26.1 % — LOW (ref 34.5–46.5)
HCT VFR BLDA CALC: 26.3 % — LOW (ref 34.5–46.5)
HGB BLD-MCNC: 8.4 G/DL — LOW (ref 11.5–15.5)
HGB BLDA-MCNC: 7.5 G/DL — LOW (ref 11.5–15.5)
HGB BLDA-MCNC: 8.3 G/DL — LOW (ref 11.5–15.5)
HGB BLDA-MCNC: 8.4 G/DL — LOW (ref 11.5–15.5)
HGB BLDA-MCNC: 8.5 G/DL — LOW (ref 11.5–15.5)
IMM GRANULOCYTES NFR BLD AUTO: 9.6 % — HIGH (ref 0–1.5)
INR BLD: 1.25 — HIGH (ref 0.88–1.17)
LACTATE BLDA-SCNC: 0.8 MMOL/L — SIGNIFICANT CHANGE UP (ref 0.5–2)
LACTATE BLDA-SCNC: 1 MMOL/L — SIGNIFICANT CHANGE UP (ref 0.5–2)
LACTATE BLDA-SCNC: 1.4 MMOL/L — SIGNIFICANT CHANGE UP (ref 0.5–2)
LACTATE BLDA-SCNC: 2.6 MMOL/L — HIGH (ref 0.5–2)
LDH SERPL L TO P-CCNC: 319 U/L — HIGH (ref 135–225)
LYMPHOCYTES # BLD AUTO: 2.43 K/UL — SIGNIFICANT CHANGE UP (ref 1–3.3)
LYMPHOCYTES # BLD AUTO: 8.4 % — LOW (ref 13–44)
LYMPHOCYTES NFR SPEC AUTO: 7.1 % — LOW (ref 13–44)
MAGNESIUM SERPL-MCNC: 3.1 MG/DL — HIGH (ref 1.6–2.6)
MCHC RBC-ENTMCNC: 28.2 PG — SIGNIFICANT CHANGE UP (ref 27–34)
MCHC RBC-ENTMCNC: 33.2 % — SIGNIFICANT CHANGE UP (ref 32–36)
MCV RBC AUTO: 84.9 FL — SIGNIFICANT CHANGE UP (ref 80–100)
METAMYELOCYTES # FLD: 1.8 % — HIGH (ref 0–1)
MICROCYTES BLD QL: SLIGHT — SIGNIFICANT CHANGE UP
MONOCYTES # BLD AUTO: 2.28 K/UL — HIGH (ref 0–0.9)
MONOCYTES NFR BLD AUTO: 7.8 % — SIGNIFICANT CHANGE UP (ref 2–14)
MONOCYTES NFR BLD: 7.1 % — SIGNIFICANT CHANGE UP (ref 2–9)
MYELOCYTES NFR BLD: 0.9 % — HIGH (ref 0–0)
NEUTROPHIL AB SER-ACNC: 71.4 % — SIGNIFICANT CHANGE UP (ref 43–77)
NEUTROPHILS # BLD AUTO: 21.46 K/UL — HIGH (ref 1.8–7.4)
NEUTROPHILS NFR BLD AUTO: 73.9 % — SIGNIFICANT CHANGE UP (ref 43–77)
NEUTS BAND # BLD: 6.3 % — HIGH (ref 0–6)
NRBC # BLD: 1 /100WBC — SIGNIFICANT CHANGE UP
NRBC # FLD: 0.26 K/UL — SIGNIFICANT CHANGE UP (ref 0–0)
NT-PROBNP SERPL-SCNC: 3475 PG/ML — SIGNIFICANT CHANGE UP
OTHER - HEMATOLOGY %: 0 — SIGNIFICANT CHANGE UP
OVALOCYTES BLD QL SMEAR: SLIGHT — SIGNIFICANT CHANGE UP
PCO2 BLDA: 37 MMHG — SIGNIFICANT CHANGE UP (ref 32–48)
PCO2 BLDA: 39 MMHG — SIGNIFICANT CHANGE UP (ref 32–48)
PCO2 BLDA: 42 MMHG — SIGNIFICANT CHANGE UP (ref 32–48)
PCO2 BLDA: 53 MMHG — HIGH (ref 32–48)
PH BLDA: 7.2 PH — CRITICAL LOW (ref 7.35–7.45)
PH BLDA: 7.3 PH — LOW (ref 7.35–7.45)
PH BLDA: 7.32 PH — LOW (ref 7.35–7.45)
PH BLDA: 7.34 PH — LOW (ref 7.35–7.45)
PHOSPHATE SERPL-MCNC: 5.9 MG/DL — HIGH (ref 2.5–4.5)
PLATELET # BLD AUTO: 647 K/UL — HIGH (ref 150–400)
PLATELET COUNT - ESTIMATE: SIGNIFICANT CHANGE UP
PMV BLD: 11.4 FL — SIGNIFICANT CHANGE UP (ref 7–13)
PO2 BLDA: 123 MMHG — HIGH (ref 83–108)
PO2 BLDA: 158 MMHG — HIGH (ref 83–108)
PO2 BLDA: 69 MMHG — LOW (ref 83–108)
PO2 BLDA: 90 MMHG — SIGNIFICANT CHANGE UP (ref 83–108)
POIKILOCYTOSIS BLD QL AUTO: SIGNIFICANT CHANGE UP
POLYCHROMASIA BLD QL SMEAR: SLIGHT — SIGNIFICANT CHANGE UP
POTASSIUM BLDA-SCNC: 4.3 MMOL/L — SIGNIFICANT CHANGE UP (ref 3.4–4.5)
POTASSIUM BLDA-SCNC: 4.5 MMOL/L — SIGNIFICANT CHANGE UP (ref 3.4–4.5)
POTASSIUM BLDA-SCNC: 4.6 MMOL/L — HIGH (ref 3.4–4.5)
POTASSIUM BLDA-SCNC: 4.6 MMOL/L — HIGH (ref 3.4–4.5)
POTASSIUM SERPL-MCNC: 4.5 MMOL/L — SIGNIFICANT CHANGE UP (ref 3.5–5.3)
POTASSIUM SERPL-MCNC: 4.5 MMOL/L — SIGNIFICANT CHANGE UP (ref 3.5–5.3)
POTASSIUM SERPL-SCNC: 4.5 MMOL/L — SIGNIFICANT CHANGE UP (ref 3.5–5.3)
POTASSIUM SERPL-SCNC: 4.5 MMOL/L — SIGNIFICANT CHANGE UP (ref 3.5–5.3)
PROCALCITONIN SERPL-MCNC: 1.06 NG/ML — HIGH (ref 0.02–0.1)
PROMYELOCYTES # FLD: 0 % — SIGNIFICANT CHANGE UP (ref 0–0)
PROT SERPL-MCNC: 8.4 G/DL — HIGH (ref 6–8.3)
PROT SERPL-MCNC: 8.4 G/DL — HIGH (ref 6–8.3)
PROTHROM AB SERPL-ACNC: 14.4 SEC — HIGH (ref 9.8–13.1)
RBC # BLD: 2.98 M/UL — LOW (ref 3.8–5.2)
RBC # FLD: 16.2 % — HIGH (ref 10.3–14.5)
SAO2 % BLDA: 92.1 % — LOW (ref 95–99)
SAO2 % BLDA: 96.2 % — SIGNIFICANT CHANGE UP (ref 95–99)
SAO2 % BLDA: 98 % — SIGNIFICANT CHANGE UP (ref 95–99)
SAO2 % BLDA: 98.4 % — SIGNIFICANT CHANGE UP (ref 95–99)
SCHISTOCYTES BLD QL AUTO: SLIGHT — SIGNIFICANT CHANGE UP
SODIUM BLDA-SCNC: 135 MMOL/L — LOW (ref 136–146)
SODIUM BLDA-SCNC: 136 MMOL/L — SIGNIFICANT CHANGE UP (ref 136–146)
SODIUM BLDA-SCNC: 136 MMOL/L — SIGNIFICANT CHANGE UP (ref 136–146)
SODIUM BLDA-SCNC: 137 MMOL/L — SIGNIFICANT CHANGE UP (ref 136–146)
SODIUM SERPL-SCNC: 134 MMOL/L — LOW (ref 135–145)
SODIUM SERPL-SCNC: 134 MMOL/L — LOW (ref 135–145)
TARGETS BLD QL SMEAR: SIGNIFICANT CHANGE UP
TRIGL SERPL-MCNC: 203 MG/DL — HIGH (ref 10–149)
TROPONIN T, HIGH SENSITIVITY: 67 NG/L — CRITICAL HIGH (ref ?–14)
VARIANT LYMPHS # BLD: 3.6 % — SIGNIFICANT CHANGE UP
WBC # BLD: 29.07 K/UL — HIGH (ref 3.8–10.5)
WBC # FLD AUTO: 29.07 K/UL — HIGH (ref 3.8–10.5)

## 2020-04-04 PROCEDURE — 71045 X-RAY EXAM CHEST 1 VIEW: CPT | Mod: 26,77

## 2020-04-04 PROCEDURE — 99233 SBSQ HOSP IP/OBS HIGH 50: CPT | Mod: GC

## 2020-04-04 PROCEDURE — 99292 CRITICAL CARE ADDL 30 MIN: CPT

## 2020-04-04 PROCEDURE — 99291 CRITICAL CARE FIRST HOUR: CPT

## 2020-04-04 PROCEDURE — 71045 X-RAY EXAM CHEST 1 VIEW: CPT | Mod: 26

## 2020-04-04 RX ORDER — DEXMEDETOMIDINE HYDROCHLORIDE IN 0.9% SODIUM CHLORIDE 4 UG/ML
0.3 INJECTION INTRAVENOUS
Qty: 200 | Refills: 0 | Status: DISCONTINUED | OUTPATIENT
Start: 2020-04-04 | End: 2020-04-10

## 2020-04-04 RX ORDER — CHLORHEXIDINE GLUCONATE 213 G/1000ML
15 SOLUTION TOPICAL EVERY 12 HOURS
Refills: 0 | Status: DISCONTINUED | OUTPATIENT
Start: 2020-04-04 | End: 2020-04-16

## 2020-04-04 RX ORDER — DILTIAZEM HCL 120 MG
10 CAPSULE, EXT RELEASE 24 HR ORAL ONCE
Refills: 0 | Status: COMPLETED | OUTPATIENT
Start: 2020-04-04 | End: 2020-04-04

## 2020-04-04 RX ORDER — PHENYLEPHRINE HYDROCHLORIDE 10 MG/ML
0.1 INJECTION INTRAVENOUS
Qty: 160 | Refills: 0 | Status: DISCONTINUED | OUTPATIENT
Start: 2020-04-04 | End: 2020-04-05

## 2020-04-04 RX ORDER — PROPOFOL 10 MG/ML
10 INJECTION, EMULSION INTRAVENOUS
Qty: 1000 | Refills: 0 | Status: DISCONTINUED | OUTPATIENT
Start: 2020-04-04 | End: 2020-04-07

## 2020-04-04 RX ORDER — METOPROLOL TARTRATE 50 MG
2.5 TABLET ORAL ONCE
Refills: 0 | Status: DISCONTINUED | OUTPATIENT
Start: 2020-04-04 | End: 2020-04-04

## 2020-04-04 RX ORDER — DILTIAZEM HCL 120 MG
10 CAPSULE, EXT RELEASE 24 HR ORAL ONCE
Refills: 0 | Status: DISCONTINUED | OUTPATIENT
Start: 2020-04-04 | End: 2020-04-04

## 2020-04-04 RX ADMIN — ALTEPLASE 2 MILLIGRAM(S): KIT at 02:40

## 2020-04-04 RX ADMIN — CHLORHEXIDINE GLUCONATE 1 APPLICATION(S): 213 SOLUTION TOPICAL at 05:08

## 2020-04-04 RX ADMIN — Medication 216 GRAM(S): at 05:08

## 2020-04-04 RX ADMIN — Medication 2.5 MILLIGRAM(S): at 00:00

## 2020-04-04 RX ADMIN — Medication 10 MILLIGRAM(S): at 03:00

## 2020-04-04 RX ADMIN — FAMOTIDINE 20 MILLIGRAM(S): 10 INJECTION INTRAVENOUS at 17:41

## 2020-04-04 RX ADMIN — Medication 10 MILLIGRAM(S): at 03:41

## 2020-04-04 RX ADMIN — Medication 2.5 MILLIGRAM(S): at 05:08

## 2020-04-04 RX ADMIN — Medication 216 GRAM(S): at 17:41

## 2020-04-04 RX ADMIN — Medication 2.5 MILLIGRAM(S): at 17:41

## 2020-04-04 RX ADMIN — Medication 216 GRAM(S): at 22:48

## 2020-04-04 RX ADMIN — Medication 400 MILLIGRAM(S): at 00:01

## 2020-04-04 RX ADMIN — DEXMEDETOMIDINE HYDROCHLORIDE IN 0.9% SODIUM CHLORIDE 5.41 MICROGRAM(S)/KG/HR: 4 INJECTION INTRAVENOUS at 02:00

## 2020-04-04 NOTE — PROCEDURE NOTE - NSCVLACTUALSITE_VASC_A_CORE
right/internal jugular
right/internal jugular
subclavian vein/left
left/internal jugular
subclavian vein

## 2020-04-04 NOTE — PROGRESS NOTE ADULT - SUBJECTIVE AND OBJECTIVE BOX
[facility] DIVISION OF KIDNEY DISEASES AND HYPERTENSION -- FOLLOW UP NOTE  --------------------------------------------------------------------------------  HPI: 58 yo F admitted to ICU for respiratory failure, pneumonia, SAM and COVID-19 infection. Scr increased to 4.6 on 3/23/20. Pt. initiated on CRRT/CVVHDF on 3/23/20.    Pt. was seen and examined in ICU. Overnight pt. had clotting of CRRT circuit. CRRT/CVVHDF currently paused. Primary team plans to reinsert HD catheter. Pt. is sedated, intubated (FiO2 stable at 40%, PEEP stable at 5), not on IV vasopressor support. Remains anuric.    PAST HISTORY  --------------------------------------------------------------------------------  No significant changes to PMH, PSH, FHx, SHx, unless otherwise noted    ALLERGIES & MEDICATIONS  --------------------------------------------------------------------------------  Allergies    No Known Allergies    Intolerances    Standing Inpatient Medications  ampicillin  IVPB      ampicillin  IVPB 2 Gram(s) IV Intermittent every 8 hours  chlorhexidine 4% Liquid 1 Application(s) Topical <User Schedule>  CRRT Treatment    <Continuous>  dexMEDEtomidine Infusion 0.3 MICROgram(s)/kG/Hr IV Continuous <Continuous>  famotidine Injectable 20 milliGRAM(s) IV Push daily  heparin  Infusion 1200 Unit(s)/Hr IV Continuous <Continuous>  lactulose Syrup 20 Gram(s) Oral four times a day  metoprolol tartrate Injectable 2.5 milliGRAM(s) IV Push every 6 hours  Phoxillum Filtration BK 4 / 2.5 5000 milliLiter(s) CRRT <Continuous>  polyethylene glycol 3350 17 Gram(s) Oral daily  PrismaSOL Filtration BGK 4 / 2.5 5000 milliLiter(s) CRRT <Continuous>  PrismaSOL Filtration BGK 4 / 2.5 5000 milliLiter(s) CRRT <Continuous>  senna Syrup 15 milliLiter(s) Oral daily    REVIEW OF SYSTEMS  --------------------------------------------------------------------------------  Unable to obtain.    VITALS/PHYSICAL EXAM  --------------------------------------------------------------------------------  T(C): 37.7 (04-04-20 @ 04:00), Max: 38.2 (04-04-20 @ 00:00)  HR: 144 (04-04-20 @ 00:00) (120 - 144)  BP: --  RR: 25 (04-04-20 @ 00:00) (15 - 25)  SpO2: 100% (04-04-20 @ 00:00) (100% - 100%)  Wt(kg): --    04-03-20 @ 07:01  -  04-04-20 @ 07:00  --------------------------------------------------------  IN: 444 mL / OUT: 770 mL / NET: -326 mL    Physical Exam:  	Gen: Sedated, intubated  	HEENT: + ETT  	Pulm: + mechanical breath sounds  	CV: S1S2+  	Abd: Soft, non distended   	Ext: No LE edema B/L  	Neuro: Sedated  	Skin: Warm and dry              Access: Right subclavian non tunneled catheter without bleeding    LABS/STUDIES  --------------------------------------------------------------------------------              8.4    29.07 >-----------<  647      [04-04-20 @ 02:59]              25.3     134  |  95  |  47  ----------------------------<  113      [04-04-20 @ 02:59]  4.5   |  17  |  2.94        Ca     11.4     [04-04-20 @ 02:59]      Mg     3.1     [04-04-20 @ 02:59]      Phos  5.9     [04-04-20 @ 02:59]    Creatinine Trend:  SCr 2.94 [04-04 @ 02:59]  SCr 1.60 [04-03 @ 03:00]  SCr 1.58 [04-02 @ 22:45]  SCr 1.55 [04-02 @ 13:10]  SCr 1.73 [04-02 @ 04:30] [facility] DIVISION OF KIDNEY DISEASES AND HYPERTENSION -- FOLLOW UP NOTE  --------------------------------------------------------------------------------  HPI: 60 yo F admitted to ICU for respiratory failure, pneumonia, SAM and COVID-19 infection. Scr increased to 4.6 on 3/23/20. Pt. initiated on CRRT/CVVHDF on 3/23/20.    Pt. was seen and examined in ICU. Overnight, pt. had clotting of CRRT circuit. CRRT/CVVHDF currently paused. Primary ICU team plans to replace HD catheter. Pt. is sedated, intubated (FiO2 stable at 40%, PEEP stable at 5), not on IV vasopressor support. Remains anuric.    PAST HISTORY  --------------------------------------------------------------------------------  No significant changes to PMH, PSH, FHx, SHx, unless otherwise noted    ALLERGIES & MEDICATIONS  --------------------------------------------------------------------------------  Allergies    No Known Allergies    Intolerances    Standing Inpatient Medications  ampicillin  IVPB      ampicillin  IVPB 2 Gram(s) IV Intermittent every 8 hours  chlorhexidine 4% Liquid 1 Application(s) Topical <User Schedule>  CRRT Treatment    <Continuous>  dexMEDEtomidine Infusion 0.3 MICROgram(s)/kG/Hr IV Continuous <Continuous>  famotidine Injectable 20 milliGRAM(s) IV Push daily  heparin  Infusion 1200 Unit(s)/Hr IV Continuous <Continuous>  lactulose Syrup 20 Gram(s) Oral four times a day  metoprolol tartrate Injectable 2.5 milliGRAM(s) IV Push every 6 hours  Phoxillum Filtration BK 4 / 2.5 5000 milliLiter(s) CRRT <Continuous>  polyethylene glycol 3350 17 Gram(s) Oral daily  PrismaSOL Filtration BGK 4 / 2.5 5000 milliLiter(s) CRRT <Continuous>  PrismaSOL Filtration BGK 4 / 2.5 5000 milliLiter(s) CRRT <Continuous>  senna Syrup 15 milliLiter(s) Oral daily    REVIEW OF SYSTEMS  --------------------------------------------------------------------------------  Unable to obtain.    VITALS/PHYSICAL EXAM  --------------------------------------------------------------------------------  T(C): 37.7 (04-04-20 @ 04:00), Max: 38.2 (04-04-20 @ 00:00)  HR: 144 (04-04-20 @ 00:00) (120 - 144)  BP: --  RR: 25 (04-04-20 @ 00:00) (15 - 25)  SpO2: 100% (04-04-20 @ 00:00) (100% - 100%)  Wt(kg): --    04-03-20 @ 07:01  -  04-04-20 @ 07:00  --------------------------------------------------------  IN: 444 mL / OUT: 770 mL / NET: -326 mL    Physical Exam:  	Gen: Sedated, intubated  	HEENT: + ETT  	Pulm: + mechanical breath sounds  	CV: S1S2+  	Abd: Soft, non distended   	Ext: No LE edema B/L  	Neuro: Sedated  	Skin: Warm and dry              Access: Right subclavian non tunneled catheter without bleeding    LABS/STUDIES  --------------------------------------------------------------------------------              8.4    29.07 >-----------<  647      [04-04-20 @ 02:59]              25.3     134  |  95  |  47  ----------------------------<  113      [04-04-20 @ 02:59]  4.5   |  17  |  2.94        Ca     11.4     [04-04-20 @ 02:59]      Mg     3.1     [04-04-20 @ 02:59]      Phos  5.9     [04-04-20 @ 02:59]    Creatinine Trend:  SCr 2.94 [04-04 @ 02:59]  SCr 1.60 [04-03 @ 03:00]  SCr 1.58 [04-02 @ 22:45]  SCr 1.55 [04-02 @ 13:10]  SCr 1.73 [04-02 @ 04:30]

## 2020-04-04 NOTE — PROCEDURE NOTE - NSPOSTPRCRAD_GEN_A_CORE
central line located in the superior vena cava
post-procedure radiography performed/feeding tube in correct gastric position
post-procedure radiography performed
central line located in the superior vena cava/15CM deep/central line located in the
no pneumothorax/post-procedure radiography performed/central line located in the superior vena cava

## 2020-04-04 NOTE — PROGRESS NOTE ADULT - SUBJECTIVE AND OBJECTIVE BOX
ISSUES:   Acute hypoxic respiratory failure              ARDS              Septic shock              COVID-19+              SAM              Mixed metabolic / respiratory acidosis              Anemia    INTERVAL EVENTS:   Reintubated for respiratory distress today.  Resuming CRRT for renal failure  Remains on phenylephrine for septic shock. This was titrated.    HISTORY:   Unable to obtain history or ROS due to intubation and sedation    PHYSICAL EXAM:   Gen: Comfortable, No acute distress  Eyes: Sclera white, Conjunctiva normal, Eyelids normal, Pupils symmetrical   ENT: Mucous membranes moist, OG tube in place,  ,    Neck: Trachea midline,  ,  ,  ,  ,    CV: Rate regular, Rhythm regular,  ,    Resp: Breath sounds coarse, No accessory muscles use,  ,    Abd: Soft, Mildly distended, Non-tender, Bowel sounds normal,  ,    Skin: Warm, 1+ edema of lower extremities,  ,    : Frances in place  Neuro: Unable to elicit motor response.,    Psych: RASS -5      ASSESSMENT AND PLAN:     NEURO:  Vent sedation - Continue propofol, precedex for ventilator synchrony.                          RESPIRATORY:  Acute respiratory failure - Mechanical ventilation. Monitor ABG. Wean FIO2 for goal O2sat above 92. Vent bundle.                                 CARDIOVASCULAR:  Septic shock requiring IV pressors - Wean pressors for MAP goal of 65.                               RENAL:  SAM requiring dialysis – Dialysis. Renally dose medications. Monitor for hyperkalemia and uremia. Avoid nephrotoxic medications. Monitor IOs and electrolytes. Discussed with Renal Consult.         GASTROINTESTINAL:  GI prophylaxis with famotidine  Zofran and Reglan IV PRN for nausea  Tube feed              HEMATOLOGIC:  No signs of active bleeding. Monitor Hgb in CBC in AM  On therapeutic anticoagulation.  COVID hypercoagulability - Continue heparin gtt      INFECTIOUS DISEASE:  COVID-19 infection - s/p hydroxychloroquine, azithromycin            Enterococcus bacteremia - ampicillin. Aortic valve not well visualized on TTE. F/U Culture.                      ENDOCRINE:  Stable – Monitor glucose fingersticks for goal 120-180.            Pertinent clinical, laboratory, radiographic, hemodynamic, echocardiographic, respiratory data, microbiologic data and chart were reviewed by myself and analyzed frequently throughout the course of the day and night by myself.    Plan discussed at length with the CTICU staff     Patient unable to participate with discussion of plan.    Patient required critical care management.  75 minutes were spent evaluating, managing, providing, coordinating, and documenting care for this patient, exclusive of procedures from  8AM to 1159PM.  _________________________  VITAL SIGNS:  Vital Signs Last 24 Hrs  T(C): 36.9 (04 Apr 2020 16:00), Max: 38.2 (04 Apr 2020 00:00)  T(F): 98.4 (04 Apr 2020 16:00), Max: 100.8 (04 Apr 2020 00:00)  HR: 123 (04 Apr 2020 19:30) (120 - 144)  BP: --  BP(mean): --  RR: 26 (04 Apr 2020 12:00) (25 - 26)  SpO2: 98% (04 Apr 2020 19:30) (88% - 100%)  I/Os:   I&O's Detail    03 Apr 2020 07:01  -  04 Apr 2020 07:00  --------------------------------------------------------  IN:    heparin Infusion: 132 mL    norepinephrine Infusion: 12 mL    Solution: 300 mL  Total IN: 444 mL    OUT:    Other: 770 mL  Total OUT: 770 mL    Total NET: -326 mL      04 Apr 2020 07:01  -  04 Apr 2020 21:59  --------------------------------------------------------  IN:    dexmedetomidine Infusion: 99 mL    heparin Infusion: 60 mL    propofol Infusion: 13 mL    Solution: 55 mL  Total IN: 227 mL    OUT:  Total OUT: 0 mL    Total NET: 227 mL          Mode: AC/ CMV (Assist Control/ Continuous Mandatory Ventilation)  RR (machine): 28  TV (machine): 330  FiO2: 40  PEEP: 5  MAP: 10  PIP: 29      MEDICATIONS:  MEDICATIONS  (STANDING):  ampicillin  IVPB      ampicillin  IVPB 2 Gram(s) IV Intermittent every 8 hours  chlorhexidine 0.12% Liquid 15 milliLiter(s) Oral Mucosa every 12 hours  chlorhexidine 4% Liquid 1 Application(s) Topical <User Schedule>  CRRT Treatment    <Continuous>  dexMEDEtomidine Infusion 0.3 MICROgram(s)/kG/Hr (5.41 mL/Hr) IV Continuous <Continuous>  famotidine Injectable 20 milliGRAM(s) IV Push daily  heparin  Infusion 1200 Unit(s)/Hr (12 mL/Hr) IV Continuous <Continuous>  lactulose Syrup 20 Gram(s) Oral four times a day  metoprolol tartrate Injectable 2.5 milliGRAM(s) IV Push every 6 hours  phenylephrine    Infusion 0.1 MICROgram(s)/kG/Min (1.35 mL/Hr) IV Continuous <Continuous>  Phoxillum Filtration BK 4 / 2.5 5000 milliLiter(s) (1000 mL/Hr) CRRT <Continuous>  polyethylene glycol 3350 17 Gram(s) Oral daily  PrismaSOL Filtration BGK 4 / 2.5 5000 milliLiter(s) (1200 mL/Hr) CRRT <Continuous>  PrismaSOL Filtration BGK 4 / 2.5 5000 milliLiter(s) (200 mL/Hr) CRRT <Continuous>  propofol Infusion 10 MICROgram(s)/kG/Min (4.33 mL/Hr) IV Continuous <Continuous>  senna Syrup 15 milliLiter(s) Oral daily    MEDICATIONS  (PRN):  acetaminophen    Suspension .. 650 milliGRAM(s) Oral every 6 hours PRN Temp greater or equal to 38C (100.4F)      LABS:                        8.4    29.07 )-----------( 647      ( 04 Apr 2020 02:59 )             25.3     04-04    134<L>  |  95<L>  |  47<H>  ----------------------------<  113<H>  4.5   |  17<L>  |  2.94<H>    Ca    11.4<H>      04 Apr 2020 02:59  Phos  5.9     04-04  Mg     3.1     04-04    TPro  8.4<H>  /  Alb  3.5  /  TBili  0.3  /  DBili  x   /  AST  74<H>  /  ALT  90<H>  /  AlkPhos  144<H>  04-04    LIVER FUNCTIONS - ( 04 Apr 2020 02:59 )  Alb: 3.5 g/dL / Pro: 8.4 g/dL / ALK PHOS: 144 u/L / ALT: 90 u/L / AST: 74 u/L / GGT: x           PT/INR - ( 04 Apr 2020 02:59 )   PT: 14.4 SEC;   INR: 1.25          PTT - ( 04 Apr 2020 02:59 )  PTT:88.0 SEC  ABG - ( 04 Apr 2020 15:34 )  pH, Arterial: 7.20  pH, Blood: x     /  pCO2: 53    /  pO2: 158   / HCO3: 18    / Base Excess: -7.0  /  SaO2: 98.4                _________________________

## 2020-04-04 NOTE — PROGRESS NOTE ADULT - PROBLEM SELECTOR PLAN 1
Pt. with oliguric SAM in the setting of hypotension and COVID-19. Scr on admission (3/15/20) was 0.84. Scr however worsened to 4.6 on 3/26/20. Pt. likely with ATN. Pt. was started on CRRT/CVVHDF on 3/26/20. Pt. with clotting of CRRT circuit overnight. Primary team plans to reinsert non-tunneled HD catheter. Plan to continue CRRT/CVVHDF today after replacement of HD catheter. Pt. on heparin gtt. Consider starting  mg daily to help reduce clotting of CRRT circuit. Check renal sonogram (when feasible). Monitor labs and urine output. Avoid any potential nephrotoxins Pt. with oliguric SAM in the setting of hypotension and COVID-19. Scr on admission (3/15/20) was 0.84. Scr however worsened to 4.6 on 3/26/20. Pt. likely with ATN. Pt. was started on CRRT/CVVHDF on 3/26/20. Pt. with clotting of CRRT circuit overnight. Primary ICU team plans to replace non-tunneled HD catheter. Plan is to resume CRRT/CVVHDF today after replacement of HD catheter. Pt. on heparin gtt. Consider adding  mg daily to help reduce clotting of CRRT circuit. Check renal sonogram (when feasible). Monitor labs and urine output. Avoid any potential nephrotoxins

## 2020-04-04 NOTE — PROGRESS NOTE ADULT - SUBJECTIVE AND OBJECTIVE BOX
Anesthesia called for urgent intubation of COVID + patient in respiratory failure..  On arrival, pt tachypneic, lethargic, preoxygenating on NRB.  RSI with etomidate 14 mg, succinylcholine 100 mg, intubated with 7.5 ETT at 19 cm. Pt placed immediately on ventilator, + chest rise.

## 2020-04-04 NOTE — PROCEDURE NOTE - NSANESTHESIA_GEN_A_CORE
5 ml/1% lidocaine
no anesthesia administered
1% lidocaine
1% lidocaine

## 2020-04-05 LAB
ALBUMIN SERPL ELPH-MCNC: 3.2 G/DL — LOW (ref 3.3–5)
ALP SERPL-CCNC: 123 U/L — HIGH (ref 40–120)
ALT FLD-CCNC: 69 U/L — HIGH (ref 4–33)
ANION GAP SERPL CALC-SCNC: 20 MMO/L — HIGH (ref 7–14)
APTT BLD: 101.3 SEC — HIGH (ref 27.5–36.3)
APTT BLD: 160.2 SEC — CRITICAL HIGH (ref 27.5–36.3)
APTT BLD: 73.4 SEC — HIGH (ref 27.5–36.3)
APTT BLD: 76.5 SEC — HIGH (ref 27.5–36.3)
AST SERPL-CCNC: 45 U/L — HIGH (ref 4–32)
BASE EXCESS BLDA CALC-SCNC: -5.1 MMOL/L — SIGNIFICANT CHANGE UP
BASE EXCESS BLDA CALC-SCNC: -5.2 MMOL/L — SIGNIFICANT CHANGE UP
BASOPHILS # BLD AUTO: 0.33 K/UL — HIGH (ref 0–0.2)
BASOPHILS NFR BLD AUTO: 1 % — SIGNIFICANT CHANGE UP (ref 0–2)
BILIRUB SERPL-MCNC: 0.3 MG/DL — SIGNIFICANT CHANGE UP (ref 0.2–1.2)
BLOOD GAS ARTERIAL - FIO2: 40 — SIGNIFICANT CHANGE UP
BUN SERPL-MCNC: 49 MG/DL — HIGH (ref 7–23)
CA-I BLDA-SCNC: 1.24 MMOL/L — SIGNIFICANT CHANGE UP (ref 1.15–1.29)
CA-I BLDA-SCNC: 1.31 MMOL/L — HIGH (ref 1.15–1.29)
CALCIUM SERPL-MCNC: 10.6 MG/DL — HIGH (ref 8.4–10.5)
CHLORIDE SERPL-SCNC: 97 MMOL/L — LOW (ref 98–107)
CK SERPL-CCNC: 867 U/L — HIGH (ref 25–170)
CO2 SERPL-SCNC: 14 MMOL/L — LOW (ref 22–31)
CREAT SERPL-MCNC: 2.95 MG/DL — HIGH (ref 0.5–1.3)
CRP SERPL-MCNC: 118 MG/L — HIGH
D DIMER BLD IA.RAPID-MCNC: 3233 NG/ML — SIGNIFICANT CHANGE UP
EOSINOPHIL # BLD AUTO: 0.05 K/UL — SIGNIFICANT CHANGE UP (ref 0–0.5)
EOSINOPHIL NFR BLD AUTO: 0.2 % — SIGNIFICANT CHANGE UP (ref 0–6)
FIBRINOGEN PPP-MCNC: 737 MG/DL — HIGH (ref 300–520)
GLUCOSE BLDA-MCNC: 119 MG/DL — HIGH (ref 70–99)
GLUCOSE BLDA-MCNC: 179 MG/DL — HIGH (ref 70–99)
GLUCOSE BLDC GLUCOMTR-MCNC: 162 MG/DL — HIGH (ref 70–99)
GLUCOSE SERPL-MCNC: 109 MG/DL — HIGH (ref 70–99)
HCO3 BLDA-SCNC: 20 MMOL/L — LOW (ref 22–26)
HCO3 BLDA-SCNC: 20 MMOL/L — LOW (ref 22–26)
HCT VFR BLD CALC: 23.5 % — LOW (ref 34.5–45)
HCT VFR BLD CALC: 23.8 % — LOW (ref 34.5–45)
HCT VFR BLDA CALC: 24.6 % — LOW (ref 34.5–46.5)
HCT VFR BLDA CALC: 24.7 % — LOW (ref 34.5–46.5)
HGB BLD-MCNC: 7.8 G/DL — LOW (ref 11.5–15.5)
HGB BLD-MCNC: 7.8 G/DL — LOW (ref 11.5–15.5)
HGB BLDA-MCNC: 7.9 G/DL — LOW (ref 11.5–15.5)
HGB BLDA-MCNC: 7.9 G/DL — LOW (ref 11.5–15.5)
IMM GRANULOCYTES NFR BLD AUTO: 6.4 % — HIGH (ref 0–1.5)
INR BLD: 1.18 — HIGH (ref 0.88–1.17)
LACTATE BLDA-SCNC: 1.8 MMOL/L — SIGNIFICANT CHANGE UP (ref 0.5–2)
LACTATE BLDA-SCNC: 1.8 MMOL/L — SIGNIFICANT CHANGE UP (ref 0.5–2)
LYMPHOCYTES # BLD AUTO: 3.16 K/UL — SIGNIFICANT CHANGE UP (ref 1–3.3)
LYMPHOCYTES # BLD AUTO: 9.5 % — LOW (ref 13–44)
MAGNESIUM SERPL-MCNC: 3.1 MG/DL — HIGH (ref 1.6–2.6)
MANUAL SMEAR VERIFICATION: SIGNIFICANT CHANGE UP
MANUAL SMEAR VERIFICATION: SIGNIFICANT CHANGE UP
MCHC RBC-ENTMCNC: 28.5 PG — SIGNIFICANT CHANGE UP (ref 27–34)
MCHC RBC-ENTMCNC: 28.6 PG — SIGNIFICANT CHANGE UP (ref 27–34)
MCHC RBC-ENTMCNC: 32.8 % — SIGNIFICANT CHANGE UP (ref 32–36)
MCHC RBC-ENTMCNC: 33.2 % — SIGNIFICANT CHANGE UP (ref 32–36)
MCV RBC AUTO: 86.1 FL — SIGNIFICANT CHANGE UP (ref 80–100)
MCV RBC AUTO: 86.9 FL — SIGNIFICANT CHANGE UP (ref 80–100)
MONOCYTES # BLD AUTO: 2.97 K/UL — HIGH (ref 0–0.9)
MONOCYTES NFR BLD AUTO: 8.9 % — SIGNIFICANT CHANGE UP (ref 2–14)
NEUTROPHILS # BLD AUTO: 24.57 K/UL — HIGH (ref 1.8–7.4)
NEUTROPHILS NFR BLD AUTO: 74 % — SIGNIFICANT CHANGE UP (ref 43–77)
NRBC # FLD: 0.3 K/UL — SIGNIFICANT CHANGE UP (ref 0–0)
NRBC # FLD: 0.33 K/UL — SIGNIFICANT CHANGE UP (ref 0–0)
NRBC FLD-RTO: 1 — SIGNIFICANT CHANGE UP
NT-PROBNP SERPL-SCNC: 4338 PG/ML — SIGNIFICANT CHANGE UP
PCO2 BLDA: 34 MMHG — SIGNIFICANT CHANGE UP (ref 32–48)
PCO2 BLDA: 36 MMHG — SIGNIFICANT CHANGE UP (ref 32–48)
PH BLDA: 7.35 PH — SIGNIFICANT CHANGE UP (ref 7.35–7.45)
PH BLDA: 7.37 PH — SIGNIFICANT CHANGE UP (ref 7.35–7.45)
PHOSPHATE SERPL-MCNC: 5.2 MG/DL — HIGH (ref 2.5–4.5)
PLATELET # BLD AUTO: 607 K/UL — HIGH (ref 150–400)
PLATELET # BLD AUTO: 653 K/UL — HIGH (ref 150–400)
PMV BLD: 11.7 FL — SIGNIFICANT CHANGE UP (ref 7–13)
PMV BLD: 11.8 FL — SIGNIFICANT CHANGE UP (ref 7–13)
PO2 BLDA: 114 MMHG — HIGH (ref 83–108)
PO2 BLDA: 86 MMHG — SIGNIFICANT CHANGE UP (ref 83–108)
POTASSIUM BLDA-SCNC: 4 MMOL/L — SIGNIFICANT CHANGE UP (ref 3.4–4.5)
POTASSIUM BLDA-SCNC: 4.3 MMOL/L — SIGNIFICANT CHANGE UP (ref 3.4–4.5)
POTASSIUM SERPL-MCNC: 4.6 MMOL/L — SIGNIFICANT CHANGE UP (ref 3.5–5.3)
POTASSIUM SERPL-SCNC: 4.6 MMOL/L — SIGNIFICANT CHANGE UP (ref 3.5–5.3)
PROCALCITONIN SERPL-MCNC: 1.06 NG/ML — HIGH (ref 0.02–0.1)
PROT SERPL-MCNC: 8 G/DL — SIGNIFICANT CHANGE UP (ref 6–8.3)
PROTHROM AB SERPL-ACNC: 13.6 SEC — HIGH (ref 9.8–13.1)
RBC # BLD: 2.73 M/UL — LOW (ref 3.8–5.2)
RBC # BLD: 2.74 M/UL — LOW (ref 3.8–5.2)
RBC # FLD: 16.7 % — HIGH (ref 10.3–14.5)
RBC # FLD: 17 % — HIGH (ref 10.3–14.5)
SAO2 % BLDA: 96.1 % — SIGNIFICANT CHANGE UP (ref 95–99)
SAO2 % BLDA: 98.1 % — SIGNIFICANT CHANGE UP (ref 95–99)
SODIUM BLDA-SCNC: 135 MMOL/L — LOW (ref 136–146)
SODIUM BLDA-SCNC: 135 MMOL/L — LOW (ref 136–146)
SODIUM SERPL-SCNC: 131 MMOL/L — LOW (ref 135–145)
TROPONIN T, HIGH SENSITIVITY: 81 NG/L — CRITICAL HIGH (ref ?–14)
WBC # BLD: 33.21 K/UL — HIGH (ref 3.8–10.5)
WBC # BLD: 34.57 K/UL — HIGH (ref 3.8–10.5)
WBC # FLD AUTO: 33.21 K/UL — HIGH (ref 3.8–10.5)
WBC # FLD AUTO: 34.57 K/UL — HIGH (ref 3.8–10.5)

## 2020-04-05 PROCEDURE — 99291 CRITICAL CARE FIRST HOUR: CPT

## 2020-04-05 PROCEDURE — 99292 CRITICAL CARE ADDL 30 MIN: CPT

## 2020-04-05 PROCEDURE — 90945 DIALYSIS ONE EVALUATION: CPT | Mod: GC

## 2020-04-05 RX ORDER — VASOPRESSIN 20 [USP'U]/ML
0 INJECTION INTRAVENOUS
Qty: 50 | Refills: 0 | Status: DISCONTINUED | OUTPATIENT
Start: 2020-04-05 | End: 2020-04-14

## 2020-04-05 RX ORDER — NOREPINEPHRINE BITARTRATE/D5W 8 MG/250ML
0.05 PLASTIC BAG, INJECTION (ML) INTRAVENOUS
Qty: 16 | Refills: 0 | Status: DISCONTINUED | OUTPATIENT
Start: 2020-04-05 | End: 2020-04-14

## 2020-04-05 RX ORDER — HEPARIN SODIUM 5000 [USP'U]/ML
1050 INJECTION INTRAVENOUS; SUBCUTANEOUS
Qty: 25000 | Refills: 0 | Status: DISCONTINUED | OUTPATIENT
Start: 2020-04-05 | End: 2020-04-14

## 2020-04-05 RX ADMIN — Medication 1 DROP(S): at 12:15

## 2020-04-05 RX ADMIN — Medication 3.38 MICROGRAM(S)/KG/MIN: at 12:15

## 2020-04-05 RX ADMIN — Medication 216 GRAM(S): at 05:42

## 2020-04-05 RX ADMIN — PROPOFOL 4.33 MICROGRAM(S)/KG/MIN: 10 INJECTION, EMULSION INTRAVENOUS at 12:15

## 2020-04-05 RX ADMIN — Medication 216 GRAM(S): at 21:02

## 2020-04-05 RX ADMIN — CHLORHEXIDINE GLUCONATE 1 APPLICATION(S): 213 SOLUTION TOPICAL at 06:04

## 2020-04-05 RX ADMIN — DEXMEDETOMIDINE HYDROCHLORIDE IN 0.9% SODIUM CHLORIDE 5.41 MICROGRAM(S)/KG/HR: 4 INJECTION INTRAVENOUS at 13:24

## 2020-04-05 RX ADMIN — Medication 1 DROP(S): at 19:13

## 2020-04-05 RX ADMIN — CHLORHEXIDINE GLUCONATE 15 MILLILITER(S): 213 SOLUTION TOPICAL at 05:42

## 2020-04-05 RX ADMIN — VASOPRESSIN 0.03 UNIT(S)/MIN: 20 INJECTION INTRAVENOUS at 13:23

## 2020-04-05 RX ADMIN — FAMOTIDINE 20 MILLIGRAM(S): 10 INJECTION INTRAVENOUS at 12:16

## 2020-04-05 RX ADMIN — CHLORHEXIDINE GLUCONATE 15 MILLILITER(S): 213 SOLUTION TOPICAL at 19:14

## 2020-04-05 RX ADMIN — Medication 216 GRAM(S): at 14:30

## 2020-04-05 NOTE — PROGRESS NOTE ADULT - PROBLEM SELECTOR PLAN 1
Pt. with oliguric SAM in the setting of hypotension and COVID-19. Scr on admission (3/15/20) was 0.84. Scr however worsened to 4.6 on 3/26/20. Pt. likely with ATN. Pt. was started on CRRT/CVVHDF on 3/26/20. Pt. tolerating CVVHDF at present. BP being maintained on IV vasopressors. HD catheter functioning during rounds. Plan is to continue CRRT/CVVHDF today. Pt. on heparin gtt. Consider adding  mg daily to help reduce clotting of CRRT circuit. Check renal sonogram (when feasible). Monitor labs and urine output. Avoid any potential nephrotoxins

## 2020-04-05 NOTE — PROGRESS NOTE ADULT - SUBJECTIVE AND OBJECTIVE BOX
[facility] DIVISION OF KIDNEY DISEASES AND HYPERTENSION -- FOLLOW UP NOTE  --------------------------------------------------------------------------------  HPI: 58 yo F admitted to ICU for respiratory failure, pneumonia, SAM and COVID-19 infection. Scr increased to 4.6 on 3/23/20. Pt. initiated on CRRT/CVVHDF on 3/23/20. Pt. was extubated yesterday (4/4/20), however re-intubated overnight.     Pt. was seen and examined in ICU. Pt. is sedated, intubated (FiO2 stable at 40%, PEEP increased to 16), on IV vasopressor support. Remains anuric.    PAST HISTORY  --------------------------------------------------------------------------------  No significant changes to PMH, PSH, FHx, SHx, unless otherwise noted    ALLERGIES & MEDICATIONS  --------------------------------------------------------------------------------  Allergies    No Known Allergies    Intolerances    Standing Inpatient Medications  ampicillin  IVPB      ampicillin  IVPB 2 Gram(s) IV Intermittent every 8 hours  artificial tears (preservative free) Ophthalmic Solution 1 Drop(s) Both EYES every 6 hours  chlorhexidine 0.12% Liquid 15 milliLiter(s) Oral Mucosa every 12 hours  chlorhexidine 4% Liquid 1 Application(s) Topical <User Schedule>  CRRT Treatment    <Continuous>  dexMEDEtomidine Infusion 0.3 MICROgram(s)/kG/Hr IV Continuous <Continuous>  famotidine Injectable 20 milliGRAM(s) IV Push daily  heparin  Infusion 1050 Unit(s)/Hr IV Continuous <Continuous>  lactulose Syrup 20 Gram(s) Oral four times a day  metoprolol tartrate Injectable 2.5 milliGRAM(s) IV Push every 6 hours  norepinephrine Infusion 0.05 MICROgram(s)/kG/Min IV Continuous <Continuous>  polyethylene glycol 3350 17 Gram(s) Oral daily  PrismaSATE Dialysate BGK 4 / 2.5 5000 milliLiter(s) CRRT <Continuous>  PrismaSOL Filtration BGK 4 / 2.5 5000 milliLiter(s) CRRT <Continuous>  PrismaSOL Filtration BGK 4 / 2.5 5000 milliLiter(s) CRRT <Continuous>  propofol Infusion 10 MICROgram(s)/kG/Min IV Continuous <Continuous>  senna Syrup 15 milliLiter(s) Oral daily  vasopressin Infusion 0.001 Unit(s)/Min IV Continuous <Continuous>    REVIEW OF SYSTEMS  --------------------------------------------------------------------------------  Unable to obtain.    VITALS/PHYSICAL EXAM  --------------------------------------------------------------------------------  T(C): 36 (04-05-20 @ 08:00), Max: 37.8 (04-04-20 @ 12:00)  HR: 70 (04-05-20 @ 09:02) (66 - 132)  BP: --  RR: 19 (04-05-20 @ 08:00) (19 - 33)  SpO2: 100% (04-05-20 @ 09:02) (88% - 100%)  Wt(kg): --    04-04-20 @ 07:01  -  04-05-20 @ 07:00  --------------------------------------------------------  IN: 1305.8 mL / OUT: 1633 mL / NET: -327.2 mL    04-05-20 @ 07:01  -  04-05-20 @ 11:31  --------------------------------------------------------  IN: 347.5 mL / OUT: 75 mL / NET: 272.5 mL    Physical Exam:  	Gen: Sedated, intubated  	HEENT: + ETT  	Pulm: + mechanical breath sounds  	CV: S1S2+  	Abd: Soft, non distended   	Ext: No LE edema B/L  	Neuro: Sedated  	Skin: Warm and dry              Access: Right IJ non tunneled catheter without bleeding    LABS/STUDIES  --------------------------------------------------------------------------------              7.8    33.21 >-----------<  653      [04-05-20 @ 03:00]              23.8     131  |  97  |  49  ----------------------------<  109      [04-05-20 @ 03:00]  4.6   |  14  |  2.95        Ca     10.6     [04-05-20 @ 03:00]      Mg     3.1     [04-05-20 @ 03:00]      Phos  5.2     [04-05-20 @ 03:00]    Creatinine Trend:  SCr 2.95 [04-05 @ 03:00]  SCr 2.94 [04-04 @ 02:59]  SCr 1.60 [04-03 @ 03:00]  SCr 1.58 [04-02 @ 22:45]  SCr 1.55 [04-02 @ 13:10]

## 2020-04-05 NOTE — PROGRESS NOTE ADULT - SUBJECTIVE AND OBJECTIVE BOX
ALEXA GARCIA            MRN-[id number]         No Known Allergies                 60 y/o F with no PMH p/w fever and cough since Thursday. Tmax 103. She presented to urgent care initially and was treated with tamiflu for presumed flu. Despite this she continued to have fevers. Pt denies recent travel, sick contacts or any other sx or acute complaints. Endorses pinching pain in chest when she coughs. Also with nausea and poor PO intake. Denies shortness of breath, abdominal pain, dysuria or LE edema. (16 Mar 2020 02:05)      Issues:              Acute hypoxic respiratory failure              ARDS              Septic shock              COVID-19+              SAM - On CRRT              Anemia    Drips:  Propofol / Levophed / Precedex             Home Medications:      PAST MEDICAL & SURGICAL HISTORY:  No pertinent past medical history  No significant past surgical history        ICU Vital Signs Last 24 Hrs  T(C): 36 (05 Apr 2020 08:00), Max: 37.1 (04 Apr 2020 20:00)  T(F): 96.8 (05 Apr 2020 08:00), Max: 98.8 (04 Apr 2020 20:00)  HR: 63 (05 Apr 2020 12:00) (63 - 132)  BP: --  BP(mean): --  ABP: 110/40 (05 Apr 2020 12:00) (100/41 - 148/72)  ABP(mean): 61 (05 Apr 2020 12:00) (61 - 97)  RR: 16 (05 Apr 2020 12:00) (16 - 33)  SpO2: 100% (05 Apr 2020 11:48) (96% - 100%)    I&O's Detail    04 Apr 2020 07:01  -  05 Apr 2020 07:00  --------------------------------------------------------  IN:    dexmedetomidine Infusion: 189 mL    Enteral Tube Flush: 60 mL    heparin Infusion: 177 mL    norepinephrine Infusion: 27 mL    ns in tub fed  [id number]: 150 mL    phenylephrine   Infusion: 304.8 mL    propofol Infusion: 143 mL    Solution: 255 mL  Total IN: 1305.8 mL    OUT:    Other: 1433 mL    Rectal Tube: 200 mL  Total OUT: 1633 mL    Total NET: -327.2 mL      05 Apr 2020 07:01  -  05 Apr 2020 13:34  --------------------------------------------------------  IN:    dexmedetomidine Infusion: 72 mL    Enteral Tube Flush: 30 mL    heparin Infusion: 50 mL    norepinephrine Infusion: 162 mL    ns in tub fed  [id number]: 180 mL    propofol Infusion: 54.3 mL    vasopressin Infusion: 1.8 mL  Total IN: 550.1 mL    OUT:    Other: 75 mL  Total OUT: 75 mL    Total NET: 475.1 mL        CAPILLARY BLOOD GLUCOSE      POCT Blood Glucose.: 162 mg/dL (05 Apr 2020 12:10)      Home Medications:      MEDICATIONS  (STANDING):  ampicillin  IVPB      ampicillin  IVPB 2 Gram(s) IV Intermittent every 8 hours  artificial tears (preservative free) Ophthalmic Solution 1 Drop(s) Both EYES every 6 hours  chlorhexidine 0.12% Liquid 15 milliLiter(s) Oral Mucosa every 12 hours  chlorhexidine 4% Liquid 1 Application(s) Topical <User Schedule>  CRRT Treatment    <Continuous>  dexMEDEtomidine Infusion 0.3 MICROgram(s)/kG/Hr (5.41 mL/Hr) IV Continuous <Continuous>  famotidine Injectable 20 milliGRAM(s) IV Push daily  heparin  Infusion 1050 Unit(s)/Hr (8 mL/Hr) IV Continuous <Continuous>  lactulose Syrup 20 Gram(s) Oral four times a day  metoprolol tartrate Injectable 2.5 milliGRAM(s) IV Push every 6 hours  norepinephrine Infusion 0.05 MICROgram(s)/kG/Min (3.38 mL/Hr) IV Continuous <Continuous>  polyethylene glycol 3350 17 Gram(s) Oral daily  PrismaSATE Dialysate BGK 4 / 2.5 5000 milliLiter(s) (1000 mL/Hr) CRRT <Continuous>  PrismaSOL Filtration BGK 4 / 2.5 5000 milliLiter(s) (1200 mL/Hr) CRRT <Continuous>  PrismaSOL Filtration BGK 4 / 2.5 5000 milliLiter(s) (200 mL/Hr) CRRT <Continuous>  propofol Infusion 10 MICROgram(s)/kG/Min (4.33 mL/Hr) IV Continuous <Continuous>  senna Syrup 15 milliLiter(s) Oral daily  vasopressin Infusion 0.001 Unit(s)/Min (0.03 mL/Hr) IV Continuous <Continuous>    MEDICATIONS  (PRN):  acetaminophen    Suspension .. 650 milliGRAM(s) Oral every 6 hours PRN Temp greater or equal to 38C (100.4F)      Mode: CPAP with PS  FiO2: 30  PEEP: 5  PS: 10  MAP: 9  PIP: 16      CAPILLARY BLOOD GLUCOSE      POCT Blood Glucose.: 162 mg/dL (05 Apr 2020 12:10)      Physical exam:   General:                Sedated                                               Neuro:                  Off sedation, followed commands and moved all ext but weak                          Cardiovascular:   S1 & S2, regular                           Respiratory:         Coarse breath sounds                          GI:                          Soft, nondistended, Bowel sounds +                            Ext:                        Edema                             Labs:                                                                           7.8    34.57 )-----------( 607      ( 05 Apr 2020 10:15 )             23.5             04-05    131<L>  |  97<L>  |  49<H>  ----------------------------<  109<H>  4.6   |  14<L>  |  2.95<H>    Ca    10.6<H>      05 Apr 2020 03:00  Phos  5.2     04-05  Mg     3.1     04-05    TPro  8.0  /  Alb  3.2<L>  /  TBili  0.3  /  DBili  x   /  AST  45<H>  /  ALT  69<H>  /  AlkPhos  123<H>  04-05                  PT/INR - ( 05 Apr 2020 03:00 )   PT: 13.6 SEC;   INR: 1.18          PTT - ( 05 Apr 2020 10:15 )  PTT:160.2 SEC  LIVER FUNCTIONS - ( 05 Apr 2020 03:00 )  Alb: 3.2 g/dL / Pro: 8.0 g/dL / ALK PHOS: 123 u/L / ALT: 69 u/L / AST: 45 u/L / GGT: x          Transcutaneous Bilirubin    CARDIAC MARKERS ( 05 Apr 2020 03:00 )  x     / x     / 867 u/L / x     / x      CARDIAC MARKERS ( 04 Apr 2020 02:59 )  x     / x     / 1890 u/L / x     / x              CXR:    < from: Xray Chest 1 View- PORTABLE-Urgent (04.04.20 @ 17:01) >  Lines and Tubes: Endotracheal tube in place. Central venous catheter via a left-sided approach is in the right internal jugular vein. Catheter via a right-sided approach is in the SVC.      Lungs: No change in the bilateral opacities    Pleura: No pleural effusion or pneumothorax.    Heart and Mediastinum: The heart is normal in size.    Skeletal: Unremarkable.    IMPRESSION:    1.  The central venous catheter via left-sided approach is in the right internal jugular vein. The findings were discussed with Dr. Bryant by Dr. Lopez.        Plan:    General: 60 y/o F with no PMH p/w fever and cough since Thursday. Tmax 103. She presented to urgent care initially and was treated with tamiflu for presumed flu. Despite this she continued to have fevers. Pt denies recent travel, sick contacts or any other sx or acute complaints. Endorses pinching pain in chest when she coughs. Also with nausea and poor PO intake. Denies shortness of breath, abdominal pain, dysuria or LE edema. (16 Mar 2020 02:05)                              Neuro:                                         Currently on prop + Precedex. Off sedation, non-focal but has generalized weakness                            Cardiovascular:                                          Continue hemodynamic monitoring.                                         Titrate Levophed  to MAP>65                                        Daily EKGs, monitor QTc                                        Continue  Heparin gtt - Target PTT 50-80                            Respiratory:                                         Acute hypoxemic respiratory failure due to Pneumonia / COVID-19. Extubated and got reintubated within 24 hrs                                         On full mechanical vent support  AC-40%-5                                                Wean FiO2 as tolerated                                                CPAP wean as tolerated.                                                 Continue bronchodilators, pulmonary toilet as tolerated                            GI                                         NGT feeds as tolerated                                         Continue Pepcid                                         Bowel regimen	                                                                 Renal:                                         Strict I/Os                                         SAM - CRRT in progress, goal -100cc/hr if tolerates                                         Monitor BUN / Cr                                                 Hem/ Onc:                                                                                  DVT prophylaxis -  on Heparin gtt                                         Follow CBC  & signs of bleeding                           Infectious disease:                                            Pneumonia                                           COVID-19 +                                          Follow cultures                            Endocrine                                            DM-2: Continue Accu-Checks with coverage      Pertinent clinical, laboratory, radiographic, hemodynamic, echocardiographic, respiratory data, microbiologic data and chart were reviewed and analyzed frequently throughout the course of the day and night  Patient seen, examined and plan discussed with  CTICU team during rounds.    Pt's status &  goals of care  discussed with family     I have spent  75  minutes of critical care time with this pt between  7am  and 11.59 pm            Favian Moreland MD

## 2020-04-06 LAB
ALBUMIN SERPL ELPH-MCNC: 3.8 G/DL — SIGNIFICANT CHANGE UP (ref 3.3–5)
ALP SERPL-CCNC: 122 U/L — HIGH (ref 40–120)
ALT FLD-CCNC: 58 U/L — HIGH (ref 4–33)
ANION GAP SERPL CALC-SCNC: 14 MMO/L — SIGNIFICANT CHANGE UP (ref 7–14)
ANISOCYTOSIS BLD QL: SLIGHT — SIGNIFICANT CHANGE UP
APTT BLD: 53.4 SEC — HIGH (ref 27.5–36.3)
APTT BLD: 73.5 SEC — HIGH (ref 27.5–36.3)
APTT BLD: 90.9 SEC — HIGH (ref 27.5–36.3)
AST SERPL-CCNC: 30 U/L — SIGNIFICANT CHANGE UP (ref 4–32)
BASE EXCESS BLDA CALC-SCNC: -0.1 MMOL/L — SIGNIFICANT CHANGE UP
BASE EXCESS BLDA CALC-SCNC: -3.1 MMOL/L — SIGNIFICANT CHANGE UP
BASOPHILS # BLD AUTO: 0.33 K/UL — HIGH (ref 0–0.2)
BASOPHILS NFR BLD AUTO: 1.1 % — SIGNIFICANT CHANGE UP (ref 0–2)
BASOPHILS NFR SPEC: 0.9 % — SIGNIFICANT CHANGE UP (ref 0–2)
BILIRUB SERPL-MCNC: 0.3 MG/DL — SIGNIFICANT CHANGE UP (ref 0.2–1.2)
BLASTS # FLD: 0 % — SIGNIFICANT CHANGE UP (ref 0–0)
BLOOD GAS ARTERIAL - FIO2: 40 — SIGNIFICANT CHANGE UP
BLOOD GAS ARTERIAL - FIO2: 40 — SIGNIFICANT CHANGE UP
BUN SERPL-MCNC: 21 MG/DL — SIGNIFICANT CHANGE UP (ref 7–23)
CA-I BLDA-SCNC: 1.27 MMOL/L — SIGNIFICANT CHANGE UP (ref 1.15–1.29)
CALCIUM SERPL-MCNC: 10.7 MG/DL — HIGH (ref 8.4–10.5)
CHLORIDE SERPL-SCNC: 99 MMOL/L — SIGNIFICANT CHANGE UP (ref 98–107)
CO2 SERPL-SCNC: 22 MMOL/L — SIGNIFICANT CHANGE UP (ref 22–31)
CREAT SERPL-MCNC: 1.14 MG/DL — SIGNIFICANT CHANGE UP (ref 0.5–1.3)
D DIMER BLD IA.RAPID-MCNC: 2015 NG/ML — SIGNIFICANT CHANGE UP
EOSINOPHIL # BLD AUTO: 0.25 K/UL — SIGNIFICANT CHANGE UP (ref 0–0.5)
EOSINOPHIL NFR BLD AUTO: 0.8 % — SIGNIFICANT CHANGE UP (ref 0–6)
EOSINOPHIL NFR FLD: 0 % — SIGNIFICANT CHANGE UP (ref 0–6)
FIBRINOGEN PPP-MCNC: 911 MG/DL — HIGH (ref 300–520)
GIANT PLATELETS BLD QL SMEAR: PRESENT — SIGNIFICANT CHANGE UP
GLUCOSE BLDA-MCNC: 119 MG/DL — HIGH (ref 70–99)
GLUCOSE BLDA-MCNC: 135 MG/DL — HIGH (ref 70–99)
GLUCOSE SERPL-MCNC: 145 MG/DL — HIGH (ref 70–99)
HCO3 BLDA-SCNC: 22 MMOL/L — SIGNIFICANT CHANGE UP (ref 22–26)
HCO3 BLDA-SCNC: 24 MMOL/L — SIGNIFICANT CHANGE UP (ref 22–26)
HCT VFR BLD CALC: 23 % — LOW (ref 34.5–45)
HCT VFR BLDA CALC: 22.2 % — LOW (ref 34.5–46.5)
HCT VFR BLDA CALC: 24.8 % — LOW (ref 34.5–46.5)
HGB BLD-MCNC: 7.7 G/DL — LOW (ref 11.5–15.5)
HGB BLDA-MCNC: 7.1 G/DL — LOW (ref 11.5–15.5)
HGB BLDA-MCNC: 8 G/DL — LOW (ref 11.5–15.5)
HYPOCHROMIA BLD QL: SIGNIFICANT CHANGE UP
IMM GRANULOCYTES NFR BLD AUTO: 7.7 % — HIGH (ref 0–1.5)
INR BLD: 1.2 — HIGH (ref 0.88–1.17)
LACTATE BLDA-SCNC: 1.5 MMOL/L — SIGNIFICANT CHANGE UP (ref 0.5–2)
LACTATE BLDA-SCNC: 1.9 MMOL/L — SIGNIFICANT CHANGE UP (ref 0.5–2)
LYMPHOCYTES # BLD AUTO: 2.62 K/UL — SIGNIFICANT CHANGE UP (ref 1–3.3)
LYMPHOCYTES # BLD AUTO: 8.8 % — LOW (ref 13–44)
LYMPHOCYTES NFR SPEC AUTO: 5.3 % — LOW (ref 13–44)
MACROCYTES BLD QL: SLIGHT — SIGNIFICANT CHANGE UP
MAGNESIUM SERPL-MCNC: 2.7 MG/DL — HIGH (ref 1.6–2.6)
MCHC RBC-ENTMCNC: 28.7 PG — SIGNIFICANT CHANGE UP (ref 27–34)
MCHC RBC-ENTMCNC: 33.5 % — SIGNIFICANT CHANGE UP (ref 32–36)
MCV RBC AUTO: 85.8 FL — SIGNIFICANT CHANGE UP (ref 80–100)
METAMYELOCYTES # FLD: 1.8 % — HIGH (ref 0–1)
MICROCYTES BLD QL: SLIGHT — SIGNIFICANT CHANGE UP
MONOCYTES # BLD AUTO: 2.38 K/UL — HIGH (ref 0–0.9)
MONOCYTES NFR BLD AUTO: 8 % — SIGNIFICANT CHANGE UP (ref 2–14)
MONOCYTES NFR BLD: 5.3 % — SIGNIFICANT CHANGE UP (ref 2–9)
MYELOCYTES NFR BLD: 1.8 % — HIGH (ref 0–0)
NEUTROPHIL AB SER-ACNC: 80.5 % — HIGH (ref 43–77)
NEUTROPHILS # BLD AUTO: 21.96 K/UL — HIGH (ref 1.8–7.4)
NEUTROPHILS NFR BLD AUTO: 73.6 % — SIGNIFICANT CHANGE UP (ref 43–77)
NEUTS BAND # BLD: 3.5 % — SIGNIFICANT CHANGE UP (ref 0–6)
NRBC # BLD: 1 /100WBC — SIGNIFICANT CHANGE UP
NRBC # FLD: 0.55 K/UL — SIGNIFICANT CHANGE UP (ref 0–0)
NRBC FLD-RTO: 1.8 — SIGNIFICANT CHANGE UP
OTHER - HEMATOLOGY %: 0 — SIGNIFICANT CHANGE UP
PCO2 BLDA: 43 MMHG — SIGNIFICANT CHANGE UP (ref 32–48)
PCO2 BLDA: 50 MMHG — HIGH (ref 32–48)
PH BLDA: 7.28 PH — LOW (ref 7.35–7.45)
PH BLDA: 7.37 PH — SIGNIFICANT CHANGE UP (ref 7.35–7.45)
PHOSPHATE SERPL-MCNC: 4 MG/DL — SIGNIFICANT CHANGE UP (ref 2.5–4.5)
PLATELET # BLD AUTO: 566 K/UL — HIGH (ref 150–400)
PLATELET COUNT - ESTIMATE: SIGNIFICANT CHANGE UP
PMV BLD: 11.9 FL — SIGNIFICANT CHANGE UP (ref 7–13)
PO2 BLDA: 103 MMHG — SIGNIFICANT CHANGE UP (ref 83–108)
PO2 BLDA: 151 MMHG — HIGH (ref 83–108)
POLYCHROMASIA BLD QL SMEAR: SIGNIFICANT CHANGE UP
POTASSIUM BLDA-SCNC: 4.2 MMOL/L — SIGNIFICANT CHANGE UP (ref 3.4–4.5)
POTASSIUM BLDA-SCNC: 4.4 MMOL/L — SIGNIFICANT CHANGE UP (ref 3.4–4.5)
POTASSIUM SERPL-MCNC: 4.4 MMOL/L — SIGNIFICANT CHANGE UP (ref 3.5–5.3)
POTASSIUM SERPL-SCNC: 4.4 MMOL/L — SIGNIFICANT CHANGE UP (ref 3.5–5.3)
PROMYELOCYTES # FLD: 0 % — SIGNIFICANT CHANGE UP (ref 0–0)
PROT SERPL-MCNC: 8 G/DL — SIGNIFICANT CHANGE UP (ref 6–8.3)
PROTHROM AB SERPL-ACNC: 13.8 SEC — HIGH (ref 9.8–13.1)
RBC # BLD: 2.68 M/UL — LOW (ref 3.8–5.2)
RBC # FLD: 17 % — HIGH (ref 10.3–14.5)
SAO2 % BLDA: 97.7 % — SIGNIFICANT CHANGE UP (ref 95–99)
SAO2 % BLDA: 98.6 % — SIGNIFICANT CHANGE UP (ref 95–99)
SMUDGE CELLS # BLD: PRESENT — SIGNIFICANT CHANGE UP
SODIUM BLDA-SCNC: 136 MMOL/L — SIGNIFICANT CHANGE UP (ref 136–146)
SODIUM BLDA-SCNC: 138 MMOL/L — SIGNIFICANT CHANGE UP (ref 136–146)
SODIUM SERPL-SCNC: 135 MMOL/L — SIGNIFICANT CHANGE UP (ref 135–145)
VARIANT LYMPHS # BLD: 0.9 % — SIGNIFICANT CHANGE UP
WBC # BLD: 29.85 K/UL — HIGH (ref 3.8–10.5)
WBC # FLD AUTO: 29.85 K/UL — HIGH (ref 3.8–10.5)

## 2020-04-06 PROCEDURE — 99292 CRITICAL CARE ADDL 30 MIN: CPT

## 2020-04-06 PROCEDURE — 90945 DIALYSIS ONE EVALUATION: CPT | Mod: GC

## 2020-04-06 PROCEDURE — 99291 CRITICAL CARE FIRST HOUR: CPT

## 2020-04-06 RX ORDER — SODIUM CHLORIDE 9 MG/ML
1000 INJECTION, SOLUTION INTRAVENOUS ONCE
Refills: 0 | Status: COMPLETED | OUTPATIENT
Start: 2020-04-06 | End: 2020-04-06

## 2020-04-06 RX ORDER — METHYLPHENIDATE HCL 5 MG
10 TABLET ORAL
Refills: 0 | Status: DISCONTINUED | OUTPATIENT
Start: 2020-04-06 | End: 2020-04-09

## 2020-04-06 RX ORDER — METHYLPHENIDATE HCL 5 MG
10 TABLET ORAL
Refills: 0 | Status: DISCONTINUED | OUTPATIENT
Start: 2020-04-06 | End: 2020-04-06

## 2020-04-06 RX ADMIN — Medication 216 GRAM(S): at 05:07

## 2020-04-06 RX ADMIN — POLYETHYLENE GLYCOL 3350 17 GRAM(S): 17 POWDER, FOR SOLUTION ORAL at 15:27

## 2020-04-06 RX ADMIN — HEPARIN SODIUM 7 UNIT(S)/HR: 5000 INJECTION INTRAVENOUS; SUBCUTANEOUS at 15:24

## 2020-04-06 RX ADMIN — Medication 216 GRAM(S): at 22:19

## 2020-04-06 RX ADMIN — DEXMEDETOMIDINE HYDROCHLORIDE IN 0.9% SODIUM CHLORIDE 5.41 MICROGRAM(S)/KG/HR: 4 INJECTION INTRAVENOUS at 09:36

## 2020-04-06 RX ADMIN — Medication 1 DROP(S): at 19:32

## 2020-04-06 RX ADMIN — SODIUM CHLORIDE 1000 MILLILITER(S): 9 INJECTION, SOLUTION INTRAVENOUS at 10:00

## 2020-04-06 RX ADMIN — LACTULOSE 20 GRAM(S): 10 SOLUTION ORAL at 15:27

## 2020-04-06 RX ADMIN — CHLORHEXIDINE GLUCONATE 15 MILLILITER(S): 213 SOLUTION TOPICAL at 05:08

## 2020-04-06 RX ADMIN — PROPOFOL 4.33 MICROGRAM(S)/KG/MIN: 10 INJECTION, EMULSION INTRAVENOUS at 09:35

## 2020-04-06 RX ADMIN — FAMOTIDINE 20 MILLIGRAM(S): 10 INJECTION INTRAVENOUS at 15:27

## 2020-04-06 RX ADMIN — SENNA PLUS 15 MILLILITER(S): 8.6 TABLET ORAL at 15:29

## 2020-04-06 RX ADMIN — CHLORHEXIDINE GLUCONATE 1 APPLICATION(S): 213 SOLUTION TOPICAL at 05:08

## 2020-04-06 RX ADMIN — Medication 3.38 MICROGRAM(S)/KG/MIN: at 09:35

## 2020-04-06 RX ADMIN — Medication 10 MILLIGRAM(S): at 16:00

## 2020-04-06 RX ADMIN — Medication 1 DROP(S): at 15:28

## 2020-04-06 RX ADMIN — Medication 216 GRAM(S): at 13:50

## 2020-04-06 RX ADMIN — Medication 2.5 MILLIGRAM(S): at 23:12

## 2020-04-06 RX ADMIN — Medication 1 DROP(S): at 00:27

## 2020-04-06 RX ADMIN — Medication 1 DROP(S): at 05:07

## 2020-04-06 RX ADMIN — Medication 650 MILLIGRAM(S): at 23:10

## 2020-04-06 RX ADMIN — Medication 3.38 MICROGRAM(S)/KG/MIN: at 13:50

## 2020-04-06 NOTE — PROGRESS NOTE ADULT - SUBJECTIVE AND OBJECTIVE BOX
[facility] DIVISION OF KIDNEY DISEASES AND HYPERTENSION -- FOLLOW UP NOTE  --------------------------------------------------------------------------------  HPI: 60 yo F admitted to ICU for respiratory failure, pneumonia, SAM and COVID-19 infection. Scr increased to 4.6 on 3/23/20. Pt. initiated on CRRT/CVVHDF on 3/23/20. Pt. was extubated on 4/4/20, however pt. was re-intubated.     Pt. was seen and examined in ICU. Pt. is sedated, intubated (FiO2 stable at 40%, PEEP improved to 5), on IV vasopressor support. Remains anuric.    PAST HISTORY  --------------------------------------------------------------------------------  No significant changes to PMH, PSH, FHx, SHx, unless otherwise noted    ALLERGIES & MEDICATIONS  --------------------------------------------------------------------------------  Allergies    No Known Allergies    Intolerances    Standing Inpatient Medications  ampicillin  IVPB      ampicillin  IVPB 2 Gram(s) IV Intermittent every 8 hours  artificial tears (preservative free) Ophthalmic Solution 1 Drop(s) Both EYES every 6 hours  chlorhexidine 0.12% Liquid 15 milliLiter(s) Oral Mucosa every 12 hours  chlorhexidine 4% Liquid 1 Application(s) Topical <User Schedule>  CRRT Treatment    <Continuous>  dexMEDEtomidine Infusion 0.3 MICROgram(s)/kG/Hr IV Continuous <Continuous>  famotidine Injectable 20 milliGRAM(s) IV Push daily  heparin  Infusion 1050 Unit(s)/Hr IV Continuous <Continuous>  lactulose Syrup 20 Gram(s) Oral four times a day  metoprolol tartrate Injectable 2.5 milliGRAM(s) IV Push every 6 hours  norepinephrine Infusion 0.05 MICROgram(s)/kG/Min IV Continuous <Continuous>  polyethylene glycol 3350 17 Gram(s) Oral daily  PrismaSATE Dialysate BGK 4 / 2.5 5000 milliLiter(s) CRRT <Continuous>  PrismaSOL Filtration BGK 4 / 2.5 5000 milliLiter(s) CRRT <Continuous>  PrismaSOL Filtration BGK 4 / 2.5 5000 milliLiter(s) CRRT <Continuous>  propofol Infusion 10 MICROgram(s)/kG/Min IV Continuous <Continuous>  senna Syrup 15 milliLiter(s) Oral daily  vasopressin Infusion 0.001 Unit(s)/Min IV Continuous <Continuous>    REVIEW OF SYSTEMS  --------------------------------------------------------------------------------  Unable to obtain.    VITALS/PHYSICAL EXAM  --------------------------------------------------------------------------------  T(C): 35.6 (04-06-20 @ 00:00), Max: 35.6 (04-06-20 @ 00:00)  HR: 78 (04-06-20 @ 08:00) (63 - 93)  BP: --  RR: 18 (04-06-20 @ 08:00) (15 - 22)  SpO2: 100% (04-06-20 @ 08:00) (100% - 100%)  Wt(kg): --    04-05-20 @ 07:01  -  04-06-20 @ 07:00  --------------------------------------------------------  IN: 948 mL / OUT: 911 mL / NET: 37 mL    Physical Exam:  	Gen: Sedated, intubated  	HEENT: + ETT  	Pulm: + mechanical breath sounds  	CV: S1S2+  	Abd: Soft, non distended   	Ext: No LE edema B/L  	Neuro: Sedated  	Skin: Warm and dry              Access: Right IJ non tunneled catheter without bleeding    LABS/STUDIES  --------------------------------------------------------------------------------              7.7    29.85 >-----------<  566      [04-06-20 @ 04:43]              23.0     135  |  99  |  21  ----------------------------<  145      [04-06-20 @ 04:43]  4.4   |  22  |  1.14        Ca     10.7     [04-06-20 @ 04:43]      Mg     2.7     [04-06-20 @ 04:43]      Phos  4.0     [04-06-20 @ 04:43]    Creatinine Trend:  SCr 1.14 [04-06 @ 04:43]  SCr 2.95 [04-05 @ 03:00]  SCr 2.94 [04-04 @ 02:59]  SCr 1.60 [04-03 @ 03:00]  SCr 1.58 [04-02 @ 22:45]

## 2020-04-06 NOTE — CHART NOTE - NSCHARTNOTEFT_GEN_A_CORE
NUTRITION SERVICES                                                                                  MALNUTRITION ALERT     Attention Health Care Provider: Upon nutritional assessment by the Registered Dietitian your patient was determined to meet criteria / has evidence of the following diagnosis/diagnoses:    [ ] Mild Protein Calorie Malnutrition   [ ] Moderate Protein Calorie Malnutrition   [ x] Severe Protein Calorie Malnutrition   [ ] Unspecified Protein Calorie Malnutrition   [ ] Underweight / BMI <19  [ ] Morbid Obesity / BMI >40      TO BE COMPLETED BY Physician / PA / NP :    [ ] AGREE  [ ] DISAGREE       By signing this assessment you are acknowledging the diagnosis/diagnoses.       PLAN OF CARE: Refer to Initial Dietitian Evaluation or Nutrition Follow-Up Documentation for Nutritional Recommendations.

## 2020-04-06 NOTE — CHART NOTE - NSCHARTNOTEFT_GEN_A_CORE
Source: EMR  Diet : NPO with tube feed Orogastric tube Jevity 1.2 @ 20 ml/hr     Patient Tolerating feeds; Low output in rectal tube overnight.  Wt. on 3/31/2020 was 8.6 kg less than admit wt. . Pt. continued to be sedated , intubated , 2+ generalized edema . Pt. meets the criteria for severe malnutrition - 2+ generalized edema , wt. loss > 10% and < 50% nutrition intake > 5 days .    Enteral : EN provides 576 kcal & 26.6 gm protein/d.       Current Weight: - 63.5 kg on 3/31/2020; was 72.1 kg on 3/18/2020    Pertinent Medications: MEDICATIONS  (STANDING):  ampicillin  IVPB      ampicillin  IVPB 2 Gram(s) IV Intermittent every 8 hours  CRRT Treatment    <Continuous>  dexMEDEtomidine Infusion 0.3 MICROgram(s)/kG/Hr (5.41 mL/Hr) IV Continuous <Continuous>  famotidine Injectable 20 milliGRAM(s) IV Push daily  heparin  Infusion 1050 Unit(s)/Hr (8 mL/Hr) IV Continuous <Continuous>  lactated ringers Bolus 1000 milliLiter(s) IV Bolus once  lactulose Syrup 20 Gram(s) Oral four times a day  methylphenidate 10 milliGRAM(s) Oral two times a day  metoprolol tartrate Injectable 2.5 milliGRAM(s) IV Push every 6 hours  norepinephrine Infusion 0.05 MICROgram(s)/kG/Min (3.38 mL/Hr) IV Continuous <Continuous>  polyethylene glycol 3350 17 Gram(s) Oral daily  PrismaSATE Dialysate BGK 4 / 2.5 5000 milliLiter(s) (1000 mL/Hr) CRRT <Continuous>  PrismaSOL Filtration BGK 4 / 2.5 5000 milliLiter(s) (1200 mL/Hr) CRRT <Continuous>  PrismaSOL Filtration BGK 4 / 2.5 5000 milliLiter(s) (200 mL/Hr) CRRT <Continuous>  propofol Infusion 10 MICROgram(s)/kG/Min (4.33 mL/Hr) IV Continuous <Continuous>  senna Syrup 15 milliLiter(s) Oral daily  vasopressin Infusion 0.001 Unit(s)/Min (0.03 mL/Hr) IV Continuous <Continuous>    Pertinent Labs:  04-06 Na135 mmol/L Glu 145 mg/dL<H> K+ 4.4 mmol/L Cr  1.14 mg/dL BUN 21 mg/dL 04-06 Phos 4.0 mg/dL 04-06 Alb 3.8 g/dL 04-04 Chol --    LDL --    HDL --    Trig 203 mg/dL<H>      Skin: intact    Estimated Needs:  no change since previous assessment      New Nutrition Diagnosis: SEVERE Malnutrition    Related to: Physiological causes - acute illness  As evidenced by:  wt. loss>10% , 2+ generalized edema and < 50% nutrition intake > 5 days   Interventions: pt. to receive > 75% nutrition     Recommend  - Jevity 1.2 @ 45 ml/hr x 24hrs w/ No Carb Pro source 1 pack /d .    Monitoring and Evaluation:  Tolerance to diet prescription , weights & follow up per protocol

## 2020-04-06 NOTE — PROGRESS NOTE ADULT - PROBLEM SELECTOR PLAN 1
Pt. with oliguric SAM in the setting of hypotension and COVID-19. Scr on admission (3/15/20) was 0.84. Scr however worsened to 4.6 on 3/26/20. Pt. likely with ATN. Pt. was started on CRRT/CVVHDF on 3/26/20. Pt. tolerating CVVHDF at present. BP being maintained on IV vasopressors. HD catheter functioning during rounds. Plan is to continue CRRT/CVVHDF today. Pt. on heparin gtt. Check renal sonogram (when feasible). Monitor labs and urine output. Avoid any potential nephrotoxins

## 2020-04-06 NOTE — PROGRESS NOTE ADULT - SUBJECTIVE AND OBJECTIVE BOX
ALEXA GARCIA   MRN#: [id number]     The patient is a 59y Female who was seen, evaluated, & examined with the ICU staff with a multidisciplinary care plan formulated & implemented. All available clinical, laboratory, radiographic, pharmacologic, and electrocardiographic data were reviewed & analyzed.      The patient was in the ICU in critical condition secondary to:     ARDS  actue hypoxic/hypercarbic respiratory failure  vasodilatory shock    For support and evaluation & prevention of further decompensation secondary to persistent cardiopulmonary dysfunction, respiratory status required:     supplemental oxygen with full ventilatory support / mechanical ventilation  continuous pulse oximetry monitoring  following ABGs with A-line monitoring  IV Precedex infusion  IV Propofol infusion    Invasive hemodynamic monitoring with     an A-line was required for continuous MAP/BP monitoring     to ensure adequate cardiovascular support and to evaluate for & help prevent decompensation while receiving     IV Levophed infusion  CVVH    secondary to     vasodilatory shock    In addition:  COVID+ admitted with respiratory failure/ARDS, intubated initially from 3/24-4/3; reintubated 4/4 for hypoxia and altered mental status  Mild ARDS with P/F 300s; will CPAP wean  Driving pressure 15, tidal volume 6 cc/kg, airway pressures 20s on CMV  Sedated with Propofol and Precedex - will wean Propofol and attempt to CPAP  Trial Ritalin as she is not following commands  Pressor requirement, like due to vasoplegia from sedation +/- hypovolemia as she is 5L negative for her admission - will fluid challenge  Tolerating feeds; Low output in rectal tube overnight - will monitor  SAM on CVVH with 50 cc/hr off - target net even - Renal following  S/p Plaquenil, Azithromycin and Solumedrol  Enterococcus in blood - on Ampicillin (4/2- ), blood cultures from 4/4 no growth  H/H low but stable on heparin gtt - no signs of active bleeding - will trend  Sugars controlled without medication  HD catheter 4/4    The patient required critical care management and I personally provided 75 minutes of non-continuous care to the patient, excluding separate procedures, in addition to discussing the patient and plan at length with the ICU staff and helping coordinate care.

## 2020-04-07 LAB
ALBUMIN SERPL ELPH-MCNC: 3.2 G/DL — LOW (ref 3.3–5)
ALP SERPL-CCNC: 116 U/L — SIGNIFICANT CHANGE UP (ref 40–120)
ALT FLD-CCNC: 41 U/L — HIGH (ref 4–33)
ANION GAP SERPL CALC-SCNC: 13 MMO/L — SIGNIFICANT CHANGE UP (ref 7–14)
APTT BLD: 43.2 SEC — HIGH (ref 27.5–36.3)
AST SERPL-CCNC: 29 U/L — SIGNIFICANT CHANGE UP (ref 4–32)
BASE EXCESS BLDA CALC-SCNC: -0.1 MMOL/L — SIGNIFICANT CHANGE UP
BASE EXCESS BLDA CALC-SCNC: -2.6 MMOL/L — SIGNIFICANT CHANGE UP
BASOPHILS # BLD AUTO: 0.15 K/UL — SIGNIFICANT CHANGE UP (ref 0–0.2)
BASOPHILS NFR BLD AUTO: 0.5 % — SIGNIFICANT CHANGE UP (ref 0–2)
BILIRUB SERPL-MCNC: 0.2 MG/DL — SIGNIFICANT CHANGE UP (ref 0.2–1.2)
BLD GP AB SCN SERPL QL: NEGATIVE — SIGNIFICANT CHANGE UP
BUN SERPL-MCNC: 14 MG/DL — SIGNIFICANT CHANGE UP (ref 7–23)
CA-I BLDA-SCNC: 1.32 MMOL/L — HIGH (ref 1.15–1.29)
CALCIUM SERPL-MCNC: 9.9 MG/DL — SIGNIFICANT CHANGE UP (ref 8.4–10.5)
CHLORIDE BLDA-SCNC: 107 MMOL/L — SIGNIFICANT CHANGE UP (ref 96–108)
CHLORIDE SERPL-SCNC: 98 MMOL/L — SIGNIFICANT CHANGE UP (ref 98–107)
CO2 SERPL-SCNC: 22 MMOL/L — SIGNIFICANT CHANGE UP (ref 22–31)
CREAT SERPL-MCNC: 1.01 MG/DL — SIGNIFICANT CHANGE UP (ref 0.5–1.3)
D DIMER BLD IA.RAPID-MCNC: 1087 NG/ML — SIGNIFICANT CHANGE UP
EOSINOPHIL # BLD AUTO: 0.34 K/UL — SIGNIFICANT CHANGE UP (ref 0–0.5)
EOSINOPHIL NFR BLD AUTO: 1 % — SIGNIFICANT CHANGE UP (ref 0–6)
FERRITIN SERPL-MCNC: 1516 NG/ML — HIGH (ref 15–150)
FIBRINOGEN PPP-MCNC: 840 MG/DL — HIGH (ref 300–520)
GLUCOSE BLDA-MCNC: 102 MG/DL — HIGH (ref 70–99)
GLUCOSE BLDA-MCNC: 121 MG/DL — HIGH (ref 70–99)
GLUCOSE BLDC GLUCOMTR-MCNC: 123 MG/DL — HIGH (ref 70–99)
GLUCOSE SERPL-MCNC: 119 MG/DL — HIGH (ref 70–99)
HCO3 BLDA-SCNC: 22 MMOL/L — SIGNIFICANT CHANGE UP (ref 22–26)
HCO3 BLDA-SCNC: 24 MMOL/L — SIGNIFICANT CHANGE UP (ref 22–26)
HCT VFR BLD CALC: 21.7 % — LOW (ref 34.5–45)
HCT VFR BLDA CALC: 22 % — LOW (ref 34.5–46.5)
HCT VFR BLDA CALC: 24.2 % — LOW (ref 34.5–46.5)
HGB BLD-MCNC: 7.1 G/DL — LOW (ref 11.5–15.5)
HGB BLDA-MCNC: 7 G/DL — CRITICAL LOW (ref 11.5–15.5)
HGB BLDA-MCNC: 7.8 G/DL — LOW (ref 11.5–15.5)
IMM GRANULOCYTES NFR BLD AUTO: 10.2 % — HIGH (ref 0–1.5)
INR BLD: 1.04 — SIGNIFICANT CHANGE UP (ref 0.88–1.17)
LACTATE BLDA-SCNC: 1.4 MMOL/L — SIGNIFICANT CHANGE UP (ref 0.5–2)
LACTATE BLDA-SCNC: 1.4 MMOL/L — SIGNIFICANT CHANGE UP (ref 0.5–2)
LDH SERPL L TO P-CCNC: 356 U/L — HIGH (ref 135–225)
LYMPHOCYTES # BLD AUTO: 2.86 K/UL — SIGNIFICANT CHANGE UP (ref 1–3.3)
LYMPHOCYTES # BLD AUTO: 8.7 % — LOW (ref 13–44)
MAGNESIUM SERPL-MCNC: 2.8 MG/DL — HIGH (ref 1.6–2.6)
MANUAL SMEAR VERIFICATION: SIGNIFICANT CHANGE UP
MCHC RBC-ENTMCNC: 29.3 PG — SIGNIFICANT CHANGE UP (ref 27–34)
MCHC RBC-ENTMCNC: 32.7 % — SIGNIFICANT CHANGE UP (ref 32–36)
MCV RBC AUTO: 89.7 FL — SIGNIFICANT CHANGE UP (ref 80–100)
MONOCYTES # BLD AUTO: 3.05 K/UL — HIGH (ref 0–0.9)
MONOCYTES NFR BLD AUTO: 9.3 % — SIGNIFICANT CHANGE UP (ref 2–14)
NEUTROPHILS # BLD AUTO: 23.05 K/UL — HIGH (ref 1.8–7.4)
NEUTROPHILS NFR BLD AUTO: 70.3 % — SIGNIFICANT CHANGE UP (ref 43–77)
NRBC # FLD: 4.91 K/UL — SIGNIFICANT CHANGE UP (ref 0–0)
NRBC FLD-RTO: 15 — SIGNIFICANT CHANGE UP
PCO2 BLDA: 56 MMHG — HIGH (ref 32–48)
PCO2 BLDA: 58 MMHG — HIGH (ref 32–48)
PH BLDA: 7.24 PH — LOW (ref 7.35–7.45)
PH BLDA: 7.28 PH — LOW (ref 7.35–7.45)
PHOSPHATE SERPL-MCNC: 2.8 MG/DL — SIGNIFICANT CHANGE UP (ref 2.5–4.5)
PLATELET # BLD AUTO: 518 K/UL — HIGH (ref 150–400)
PMV BLD: 12.1 FL — SIGNIFICANT CHANGE UP (ref 7–13)
PO2 BLDA: 103 MMHG — SIGNIFICANT CHANGE UP (ref 83–108)
PO2 BLDA: 93 MMHG — SIGNIFICANT CHANGE UP (ref 83–108)
POTASSIUM BLDA-SCNC: 4.4 MMOL/L — SIGNIFICANT CHANGE UP (ref 3.4–4.5)
POTASSIUM BLDA-SCNC: 4.7 MMOL/L — HIGH (ref 3.4–4.5)
POTASSIUM SERPL-MCNC: 4.6 MMOL/L — SIGNIFICANT CHANGE UP (ref 3.5–5.3)
POTASSIUM SERPL-SCNC: 4.6 MMOL/L — SIGNIFICANT CHANGE UP (ref 3.5–5.3)
PROT SERPL-MCNC: 7.3 G/DL — SIGNIFICANT CHANGE UP (ref 6–8.3)
PROTHROM AB SERPL-ACNC: 12 SEC — SIGNIFICANT CHANGE UP (ref 9.8–13.1)
RBC # BLD: 2.42 M/UL — LOW (ref 3.8–5.2)
RBC # FLD: 17.5 % — HIGH (ref 10.3–14.5)
RH IG SCN BLD-IMP: POSITIVE — SIGNIFICANT CHANGE UP
SAO2 % BLDA: 96.2 % — SIGNIFICANT CHANGE UP (ref 95–99)
SAO2 % BLDA: 97.6 % — SIGNIFICANT CHANGE UP (ref 95–99)
SODIUM BLDA-SCNC: 137 MMOL/L — SIGNIFICANT CHANGE UP (ref 136–146)
SODIUM BLDA-SCNC: 138 MMOL/L — SIGNIFICANT CHANGE UP (ref 136–146)
SODIUM SERPL-SCNC: 133 MMOL/L — LOW (ref 135–145)
WBC # BLD: 32.81 K/UL — HIGH (ref 3.8–10.5)
WBC # FLD AUTO: 32.81 K/UL — HIGH (ref 3.8–10.5)

## 2020-04-07 PROCEDURE — 90945 DIALYSIS ONE EVALUATION: CPT | Mod: GC

## 2020-04-07 PROCEDURE — 99291 CRITICAL CARE FIRST HOUR: CPT

## 2020-04-07 RX ORDER — PROPOFOL 10 MG/ML
20 INJECTION, EMULSION INTRAVENOUS
Qty: 1000 | Refills: 0 | Status: DISCONTINUED | OUTPATIENT
Start: 2020-04-07 | End: 2020-04-10

## 2020-04-07 RX ORDER — VANCOMYCIN HCL 1 G
750 VIAL (EA) INTRAVENOUS ONCE
Refills: 0 | Status: COMPLETED | OUTPATIENT
Start: 2020-04-07 | End: 2020-04-08

## 2020-04-07 RX ADMIN — CHLORHEXIDINE GLUCONATE 15 MILLILITER(S): 213 SOLUTION TOPICAL at 17:17

## 2020-04-07 RX ADMIN — Medication 1 DROP(S): at 11:35

## 2020-04-07 RX ADMIN — DEXMEDETOMIDINE HYDROCHLORIDE IN 0.9% SODIUM CHLORIDE 5.41 MICROGRAM(S)/KG/HR: 4 INJECTION INTRAVENOUS at 10:16

## 2020-04-07 RX ADMIN — Medication 216 GRAM(S): at 14:00

## 2020-04-07 RX ADMIN — LACTULOSE 20 GRAM(S): 10 SOLUTION ORAL at 05:01

## 2020-04-07 RX ADMIN — Medication 1 DROP(S): at 23:00

## 2020-04-07 RX ADMIN — CHLORHEXIDINE GLUCONATE 15 MILLILITER(S): 213 SOLUTION TOPICAL at 05:01

## 2020-04-07 RX ADMIN — Medication 2.5 MILLIGRAM(S): at 05:05

## 2020-04-07 RX ADMIN — Medication 1 DROP(S): at 17:21

## 2020-04-07 RX ADMIN — Medication 3.38 MICROGRAM(S)/KG/MIN: at 14:00

## 2020-04-07 RX ADMIN — Medication 1 DROP(S): at 05:01

## 2020-04-07 RX ADMIN — VASOPRESSIN 0.03 UNIT(S)/MIN: 20 INJECTION INTRAVENOUS at 11:44

## 2020-04-07 RX ADMIN — DEXMEDETOMIDINE HYDROCHLORIDE IN 0.9% SODIUM CHLORIDE 5.41 MICROGRAM(S)/KG/HR: 4 INJECTION INTRAVENOUS at 17:20

## 2020-04-07 RX ADMIN — Medication 216 GRAM(S): at 05:01

## 2020-04-07 RX ADMIN — Medication 216 GRAM(S): at 22:54

## 2020-04-07 RX ADMIN — DEXMEDETOMIDINE HYDROCHLORIDE IN 0.9% SODIUM CHLORIDE 5.41 MICROGRAM(S)/KG/HR: 4 INJECTION INTRAVENOUS at 18:40

## 2020-04-07 RX ADMIN — PROPOFOL 8.66 MICROGRAM(S)/KG/MIN: 10 INJECTION, EMULSION INTRAVENOUS at 19:01

## 2020-04-07 RX ADMIN — Medication 1 DROP(S): at 00:00

## 2020-04-07 RX ADMIN — CHLORHEXIDINE GLUCONATE 1 APPLICATION(S): 213 SOLUTION TOPICAL at 11:32

## 2020-04-07 RX ADMIN — FAMOTIDINE 20 MILLIGRAM(S): 10 INJECTION INTRAVENOUS at 11:36

## 2020-04-07 RX ADMIN — HEPARIN SODIUM 7 UNIT(S)/HR: 5000 INJECTION INTRAVENOUS; SUBCUTANEOUS at 11:43

## 2020-04-07 NOTE — PROGRESS NOTE ADULT - SUBJECTIVE AND OBJECTIVE BOX
CRITICAL CARE ATTENDING - CTICU    MEDICATIONS  (STANDING):  ampicillin  IVPB      ampicillin  IVPB 2 Gram(s) IV Intermittent every 8 hours  artificial tears (preservative free) Ophthalmic Solution 1 Drop(s) Both EYES every 6 hours  chlorhexidine 0.12% Liquid 15 milliLiter(s) Oral Mucosa every 12 hours  chlorhexidine 4% Liquid 1 Application(s) Topical <User Schedule>  CRRT Treatment    <Continuous>  dexMEDEtomidine Infusion 0.3 MICROgram(s)/kG/Hr (5.41 mL/Hr) IV Continuous <Continuous>  famotidine Injectable 20 milliGRAM(s) IV Push daily  heparin  Infusion 1050 Unit(s)/Hr (7 mL/Hr) IV Continuous <Continuous>  methylphenidate 10 milliGRAM(s) Oral two times a day  metoprolol tartrate Injectable 2.5 milliGRAM(s) IV Push every 6 hours  norepinephrine Infusion 0.05 MICROgram(s)/kG/Min (3.38 mL/Hr) IV Continuous <Continuous>  Phoxillum Filtration BK 4 / 2.5 5000 milliLiter(s) (1200 mL/Hr) CRRT <Continuous>  PrismaSATE Dialysate BGK 4 / 2.5 5000 milliLiter(s) (1000 mL/Hr) CRRT <Continuous>  PrismaSOL Filtration BGK 4 / 2.5 5000 milliLiter(s) (200 mL/Hr) CRRT <Continuous>  vasopressin Infusion 0.001 Unit(s)/Min (0.03 mL/Hr) IV Continuous <Continuous>                                    7.1    32.81 )-----------( 518      ( 07 Apr 2020 03:00 )             21.7       04-07    133<L>  |  98  |  14  ----------------------------<  119<H>  4.6   |  22  |  1.01    Ca    9.9      07 Apr 2020 03:00  Phos  2.8     04-07  Mg     2.8     04-07    TPro  7.3  /  Alb  3.2<L>  /  TBili  0.2  /  DBili  x   /  AST  29  /  ALT  41<H>  /  AlkPhos  116  04-07      PT/INR - ( 07 Apr 2020 03:00 )   PT: 12.0 SEC;   INR: 1.04          PTT - ( 07 Apr 2020 03:00 )  PTT:43.2 SEC    Mode: CPAP with PS  FiO2: 40  PEEP: 5  PS: 5  MAP: 8  PIP: 17      Daily     Daily       04-06 @ 07:01  -  04-07 @ 07:00  --------------------------------------------------------  IN: 2298.9 mL / OUT: 657 mL / NET: 1641.9 mL    04-07 @ 07:01  -  04-07 @ 12:21  --------------------------------------------------------  IN: 312.3 mL / OUT: 350 mL / NET: -37.7 mL        Critically Ill patient  : [ ] preoperative ,   [ ] post operative [x] Non Opeerative    Requires :  [x ] Arterial Line   [ x] Central Line  [ ] PA catheter  [ ] IABP  [ ] ECMO  [ ] LVAD  [ ] Ventilator  [ ] pacemaker [ ] Impella.                      [ ] ABG's     [x ] Pulse Oxymetry Monitoring  Bedside evaluation , monitoring , treatment of hemodynamics , fluids , IVP/ IVCD meds.        Diagnosis:     respiratory failure     ARDS - resolving     HYpoxemia    Requires chest PT, pulmonary toilet, ambu bagging, suctioning to maintain SaO2,  patent airway and treat atelectasis.     Ventilator Management:  [ x]AC-rest    [ x]CPAP-PS Wean    [ ]Trach Collar     [ ]Extubate    [ ] T-Piece  [ ]peep>5     Difficult weaning process - multiple organ system involvement in critically ill patient     Renal Failure - Acute Kidney Injury     CVVHD     Fluid  overload     hypotension    Hemodynamic lability,  instability. Requires IVCD [x ] vasopressors [ ] inotropes  [ ] vasodilator  [ ]IVSS fluid  to maintain MAP, perfusion, C.I.     IVCD anticoagulation with [ x] Heparin  [ ] Argatroban for CVVHD     Hyponatremia     Requires IVCD sedation / analgesia to manage ARDS / ventilator                     Discussed with CT surgeon, Physician's Assistant - Nurse Practitioner- Critical care medicine team.   Dicussed at  AM / PM rounds.   Chart, labs , films reviewed.    Total Time: 30 min

## 2020-04-07 NOTE — PROGRESS NOTE ADULT - SUBJECTIVE AND OBJECTIVE BOX
[facility] DIVISION OF KIDNEY DISEASES AND HYPERTENSION -- FOLLOW UP NOTE  --------------------------------------------------------------------------------  HPI: 60 yo F admitted to ICU for respiratory failure, pneumonia, SAM and COVID-19 infection. Scr increased to 4.6 on 3/23/20. Pt. initiated on CRRT/CVVHDF on 3/23/20. Pt. was extubated on 4/4/20, however pt. was re-intubated.     Pt. was seen and examined in ICU. Pt. is sedated, intubated (FiO2 stable at 40%, PEEP stable at 5), on IV vasopressor support. Remains anuric.    PAST HISTORY  --------------------------------------------------------------------------------  No significant changes to PMH, PSH, FHx, SHx, unless otherwise noted    ALLERGIES & MEDICATIONS  --------------------------------------------------------------------------------  Allergies    No Known Allergies    Intolerances    Standing Inpatient Medications  ampicillin  IVPB      ampicillin  IVPB 2 Gram(s) IV Intermittent every 8 hours  artificial tears (preservative free) Ophthalmic Solution 1 Drop(s) Both EYES every 6 hours  chlorhexidine 0.12% Liquid 15 milliLiter(s) Oral Mucosa every 12 hours  chlorhexidine 4% Liquid 1 Application(s) Topical <User Schedule>  CRRT Treatment    <Continuous>  dexMEDEtomidine Infusion 0.3 MICROgram(s)/kG/Hr IV Continuous <Continuous>  famotidine Injectable 20 milliGRAM(s) IV Push daily  heparin  Infusion 1050 Unit(s)/Hr IV Continuous <Continuous>  lactulose Syrup 20 Gram(s) Oral four times a day  methylphenidate 10 milliGRAM(s) Oral two times a day  metoprolol tartrate Injectable 2.5 milliGRAM(s) IV Push every 6 hours  norepinephrine Infusion 0.05 MICROgram(s)/kG/Min IV Continuous <Continuous>  Phoxillum Filtration BK 4 / 2.5 5000 milliLiter(s) CRRT <Continuous>  polyethylene glycol 3350 17 Gram(s) Oral daily  PrismaSATE Dialysate BGK 4 / 2.5 5000 milliLiter(s) CRRT <Continuous>  PrismaSOL Filtration BGK 4 / 2.5 5000 milliLiter(s) CRRT <Continuous>  senna Syrup 15 milliLiter(s) Oral daily  vasopressin Infusion 0.001 Unit(s)/Min IV Continuous <Continuous>    REVIEW OF SYSTEMS  --------------------------------------------------------------------------------  Unable to obtain.    VITALS/PHYSICAL EXAM  --------------------------------------------------------------------------------  T(C): 35.8 (04-07-20 @ 08:00), Max: 37.2 (04-07-20 @ 04:00)  HR: 99 (04-07-20 @ 11:00) (80 - 112)  BP: --  RR: 21 (04-07-20 @ 11:00) (15 - 27)  SpO2: 100% (04-07-20 @ 11:00) (90% - 100%)  Wt(kg): --    04-06-20 @ 07:01  -  04-07-20 @ 07:00  --------------------------------------------------------  IN: 2298.9 mL / OUT: 657 mL / NET: 1641.9 mL    04-07-20 @ 07:01  -  04-07-20 @ 11:44  --------------------------------------------------------  IN: 312.3 mL / OUT: 350 mL / NET: -37.7 mL    Physical Exam:  	Gen: Sedated, intubated  	HEENT: + ETT  	Pulm: + mechanical breath sounds  	CV: S1S2+  	Abd: Soft, non distended   	Ext: No LE edema B/L  	Neuro: Sedated  	Skin: Warm and dry              Access: Right IJ non tunneled catheter without bleeding    LABS/STUDIES  --------------------------------------------------------------------------------              7.1    32.81 >-----------<  518      [04-07-20 @ 03:00]              21.7     133  |  98  |  14  ----------------------------<  119      [04-07-20 @ 03:00]  4.6   |  22  |  1.01        Ca     9.9     [04-07-20 @ 03:00]      Mg     2.8     [04-07-20 @ 03:00]      Phos  2.8     [04-07-20 @ 03:00]    Creatinine Trend:  SCr 1.01 [04-07 @ 03:00]  SCr 1.14 [04-06 @ 04:43]  SCr 2.95 [04-05 @ 03:00]  SCr 2.94 [04-04 @ 02:59]  SCr 1.60 [04-03 @ 03:00]

## 2020-04-08 LAB
ALBUMIN SERPL ELPH-MCNC: 3 G/DL — LOW (ref 3.3–5)
ALP SERPL-CCNC: 113 U/L — SIGNIFICANT CHANGE UP (ref 40–120)
ALT FLD-CCNC: 31 U/L — SIGNIFICANT CHANGE UP (ref 4–33)
ANION GAP SERPL CALC-SCNC: 14 MMO/L — SIGNIFICANT CHANGE UP (ref 7–14)
APTT BLD: 48.3 SEC — HIGH (ref 27.5–36.3)
APTT BLD: 74.8 SEC — HIGH (ref 27.5–36.3)
AST SERPL-CCNC: 25 U/L — SIGNIFICANT CHANGE UP (ref 4–32)
BASE EXCESS BLDA CALC-SCNC: -0.9 MMOL/L — SIGNIFICANT CHANGE UP
BASOPHILS # BLD AUTO: 0.14 K/UL — SIGNIFICANT CHANGE UP (ref 0–0.2)
BASOPHILS NFR BLD AUTO: 0.6 % — SIGNIFICANT CHANGE UP (ref 0–2)
BILIRUB SERPL-MCNC: 0.2 MG/DL — SIGNIFICANT CHANGE UP (ref 0.2–1.2)
BLOOD GAS ARTERIAL - FIO2: 40 — SIGNIFICANT CHANGE UP
BUN SERPL-MCNC: 13 MG/DL — SIGNIFICANT CHANGE UP (ref 7–23)
CA-I BLDA-SCNC: 1.36 MMOL/L — HIGH (ref 1.15–1.29)
CALCIUM SERPL-MCNC: 10.6 MG/DL — HIGH (ref 8.4–10.5)
CHLORIDE SERPL-SCNC: 97 MMOL/L — LOW (ref 98–107)
CO2 SERPL-SCNC: 22 MMOL/L — SIGNIFICANT CHANGE UP (ref 22–31)
CREAT SERPL-MCNC: 1.07 MG/DL — SIGNIFICANT CHANGE UP (ref 0.5–1.3)
CULTURE RESULTS: SIGNIFICANT CHANGE UP
D DIMER BLD IA.RAPID-MCNC: 713 NG/ML — SIGNIFICANT CHANGE UP
EOSINOPHIL # BLD AUTO: 0.26 K/UL — SIGNIFICANT CHANGE UP (ref 0–0.5)
EOSINOPHIL NFR BLD AUTO: 1.1 % — SIGNIFICANT CHANGE UP (ref 0–6)
FERRITIN SERPL-MCNC: 1281 NG/ML — HIGH (ref 15–150)
FIBRINOGEN PPP-MCNC: 996 MG/DL — HIGH (ref 300–520)
GLUCOSE BLDA-MCNC: 127 MG/DL — HIGH (ref 70–99)
GLUCOSE BLDC GLUCOMTR-MCNC: 101 MG/DL — HIGH (ref 70–99)
GLUCOSE BLDC GLUCOMTR-MCNC: 44 MG/DL — CRITICAL LOW (ref 70–99)
GLUCOSE BLDC GLUCOMTR-MCNC: 89 MG/DL — SIGNIFICANT CHANGE UP (ref 70–99)
GLUCOSE SERPL-MCNC: 125 MG/DL — HIGH (ref 70–99)
GRAM STN FLD: SIGNIFICANT CHANGE UP
HCO3 BLDA-SCNC: 23 MMOL/L — SIGNIFICANT CHANGE UP (ref 22–26)
HCT VFR BLD CALC: 22.9 % — LOW (ref 34.5–45)
HCT VFR BLDA CALC: 22.8 % — LOW (ref 34.5–46.5)
HGB BLD-MCNC: 7.3 G/DL — LOW (ref 11.5–15.5)
HGB BLDA-MCNC: 7.3 G/DL — LOW (ref 11.5–15.5)
IMM GRANULOCYTES NFR BLD AUTO: 5.9 % — HIGH (ref 0–1.5)
INR BLD: 1.04 — SIGNIFICANT CHANGE UP (ref 0.88–1.17)
LACTATE BLDA-SCNC: 1.5 MMOL/L — SIGNIFICANT CHANGE UP (ref 0.5–2)
LDH SERPL L TO P-CCNC: 390 U/L — HIGH (ref 135–225)
LYMPHOCYTES # BLD AUTO: 2.33 K/UL — SIGNIFICANT CHANGE UP (ref 1–3.3)
LYMPHOCYTES # BLD AUTO: 9.7 % — LOW (ref 13–44)
MAGNESIUM SERPL-MCNC: 2.8 MG/DL — HIGH (ref 1.6–2.6)
MANUAL SMEAR VERIFICATION: SIGNIFICANT CHANGE UP
MCHC RBC-ENTMCNC: 28.7 PG — SIGNIFICANT CHANGE UP (ref 27–34)
MCHC RBC-ENTMCNC: 31.9 % — LOW (ref 32–36)
MCV RBC AUTO: 90.2 FL — SIGNIFICANT CHANGE UP (ref 80–100)
MISCELLANEOUS - CHEM: SIGNIFICANT CHANGE UP
MONOCYTES # BLD AUTO: 2.05 K/UL — HIGH (ref 0–0.9)
MONOCYTES NFR BLD AUTO: 8.5 % — SIGNIFICANT CHANGE UP (ref 2–14)
NEUTROPHILS # BLD AUTO: 17.87 K/UL — HIGH (ref 1.8–7.4)
NEUTROPHILS NFR BLD AUTO: 74.2 % — SIGNIFICANT CHANGE UP (ref 43–77)
NRBC # FLD: 2.33 K/UL — SIGNIFICANT CHANGE UP (ref 0–0)
NRBC FLD-RTO: 9.7 — SIGNIFICANT CHANGE UP
PCO2 BLDA: 57 MMHG — HIGH (ref 32–48)
PH BLDA: 7.27 PH — LOW (ref 7.35–7.45)
PHOSPHATE SERPL-MCNC: 3.5 MG/DL — SIGNIFICANT CHANGE UP (ref 2.5–4.5)
PLATELET # BLD AUTO: 357 K/UL — SIGNIFICANT CHANGE UP (ref 150–400)
PMV BLD: 11.7 FL — SIGNIFICANT CHANGE UP (ref 7–13)
PO2 BLDA: 113 MMHG — HIGH (ref 83–108)
POTASSIUM BLDA-SCNC: 4.3 MMOL/L — SIGNIFICANT CHANGE UP (ref 3.4–4.5)
POTASSIUM SERPL-MCNC: 4.4 MMOL/L — SIGNIFICANT CHANGE UP (ref 3.5–5.3)
POTASSIUM SERPL-SCNC: 4.4 MMOL/L — SIGNIFICANT CHANGE UP (ref 3.5–5.3)
PROT SERPL-MCNC: 7 G/DL — SIGNIFICANT CHANGE UP (ref 6–8.3)
PROTHROM AB SERPL-ACNC: 11.9 SEC — SIGNIFICANT CHANGE UP (ref 9.8–13.1)
RBC # BLD: 2.54 M/UL — LOW (ref 3.8–5.2)
RBC # FLD: 17.4 % — HIGH (ref 10.3–14.5)
SAO2 % BLDA: 97.9 % — SIGNIFICANT CHANGE UP (ref 95–99)
SODIUM BLDA-SCNC: 133 MMOL/L — LOW (ref 136–146)
SODIUM SERPL-SCNC: 133 MMOL/L — LOW (ref 135–145)
SPECIMEN SOURCE: SIGNIFICANT CHANGE UP
SPECIMEN SOURCE: SIGNIFICANT CHANGE UP
WBC # BLD: 24.07 K/UL — HIGH (ref 3.8–10.5)
WBC # FLD AUTO: 24.07 K/UL — HIGH (ref 3.8–10.5)

## 2020-04-08 PROCEDURE — 99291 CRITICAL CARE FIRST HOUR: CPT

## 2020-04-08 PROCEDURE — 90945 DIALYSIS ONE EVALUATION: CPT

## 2020-04-08 RX ADMIN — Medication 216 GRAM(S): at 21:54

## 2020-04-08 RX ADMIN — Medication 3.38 MICROGRAM(S)/KG/MIN: at 07:55

## 2020-04-08 RX ADMIN — DEXMEDETOMIDINE HYDROCHLORIDE IN 0.9% SODIUM CHLORIDE 5.41 MICROGRAM(S)/KG/HR: 4 INJECTION INTRAVENOUS at 07:55

## 2020-04-08 RX ADMIN — HEPARIN SODIUM 7 UNIT(S)/HR: 5000 INJECTION INTRAVENOUS; SUBCUTANEOUS at 07:55

## 2020-04-08 RX ADMIN — CHLORHEXIDINE GLUCONATE 1 APPLICATION(S): 213 SOLUTION TOPICAL at 06:43

## 2020-04-08 RX ADMIN — Medication 216 GRAM(S): at 13:08

## 2020-04-08 RX ADMIN — FAMOTIDINE 20 MILLIGRAM(S): 10 INJECTION INTRAVENOUS at 10:48

## 2020-04-08 RX ADMIN — PROPOFOL 8.66 MICROGRAM(S)/KG/MIN: 10 INJECTION, EMULSION INTRAVENOUS at 07:54

## 2020-04-08 RX ADMIN — CHLORHEXIDINE GLUCONATE 15 MILLILITER(S): 213 SOLUTION TOPICAL at 06:42

## 2020-04-08 RX ADMIN — Medication 1 DROP(S): at 06:42

## 2020-04-08 RX ADMIN — VASOPRESSIN 0.03 UNIT(S)/MIN: 20 INJECTION INTRAVENOUS at 07:55

## 2020-04-08 RX ADMIN — Medication 216 GRAM(S): at 06:42

## 2020-04-08 RX ADMIN — CHLORHEXIDINE GLUCONATE 15 MILLILITER(S): 213 SOLUTION TOPICAL at 16:27

## 2020-04-08 RX ADMIN — Medication 1 DROP(S): at 10:48

## 2020-04-08 RX ADMIN — Medication 250 MILLIGRAM(S): at 23:00

## 2020-04-08 RX ADMIN — Medication 1 DROP(S): at 16:26

## 2020-04-08 NOTE — PROGRESS NOTE ADULT - SUBJECTIVE AND OBJECTIVE BOX
CRITICAL CARE ATTENDING - CTICU    MEDICATIONS  (STANDING):  ampicillin  IVPB      ampicillin  IVPB 2 Gram(s) IV Intermittent every 8 hours  artificial tears (preservative free) Ophthalmic Solution 1 Drop(s) Both EYES every 6 hours  chlorhexidine 0.12% Liquid 15 milliLiter(s) Oral Mucosa every 12 hours  chlorhexidine 4% Liquid 1 Application(s) Topical <User Schedule>  CRRT Treatment    <Continuous>  dexMEDEtomidine Infusion 0.3 MICROgram(s)/kG/Hr (5.41 mL/Hr) IV Continuous <Continuous>  famotidine Injectable 20 milliGRAM(s) IV Push daily  heparin  Infusion 1050 Unit(s)/Hr (7 mL/Hr) IV Continuous <Continuous>  methylphenidate 10 milliGRAM(s) Oral two times a day  metoprolol tartrate Injectable 2.5 milliGRAM(s) IV Push every 6 hours  norepinephrine Infusion 0.05 MICROgram(s)/kG/Min (3.38 mL/Hr) IV Continuous <Continuous>  Phoxillum Filtration BK 4 / 2.5 5000 milliLiter(s) (1200 mL/Hr) CRRT <Continuous>  PrismaSATE Dialysate BGK 4 / 2.5 5000 milliLiter(s) (1000 mL/Hr) CRRT <Continuous>  PrismaSOL Filtration BGK 4 / 2.5 5000 milliLiter(s) (200 mL/Hr) CRRT <Continuous>  propofol Infusion 20 MICROgram(s)/kG/Min (8.66 mL/Hr) IV Continuous <Continuous>  vasopressin Infusion 0.001 Unit(s)/Min (0.03 mL/Hr) IV Continuous <Continuous>                                    7.3    24.07 )-----------( 357      ( 08 Apr 2020 03:30 )             22.9       04-08    133<L>  |  97<L>  |  13  ----------------------------<  125<H>  4.4   |  22  |  1.07    Ca    10.6<H>      08 Apr 2020 03:30  Phos  3.5     04-08  Mg     2.8     04-08    TPro  7.0  /  Alb  3.0<L>  /  TBili  0.2  /  DBili  x   /  AST  25  /  ALT  31  /  AlkPhos  113  04-08      PT/INR - ( 08 Apr 2020 03:30 )   PT: 11.9 SEC;   INR: 1.04          PTT - ( 08 Apr 2020 03:30 )  PTT:74.8 SEC    Mode: AC/ CMV (Assist Control/ Continuous Mandatory Ventilation)  RR (machine): 28  TV (machine): 330  FiO2: 40  PEEP: 5  ITime: 0.46  MAP: 15  PIP: 37      Daily     Daily       04-07 @ 07:01  -  04-08 @ 07:00  --------------------------------------------------------  IN: 1698.2 mL / OUT: 835 mL / NET: 863.2 mL    04-08 @ 07:01  -  04-08 @ 10:53  --------------------------------------------------------  IN: 438.7 mL / OUT: 82 mL / NET: 356.7 mL        Critically Ill patient  : [ ] preoperative ,   [ ] post operative  [x] Non Operative     Requires :  [ x] Arterial Line   [ x] Central Line  [ ] PA catheter  [ ] IABP  [ ] ECMO  [ ] LVAD  [x ] Ventilator  [ ] pacemaker [ ] Impella.                      [x ] ABG's     [x ] Pulse Oxymetry Monitoring  Bedside evaluation , monitoring , treatment of hemodynamics , fluids , IVP/ IVCD meds.        Diagnosis:     COVID +     Viral Pneumonia    respiratory failure - ARDS     Requires chest PT, pulmonary toilet, ambu bagging, suctioning to maintain SaO2,  patent airway and treat atelectasis.     Ventilator Management:  [ x]AC-rest    [x ]CPAP-PS Wean    [ ]Trach Collar     [ ]Extubate    [ ] T-Piece  [ ]peep>5     Difficult weaning process - multiple organ system involvement in critically ill patient     Enterococcus in blood culture     Hyponatremia     Hypotension     Hemodynamic lability,  instability. Requires IVCD [x ] vasopressors [ ] inotropes  [ ] vasodilator  [ ]IVSS fluid  to maintain MAP, perfusion, C.I.    Renal Failure - Acute Kidney Injury     CVVHD     IVCD anticoagulation with [ x] Heparin  [ ] Argatroban for  CVVHD     Tolerates NG / NJ feeds at [ ] goal rate    [x ] trophic rate    [ ]       rate     Requires IVCD sedation / analgesia to manage ARDS / ventilator / weaning                    Discussed with , Physician's Assistant - Nurse Practitioner- Critical care medicine team.   Dicussed at  AM / PM rounds.   Chart, labs , films reviewed.    Total Time:  35 min

## 2020-04-08 NOTE — PROGRESS NOTE ADULT - PROBLEM SELECTOR PLAN 1
Pt. with oliguric SAM in the setting of hypotension and COVID-19. Scr on admission (3/15/20) was 0.84. Scr however worsened to 4.6 on 3/26/20. Pt. likely with ATN. Pt. was started on CRRT/CVVHDF on 3/26/20. Pt. tolerating CVVHDF at present. BP being maintained on IV vasopressors. HD catheter functioning during rounds. Plan is to continue CRRT/CVVHDF today. Check renal sonogram (when feasible). Monitor labs and urine output. Avoid any potential nephrotoxins

## 2020-04-08 NOTE — PROGRESS NOTE ADULT - SUBJECTIVE AND OBJECTIVE BOX
[facility] DIVISION OF KIDNEY DISEASES AND HYPERTENSION --    HPI: 59-year-old female admitted to ICU for respiratory failure, pneumonia, SAM and COVID-19 infection. Scr increased to 4.6 on 3/23/20. Pt. initiated on CRRT/CVVHDF on 3/23/20. Pt. was extubated on 4/4/20, however pt. was re-intubated.     Pt. was seen and examined in ICU. Pt. is sedated, intubated, on IV vasopressor support. Pt. remains oliguric.     PAST HISTORY  --------------------------------------------------------------------------------  No significant changes to PMH, PSH, FHx, SHx, unless otherwise noted    ALLERGIES & MEDICATIONS  --------------------------------------------------------------------------------  Allergies    No Known Allergies    Intolerances    Standing Inpatient Medications  ampicillin  IVPB      ampicillin  IVPB 2 Gram(s) IV Intermittent every 8 hours  artificial tears (preservative free) Ophthalmic Solution 1 Drop(s) Both EYES every 6 hours  chlorhexidine 0.12% Liquid 15 milliLiter(s) Oral Mucosa every 12 hours  chlorhexidine 4% Liquid 1 Application(s) Topical <User Schedule>  CRRT Treatment    <Continuous>  dexMEDEtomidine Infusion 0.3 MICROgram(s)/kG/Hr IV Continuous <Continuous>  famotidine Injectable 20 milliGRAM(s) IV Push daily  heparin  Infusion 1050 Unit(s)/Hr IV Continuous <Continuous>  methylphenidate 10 milliGRAM(s) Oral two times a day  metoprolol tartrate Injectable 2.5 milliGRAM(s) IV Push every 6 hours  norepinephrine Infusion 0.05 MICROgram(s)/kG/Min IV Continuous <Continuous>  Phoxillum Filtration BK 4 / 2.5 5000 milliLiter(s) CRRT <Continuous>  PrismaSATE Dialysate BGK 4 / 2.5 5000 milliLiter(s) CRRT <Continuous>  PrismaSOL Filtration BGK 4 / 2.5 5000 milliLiter(s) CRRT <Continuous>  propofol Infusion 20 MICROgram(s)/kG/Min IV Continuous <Continuous>  vasopressin Infusion 0.001 Unit(s)/Min IV Continuous <Continuous>    PRN Inpatient Medications  acetaminophen    Suspension .. 650 milliGRAM(s) Oral every 6 hours PRN    REVIEW OF SYSTEMS  --------------------------------------------------------------------------------  Unable to obtain.    VITALS/PHYSICAL EXAM  --------------------------------------------------------------------------------  T(C): 33.8 (04-08-20 @ 08:00), Max: 37.1 (04-07-20 @ 14:00)  HR: 81 (04-08-20 @ 09:00) (49 - 138)  BP: --  RR: 17 (04-08-20 @ 09:00) (17 - 28)  SpO2: 100% (04-08-20 @ 09:00) (91% - 100%)  Wt(kg): --    04-07-20 @ 07:01  -  04-08-20 @ 07:00  --------------------------------------------------------  IN: 1698.2 mL / OUT: 835 mL / NET: 863.2 mL    04-08-20 @ 07:01  -  04-08-20 @ 10:32  --------------------------------------------------------  IN: 438.7 mL / OUT: 82 mL / NET: 356.7 mL    Physical Exam:  	Gen: Sedated, intubated  	HEENT: + ETT  	Pulm: + mechanical breath sounds  	CV: S1S2+  	Abd: Soft, non distended   	Ext: No LE edema B/L  	Neuro: Sedated  	Skin: Warm and dry              Access: Right IJ non tunneled catheter without bleeding    LABS/STUDIES  --------------------------------------------------------------------------------              7.3    24.07 >-----------<  357      [04-08-20 @ 03:30]              22.9     133  |  97  |  13  ----------------------------<  125      [04-08-20 @ 03:30]  4.4   |  22  |  1.07        Ca     10.6     [04-08-20 @ 03:30]      Mg     2.8     [04-08-20 @ 03:30]      Phos  3.5     [04-08-20 @ 03:30]    TPro  7.0  /  Alb  3.0  /  TBili  0.2  /  DBili  x   /  AST  25  /  ALT  31  /  AlkPhos  113  [04-08-20 @ 03:30]    Creatinine Trend:  SCr 1.07 [04-08 @ 03:30]  SCr 1.01 [04-07 @ 03:00]  SCr 1.14 [04-06 @ 04:43]  SCr 2.95 [04-05 @ 03:00]  SCr 2.94 [04-04 @ 02:59]    HCV 0.12, Nonreactive Hepatitis C AB  S/CO Ratio                        Interpretation  < 1.00                                   Non-Reactive  1.00 - 4.99                         Weakly-Reactive  >= 5.00                                Reactive  Non-Reactive: Aperson with a non-reactive HCV antibody  result is considered uninfected.  No further action is  needed unless recent infection is suspected.  In these  cases, consider repeat testing later to detect  seroconversion..  Weakly-Reactive: HCV antibody test is abnormal, HCV RNA  Qualitative test will follow.  Reactive: HCV antibody test is abnormal, HCV RNA  Qualitative test will follow.  Note: HCV antibody testing is performed on the [employer]   system.      [03-16-20 @ 05:30]

## 2020-04-09 LAB
ALBUMIN SERPL ELPH-MCNC: 2.9 G/DL — LOW (ref 3.3–5)
ALP SERPL-CCNC: 159 U/L — HIGH (ref 40–120)
ALT FLD-CCNC: 31 U/L — SIGNIFICANT CHANGE UP (ref 4–33)
ANION GAP SERPL CALC-SCNC: 13 MMO/L — SIGNIFICANT CHANGE UP (ref 7–14)
APTT BLD: 55.7 SEC — HIGH (ref 27.5–36.3)
APTT BLD: 61.7 SEC — HIGH (ref 27.5–36.3)
APTT BLD: 62.8 SEC — HIGH (ref 27.5–36.3)
APTT BLD: 67.4 SEC — HIGH (ref 27.5–36.3)
APTT BLD: 71.4 SEC — HIGH (ref 27.5–36.3)
AST SERPL-CCNC: 33 U/L — HIGH (ref 4–32)
BASE EXCESS BLDA CALC-SCNC: 0.1 MMOL/L — SIGNIFICANT CHANGE UP
BASE EXCESS BLDA CALC-SCNC: 0.3 MMOL/L — SIGNIFICANT CHANGE UP
BASOPHILS # BLD AUTO: 0.09 K/UL — SIGNIFICANT CHANGE UP (ref 0–0.2)
BASOPHILS NFR BLD AUTO: 0.4 % — SIGNIFICANT CHANGE UP (ref 0–2)
BILIRUB SERPL-MCNC: 0.3 MG/DL — SIGNIFICANT CHANGE UP (ref 0.2–1.2)
BUN SERPL-MCNC: 14 MG/DL — SIGNIFICANT CHANGE UP (ref 7–23)
CA-I BLDA-SCNC: 1.34 MMOL/L — HIGH (ref 1.15–1.29)
CALCIUM SERPL-MCNC: 10.4 MG/DL — SIGNIFICANT CHANGE UP (ref 8.4–10.5)
CHLORIDE SERPL-SCNC: 99 MMOL/L — SIGNIFICANT CHANGE UP (ref 98–107)
CK SERPL-CCNC: 140 U/L — SIGNIFICANT CHANGE UP (ref 25–170)
CO2 SERPL-SCNC: 21 MMOL/L — LOW (ref 22–31)
CREAT SERPL-MCNC: 1.26 MG/DL — SIGNIFICANT CHANGE UP (ref 0.5–1.3)
CRP SERPL-MCNC: 246.9 MG/L — HIGH
CULTURE RESULTS: SIGNIFICANT CHANGE UP
D DIMER BLD IA.RAPID-MCNC: 2355 NG/ML — SIGNIFICANT CHANGE UP
EOSINOPHIL # BLD AUTO: 0.09 K/UL — SIGNIFICANT CHANGE UP (ref 0–0.5)
EOSINOPHIL NFR BLD AUTO: 0.4 % — SIGNIFICANT CHANGE UP (ref 0–6)
FIBRINOGEN PPP-MCNC: 1086 MG/DL — HIGH (ref 300–520)
GLUCOSE BLDA-MCNC: 100 MG/DL — HIGH (ref 70–99)
GLUCOSE BLDA-MCNC: 113 MG/DL — HIGH (ref 70–99)
GLUCOSE SERPL-MCNC: 103 MG/DL — HIGH (ref 70–99)
HCO3 BLDA-SCNC: 25 MMOL/L — SIGNIFICANT CHANGE UP (ref 22–26)
HCO3 BLDA-SCNC: 25 MMOL/L — SIGNIFICANT CHANGE UP (ref 22–26)
HCT VFR BLD CALC: 22.5 % — LOW (ref 34.5–45)
HCT VFR BLD CALC: 24.4 % — LOW (ref 34.5–45)
HCT VFR BLDA CALC: 21.6 % — LOW (ref 34.5–46.5)
HCT VFR BLDA CALC: 24.7 % — LOW (ref 34.5–46.5)
HGB BLD-MCNC: 7.1 G/DL — LOW (ref 11.5–15.5)
HGB BLD-MCNC: 7.8 G/DL — LOW (ref 11.5–15.5)
HGB BLDA-MCNC: 6.9 G/DL — CRITICAL LOW (ref 11.5–15.5)
HGB BLDA-MCNC: 7.9 G/DL — LOW (ref 11.5–15.5)
IMM GRANULOCYTES NFR BLD AUTO: 6.7 % — HIGH (ref 0–1.5)
INR BLD: 1.03 — SIGNIFICANT CHANGE UP (ref 0.88–1.17)
INR BLD: 1.11 — SIGNIFICANT CHANGE UP (ref 0.88–1.17)
LACTATE BLDA-SCNC: 1.4 MMOL/L — SIGNIFICANT CHANGE UP (ref 0.5–2)
LYMPHOCYTES # BLD AUTO: 1.54 K/UL — SIGNIFICANT CHANGE UP (ref 1–3.3)
LYMPHOCYTES # BLD AUTO: 7.1 % — LOW (ref 13–44)
MAGNESIUM SERPL-MCNC: 2.9 MG/DL — HIGH (ref 1.6–2.6)
MCHC RBC-ENTMCNC: 28.5 PG — SIGNIFICANT CHANGE UP (ref 27–34)
MCHC RBC-ENTMCNC: 28.7 PG — SIGNIFICANT CHANGE UP (ref 27–34)
MCHC RBC-ENTMCNC: 31.6 % — LOW (ref 32–36)
MCHC RBC-ENTMCNC: 32 % — SIGNIFICANT CHANGE UP (ref 32–36)
MCV RBC AUTO: 89.7 FL — SIGNIFICANT CHANGE UP (ref 80–100)
MCV RBC AUTO: 90.4 FL — SIGNIFICANT CHANGE UP (ref 80–100)
MONOCYTES # BLD AUTO: 2.04 K/UL — HIGH (ref 0–0.9)
MONOCYTES NFR BLD AUTO: 9.4 % — SIGNIFICANT CHANGE UP (ref 2–14)
NEUTROPHILS # BLD AUTO: 16.56 K/UL — HIGH (ref 1.8–7.4)
NEUTROPHILS NFR BLD AUTO: 76 % — SIGNIFICANT CHANGE UP (ref 43–77)
NRBC # FLD: 0.28 K/UL — SIGNIFICANT CHANGE UP (ref 0–0)
NRBC # FLD: 1.08 K/UL — SIGNIFICANT CHANGE UP (ref 0–0)
NRBC FLD-RTO: 1.7 — SIGNIFICANT CHANGE UP
NRBC FLD-RTO: 5 — SIGNIFICANT CHANGE UP
NT-PROBNP SERPL-SCNC: 7802 PG/ML — SIGNIFICANT CHANGE UP
PCO2 BLDA: 37 MMHG — SIGNIFICANT CHANGE UP (ref 32–48)
PCO2 BLDA: 44 MMHG — SIGNIFICANT CHANGE UP (ref 32–48)
PH BLDA: 7.37 PH — SIGNIFICANT CHANGE UP (ref 7.35–7.45)
PH BLDA: 7.43 PH — SIGNIFICANT CHANGE UP (ref 7.35–7.45)
PHOSPHATE SERPL-MCNC: 3.6 MG/DL — SIGNIFICANT CHANGE UP (ref 2.5–4.5)
PLATELET # BLD AUTO: 277 K/UL — SIGNIFICANT CHANGE UP (ref 150–400)
PLATELET # BLD AUTO: 355 K/UL — SIGNIFICANT CHANGE UP (ref 150–400)
PMV BLD: 11.4 FL — SIGNIFICANT CHANGE UP (ref 7–13)
PMV BLD: 11.4 FL — SIGNIFICANT CHANGE UP (ref 7–13)
PO2 BLDA: 112 MMHG — HIGH (ref 83–108)
PO2 BLDA: 162 MMHG — HIGH (ref 83–108)
POTASSIUM BLDA-SCNC: 4.5 MMOL/L — SIGNIFICANT CHANGE UP (ref 3.4–4.5)
POTASSIUM BLDA-SCNC: 5.1 MMOL/L — HIGH (ref 3.4–4.5)
POTASSIUM SERPL-MCNC: 5.2 MMOL/L — SIGNIFICANT CHANGE UP (ref 3.5–5.3)
POTASSIUM SERPL-SCNC: 5.2 MMOL/L — SIGNIFICANT CHANGE UP (ref 3.5–5.3)
PROT SERPL-MCNC: 7.4 G/DL — SIGNIFICANT CHANGE UP (ref 6–8.3)
PROTHROM AB SERPL-ACNC: 11.9 SEC — SIGNIFICANT CHANGE UP (ref 9.8–13.1)
PROTHROM AB SERPL-ACNC: 12.8 SEC — SIGNIFICANT CHANGE UP (ref 9.8–13.1)
RBC # BLD: 2.49 M/UL — LOW (ref 3.8–5.2)
RBC # BLD: 2.72 M/UL — LOW (ref 3.8–5.2)
RBC # FLD: 17.5 % — HIGH (ref 10.3–14.5)
RBC # FLD: 18.1 % — HIGH (ref 10.3–14.5)
SAO2 % BLDA: 98.7 % — SIGNIFICANT CHANGE UP (ref 95–99)
SAO2 % BLDA: 99.3 % — HIGH (ref 95–99)
SODIUM BLDA-SCNC: 135 MMOL/L — LOW (ref 136–146)
SODIUM BLDA-SCNC: 137 MMOL/L — SIGNIFICANT CHANGE UP (ref 136–146)
SODIUM SERPL-SCNC: 133 MMOL/L — LOW (ref 135–145)
SPECIMEN SOURCE: SIGNIFICANT CHANGE UP
TROPONIN T, HIGH SENSITIVITY: 62 NG/L — CRITICAL HIGH (ref ?–14)
WBC # BLD: 16.92 K/UL — HIGH (ref 3.8–10.5)
WBC # BLD: 21.78 K/UL — HIGH (ref 3.8–10.5)
WBC # FLD AUTO: 16.92 K/UL — HIGH (ref 3.8–10.5)
WBC # FLD AUTO: 21.78 K/UL — HIGH (ref 3.8–10.5)

## 2020-04-09 PROCEDURE — 99291 CRITICAL CARE FIRST HOUR: CPT

## 2020-04-09 PROCEDURE — 90945 DIALYSIS ONE EVALUATION: CPT

## 2020-04-09 PROCEDURE — 99292 CRITICAL CARE ADDL 30 MIN: CPT

## 2020-04-09 RX ORDER — METHYLPHENIDATE HCL 5 MG
10 TABLET ORAL EVERY 12 HOURS
Refills: 0 | Status: DISCONTINUED | OUTPATIENT
Start: 2020-04-09 | End: 2020-04-10

## 2020-04-09 RX ORDER — HYDROMORPHONE HYDROCHLORIDE 2 MG/ML
1 INJECTION INTRAMUSCULAR; INTRAVENOUS; SUBCUTANEOUS ONCE
Refills: 0 | Status: DISCONTINUED | OUTPATIENT
Start: 2020-04-09 | End: 2020-04-11

## 2020-04-09 RX ADMIN — Medication 10 MILLIGRAM(S): at 11:34

## 2020-04-09 RX ADMIN — VASOPRESSIN 0.03 UNIT(S)/MIN: 20 INJECTION INTRAVENOUS at 08:56

## 2020-04-09 RX ADMIN — Medication 1 DROP(S): at 00:04

## 2020-04-09 RX ADMIN — CHLORHEXIDINE GLUCONATE 1 APPLICATION(S): 213 SOLUTION TOPICAL at 05:13

## 2020-04-09 RX ADMIN — FAMOTIDINE 20 MILLIGRAM(S): 10 INJECTION INTRAVENOUS at 11:25

## 2020-04-09 RX ADMIN — Medication 1 DROP(S): at 05:12

## 2020-04-09 RX ADMIN — Medication 1 DROP(S): at 11:25

## 2020-04-09 RX ADMIN — Medication 216 GRAM(S): at 14:00

## 2020-04-09 RX ADMIN — Medication 2.5 MILLIGRAM(S): at 23:40

## 2020-04-09 RX ADMIN — CHLORHEXIDINE GLUCONATE 15 MILLILITER(S): 213 SOLUTION TOPICAL at 05:12

## 2020-04-09 RX ADMIN — Medication 650 MILLIGRAM(S): at 08:56

## 2020-04-09 RX ADMIN — Medication 1 DROP(S): at 23:40

## 2020-04-09 RX ADMIN — Medication 216 GRAM(S): at 23:01

## 2020-04-09 RX ADMIN — DEXMEDETOMIDINE HYDROCHLORIDE IN 0.9% SODIUM CHLORIDE 5.41 MICROGRAM(S)/KG/HR: 4 INJECTION INTRAVENOUS at 08:56

## 2020-04-09 RX ADMIN — Medication 1 DROP(S): at 19:00

## 2020-04-09 RX ADMIN — CHLORHEXIDINE GLUCONATE 15 MILLILITER(S): 213 SOLUTION TOPICAL at 19:00

## 2020-04-09 RX ADMIN — Medication 2.5 MILLIGRAM(S): at 11:25

## 2020-04-09 RX ADMIN — Medication 216 GRAM(S): at 05:12

## 2020-04-09 RX ADMIN — Medication 3.38 MICROGRAM(S)/KG/MIN: at 08:56

## 2020-04-09 NOTE — PROGRESS NOTE ADULT - SUBJECTIVE AND OBJECTIVE BOX
ALEXA GARCIA                     MRN-[id number]    HPI:  58 y/o F with no PMH p/w fever and cough since Thursday. Tmax 103. She presented to urgent care initially and was treated with tamiflu for presumed flu. Despite this she continued to have fevers. Pt denies recent travel, sick contacts or any other sx or acute complaints. Endorses pinching pain in chest when she coughs. Also with nausea and poor PO intake. Denies shortness of breath, abdominal pain, dysuria or LE edema. (16 Mar 2020 02:05)      Issues:              Acute hypoxic respiratory failure              ARDS              Septic shock              COVID-19+              SAM - On CRRT              Anemia    Drips:  Propofol / Levophed / Precedex/ vaso      PAST MEDICAL & SURGICAL HISTORY:  No pertinent past medical history  No significant past surgical history            VITAL SIGNS:  Vital Signs Last 24 Hrs  T(C): 37.8 (09 Apr 2020 12:00), Max: 38 (09 Apr 2020 08:00)  T(F): 100 (09 Apr 2020 12:00), Max: 100.4 (09 Apr 2020 08:00)  HR: 115 (09 Apr 2020 12:00) (115 - 134)  BP: --  BP(mean): --  RR: 22 (09 Apr 2020 12:00) (22 - 25)  SpO2: 100% (09 Apr 2020 12:00) (99% - 100%)    I/Os:   I&O's Detail    08 Apr 2020 07:01  -  09 Apr 2020 07:00  --------------------------------------------------------  IN:    dexmedetomidine Infusion: 37.8 mL    Enteral Tube Flush: 30 mL    heparin Infusion: 102.5 mL    norepinephrine Infusion: 57.7 mL    ns in tub fed  [id number]: 360 mL    Solution: 100 mL    vasopressin Infusion: 33.6 mL  Total IN: 721.6 mL    OUT:    Other: 692 mL    Rectal Tube: 50 mL  Total OUT: 742 mL    Total NET: -20.4 mL      09 Apr 2020 07:01  -  09 Apr 2020 12:49  --------------------------------------------------------  IN:    Enteral Tube Flush: 30 mL    heparin Infusion: 47.5 mL    norepinephrine Infusion: 9 mL    ns in tub fed  [id number]: 150 mL    Packed Red Blood Cells: 310 mL    vasopressin Infusion: 9 mL  Total IN: 555.5 mL    OUT:    Other: 130 mL  Total OUT: 130 mL    Total NET: 425.5 mL          CAPILLARY BLOOD GLUCOSE      POCT Blood Glucose.: 101 mg/dL (08 Apr 2020 16:36)      =======================MEDICATIONS===================  MEDICATIONS  (STANDING):  ampicillin  IVPB      ampicillin  IVPB 2 Gram(s) IV Intermittent every 8 hours  artificial tears (preservative free) Ophthalmic Solution 1 Drop(s) Both EYES every 6 hours  chlorhexidine 0.12% Liquid 15 milliLiter(s) Oral Mucosa every 12 hours  chlorhexidine 4% Liquid 1 Application(s) Topical <User Schedule>  CRRT Treatment    <Continuous>  dexMEDEtomidine Infusion 0.3 MICROgram(s)/kG/Hr (5.41 mL/Hr) IV Continuous <Continuous>  famotidine Injectable 20 milliGRAM(s) IV Push daily  heparin  Infusion 1050 Unit(s)/Hr (9.5 mL/Hr) IV Continuous <Continuous>  HYDROmorphone  Injectable 1 milliGRAM(s) IV Push once  methylphenidate 10 milliGRAM(s) Oral every 12 hours  metoprolol tartrate Injectable 2.5 milliGRAM(s) IV Push every 6 hours  norepinephrine Infusion 0.05 MICROgram(s)/kG/Min (3.38 mL/Hr) IV Continuous <Continuous>  PrismaSATE Dialysate BK 0 / 3.5 5000 milliLiter(s) (1000 mL/Hr) CRRT <Continuous>  PrismaSOL Filtration BGK 0 / 2.5 5000 milliLiter(s) (200 mL/Hr) CRRT <Continuous>  PrismaSOL Filtration BGK 4 / 2.5 5000 milliLiter(s) (1200 mL/Hr) CRRT <Continuous>  propofol Infusion 20 MICROgram(s)/kG/Min (8.66 mL/Hr) IV Continuous <Continuous>  vasopressin Infusion 0.001 Unit(s)/Min (0.03 mL/Hr) IV Continuous <Continuous>    MEDICATIONS  (PRN):  acetaminophen    Suspension .. 650 milliGRAM(s) Oral every 6 hours PRN Temp greater or equal to 38C (100.4F)      =======================VENTILATOR SETTINGS===================  Mode: CPAP with PS  FiO2: 40  PEEP: 5  PS: 5  MAP: 8        PHYSICAL EXAM============================  General:                Sedated    , vented                                           Neuro:                  Off sedation, Not following  commands and moved all ext but weak                          Cardiovascular:   S1 & S2, regular                           Respiratory:         Coarse breath sounds                          GI:                          Soft, nondistended, Bowel sounds +                            Ext:                        + Edema                               ============================LABS=========================                        7.1    21.78 )-----------( 355      ( 09 Apr 2020 03:20 )             22.5     04-09    133<L>  |  99  |  14  ----------------------------<  103<H>  5.2   |  21<L>  |  1.26    Ca    10.4      09 Apr 2020 03:20  Phos  3.6     04-09  Mg     2.9     04-09    TPro  7.4  /  Alb  2.9<L>  /  TBili  0.3  /  DBili  x   /  AST  33<H>  /  ALT  31  /  AlkPhos  159<H>  04-09    LIVER FUNCTIONS - ( 09 Apr 2020 03:20 )  Alb: 2.9 g/dL / Pro: 7.4 g/dL / ALK PHOS: 159 u/L / ALT: 31 u/L / AST: 33 u/L / GGT: x           PT/INR - ( 09 Apr 2020 10:25 )   PT: 12.8 SEC;   INR: 1.11          PTT - ( 09 Apr 2020 10:25 )  PTT:62.8 SEC  ABG - ( 09 Apr 2020 03:20 )  pH, Arterial: 7.37  pH, Blood: x     /  pCO2: 44    /  pO2: 112   / HCO3: 25    / Base Excess: 0.1   /  SaO2: 98.7                  ============================IMAGING STUDIES=========================  < from: Xray Chest 1 View- PORTABLE-Urgent (04.04.20 @ 22:28) >    INTERPRETATION:  EXAMINATION: XR CHEST URGENT    CLINICAL INDICATION: ngt    TECHNIQUE: Frontal radiograph of the chest was obtained.    COMPARISON: 4.4.20.    FINDINGS:    Cardiac silhouette normal in size. Left patchy lung opacity.  No pleural effusion or pneumothorax.    Endotracheal tube tip mid trachea.  Enteric tube tip below diaphragm.  Right IJ line with tip in the SVC.  Left sided IJ line with tip coursing superiorly in the right brachiocephalic vein.    IMPRESSION:   Left sided IJ line with tip coursing superiorly in the right brachiocephalic vein.      58 y/o F with no PMH p/w fever and cough since Thursday. Tmax 103. She presented to urgent care initially and was treated with tamiflu for presumed flu. Despite this she continued to have fevers. Pt denies recent travel, sick contacts or any other sx or acute complaints. Endorses pinching pain in chest when she coughs. Also with nausea and poor PO intake. Denies shortness of breath, abdominal pain, dysuria or LE edema. (16 Mar 2020 02:05)                              Neuro:                                         Currently on prop + Precedex. Off sedation, non-focal but has generalized weakness                            Cardiovascular:                                          Continue hemodynamic monitoring.                                         Titrate Levophed  to MAP>65                                        Daily EKGs, monitor QTc                                        Continue  Heparin gtt - Target PTT 50-80                            Respiratory:                                         Acute hypoxemic respiratory failure due to Pneumonia / COVID-19. Extubated and got reintubated within 24 hrs                                         On full mechanical vent support  AC-40%-5                                                Wean FiO2 as tolerated                                                CPAP wean as tolerated.                                                 Continue bronchodilators, pulmonary toilet as tolerated                            GI                                         NGT feeds as tolerated                                         Continue Pepcid                                         Bowel regimen	                                                                 Renal:                                         Strict I/Os                                         SAM - CRRT in progress, Renal F/U                                         Monitor BUN / Cr                                                 Hem/ Onc:                                                                                  DVT prophylaxis -  on Heparin gtt                                         Follow CBC  & signs of bleeding                           Infectious disease:                                            Pneumonia                                           COVID-19 +                                          Follow cultures                            Endocrine                                            DM-2: Continue Accu-Checks with coverage      Pertinent clinical, laboratory, radiographic, hemodynamic, echocardiographic, respiratory data, microbiologic data and chart were reviewed and analyzed frequently throughout the course of the day and night  Patient seen, examined and plan discussed with  CTICU team during rounds.    Pt's status &  goals of care  discussed with family     I have spent  75  minutes of critical care time with this pt between  7am  and 11.59 pm          Leslye Juárez DO, FACEP

## 2020-04-09 NOTE — PROGRESS NOTE ADULT - PROBLEM SELECTOR PLAN 1
Pt. with oliguric SAM in the setting of hypotension and COVID-19. Scr on admission (3/15/20) was 0.84. Scr however worsened to 4.6 on 3/26/20. Pt. likely with ATN. Pt. was started on CRRT/CVVHDF on 3/26/20. Pt. tolerating CVVHDF at present. BP being maintained on IV vasopressors. HD catheter functioning during rounds. Check renal sonogram (when feasible). Monitor labs and urine output. Avoid any potential nephrotoxins

## 2020-04-09 NOTE — PROGRESS NOTE ADULT - SUBJECTIVE AND OBJECTIVE BOX
[facility] DIVISION OF KIDNEY DISEASES AND HYPERTENSION --    HPI: 59-year-old female admitted to ICU for respiratory failure, pneumonia, SAM and COVID-19 infection. Scr increased to 4.6 on 3/23/20. Pt. initiated on CRRT/CVVHDF on 3/23/20.     Pt. was seen and examined in ICU. Pt. is sedated, intubated, on IV vasopressor support. Pt. remains oliguric.     PAST HISTORY  --------------------------------------------------------------------------------  No significant changes to PMH, PSH, FHx, SHx, unless otherwise noted    ALLERGIES & MEDICATIONS  --------------------------------------------------------------------------------  Allergies    No Known Allergies    Intolerances    Standing Inpatient Medications  ampicillin  IVPB      ampicillin  IVPB 2 Gram(s) IV Intermittent every 8 hours  artificial tears (preservative free) Ophthalmic Solution 1 Drop(s) Both EYES every 6 hours  chlorhexidine 0.12% Liquid 15 milliLiter(s) Oral Mucosa every 12 hours  chlorhexidine 4% Liquid 1 Application(s) Topical <User Schedule>  CRRT Treatment    <Continuous>  dexMEDEtomidine Infusion 0.3 MICROgram(s)/kG/Hr IV Continuous <Continuous>  famotidine Injectable 20 milliGRAM(s) IV Push daily  heparin  Infusion 1050 Unit(s)/Hr IV Continuous <Continuous>  methylphenidate 10 milliGRAM(s) Oral every 12 hours  metoprolol tartrate Injectable 2.5 milliGRAM(s) IV Push every 6 hours  norepinephrine Infusion 0.05 MICROgram(s)/kG/Min IV Continuous <Continuous>  PrismaSATE Dialysate BK 0 / 3.5 5000 milliLiter(s) CRRT <Continuous>  PrismaSOL Filtration BGK 0 / 2.5 5000 milliLiter(s) CRRT <Continuous>  PrismaSOL Filtration BGK 4 / 2.5 5000 milliLiter(s) CRRT <Continuous>  propofol Infusion 20 MICROgram(s)/kG/Min IV Continuous <Continuous>  vasopressin Infusion 0.001 Unit(s)/Min IV Continuous <Continuous>    PRN Inpatient Medications  acetaminophen    Suspension .. 650 milliGRAM(s) Oral every 6 hours PRN    REVIEW OF SYSTEMS  --------------------------------------------------------------------------------  Unable to obtain.    VITALS/PHYSICAL EXAM  --------------------------------------------------------------------------------  T(C): 37.8 (04-09-20 @ 12:00), Max: 38 (04-09-20 @ 08:00)  HR: 115 (04-09-20 @ 12:00) (115 - 134)  BP: --  RR: 22 (04-09-20 @ 12:00) (22 - 25)  SpO2: 100% (04-09-20 @ 12:00) (99% - 100%)  Wt(kg): --    04-08-20 @ 07:01  -  04-09-20 @ 07:00  --------------------------------------------------------  IN: 721.6 mL / OUT: 742 mL / NET: -20.4 mL    04-09-20 @ 07:01  -  04-09-20 @ 12:37  --------------------------------------------------------  IN: 555.5 mL / OUT: 130 mL / NET: 425.5 mL    Physical Exam:  	Gen: Sedated, intubated  	HEENT: +ETT  	Pulm: +mechanical breath sounds  	CV: S1S2+  	Abd: Soft, non-distended   	Ext: No LE edema B/L  	Neuro: Sedated  	Skin: Warm and dry              Access: Right IJ non tunneled catheter without bleeding    LABS/STUDIES  --------------------------------------------------------------------------------              7.1    21.78 >-----------<  355      [04-09-20 @ 03:20]              22.5     133  |  99  |  14  ----------------------------<  103      [04-09-20 @ 03:20]  5.2   |  21  |  1.26        Ca     10.4     [04-09-20 @ 03:20]      Mg     2.9     [04-09-20 @ 03:20]      Phos  3.6     [04-09-20 @ 03:20]    TPro  7.4  /  Alb  2.9  /  TBili  0.3  /  DBili  x   /  AST  33  /  ALT  31  /  AlkPhos  159  [04-09-20 @ 03:20]    Creatinine Trend:  SCr 1.26 [04-09 @ 03:20]  SCr 1.07 [04-08 @ 03:30]  SCr 1.01 [04-07 @ 03:00]  SCr 1.14 [04-06 @ 04:43]  SCr 2.95 [04-05 @ 03:00]    HCV 0.12, Nonreactive Hepatitis C AB  S/CO Ratio                        Interpretation  < 1.00                                   Non-Reactive  1.00 - 4.99                         Weakly-Reactive  >= 5.00                                Reactive  Non-Reactive: Aperson with a non-reactive HCV antibody  result is considered uninfected.  No further action is  needed unless recent infection is suspected.  In these  cases, consider repeat testing later to detect  seroconversion..  Weakly-Reactive: HCV antibody test is abnormal, HCV RNA  Qualitative test will follow.  Reactive: HCV antibody test is abnormal, HCV RNA  Qualitative test will follow.  Note: HCV antibody testing is performed on the [employer]   system.      [03-16-20 @ 05:30]

## 2020-04-10 LAB
ALBUMIN SERPL ELPH-MCNC: 3.1 G/DL — LOW (ref 3.3–5)
ALBUMIN SERPL ELPH-MCNC: 3.1 G/DL — LOW (ref 3.3–5)
ALP SERPL-CCNC: 177 U/L — HIGH (ref 40–120)
ALP SERPL-CCNC: 177 U/L — HIGH (ref 40–120)
ALT FLD-CCNC: 36 U/L — HIGH (ref 4–33)
ALT FLD-CCNC: 36 U/L — HIGH (ref 4–33)
ANION GAP SERPL CALC-SCNC: 20 MMO/L — HIGH (ref 7–14)
ANION GAP SERPL CALC-SCNC: 20 MMO/L — HIGH (ref 7–14)
APTT BLD: 75.9 SEC — HIGH (ref 27.5–36.3)
AST SERPL-CCNC: 43 U/L — HIGH (ref 4–32)
AST SERPL-CCNC: 43 U/L — HIGH (ref 4–32)
BASE EXCESS BLDA CALC-SCNC: -2.3 MMOL/L — SIGNIFICANT CHANGE UP
BASE EXCESS BLDA CALC-SCNC: -2.3 MMOL/L — SIGNIFICANT CHANGE UP
BASE EXCESS BLDA CALC-SCNC: -3.8 MMOL/L — SIGNIFICANT CHANGE UP
BASOPHILS # BLD AUTO: 0.1 K/UL — SIGNIFICANT CHANGE UP (ref 0–0.2)
BASOPHILS NFR BLD AUTO: 0.7 % — SIGNIFICANT CHANGE UP (ref 0–2)
BILIRUB SERPL-MCNC: 0.2 MG/DL — SIGNIFICANT CHANGE UP (ref 0.2–1.2)
BILIRUB SERPL-MCNC: 0.2 MG/DL — SIGNIFICANT CHANGE UP (ref 0.2–1.2)
BUN SERPL-MCNC: 41 MG/DL — HIGH (ref 7–23)
BUN SERPL-MCNC: 41 MG/DL — HIGH (ref 7–23)
CA-I BLDA-SCNC: 1.39 MMOL/L — HIGH (ref 1.15–1.29)
CALCIUM SERPL-MCNC: 11.1 MG/DL — HIGH (ref 8.4–10.5)
CALCIUM SERPL-MCNC: 11.1 MG/DL — HIGH (ref 8.4–10.5)
CHLORIDE SERPL-SCNC: 104 MMOL/L — SIGNIFICANT CHANGE UP (ref 98–107)
CHLORIDE SERPL-SCNC: 104 MMOL/L — SIGNIFICANT CHANGE UP (ref 98–107)
CK SERPL-CCNC: 168 U/L — SIGNIFICANT CHANGE UP (ref 25–170)
CO2 SERPL-SCNC: 18 MMOL/L — LOW (ref 22–31)
CO2 SERPL-SCNC: 18 MMOL/L — LOW (ref 22–31)
CREAT SERPL-MCNC: 2.5 MG/DL — HIGH (ref 0.5–1.3)
CREAT SERPL-MCNC: 2.5 MG/DL — HIGH (ref 0.5–1.3)
CRP SERPL-MCNC: 166.9 MG/L — HIGH
D DIMER BLD IA.RAPID-MCNC: 2047 NG/ML — SIGNIFICANT CHANGE UP
EOSINOPHIL # BLD AUTO: 0.19 K/UL — SIGNIFICANT CHANGE UP (ref 0–0.5)
EOSINOPHIL NFR BLD AUTO: 1.3 % — SIGNIFICANT CHANGE UP (ref 0–6)
FERRITIN SERPL-MCNC: 997.7 NG/ML — HIGH (ref 15–150)
FIBRINOGEN PPP-MCNC: 1009 MG/DL — HIGH (ref 300–520)
GLUCOSE BLDA-MCNC: 125 MG/DL — HIGH (ref 70–99)
GLUCOSE BLDA-MCNC: 87 MG/DL — SIGNIFICANT CHANGE UP (ref 70–99)
GLUCOSE BLDA-MCNC: 87 MG/DL — SIGNIFICANT CHANGE UP (ref 70–99)
GLUCOSE SERPL-MCNC: 91 MG/DL — SIGNIFICANT CHANGE UP (ref 70–99)
GLUCOSE SERPL-MCNC: 91 MG/DL — SIGNIFICANT CHANGE UP (ref 70–99)
HCO3 BLDA-SCNC: 21 MMOL/L — LOW (ref 22–26)
HCO3 BLDA-SCNC: 22 MMOL/L — SIGNIFICANT CHANGE UP (ref 22–26)
HCO3 BLDA-SCNC: 22 MMOL/L — SIGNIFICANT CHANGE UP (ref 22–26)
HCT VFR BLD CALC: 20.8 % — CRITICAL LOW (ref 34.5–45)
HCT VFR BLD CALC: 24.5 % — LOW (ref 34.5–45)
HCT VFR BLDA CALC: 21.7 % — LOW (ref 34.5–46.5)
HCT VFR BLDA CALC: 26 % — LOW (ref 34.5–46.5)
HCT VFR BLDA CALC: 26 % — LOW (ref 34.5–46.5)
HGB BLD-MCNC: 6.6 G/DL — CRITICAL LOW (ref 11.5–15.5)
HGB BLD-MCNC: 7.7 G/DL — LOW (ref 11.5–15.5)
HGB BLDA-MCNC: 6.9 G/DL — CRITICAL LOW (ref 11.5–15.5)
HGB BLDA-MCNC: 8.4 G/DL — LOW (ref 11.5–15.5)
HGB BLDA-MCNC: 8.4 G/DL — LOW (ref 11.5–15.5)
IMM GRANULOCYTES NFR BLD AUTO: 7.1 % — HIGH (ref 0–1.5)
INR BLD: 1.08 — SIGNIFICANT CHANGE UP (ref 0.88–1.17)
LACTATE BLDA-SCNC: 1.9 MMOL/L — SIGNIFICANT CHANGE UP (ref 0.5–2)
LYMPHOCYTES # BLD AUTO: 1.05 K/UL — SIGNIFICANT CHANGE UP (ref 1–3.3)
LYMPHOCYTES # BLD AUTO: 7.3 % — LOW (ref 13–44)
MAGNESIUM SERPL-MCNC: 3.1 MG/DL — HIGH (ref 1.6–2.6)
MCHC RBC-ENTMCNC: 28.3 PG — SIGNIFICANT CHANGE UP (ref 27–34)
MCHC RBC-ENTMCNC: 28.8 PG — SIGNIFICANT CHANGE UP (ref 27–34)
MCHC RBC-ENTMCNC: 31.4 % — LOW (ref 32–36)
MCHC RBC-ENTMCNC: 31.7 % — LOW (ref 32–36)
MCV RBC AUTO: 90.1 FL — SIGNIFICANT CHANGE UP (ref 80–100)
MCV RBC AUTO: 90.8 FL — SIGNIFICANT CHANGE UP (ref 80–100)
MONOCYTES # BLD AUTO: 1.99 K/UL — HIGH (ref 0–0.9)
MONOCYTES NFR BLD AUTO: 13.8 % — SIGNIFICANT CHANGE UP (ref 2–14)
NEUTROPHILS # BLD AUTO: 10.02 K/UL — HIGH (ref 1.8–7.4)
NEUTROPHILS NFR BLD AUTO: 69.8 % — SIGNIFICANT CHANGE UP (ref 43–77)
NRBC # FLD: 0.04 K/UL — SIGNIFICANT CHANGE UP (ref 0–0)
NRBC # FLD: 0.18 K/UL — SIGNIFICANT CHANGE UP (ref 0–0)
NRBC FLD-RTO: 1.3 — SIGNIFICANT CHANGE UP
NT-PROBNP SERPL-SCNC: SIGNIFICANT CHANGE UP PG/ML
PCO2 BLDA: 43 MMHG — SIGNIFICANT CHANGE UP (ref 32–48)
PCO2 BLDA: 48 MMHG — SIGNIFICANT CHANGE UP (ref 32–48)
PCO2 BLDA: 48 MMHG — SIGNIFICANT CHANGE UP (ref 32–48)
PH BLDA: 7.31 PH — LOW (ref 7.35–7.45)
PH BLDA: 7.31 PH — LOW (ref 7.35–7.45)
PH BLDA: 7.32 PH — LOW (ref 7.35–7.45)
PHOSPHATE SERPL-MCNC: 4.6 MG/DL — HIGH (ref 2.5–4.5)
PLATELET # BLD AUTO: 238 K/UL — SIGNIFICANT CHANGE UP (ref 150–400)
PLATELET # BLD AUTO: 285 K/UL — SIGNIFICANT CHANGE UP (ref 150–400)
PMV BLD: 11.7 FL — SIGNIFICANT CHANGE UP (ref 7–13)
PMV BLD: 11.7 FL — SIGNIFICANT CHANGE UP (ref 7–13)
PO2 BLDA: 106 MMHG — SIGNIFICANT CHANGE UP (ref 83–108)
PO2 BLDA: 106 MMHG — SIGNIFICANT CHANGE UP (ref 83–108)
PO2 BLDA: 178 MMHG — HIGH (ref 83–108)
POTASSIUM BLDA-SCNC: 4.8 MMOL/L — HIGH (ref 3.4–4.5)
POTASSIUM BLDA-SCNC: 4.8 MMOL/L — HIGH (ref 3.4–4.5)
POTASSIUM BLDA-SCNC: 5.9 MMOL/L — HIGH (ref 3.4–4.5)
POTASSIUM SERPL-MCNC: 5.3 MMOL/L — SIGNIFICANT CHANGE UP (ref 3.5–5.3)
POTASSIUM SERPL-MCNC: 5.3 MMOL/L — SIGNIFICANT CHANGE UP (ref 3.5–5.3)
POTASSIUM SERPL-SCNC: 5.3 MMOL/L — SIGNIFICANT CHANGE UP (ref 3.5–5.3)
POTASSIUM SERPL-SCNC: 5.3 MMOL/L — SIGNIFICANT CHANGE UP (ref 3.5–5.3)
PROCALCITONIN SERPL-MCNC: 1.31 NG/ML — HIGH (ref 0.02–0.1)
PROT SERPL-MCNC: 6.9 G/DL — SIGNIFICANT CHANGE UP (ref 6–8.3)
PROT SERPL-MCNC: 6.9 G/DL — SIGNIFICANT CHANGE UP (ref 6–8.3)
PROTHROM AB SERPL-ACNC: 12.4 SEC — SIGNIFICANT CHANGE UP (ref 9.8–13.1)
RBC # BLD: 2.29 M/UL — LOW (ref 3.8–5.2)
RBC # BLD: 2.72 M/UL — LOW (ref 3.8–5.2)
RBC # FLD: 17.7 % — HIGH (ref 10.3–14.5)
RBC # FLD: 17.7 % — HIGH (ref 10.3–14.5)
SAO2 % BLDA: 98 % — SIGNIFICANT CHANGE UP (ref 95–99)
SAO2 % BLDA: 98 % — SIGNIFICANT CHANGE UP (ref 95–99)
SAO2 % BLDA: 99.1 % — HIGH (ref 95–99)
SODIUM BLDA-SCNC: 135 MMOL/L — LOW (ref 136–146)
SODIUM BLDA-SCNC: 136 MMOL/L — SIGNIFICANT CHANGE UP (ref 136–146)
SODIUM BLDA-SCNC: 136 MMOL/L — SIGNIFICANT CHANGE UP (ref 136–146)
SODIUM SERPL-SCNC: 142 MMOL/L — SIGNIFICANT CHANGE UP (ref 135–145)
SODIUM SERPL-SCNC: 142 MMOL/L — SIGNIFICANT CHANGE UP (ref 135–145)
TROPONIN T, HIGH SENSITIVITY: 80 NG/L — CRITICAL HIGH (ref ?–14)
WBC # BLD: 13.99 K/UL — HIGH (ref 3.8–10.5)
WBC # BLD: 14.37 K/UL — HIGH (ref 3.8–10.5)
WBC # FLD AUTO: 13.99 K/UL — HIGH (ref 3.8–10.5)
WBC # FLD AUTO: 14.37 K/UL — HIGH (ref 3.8–10.5)

## 2020-04-10 PROCEDURE — 31500 INSERT EMERGENCY AIRWAY: CPT

## 2020-04-10 PROCEDURE — 99232 SBSQ HOSP IP/OBS MODERATE 35: CPT | Mod: GC

## 2020-04-10 PROCEDURE — 99292 CRITICAL CARE ADDL 30 MIN: CPT | Mod: 25

## 2020-04-10 PROCEDURE — 99291 CRITICAL CARE FIRST HOUR: CPT | Mod: 25

## 2020-04-10 PROCEDURE — 71045 X-RAY EXAM CHEST 1 VIEW: CPT | Mod: 26

## 2020-04-10 RX ORDER — DEXMEDETOMIDINE HYDROCHLORIDE IN 0.9% SODIUM CHLORIDE 4 UG/ML
0.2 INJECTION INTRAVENOUS
Qty: 200 | Refills: 0 | Status: DISCONTINUED | OUTPATIENT
Start: 2020-04-10 | End: 2020-04-19

## 2020-04-10 RX ADMIN — Medication 1 DROP(S): at 17:00

## 2020-04-10 RX ADMIN — HEPARIN SODIUM 9.5 UNIT(S)/HR: 5000 INJECTION INTRAVENOUS; SUBCUTANEOUS at 20:41

## 2020-04-10 RX ADMIN — DEXMEDETOMIDINE HYDROCHLORIDE IN 0.9% SODIUM CHLORIDE 3.61 MICROGRAM(S)/KG/HR: 4 INJECTION INTRAVENOUS at 20:41

## 2020-04-10 RX ADMIN — Medication 1 DROP(S): at 05:24

## 2020-04-10 RX ADMIN — Medication 216 GRAM(S): at 20:40

## 2020-04-10 RX ADMIN — CHLORHEXIDINE GLUCONATE 15 MILLILITER(S): 213 SOLUTION TOPICAL at 17:01

## 2020-04-10 RX ADMIN — Medication 3.38 MICROGRAM(S)/KG/MIN: at 08:14

## 2020-04-10 RX ADMIN — VASOPRESSIN 0.03 UNIT(S)/MIN: 20 INJECTION INTRAVENOUS at 20:42

## 2020-04-10 RX ADMIN — Medication 1 DROP(S): at 13:24

## 2020-04-10 RX ADMIN — Medication 216 GRAM(S): at 14:54

## 2020-04-10 RX ADMIN — CHLORHEXIDINE GLUCONATE 1 APPLICATION(S): 213 SOLUTION TOPICAL at 05:24

## 2020-04-10 RX ADMIN — VASOPRESSIN 0.03 UNIT(S)/MIN: 20 INJECTION INTRAVENOUS at 08:14

## 2020-04-10 RX ADMIN — HEPARIN SODIUM 9.5 UNIT(S)/HR: 5000 INJECTION INTRAVENOUS; SUBCUTANEOUS at 08:13

## 2020-04-10 RX ADMIN — Medication 1 DROP(S): at 23:20

## 2020-04-10 RX ADMIN — Medication 2.5 MILLIGRAM(S): at 05:23

## 2020-04-10 RX ADMIN — CHLORHEXIDINE GLUCONATE 15 MILLILITER(S): 213 SOLUTION TOPICAL at 05:23

## 2020-04-10 RX ADMIN — Medication 10 MILLIGRAM(S): at 05:23

## 2020-04-10 RX ADMIN — Medication 216 GRAM(S): at 05:23

## 2020-04-10 RX ADMIN — DEXMEDETOMIDINE HYDROCHLORIDE IN 0.9% SODIUM CHLORIDE 3.61 MICROGRAM(S)/KG/HR: 4 INJECTION INTRAVENOUS at 15:13

## 2020-04-10 RX ADMIN — FAMOTIDINE 20 MILLIGRAM(S): 10 INJECTION INTRAVENOUS at 13:00

## 2020-04-10 NOTE — PROCEDURE NOTE - NSPROCNAME_GEN_A_CORE
Arterial Puncture/Cannulation
Central Line Insertion
Feeding Tube Replacement
Tracheal Intubation
Central Line Insertion
Central Line Insertion

## 2020-04-10 NOTE — PROCEDURE NOTE - NSINFORMCONSENT_GEN_A_CORE

## 2020-04-10 NOTE — PROCEDURE NOTE - NSPROCDETAILS_GEN_ALL_CORE
patient pre-oxygenated, tube inserted, placement confirmed
location identified, feeding tube inserted
location identified, draped/prepped, sterile technique used, needle inserted/introduced/positive blood return obtained via catheter/ultrasound guidance/connected to a pressurized flush line/hemostasis with direct pressure, dressing applied/Seldinger technique/sutured in place/all materials/supplies accounted for at end of procedure
lumen(s) aspirated and flushed/ultrasound guidance/sterile dressing applied/sterile technique, catheter placed/guidewire recovered
sterile technique, catheter placed/guidewire recovered/lumen(s) aspirated and flushed/sterile dressing applied/ultrasound guidance
sterile technique, catheter placed/guidewire recovered/sterile dressing applied/lumen(s) aspirated and flushed/ultrasound guidance
sterile dressing applied/sterile technique, catheter placed/lumen(s) aspirated and flushed/guidewire recovered/ultrasound guidance
guidewire recovered/lumen(s) aspirated and flushed/sterile dressing applied/sterile technique, catheter placed/ultrasound guidance

## 2020-04-10 NOTE — PROCEDURE NOTE - PROCEDURE DATE TIME, MLM
04-Apr-2020 16:00
10-Apr-2020 14:58
20-Mar-2020 06:00
20-Mar-2020 06:40
20-Mar-2020 07:00
23-Mar-2020 23:00
31-Mar-2020 15:00
31-Mar-2020 16:00

## 2020-04-10 NOTE — PROCEDURE NOTE - ADDITIONAL PROCEDURE DETAILS
Attempted tube exchange with bougie but ETT was likely not in the airway. The ETT was removed and using direct laryngoscopy a new ETT was placed.

## 2020-04-10 NOTE — PROCEDURE NOTE - NSINDICATIONS_GEN_A_CORE
critical patient/blood sampling/arterial puncture to obtain ABG's/monitoring purposes
dialysis/CRRT
dialysis/CRRT
respiratory distress/other
respiratory failure
venous access/critical illness/emergency venous access
critical illness/emergency venous access/venous access
dialysis/CRRT

## 2020-04-10 NOTE — PROGRESS NOTE ADULT - SUBJECTIVE AND OBJECTIVE BOX
[facility] DIVISION OF KIDNEY DISEASES AND HYPERTENSION -- FOLLOW UP NOTE  --------------------------------------------------------------------------------  HPI: 58 yo F admitted to ICU for respiratory failure, pneumonia, SAM and COVID-19. Scr increased to 4.6 on 3/23/20. Pt. initiated on CRRT/CVVHDF on 3/23/20, stopped on 4/9/20.     Pt. was seen and examined in ICU. Pt. is sedated, intubated, on IV vasopressor support. Pt. remains oliguric.     PAST HISTORY  --------------------------------------------------------------------------------  No significant changes to PMH, PSH, FHx, SHx, unless otherwise noted    ALLERGIES & MEDICATIONS  --------------------------------------------------------------------------------  Allergies    No Known Allergies    Intolerances    Standing Inpatient Medications  ampicillin  IVPB      ampicillin  IVPB 2 Gram(s) IV Intermittent every 8 hours  artificial tears (preservative free) Ophthalmic Solution 1 Drop(s) Both EYES every 6 hours  chlorhexidine 0.12% Liquid 15 milliLiter(s) Oral Mucosa every 12 hours  chlorhexidine 4% Liquid 1 Application(s) Topical <User Schedule>  famotidine Injectable 20 milliGRAM(s) IV Push daily  heparin  Infusion 1050 Unit(s)/Hr IV Continuous <Continuous>  HYDROmorphone  Injectable 1 milliGRAM(s) IV Push once  norepinephrine Infusion 0.05 MICROgram(s)/kG/Min IV Continuous <Continuous>  vasopressin Infusion 0.001 Unit(s)/Min IV Continuous <Continuous>    REVIEW OF SYSTEMS  --------------------------------------------------------------------------------  Unable to obtain.    VITALS/PHYSICAL EXAM  --------------------------------------------------------------------------------  T(C): 37.9 (04-10-20 @ 08:00), Max: 37.9 (04-10-20 @ 08:00)  HR: 119 (04-10-20 @ 11:14) (103 - 128)  BP: --  RR: 22 (04-10-20 @ 08:00) (17 - 24)  SpO2: 100% (04-10-20 @ 11:14) (100% - 100%)  Wt(kg): --    04-09-20 @ 07:01  -  04-10-20 @ 07:00  --------------------------------------------------------  IN: 1542.6 mL / OUT: 130 mL / NET: 1412.6 mL    04-10-20 @ 07:01  -  04-10-20 @ 11:20  --------------------------------------------------------  IN: 81.4 mL / OUT: 0 mL / NET: 81.4 mL    Physical Exam:  	Gen: Sedated, intubated  	HEENT: +ETT  	Pulm: +mechanical breath sounds  	CV: S1S2+  	Abd: Soft, non-distended   	Ext: No LE edema B/L  	Neuro: Sedated  	Skin: Warm and dry              Access: Right IJ non tunneled catheter without bleeding    LABS/STUDIES  --------------------------------------------------------------------------------              7.7    14.37 >-----------<  285      [04-10-20 @ 04:10]              24.5     142  |  104  |  41  ----------------------------<  91      [04-10-20 @ 04:10]  5.3   |  18  |  2.50        Ca     11.1     [04-10-20 @ 04:10]      Mg     3.1     [04-10-20 @ 04:10]      Phos  4.6     [04-10-20 @ 04:10]    Creatinine Trend:  SCr 2.50 [04-10 @ 04:10]  SCr 1.26 [04-09 @ 03:20]  SCr 1.07 [04-08 @ 03:30]  SCr 1.01 [04-07 @ 03:00]  SCr 1.14 [04-06 @ 04:43] [facility] DIVISION OF KIDNEY DISEASES AND HYPERTENSION -- FOLLOW UP NOTE  --------------------------------------------------------------------------------  HPI: 58 yo F admitted to ICU for respiratory failure, pneumonia, SAM and COVID-19. Scr increased to 4.6 on 3/23/20. Pt. initiated on CRRT/CVVHDF on 3/23/20, discontinued on 4/9/20.     Pt. was seen and examined in ICU. Pt. is sedated, intubated, on IV vasopressor support. Pt. remains oliguric.     PAST HISTORY  --------------------------------------------------------------------------------  No significant changes to PMH, PSH, FHx, SHx, unless otherwise noted    ALLERGIES & MEDICATIONS  --------------------------------------------------------------------------------  Allergies    No Known Allergies    Intolerances    Standing Inpatient Medications  ampicillin  IVPB      ampicillin  IVPB 2 Gram(s) IV Intermittent every 8 hours  artificial tears (preservative free) Ophthalmic Solution 1 Drop(s) Both EYES every 6 hours  chlorhexidine 0.12% Liquid 15 milliLiter(s) Oral Mucosa every 12 hours  chlorhexidine 4% Liquid 1 Application(s) Topical <User Schedule>  famotidine Injectable 20 milliGRAM(s) IV Push daily  heparin  Infusion 1050 Unit(s)/Hr IV Continuous <Continuous>  HYDROmorphone  Injectable 1 milliGRAM(s) IV Push once  norepinephrine Infusion 0.05 MICROgram(s)/kG/Min IV Continuous <Continuous>  vasopressin Infusion 0.001 Unit(s)/Min IV Continuous <Continuous>    REVIEW OF SYSTEMS  --------------------------------------------------------------------------------  Unable to obtain.    VITALS/PHYSICAL EXAM  --------------------------------------------------------------------------------  T(C): 37.9 (04-10-20 @ 08:00), Max: 37.9 (04-10-20 @ 08:00)  HR: 119 (04-10-20 @ 11:14) (103 - 128)  BP: --  RR: 22 (04-10-20 @ 08:00) (17 - 24)  SpO2: 100% (04-10-20 @ 11:14) (100% - 100%)  Wt(kg): --    04-09-20 @ 07:01  -  04-10-20 @ 07:00  --------------------------------------------------------  IN: 1542.6 mL / OUT: 130 mL / NET: 1412.6 mL    04-10-20 @ 07:01  -  04-10-20 @ 11:20  --------------------------------------------------------  IN: 81.4 mL / OUT: 0 mL / NET: 81.4 mL    Physical Exam:  	Gen: Sedated, intubated  	HEENT: +ETT  	Pulm: +mechanical breath sounds  	CV: S1S2+  	Abd: Soft, non-distended   	Ext: No LE edema B/L  	Neuro: Sedated  	Skin: Warm and dry              Access: Right IJ non tunneled catheter without bleeding    LABS/STUDIES  --------------------------------------------------------------------------------              7.7    14.37 >-----------<  285      [04-10-20 @ 04:10]              24.5     142  |  104  |  41  ----------------------------<  91      [04-10-20 @ 04:10]  5.3   |  18  |  2.50        Ca     11.1     [04-10-20 @ 04:10]      Mg     3.1     [04-10-20 @ 04:10]      Phos  4.6     [04-10-20 @ 04:10]    Creatinine Trend:  SCr 2.50 [04-10 @ 04:10]  SCr 1.26 [04-09 @ 03:20]  SCr 1.07 [04-08 @ 03:30]  SCr 1.01 [04-07 @ 03:00]  SCr 1.14 [04-06 @ 04:43] [facility] DIVISION OF KIDNEY DISEASES AND HYPERTENSION -- FOLLOW UP NOTE  --------------------------------------------------------------------------------  HPI: 60 yo F admitted to ICU for respiratory failure, pneumonia, SAM and COVID-19. Scr increased to 4.6 on 3/23/20. Pt. initiated on CRRT/CVVHDF on 3/23/20, discontinued on 4/9/20.     Pt. was seen and examined in ICU. Pt. is sedated, intubated, on IV vasopressor support. Pt. remains oliguric.     PAST HISTORY  --------------------------------------------------------------------------------  No significant changes to PMH, PSH, FHx, SHx, unless otherwise noted    ALLERGIES & MEDICATIONS  --------------------------------------------------------------------------------  Allergies    No Known Allergies    Intolerances    Standing Inpatient Medications  ampicillin  IVPB      ampicillin  IVPB 2 Gram(s) IV Intermittent every 8 hours  artificial tears (preservative free) Ophthalmic Solution 1 Drop(s) Both EYES every 6 hours  chlorhexidine 0.12% Liquid 15 milliLiter(s) Oral Mucosa every 12 hours  chlorhexidine 4% Liquid 1 Application(s) Topical <User Schedule>  famotidine Injectable 20 milliGRAM(s) IV Push daily  heparin  Infusion 1050 Unit(s)/Hr IV Continuous <Continuous>  HYDROmorphone  Injectable 1 milliGRAM(s) IV Push once  norepinephrine Infusion 0.05 MICROgram(s)/kG/Min IV Continuous <Continuous>  vasopressin Infusion 0.001 Unit(s)/Min IV Continuous <Continuous>    REVIEW OF SYSTEMS  --------------------------------------------------------------------------------  Unable to obtain.    VITALS/PHYSICAL EXAM  --------------------------------------------------------------------------------  T(C): 37.9 (04-10-20 @ 08:00), Max: 37.9 (04-10-20 @ 08:00)  HR: 119 (04-10-20 @ 11:14) (103 - 128)  BP: --  RR: 22 (04-10-20 @ 08:00) (17 - 24)  SpO2: 100% (04-10-20 @ 11:14) (100% - 100%)  Wt(kg): --    04-09-20 @ 07:01  -  04-10-20 @ 07:00  --------------------------------------------------------  IN: 1542.6 mL / OUT: 130 mL / NET: 1412.6 mL    04-10-20 @ 07:01  -  04-10-20 @ 11:20  --------------------------------------------------------  IN: 81.4 mL / OUT: 0 mL / NET: 81.4 mL    Physical Exam:  	Gen: Sedated, intubated  	HEENT: +ETT  	Pulm: +mechanical breath sounds  	CV: S1S2+  	Abd: Soft, non-distended   	Ext: No LE edema B/L  	Neuro: Sedated  	Skin: Warm and dry              Access: Right IJ non tunneled catheter without bleeding    LABS/STUDIES  --------------------------------------------------------------------------------              7.7    14.37 >-----------<  285      [04-10-20 @ 04:10]              24.5     142  |  104  |  41  ----------------------------<  91      [04-10-20 @ 04:10]  5.3   |  18  |  2.50        Ca     11.1     [04-10-20 @ 04:10]      Mg     3.1     [04-10-20 @ 04:10]      Phos  4.6     [04-10-20 @ 04:10]    Creatinine Trend:  SCr 2.50 [04-10 @ 04:10]  SCr 1.26 [04-09 @ 03:20]  SCr 1.07 [04-08 @ 03:30]  SCr 1.01 [04-07 @ 03:00]  SCr 1.14 [04-06 @ 04:43]

## 2020-04-10 NOTE — PROGRESS NOTE ADULT - SUBJECTIVE AND OBJECTIVE BOX
ALEXA GARCIA   MRN#: [id number]     The patient is a 59y Female who was seen, evaluated, & examined with the ICU staff with a multidisciplinary care plan formulated & implemented. All available clinical, laboratory, radiographic, pharmacologic, and electrocardiographic data were reviewed & analyzed.      The patient was in the ICU in critical condition secondary to:     ARDS  actue hypoxic/hypercarbic respiratory failure  vasodilatory shock    For support and evaluation & prevention of further decompensation secondary to persistent cardiopulmonary dysfunction, respiratory status required:     supplemental oxygen with full ventilatory support / mechanical ventilation  continuous pulse oximetry monitoring  following ABGs with A-line monitoring    Invasive hemodynamic monitoring with an A-line was required for continuous MAP/BP monitoring to ensure adequate cardiovascular support and to evaluate for & help prevent decompensation while receiving IV Vasopressin infusion secondary to vasodilatory shock.    In addition:  COVID+ admitted with respiratory failure/ARDS, intubated initially from 3/24-4/3; reintubated 4/4 for hypoxia and altered mental status  Mild ARDS with P/F 300s; CPAP 5/5 since yesterday  Off sedation for approximately 24 hours - does not follow commands   Will stop Ritalin as there has been no neurologic improvement with it after several days   Pressor requirement, like due to vasoplegia from sedation   Tolerating feeds; Low output in rectal tube overnight - will monitor  Off CVVH yesterday due to persistent clotting of circuit - will assess for HD tomorrow - anuric  S/p Plaquenil, Azithromycin and Solumedrol  Enterococcus in blood - on Ampicillin (4/2- ), blood cultures from 4/4 no growth  H/H low but stable on heparin gtt - no signs of active bleeding - will trend  Sugars controlled without medication  HD catheter 4/4    The patient required critical care management and I personally provided 75 minutes of non-continuous care to the patient, excluding separate procedures, in addition to discussing the patient and plan at length with the ICU staff and helping coordinate care.

## 2020-04-11 DIAGNOSIS — E87.5 HYPERKALEMIA: ICD-10-CM

## 2020-04-11 LAB
ALBUMIN SERPL ELPH-MCNC: 2.1 G/DL — LOW (ref 3.3–5)
ALBUMIN SERPL ELPH-MCNC: 2.8 G/DL — LOW (ref 3.3–5)
ALBUMIN SERPL ELPH-MCNC: 3.1 G/DL — LOW (ref 3.3–5)
ALP SERPL-CCNC: 103 U/L — SIGNIFICANT CHANGE UP (ref 40–120)
ALP SERPL-CCNC: 135 U/L — HIGH (ref 40–120)
ALP SERPL-CCNC: 137 U/L — HIGH (ref 40–120)
ALT FLD-CCNC: 30 U/L — SIGNIFICANT CHANGE UP (ref 4–33)
ALT FLD-CCNC: 38 U/L — HIGH (ref 4–33)
ALT FLD-CCNC: 49 U/L — HIGH (ref 4–33)
ANION GAP SERPL CALC-SCNC: 17 MMO/L — HIGH (ref 7–14)
ANION GAP SERPL CALC-SCNC: 18 MMO/L — HIGH (ref 7–14)
ANION GAP SERPL CALC-SCNC: 22 MMO/L — HIGH (ref 7–14)
APTT BLD: 28.8 SEC — SIGNIFICANT CHANGE UP (ref 27.5–36.3)
AST SERPL-CCNC: 29 U/L — SIGNIFICANT CHANGE UP (ref 4–32)
AST SERPL-CCNC: 37 U/L — HIGH (ref 4–32)
AST SERPL-CCNC: 43 U/L — HIGH (ref 4–32)
BASE EXCESS BLDA CALC-SCNC: -5.7 MMOL/L — SIGNIFICANT CHANGE UP
BASE EXCESS BLDA CALC-SCNC: -6.1 MMOL/L — SIGNIFICANT CHANGE UP
BASOPHILS # BLD AUTO: 0.07 K/UL — SIGNIFICANT CHANGE UP (ref 0–0.2)
BASOPHILS NFR BLD AUTO: 0.5 % — SIGNIFICANT CHANGE UP (ref 0–2)
BILIRUB SERPL-MCNC: < 0.2 MG/DL — LOW (ref 0.2–1.2)
BLD GP AB SCN SERPL QL: NEGATIVE — SIGNIFICANT CHANGE UP
BLOOD GAS ARTERIAL - FIO2: 50 — SIGNIFICANT CHANGE UP
BUN SERPL-MCNC: 110 MG/DL — HIGH (ref 7–23)
BUN SERPL-MCNC: 79 MG/DL — HIGH (ref 7–23)
BUN SERPL-MCNC: 82 MG/DL — HIGH (ref 7–23)
CA-I BLDA-SCNC: 1.37 MMOL/L — HIGH (ref 1.15–1.29)
CALCIUM SERPL-MCNC: 10.6 MG/DL — HIGH (ref 8.4–10.5)
CALCIUM SERPL-MCNC: 11.1 MG/DL — HIGH (ref 8.4–10.5)
CALCIUM SERPL-MCNC: 8.2 MG/DL — LOW (ref 8.4–10.5)
CHLORIDE BLDA-SCNC: 105 MMOL/L — SIGNIFICANT CHANGE UP (ref 96–108)
CHLORIDE SERPL-SCNC: 110 MMOL/L — HIGH (ref 98–107)
CHLORIDE SERPL-SCNC: 98 MMOL/L — SIGNIFICANT CHANGE UP (ref 98–107)
CHLORIDE SERPL-SCNC: 98 MMOL/L — SIGNIFICANT CHANGE UP (ref 98–107)
CK SERPL-CCNC: 58 U/L — SIGNIFICANT CHANGE UP (ref 25–170)
CO2 SERPL-SCNC: 13 MMOL/L — LOW (ref 22–31)
CO2 SERPL-SCNC: 17 MMOL/L — LOW (ref 22–31)
CO2 SERPL-SCNC: 18 MMOL/L — LOW (ref 22–31)
CREAT BLDA-MCNC: 4.73 MG/DL — HIGH (ref 0.5–1.3)
CREAT SERPL-MCNC: 3.41 MG/DL — HIGH (ref 0.5–1.3)
CREAT SERPL-MCNC: 3.9 MG/DL — HIGH (ref 0.5–1.3)
CREAT SERPL-MCNC: 4.81 MG/DL — HIGH (ref 0.5–1.3)
CRP SERPL-MCNC: 118 MG/L — HIGH
D DIMER BLD IA.RAPID-MCNC: 1072 NG/ML — SIGNIFICANT CHANGE UP
EOSINOPHIL # BLD AUTO: 0.16 K/UL — SIGNIFICANT CHANGE UP (ref 0–0.5)
EOSINOPHIL NFR BLD AUTO: 1.2 % — SIGNIFICANT CHANGE UP (ref 0–6)
FIBRINOGEN PPP-MCNC: 864 MG/DL — HIGH (ref 300–520)
GLUCOSE BLDA-MCNC: 92 MG/DL — SIGNIFICANT CHANGE UP (ref 70–99)
GLUCOSE BLDA-MCNC: 93 MG/DL — SIGNIFICANT CHANGE UP (ref 70–99)
GLUCOSE BLDC GLUCOMTR-MCNC: 104 MG/DL — HIGH (ref 70–99)
GLUCOSE BLDC GLUCOMTR-MCNC: 109 MG/DL — HIGH (ref 70–99)
GLUCOSE SERPL-MCNC: 123 MG/DL — HIGH (ref 70–99)
GLUCOSE SERPL-MCNC: 79 MG/DL — SIGNIFICANT CHANGE UP (ref 70–99)
GLUCOSE SERPL-MCNC: 93 MG/DL — SIGNIFICANT CHANGE UP (ref 70–99)
HCO3 BLDA-SCNC: 20 MMOL/L — LOW (ref 22–26)
HCO3 BLDA-SCNC: 20 MMOL/L — LOW (ref 22–26)
HCT VFR BLD CALC: 19.2 % — CRITICAL LOW (ref 34.5–45)
HCT VFR BLD CALC: 24.1 % — LOW (ref 34.5–45)
HCT VFR BLD CALC: 27.6 % — LOW (ref 34.5–45)
HCT VFR BLDA CALC: 24.1 % — LOW (ref 34.5–46.5)
HCT VFR BLDA CALC: 24.6 % — LOW (ref 34.5–46.5)
HGB BLD-MCNC: 6.4 G/DL — CRITICAL LOW (ref 11.5–15.5)
HGB BLD-MCNC: 7.7 G/DL — LOW (ref 11.5–15.5)
HGB BLD-MCNC: 9.3 G/DL — LOW (ref 11.5–15.5)
HGB BLDA-MCNC: 7.7 G/DL — LOW (ref 11.5–15.5)
HGB BLDA-MCNC: 7.9 G/DL — LOW (ref 11.5–15.5)
IMM GRANULOCYTES NFR BLD AUTO: 6.6 % — HIGH (ref 0–1.5)
INR BLD: 0.93 — SIGNIFICANT CHANGE UP (ref 0.88–1.17)
LACTATE BLDA-SCNC: 0.7 MMOL/L — SIGNIFICANT CHANGE UP (ref 0.5–2)
LACTATE BLDA-SCNC: 0.8 MMOL/L — SIGNIFICANT CHANGE UP (ref 0.5–2)
LYMPHOCYTES # BLD AUTO: 1.02 K/UL — SIGNIFICANT CHANGE UP (ref 1–3.3)
LYMPHOCYTES # BLD AUTO: 7.7 % — LOW (ref 13–44)
MAGNESIUM SERPL-MCNC: 3.5 MG/DL — HIGH (ref 1.6–2.6)
MAGNESIUM SERPL-MCNC: 3.6 MG/DL — HIGH (ref 1.6–2.6)
MCHC RBC-ENTMCNC: 28.6 PG — SIGNIFICANT CHANGE UP (ref 27–34)
MCHC RBC-ENTMCNC: 29.8 PG — SIGNIFICANT CHANGE UP (ref 27–34)
MCHC RBC-ENTMCNC: 29.8 PG — SIGNIFICANT CHANGE UP (ref 27–34)
MCHC RBC-ENTMCNC: 32 % — SIGNIFICANT CHANGE UP (ref 32–36)
MCHC RBC-ENTMCNC: 33.7 % — SIGNIFICANT CHANGE UP (ref 32–36)
MCHC RBC-ENTMCNC: 33.7 % — SIGNIFICANT CHANGE UP (ref 32–36)
MCV RBC AUTO: 88.4 FL — SIGNIFICANT CHANGE UP (ref 80–100)
MCV RBC AUTO: 88.5 FL — SIGNIFICANT CHANGE UP (ref 80–100)
MCV RBC AUTO: 89.6 FL — SIGNIFICANT CHANGE UP (ref 80–100)
MONOCYTES # BLD AUTO: 2.21 K/UL — HIGH (ref 0–0.9)
MONOCYTES NFR BLD AUTO: 16.7 % — HIGH (ref 2–14)
NEUTROPHILS # BLD AUTO: 8.92 K/UL — HIGH (ref 1.8–7.4)
NEUTROPHILS NFR BLD AUTO: 67.3 % — SIGNIFICANT CHANGE UP (ref 43–77)
NRBC # FLD: 0.02 K/UL — SIGNIFICANT CHANGE UP (ref 0–0)
NRBC # FLD: 0.04 K/UL — SIGNIFICANT CHANGE UP (ref 0–0)
NRBC # FLD: 0.04 K/UL — SIGNIFICANT CHANGE UP (ref 0–0)
NT-PROBNP SERPL-SCNC: SIGNIFICANT CHANGE UP PG/ML
PCO2 BLDA: 35 MMHG — SIGNIFICANT CHANGE UP (ref 32–48)
PCO2 BLDA: 39 MMHG — SIGNIFICANT CHANGE UP (ref 32–48)
PH BLDA: 7.32 PH — LOW (ref 7.35–7.45)
PH BLDA: 7.35 PH — SIGNIFICANT CHANGE UP (ref 7.35–7.45)
PHOSPHATE SERPL-MCNC: 7.6 MG/DL — HIGH (ref 2.5–4.5)
PHOSPHATE SERPL-MCNC: 8.4 MG/DL — HIGH (ref 2.5–4.5)
PLATELET # BLD AUTO: 179 K/UL — SIGNIFICANT CHANGE UP (ref 150–400)
PLATELET # BLD AUTO: 218 K/UL — SIGNIFICANT CHANGE UP (ref 150–400)
PLATELET # BLD AUTO: 231 K/UL — SIGNIFICANT CHANGE UP (ref 150–400)
PMV BLD: 11 FL — SIGNIFICANT CHANGE UP (ref 7–13)
PMV BLD: 11.3 FL — SIGNIFICANT CHANGE UP (ref 7–13)
PMV BLD: 11.9 FL — SIGNIFICANT CHANGE UP (ref 7–13)
PO2 BLDA: 161 MMHG — HIGH (ref 83–108)
PO2 BLDA: 164 MMHG — HIGH (ref 83–108)
POTASSIUM BLDA-SCNC: 5.6 MMOL/L — HIGH (ref 3.4–4.5)
POTASSIUM BLDA-SCNC: 5.9 MMOL/L — HIGH (ref 3.4–4.5)
POTASSIUM SERPL-MCNC: 4.4 MMOL/L — SIGNIFICANT CHANGE UP (ref 3.5–5.3)
POTASSIUM SERPL-MCNC: 6 MMOL/L — HIGH (ref 3.5–5.3)
POTASSIUM SERPL-MCNC: 6.3 MMOL/L — CRITICAL HIGH (ref 3.5–5.3)
POTASSIUM SERPL-SCNC: 4.4 MMOL/L — SIGNIFICANT CHANGE UP (ref 3.5–5.3)
POTASSIUM SERPL-SCNC: 6 MMOL/L — HIGH (ref 3.5–5.3)
POTASSIUM SERPL-SCNC: 6.3 MMOL/L — CRITICAL HIGH (ref 3.5–5.3)
PROT SERPL-MCNC: 5.2 G/DL — LOW (ref 6–8.3)
PROT SERPL-MCNC: 6.3 G/DL — SIGNIFICANT CHANGE UP (ref 6–8.3)
PROT SERPL-MCNC: 7.1 G/DL — SIGNIFICANT CHANGE UP (ref 6–8.3)
PROTHROM AB SERPL-ACNC: 10.6 SEC — SIGNIFICANT CHANGE UP (ref 9.8–13.1)
RBC # BLD: 2.15 M/UL — LOW (ref 3.8–5.2)
RBC # BLD: 2.69 M/UL — LOW (ref 3.8–5.2)
RBC # BLD: 3.12 M/UL — LOW (ref 3.8–5.2)
RBC # FLD: 15.9 % — HIGH (ref 10.3–14.5)
RBC # FLD: 16.3 % — HIGH (ref 10.3–14.5)
RBC # FLD: 16.6 % — HIGH (ref 10.3–14.5)
RH IG SCN BLD-IMP: POSITIVE — SIGNIFICANT CHANGE UP
SAO2 % BLDA: 99.4 % — HIGH (ref 95–99)
SAO2 % BLDA: 99.4 % — HIGH (ref 95–99)
SODIUM BLDA-SCNC: 136 MMOL/L — SIGNIFICANT CHANGE UP (ref 136–146)
SODIUM BLDA-SCNC: 138 MMOL/L — SIGNIFICANT CHANGE UP (ref 136–146)
SODIUM SERPL-SCNC: 133 MMOL/L — LOW (ref 135–145)
SODIUM SERPL-SCNC: 137 MMOL/L — SIGNIFICANT CHANGE UP (ref 135–145)
SODIUM SERPL-SCNC: 141 MMOL/L — SIGNIFICANT CHANGE UP (ref 135–145)
TROPONIN T, HIGH SENSITIVITY: 93 NG/L — CRITICAL HIGH (ref ?–14)
WBC # BLD: 10.72 K/UL — HIGH (ref 3.8–10.5)
WBC # BLD: 12.5 K/UL — HIGH (ref 3.8–10.5)
WBC # BLD: 13.26 K/UL — HIGH (ref 3.8–10.5)
WBC # FLD AUTO: 10.72 K/UL — HIGH (ref 3.8–10.5)
WBC # FLD AUTO: 12.5 K/UL — HIGH (ref 3.8–10.5)
WBC # FLD AUTO: 13.26 K/UL — HIGH (ref 3.8–10.5)

## 2020-04-11 PROCEDURE — 99232 SBSQ HOSP IP/OBS MODERATE 35: CPT | Mod: GC

## 2020-04-11 PROCEDURE — 99291 CRITICAL CARE FIRST HOUR: CPT

## 2020-04-11 RX ORDER — SODIUM ZIRCONIUM CYCLOSILICATE 10 G/10G
10 POWDER, FOR SUSPENSION ORAL THREE TIMES A DAY
Refills: 0 | Status: DISCONTINUED | OUTPATIENT
Start: 2020-04-11 | End: 2020-04-16

## 2020-04-11 RX ORDER — SODIUM ZIRCONIUM CYCLOSILICATE 10 G/10G
10 POWDER, FOR SUSPENSION ORAL
Refills: 0 | Status: DISCONTINUED | OUTPATIENT
Start: 2020-04-11 | End: 2020-04-11

## 2020-04-11 RX ADMIN — CHLORHEXIDINE GLUCONATE 15 MILLILITER(S): 213 SOLUTION TOPICAL at 05:39

## 2020-04-11 RX ADMIN — Medication 1 DROP(S): at 10:23

## 2020-04-11 RX ADMIN — DEXMEDETOMIDINE HYDROCHLORIDE IN 0.9% SODIUM CHLORIDE 3.61 MICROGRAM(S)/KG/HR: 4 INJECTION INTRAVENOUS at 21:20

## 2020-04-11 RX ADMIN — Medication 1 DROP(S): at 05:39

## 2020-04-11 RX ADMIN — Medication 216 GRAM(S): at 20:32

## 2020-04-11 RX ADMIN — Medication 1 DROP(S): at 22:36

## 2020-04-11 RX ADMIN — Medication 216 GRAM(S): at 12:00

## 2020-04-11 RX ADMIN — Medication 1 DROP(S): at 16:48

## 2020-04-11 RX ADMIN — Medication 216 GRAM(S): at 05:31

## 2020-04-11 RX ADMIN — CHLORHEXIDINE GLUCONATE 15 MILLILITER(S): 213 SOLUTION TOPICAL at 16:48

## 2020-04-11 RX ADMIN — SODIUM ZIRCONIUM CYCLOSILICATE 10 GRAM(S): 10 POWDER, FOR SUSPENSION ORAL at 11:46

## 2020-04-11 RX ADMIN — SODIUM ZIRCONIUM CYCLOSILICATE 10 GRAM(S): 10 POWDER, FOR SUSPENSION ORAL at 22:36

## 2020-04-11 RX ADMIN — CHLORHEXIDINE GLUCONATE 1 APPLICATION(S): 213 SOLUTION TOPICAL at 05:39

## 2020-04-11 RX ADMIN — FAMOTIDINE 20 MILLIGRAM(S): 10 INJECTION INTRAVENOUS at 10:23

## 2020-04-11 NOTE — PROGRESS NOTE ADULT - SUBJECTIVE AND OBJECTIVE BOX
[facility] DIVISION OF KIDNEY DISEASES AND HYPERTENSION -- FOLLOW UP NOTE  Ami Otero  Nephrology Fellow  Pager [facility]: [phone]  Pager [facility]: [phone]  (after 5pm or weekend please page the on-call fellow)  --------------------------------------------------------------------------------  HPI: 58 yo F admitted to ICU for respiratory failure, pneumonia, SAM and COVID-19. Scr increased to 4.6 on 3/23/20. Pt. initiated on CRRT/CVVHDF on 3/23/20, discontinued on 4/9/20.     Pt. was seen and examined in ICU. Pt. is sedated, intubated, on IV vasopressor support. Pt. remains oliguric.     PAST HISTORY  --------------------------------------------------------------------------------  No significant changes to PMH, PSH, FHx, SHx, unless otherwise noted    ALLERGIES & MEDICATIONS  --------------------------------------------------------------------------------  Allergies    No Known Allergies    Intolerances      Standing Inpatient Medications  ampicillin  IVPB      ampicillin  IVPB 2 Gram(s) IV Intermittent every 8 hours  artificial tears (preservative free) Ophthalmic Solution 1 Drop(s) Both EYES every 6 hours  chlorhexidine 0.12% Liquid 15 milliLiter(s) Oral Mucosa every 12 hours  chlorhexidine 4% Liquid 1 Application(s) Topical <User Schedule>  dexMEDEtomidine Infusion 0.2 MICROgram(s)/kG/Hr IV Continuous <Continuous>  famotidine Injectable 20 milliGRAM(s) IV Push daily  heparin  Infusion 1050 Unit(s)/Hr IV Continuous <Continuous>  HYDROmorphone  Injectable 1 milliGRAM(s) IV Push once  norepinephrine Infusion 0.05 MICROgram(s)/kG/Min IV Continuous <Continuous>  sodium zirconium cyclosilicate 10 Gram(s) Oral two times a day  vasopressin Infusion 0.001 Unit(s)/Min IV Continuous <Continuous>    PRN Inpatient Medications  acetaminophen    Suspension .. 650 milliGRAM(s) Oral every 6 hours PRN      REVIEW OF SYSTEMS  unable to obtain d/t intubated/sedated    VITALS/PHYSICAL EXAM  --------------------------------------------------------------------------------  T(C): 37.6 (04-11-20 @ 04:00), Max: 37.8 (04-10-20 @ 12:00)  HR: 121 (04-11-20 @ 08:40) (91 - 133)  BP: --  RR: 22 (04-11-20 @ 08:00) (15 - 29)  SpO2: 100% (04-11-20 @ 08:40) (100% - 100%)  Wt(kg): --        04-10-20 @ 07:01  -  04-11-20 @ 07:00  --------------------------------------------------------  IN: 1361.4 mL / OUT: 100 mL / NET: 1261.4 mL      Physical Exam:  	Gen: Sedated, intubated  	HEENT: +ETT  	Pulm: +mechanical breath sounds  	CV: S1S2+  	Abd: Soft, non-distended   	Ext: No LE edema B/L  	Neuro: Sedated  	Skin: Warm and dry              Access: Right IJ non tunneled catheter without bleeding    LABS/STUDIES  --------------------------------------------------------------------------------              7.7    13.26 >-----------<  218      [04-11-20 @ 04:00]              24.1     133  |  98  |  82  ----------------------------<  93      [04-11-20 @ 04:00]  6.0   |  18  |  3.90        Ca     10.6     [04-11-20 @ 04:00]      Mg     3.5     [04-11-20 @ 04:00]      Phos  7.6     [04-11-20 @ 04:00]    TPro  6.3  /  Alb  2.8  /  TBili  < 0.2  /  DBili  x   /  AST  37  /  ALT  38  /  AlkPhos  135  [04-11-20 @ 04:00]    PT/INR: PT 10.6 , INR 0.93       [04-11-20 @ 04:00]  PTT: 28.8       [04-11-20 @ 04:00]    CK 58      [04-11-20 @ 04:00]    Creatinine Trend:  SCr 3.90 [04-11 @ 04:00]  SCr 2.50 [04-10 @ 04:10]  SCr 1.26 [04-09 @ 03:20]  SCr 1.07 [04-08 @ 03:30]  SCr 1.01 [04-07 @ 03:00]    Urinalysis - [03-29-20 @ 10:00]      Color BROWN / Appearance TURBID / SG 1.020 / pH 6.5      Gluc NEGATIVE / Ketone NEGATIVE  / Bili SMALL / Urobili NORMAL       Blood MODERATE / Protein 100 / Leuk Est LARGE / Nitrite NEGATIVE      RBC 10-20 / WBC >50 / Hyaline  / Gran  / Sq Epi FEW / Non Sq Epi  / Bacteria MODERATE      Ferritin 997.7      [04-10-20 @ 04:10]  Lipid: chol --, , HDL --, LDL --      [04-04-20 @ 02:59]    HCV 0.12, Nonreactive Hepatitis C AB  S/CO Ratio                        Interpretation  < 1.00                                   Non-Reactive  1.00 - 4.99                         Weakly-Reactive  >= 5.00                                Reactive  Non-Reactive: Aperson with a non-reactive HCV antibody  result is considered uninfected.  No further action is  needed unless recent infection is suspected.  In these  cases, consider repeat testing later to detect  seroconversion..  Weakly-Reactive: HCV antibody test is abnormal, HCV RNA  Qualitative test will follow.  Reactive: HCV antibody test is abnormal, HCV RNA  Qualitative test will follow.  Note: HCV antibody testing is performed on the [employer]   system.      [03-16-20 @ 05:30]

## 2020-04-11 NOTE — PROGRESS NOTE ADULT - PROBLEM SELECTOR PLAN 2
Pt. with hyperkalemia in setting SAM ATN.  Last CRRT/CVVHDF on 4/9/20.  Today serum K 6.0.  Recommend start lokelma 10g BID for now (can titrate to TID if needed).  Please recheck BMP later today and call nephrology if no improvement. Monitor BMP Pt. with hyperkalemia in setting SAM ATN.  Last CRRT/CVVHDF on 4/9/20.  Today serum K 6.0.  Recommend start lokelma 10g BID for now (can titrate to TID if needed).  Please recheck BMP later today and call nephrology if no improvement.  If no improvement will likely need CRRT/HD. Monitor BMP

## 2020-04-11 NOTE — PROGRESS NOTE ADULT - SUBJECTIVE AND OBJECTIVE BOX
ALEXA GARCIA                     MRN-[id number]    HPI:  60 y/o F with no PMH p/w fever and cough since Thursday. Tmax 103. She presented to urgent care initially and was treated with tamiflu for presumed flu. Despite this she continued to have fevers. Pt denies recent travel, sick contacts or any other sx or acute complaints. Endorses pinching pain in chest when she coughs. Also with nausea and poor PO intake. Denies shortness of breath, abdominal pain, dysuria or LE edema. (16 Mar 2020 02:05)      Issues:              Acute hypoxic respiratory failure              ARDS              Septic shock              COVID-19+              SAM - On CRRT              Anemia    Drips:  Propofol / Levophed / Precedex/ vaso      PAST MEDICAL & SURGICAL HISTORY:  No pertinent past medical history  No significant past surgical history            VITAL SIGNS:  Vital Signs Last 24 Hrs  T(C): 37.6 (11 Apr 2020 04:00), Max: 37.6 (11 Apr 2020 04:00)  T(F): 99.6 (11 Apr 2020 04:00), Max: 99.6 (11 Apr 2020 04:00)  HR: 121 (11 Apr 2020 08:40) (91 - 133)  BP: --  BP(mean): --  RR: 22 (11 Apr 2020 08:00) (15 - 29)  SpO2: 100% (11 Apr 2020 08:40) (100% - 100%)    I/Os:   I&O's Detail    10 Apr 2020 07:01  -  11 Apr 2020 07:00  --------------------------------------------------------  IN:    dexmedetomidine Infusion: 45 mL    Enteral Tube Flush: 180 mL    heparin Infusion: 66.5 mL    norepinephrine Infusion: 0.7 mL    ns in tub fed  [id number]: 450 mL    Packed Red Blood Cells: 300 mL    Solution: 300 mL    vasopressin Infusion: 19.2 mL  Total IN: 1361.4 mL    OUT:    Rectal Tube: 100 mL  Total OUT: 100 mL    Total NET: 1261.4 mL      11 Apr 2020 07:01  -  11 Apr 2020 12:28  --------------------------------------------------------  IN:    Solution: 100 mL  Total IN: 100 mL    OUT:  Total OUT: 0 mL    Total NET: 100 mL          CAPILLARY BLOOD GLUCOSE          =======================MEDICATIONS===================  MEDICATIONS  (STANDING):  ampicillin  IVPB      ampicillin  IVPB 2 Gram(s) IV Intermittent every 8 hours  artificial tears (preservative free) Ophthalmic Solution 1 Drop(s) Both EYES every 6 hours  chlorhexidine 0.12% Liquid 15 milliLiter(s) Oral Mucosa every 12 hours  chlorhexidine 4% Liquid 1 Application(s) Topical <User Schedule>  dexMEDEtomidine Infusion 0.2 MICROgram(s)/kG/Hr (3.61 mL/Hr) IV Continuous <Continuous>  famotidine Injectable 20 milliGRAM(s) IV Push daily  heparin  Infusion 1050 Unit(s)/Hr (9.5 mL/Hr) IV Continuous <Continuous>  HYDROmorphone  Injectable 1 milliGRAM(s) IV Push once  norepinephrine Infusion 0.05 MICROgram(s)/kG/Min (3.38 mL/Hr) IV Continuous <Continuous>  sodium zirconium cyclosilicate 10 Gram(s) Oral two times a day  vasopressin Infusion 0.001 Unit(s)/Min (0.03 mL/Hr) IV Continuous <Continuous>    MEDICATIONS  (PRN):  acetaminophen    Suspension .. 650 milliGRAM(s) Oral every 6 hours PRN Temp greater or equal to 38C (100.4F)      =======================VENTILATOR SETTINGS===================  Mode: CPAP with PS  FiO2: 40  PEEP: 5  MAP: 8  PIP: 15      PHYSICAL EXAM============================    General:                Sedated    , vented                                           Neuro:                  Off sedation, Not following  commands and moved all ext but weak                          Cardiovascular:   S1 & S2, regular                           Respiratory:         Coarse breath sounds                          GI:                          Soft, nondistended, Bowel sounds +                            Ext:                        + Edema   ============================LABS=========================                        7.7    13.26 )-----------( 218      ( 11 Apr 2020 04:00 )             24.1     04-11    133<L>  |  98  |  82<H>  ----------------------------<  93  6.0<H>   |  18<L>  |  3.90<H>    Ca    10.6<H>      11 Apr 2020 04:00  Phos  7.6     04-11  Mg     3.5     04-11    TPro  6.3  /  Alb  2.8<L>  /  TBili  < 0.2<L>  /  DBili  x   /  AST  37<H>  /  ALT  38<H>  /  AlkPhos  135<H>  04-11    LIVER FUNCTIONS - ( 11 Apr 2020 04:00 )  Alb: 2.8 g/dL / Pro: 6.3 g/dL / ALK PHOS: 135 u/L / ALT: 38 u/L / AST: 37 u/L / GGT: x           PT/INR - ( 11 Apr 2020 04:00 )   PT: 10.6 SEC;   INR: 0.93          PTT - ( 11 Apr 2020 04:00 )  PTT:28.8 SEC  ABG - ( 11 Apr 2020 04:01 )  pH, Arterial: 7.32  pH, Blood: x     /  pCO2: 39    /  pO2: 161   / HCO3: 20    / Base Excess: -5.7  /  SaO2: 99.4            INTERPRETATION:  EXAMINATION: XR CHEST URGENT    CLINICAL INDICATION: ngt    TECHNIQUE: Frontal radiograph of the chest was obtained.    COMPARISON: 4.4.20.    FINDINGS:    Cardiac silhouette normal in size. Left patchy lung opacity.  No pleural effusion or pneumothorax.    Endotracheal tube tip mid trachea.  Enteric tube tip below diaphragm.  Right IJ line with tip in the SVC.  Left sided IJ line with tip coursing superiorly in the right brachiocephalic vein.    IMPRESSION:   Left sided IJ line with tip coursing superiorly in the right brachiocephalic vein.      A/P:  60 y/o F with no PMH p/w fever and cough since Thursday. Tmax 103. She presented to urgent care initially and was treated with tamiflu for presumed flu. Despite this she continued to have fevers. Pt denies recent travel, sick contacts or any other sx or acute complaints. Endorses pinching pain in chest when she coughs. Also with nausea and poor PO intake. Denies shortness of breath, abdominal pain, dysuria or LE edema. (16 Mar 2020 02:05)                              Neuro:                                         Currently Off sedation ,No purposeful response  , Coma state                             Cardiovascular:                                          Continue hemodynamic monitoring.                                         Titrate Levophed  to MAP>65                                         Wean off Levo                                        Continue  Heparin gtt - Target PTT 50-80                            Respiratory:                                         Acute hypoxemic respiratory failure due to Pneumonia / COVID-19. Extubated and got reintubated within 24 hrs                                         On full mechanical vent support, CPAP weaning                                                 Wean FiO2 as tolerated                                                CPAP wean as tolerated.                                                 Continue bronchodilators, pulmonary toilet as tolerated                            GI                                         NGT feeds as tolerated                                         Continue Pepcid                                         Bowel regimen	                                                                 Renal:                                         Strict I/Os                                         SAM - CRRT off, Renal F/U                                        Hyperkalemia                                          Monitor BUN / Cr                                                 Hem/ Onc:                                                                                  DVT prophylaxis -  on Heparin gtt                                         Follow CBC  & signs of bleeding                           Infectious disease:                                            Pneumonia                                           COVID-19 +                                          Follow cultures                            Endocrine                                            DM-2: Continue Accu-Checks with coverage      Pertinent clinical, laboratory, radiographic, hemodynamic, echocardiographic, respiratory data, microbiologic data and chart were reviewed and analyzed frequently throughout the course of the day and night  Patient seen, examined and plan discussed with  CTICU team during rounds.    Pt's status &  goals of care  discussed with family     I have spent  75  minutes of critical care time with this pt between  7am  and 11.59 pm          Leslye Juárez DO, FACEP

## 2020-04-12 LAB
ALBUMIN SERPL ELPH-MCNC: 2.6 G/DL — LOW (ref 3.3–5)
ALBUMIN SERPL ELPH-MCNC: 2.8 G/DL — LOW (ref 3.3–5)
ALP SERPL-CCNC: 102 U/L — SIGNIFICANT CHANGE UP (ref 40–120)
ALP SERPL-CCNC: 121 U/L — HIGH (ref 40–120)
ALT FLD-CCNC: 44 U/L — HIGH (ref 4–33)
ALT FLD-CCNC: 49 U/L — HIGH (ref 4–33)
ANION GAP SERPL CALC-SCNC: 22 MMO/L — HIGH (ref 7–14)
ANION GAP SERPL CALC-SCNC: 26 MMO/L — HIGH (ref 7–14)
ANION GAP SERPL CALC-SCNC: 26 MMO/L — HIGH (ref 7–14)
ANISOCYTOSIS BLD QL: SLIGHT — SIGNIFICANT CHANGE UP
APTT BLD: 28.4 SEC — SIGNIFICANT CHANGE UP (ref 27.5–36.3)
AST SERPL-CCNC: 29 U/L — SIGNIFICANT CHANGE UP (ref 4–32)
AST SERPL-CCNC: 40 U/L — HIGH (ref 4–32)
BASE EXCESS BLDA CALC-SCNC: -7.3 MMOL/L — SIGNIFICANT CHANGE UP
BASOPHILS # BLD AUTO: 0.06 K/UL — SIGNIFICANT CHANGE UP (ref 0–0.2)
BASOPHILS NFR BLD AUTO: 0.6 % — SIGNIFICANT CHANGE UP (ref 0–2)
BASOPHILS NFR SPEC: 0 % — SIGNIFICANT CHANGE UP (ref 0–2)
BILIRUB SERPL-MCNC: < 0.2 MG/DL — LOW (ref 0.2–1.2)
BILIRUB SERPL-MCNC: < 0.2 MG/DL — LOW (ref 0.2–1.2)
BLASTS # FLD: 0 % — SIGNIFICANT CHANGE UP (ref 0–0)
BLOOD GAS ARTERIAL - FIO2: 40 — SIGNIFICANT CHANGE UP
BUN SERPL-MCNC: 109 MG/DL — HIGH (ref 7–23)
BUN SERPL-MCNC: 124 MG/DL — HIGH (ref 7–23)
BUN SERPL-MCNC: 126 MG/DL — HIGH (ref 7–23)
CA-I BLDA-SCNC: 1.32 MMOL/L — HIGH (ref 1.15–1.29)
CALCIUM SERPL-MCNC: 10.6 MG/DL — HIGH (ref 8.4–10.5)
CHLORIDE SERPL-SCNC: 100 MMOL/L — SIGNIFICANT CHANGE UP (ref 98–107)
CHLORIDE SERPL-SCNC: 101 MMOL/L — SIGNIFICANT CHANGE UP (ref 98–107)
CHLORIDE SERPL-SCNC: 96 MMOL/L — LOW (ref 98–107)
CO2 SERPL-SCNC: 15 MMOL/L — LOW (ref 22–31)
CO2 SERPL-SCNC: 16 MMOL/L — LOW (ref 22–31)
CO2 SERPL-SCNC: 18 MMOL/L — LOW (ref 22–31)
CREAT SERPL-MCNC: 5.14 MG/DL — HIGH (ref 0.5–1.3)
CREAT SERPL-MCNC: 5.61 MG/DL — HIGH (ref 0.5–1.3)
CREAT SERPL-MCNC: 5.72 MG/DL — HIGH (ref 0.5–1.3)
CRP SERPL-MCNC: 97.8 MG/L — HIGH
D DIMER BLD IA.RAPID-MCNC: 2120 NG/ML — SIGNIFICANT CHANGE UP
EOSINOPHIL # BLD AUTO: 0.22 K/UL — SIGNIFICANT CHANGE UP (ref 0–0.5)
EOSINOPHIL NFR BLD AUTO: 2.2 % — SIGNIFICANT CHANGE UP (ref 0–6)
EOSINOPHIL NFR FLD: 1.7 % — SIGNIFICANT CHANGE UP (ref 0–6)
FIBRINOGEN PPP-MCNC: 870 MG/DL — HIGH (ref 300–520)
GIANT PLATELETS BLD QL SMEAR: PRESENT — SIGNIFICANT CHANGE UP
GLUCOSE BLDA-MCNC: 102 MG/DL — HIGH (ref 70–99)
GLUCOSE BLDC GLUCOMTR-MCNC: 107 MG/DL — HIGH (ref 70–99)
GLUCOSE BLDC GLUCOMTR-MCNC: 87 MG/DL — SIGNIFICANT CHANGE UP (ref 70–99)
GLUCOSE SERPL-MCNC: 101 MG/DL — HIGH (ref 70–99)
GLUCOSE SERPL-MCNC: 105 MG/DL — HIGH (ref 70–99)
GLUCOSE SERPL-MCNC: 99 MG/DL — SIGNIFICANT CHANGE UP (ref 70–99)
HCO3 BLDA-SCNC: 18 MMOL/L — LOW (ref 22–26)
HCT VFR BLD CALC: 25.4 % — LOW (ref 34.5–45)
HCT VFR BLDA CALC: 31 % — LOW (ref 34.5–46.5)
HGB BLD-MCNC: 8.5 G/DL — LOW (ref 11.5–15.5)
HGB BLDA-MCNC: 10 G/DL — LOW (ref 11.5–15.5)
IMM GRANULOCYTES NFR BLD AUTO: 6 % — HIGH (ref 0–1.5)
INR BLD: 0.94 — SIGNIFICANT CHANGE UP (ref 0.88–1.17)
LACTATE BLDA-SCNC: 1.4 MMOL/L — SIGNIFICANT CHANGE UP (ref 0.5–2)
LYMPHOCYTES # BLD AUTO: 0.88 K/UL — LOW (ref 1–3.3)
LYMPHOCYTES # BLD AUTO: 8.8 % — LOW (ref 13–44)
LYMPHOCYTES NFR SPEC AUTO: 3.5 % — LOW (ref 13–44)
MAGNESIUM SERPL-MCNC: 3.7 MG/DL — HIGH (ref 1.6–2.6)
MAGNESIUM SERPL-MCNC: 3.7 MG/DL — HIGH (ref 1.6–2.6)
MAGNESIUM SERPL-MCNC: 3.8 MG/DL — HIGH (ref 1.6–2.6)
MCHC RBC-ENTMCNC: 29.9 PG — SIGNIFICANT CHANGE UP (ref 27–34)
MCHC RBC-ENTMCNC: 33.5 % — SIGNIFICANT CHANGE UP (ref 32–36)
MCV RBC AUTO: 89.4 FL — SIGNIFICANT CHANGE UP (ref 80–100)
METAMYELOCYTES # FLD: 0 % — SIGNIFICANT CHANGE UP (ref 0–1)
MONOCYTES # BLD AUTO: 1.75 K/UL — HIGH (ref 0–0.9)
MONOCYTES NFR BLD AUTO: 17.6 % — HIGH (ref 2–14)
MONOCYTES NFR BLD: 14.8 % — HIGH (ref 2–9)
MYELOCYTES NFR BLD: 2.6 % — HIGH (ref 0–0)
NEUTROPHIL AB SER-ACNC: 73.9 % — SIGNIFICANT CHANGE UP (ref 43–77)
NEUTROPHILS # BLD AUTO: 6.46 K/UL — SIGNIFICANT CHANGE UP (ref 1.8–7.4)
NEUTROPHILS NFR BLD AUTO: 64.8 % — SIGNIFICANT CHANGE UP (ref 43–77)
NEUTS BAND # BLD: 0.9 % — SIGNIFICANT CHANGE UP (ref 0–6)
NRBC # FLD: 0.02 K/UL — SIGNIFICANT CHANGE UP (ref 0–0)
NT-PROBNP SERPL-SCNC: SIGNIFICANT CHANGE UP PG/ML
OTHER - HEMATOLOGY %: 0 — SIGNIFICANT CHANGE UP
PCO2 BLDA: 38 MMHG — SIGNIFICANT CHANGE UP (ref 32–48)
PH BLDA: 7.3 PH — LOW (ref 7.35–7.45)
PHOSPHATE SERPL-MCNC: 10.2 MG/DL — HIGH (ref 2.5–4.5)
PHOSPHATE SERPL-MCNC: 9.1 MG/DL — HIGH (ref 2.5–4.5)
PHOSPHATE SERPL-MCNC: 9.7 MG/DL — HIGH (ref 2.5–4.5)
PLATELET # BLD AUTO: 199 K/UL — SIGNIFICANT CHANGE UP (ref 150–400)
PLATELET COUNT - ESTIMATE: NORMAL — SIGNIFICANT CHANGE UP
PMV BLD: 12 FL — SIGNIFICANT CHANGE UP (ref 7–13)
PO2 BLDA: 134 MMHG — HIGH (ref 83–108)
POLYCHROMASIA BLD QL SMEAR: SLIGHT — SIGNIFICANT CHANGE UP
POTASSIUM BLDA-SCNC: 5.8 MMOL/L — HIGH (ref 3.4–4.5)
POTASSIUM SERPL-MCNC: 4.8 MMOL/L — SIGNIFICANT CHANGE UP (ref 3.5–5.3)
POTASSIUM SERPL-MCNC: 5.3 MMOL/L — SIGNIFICANT CHANGE UP (ref 3.5–5.3)
POTASSIUM SERPL-MCNC: 5.9 MMOL/L — HIGH (ref 3.5–5.3)
POTASSIUM SERPL-SCNC: 4.8 MMOL/L — SIGNIFICANT CHANGE UP (ref 3.5–5.3)
POTASSIUM SERPL-SCNC: 5.3 MMOL/L — SIGNIFICANT CHANGE UP (ref 3.5–5.3)
POTASSIUM SERPL-SCNC: 5.9 MMOL/L — HIGH (ref 3.5–5.3)
PROMYELOCYTES # FLD: 0 % — SIGNIFICANT CHANGE UP (ref 0–0)
PROT SERPL-MCNC: 6.2 G/DL — SIGNIFICANT CHANGE UP (ref 6–8.3)
PROT SERPL-MCNC: 6.4 G/DL — SIGNIFICANT CHANGE UP (ref 6–8.3)
PROTHROM AB SERPL-ACNC: 10.7 SEC — SIGNIFICANT CHANGE UP (ref 9.8–13.1)
RBC # BLD: 2.84 M/UL — LOW (ref 3.8–5.2)
RBC # FLD: 15.9 % — HIGH (ref 10.3–14.5)
SAO2 % BLDA: 98.5 % — SIGNIFICANT CHANGE UP (ref 95–99)
SODIUM BLDA-SCNC: 138 MMOL/L — SIGNIFICANT CHANGE UP (ref 136–146)
SODIUM SERPL-SCNC: 136 MMOL/L — SIGNIFICANT CHANGE UP (ref 135–145)
SODIUM SERPL-SCNC: 142 MMOL/L — SIGNIFICANT CHANGE UP (ref 135–145)
SODIUM SERPL-SCNC: 142 MMOL/L — SIGNIFICANT CHANGE UP (ref 135–145)
TROPONIN T, HIGH SENSITIVITY: 89 NG/L — CRITICAL HIGH (ref ?–14)
VARIANT LYMPHS # BLD: 2.6 % — SIGNIFICANT CHANGE UP
WBC # BLD: 9.97 K/UL — SIGNIFICANT CHANGE UP (ref 3.8–10.5)
WBC # FLD AUTO: 9.97 K/UL — SIGNIFICANT CHANGE UP (ref 3.8–10.5)

## 2020-04-12 PROCEDURE — 99292 CRITICAL CARE ADDL 30 MIN: CPT

## 2020-04-12 PROCEDURE — 71045 X-RAY EXAM CHEST 1 VIEW: CPT | Mod: 26

## 2020-04-12 PROCEDURE — 99232 SBSQ HOSP IP/OBS MODERATE 35: CPT | Mod: GC

## 2020-04-12 PROCEDURE — 99291 CRITICAL CARE FIRST HOUR: CPT

## 2020-04-12 RX ORDER — CALCIUM ACETATE 667 MG
1334 TABLET ORAL
Refills: 0 | Status: DISCONTINUED | OUTPATIENT
Start: 2020-04-12 | End: 2020-04-19

## 2020-04-12 RX ORDER — SODIUM BICARBONATE 1 MEQ/ML
650 SYRINGE (ML) INTRAVENOUS THREE TIMES A DAY
Refills: 0 | Status: COMPLETED | OUTPATIENT
Start: 2020-04-12 | End: 2020-04-15

## 2020-04-12 RX ADMIN — Medication 216 GRAM(S): at 12:07

## 2020-04-12 RX ADMIN — Medication 1 DROP(S): at 05:04

## 2020-04-12 RX ADMIN — Medication 650 MILLIGRAM(S): at 20:30

## 2020-04-12 RX ADMIN — Medication 1334 MILLIGRAM(S): at 23:48

## 2020-04-12 RX ADMIN — CHLORHEXIDINE GLUCONATE 1 APPLICATION(S): 213 SOLUTION TOPICAL at 05:04

## 2020-04-12 RX ADMIN — SODIUM ZIRCONIUM CYCLOSILICATE 10 GRAM(S): 10 POWDER, FOR SUSPENSION ORAL at 10:36

## 2020-04-12 RX ADMIN — Medication 1334 MILLIGRAM(S): at 22:36

## 2020-04-12 RX ADMIN — FAMOTIDINE 20 MILLIGRAM(S): 10 INJECTION INTRAVENOUS at 10:35

## 2020-04-12 RX ADMIN — Medication 216 GRAM(S): at 22:36

## 2020-04-12 RX ADMIN — Medication 1 DROP(S): at 23:49

## 2020-04-12 RX ADMIN — Medication 650 MILLIGRAM(S): at 22:36

## 2020-04-12 RX ADMIN — SODIUM ZIRCONIUM CYCLOSILICATE 10 GRAM(S): 10 POWDER, FOR SUSPENSION ORAL at 22:36

## 2020-04-12 RX ADMIN — Medication 1 DROP(S): at 10:35

## 2020-04-12 RX ADMIN — CHLORHEXIDINE GLUCONATE 15 MILLILITER(S): 213 SOLUTION TOPICAL at 16:08

## 2020-04-12 RX ADMIN — Medication 650 MILLIGRAM(S): at 00:00

## 2020-04-12 RX ADMIN — DEXMEDETOMIDINE HYDROCHLORIDE IN 0.9% SODIUM CHLORIDE 3.61 MICROGRAM(S)/KG/HR: 4 INJECTION INTRAVENOUS at 20:29

## 2020-04-12 RX ADMIN — CHLORHEXIDINE GLUCONATE 15 MILLILITER(S): 213 SOLUTION TOPICAL at 05:04

## 2020-04-12 RX ADMIN — Medication 216 GRAM(S): at 05:05

## 2020-04-12 RX ADMIN — Medication 1 DROP(S): at 16:07

## 2020-04-12 RX ADMIN — SODIUM ZIRCONIUM CYCLOSILICATE 10 GRAM(S): 10 POWDER, FOR SUSPENSION ORAL at 17:18

## 2020-04-12 NOTE — PROGRESS NOTE ADULT - PROBLEM SELECTOR PLAN 2
Pt. with hyperkalemia in setting SAM ATN.  Last CRRT/CVVHDF on 4/9/20.  Today serum K downtrend from 6.3 to 5.9.  Continue lokelma 10g TID. If no improvement will likely need CRRT/HD. Monitor BMP

## 2020-04-12 NOTE — PROGRESS NOTE ADULT - SUBJECTIVE AND OBJECTIVE BOX
ALEXA GARCIA            MRN-[id number]         No Known Allergies               58 y/o F with no PMH p/w fever and cough since Thursday. Tmax 103. She presented to urgent care initially and was treated with tamiflu for presumed flu. Despite this she continued to have fevers. Pt denies recent travel, sick contacts or any other sx or acute complaints. Endorses pinching pain in chest when she coughs. Also with nausea and poor PO intake. Denies shortness of breath, abdominal pain, dysuria or LE edema. (16 Mar 2020 02:05)      Issues:              Acute hypoxic respiratory failure              ARDS              Septic shock              COVID-19+              SAM - On CRRT  Hyperkalemia               Anemia  AMS    Drips:   Levophed / Precedex                 Home Medications:      PAST MEDICAL & SURGICAL HISTORY:  No pertinent past medical history  No significant past surgical history        ICU Vital Signs Last 24 Hrs  T(C): 37.2 (12 Apr 2020 12:00), Max: 38 (12 Apr 2020 00:00)  T(F): 99 (12 Apr 2020 12:00), Max: 100.4 (12 Apr 2020 00:00)  HR: 101 (12 Apr 2020 12:00) (90 - 138)  BP: --  BP(mean): --  ABP: 111/53 (12 Apr 2020 12:00) (105/52 - 122/63)  ABP(mean): 73 (12 Apr 2020 12:00) (70 - 85)  RR: 28 (12 Apr 2020 12:00) (24 - 29)  SpO2: 100% (12 Apr 2020 12:00) (100% - 100%)    I&O's Detail    11 Apr 2020 07:01  -  12 Apr 2020 07:00  --------------------------------------------------------  IN:    dexmedetomidine Infusion: 90 mL    Enteral Tube Flush: 150 mL    ns in tub fed  [id number]: 690 mL    Packed Red Blood Cells: 310 mL    Solution: 300 mL    vasopressin Infusion: 1.8 mL  Total IN: 1541.8 mL    OUT:    Rectal Tube: 100 mL  Total OUT: 100 mL    Total NET: 1441.8 mL      12 Apr 2020 07:01  -  12 Apr 2020 13:48  --------------------------------------------------------  IN:    dexmedetomidine Infusion: 31 mL    Enteral Tube Flush: 20 mL    ns in tub fed  [id number]: 60 mL    Solution: 50 mL  Total IN: 161 mL    OUT:  Total OUT: 0 mL    Total NET: 161 mL        CAPILLARY BLOOD GLUCOSE      POCT Blood Glucose.: 87 mg/dL (12 Apr 2020 10:54)      Home Medications:      MEDICATIONS  (STANDING):  ampicillin  IVPB      ampicillin  IVPB 2 Gram(s) IV Intermittent every 8 hours  artificial tears (preservative free) Ophthalmic Solution 1 Drop(s) Both EYES every 6 hours  chlorhexidine 0.12% Liquid 15 milliLiter(s) Oral Mucosa every 12 hours  chlorhexidine 4% Liquid 1 Application(s) Topical <User Schedule>  dexMEDEtomidine Infusion 0.2 MICROgram(s)/kG/Hr (3.61 mL/Hr) IV Continuous <Continuous>  famotidine Injectable 20 milliGRAM(s) IV Push daily  heparin  Infusion 1050 Unit(s)/Hr (9.5 mL/Hr) IV Continuous <Continuous>  norepinephrine Infusion 0.05 MICROgram(s)/kG/Min (3.38 mL/Hr) IV Continuous <Continuous>  sodium zirconium cyclosilicate 10 Gram(s) Oral three times a day  vasopressin Infusion 0.001 Unit(s)/Min (0.03 mL/Hr) IV Continuous <Continuous>    MEDICATIONS  (PRN):  acetaminophen    Suspension .. 650 milliGRAM(s) Oral every 6 hours PRN Temp greater or equal to 38C (100.4F)      Mode: AC/ CMV (Assist Control/ Continuous Mandatory Ventilation)  RR (machine): 28  TV (machine): 330  FiO2: 40  PEEP: 5  MAP: 13  PIP: 35      CAPILLARY BLOOD GLUCOSE      POCT Blood Glucose.: 87 mg/dL (12 Apr 2020 10:54)  POCT Blood Glucose.: 109 mg/dL (11 Apr 2020 23:26)  POCT Blood Glucose.: 104 mg/dL (11 Apr 2020 16:32)      Physical exam:   General:                Sedated                                               Neuro:                  Off sedation, did not follow commands                          Cardiovascular:   S1 & S2, regular                           Respiratory:         Coarse breath sounds                          GI:                          Soft, nondistended, Bowel sounds +                            Ext:                        Edema                               Labs:                                                                           8.5    9.97  )-----------( 199      ( 12 Apr 2020 03:40 )             25.4             04-12    136  |  96<L>  |  109<H>  ----------------------------<  99  5.9<H>   |  18<L>  |  5.14<H>    Ca    10.6<H>      12 Apr 2020 03:40  Phos  9.1     04-12  Mg     3.8     04-12    TPro  6.4  /  Alb  2.8<L>  /  TBili  < 0.2<L>  /  DBili  x   /  AST  40<H>  /  ALT  49<H>  /  AlkPhos  121<H>  04-12                  PT/INR - ( 12 Apr 2020 03:40 )   PT: 10.7 SEC;   INR: 0.94          PTT - ( 12 Apr 2020 03:40 )  PTT:28.4 SEC  LIVER FUNCTIONS - ( 12 Apr 2020 03:40 )  Alb: 2.8 g/dL / Pro: 6.4 g/dL / ALK PHOS: 121 u/L / ALT: 49 u/L / AST: 40 u/L / GGT: x          Transcutaneous Bilirubin    CARDIAC MARKERS ( 11 Apr 2020 04:00 )  x     / x     / 58 u/L / x     / x          CXR:    < from: Xray Chest 1 View- PORTABLE-Urgent (04.12.20 @ 09:57) >  ET tube tip is above the ashley. Left-sided IJ approach central line with the tip in the brachiocephalic vein, unchanged compared to prior studies. Enteric feeding tube courses below the diaphragm with the tip out of field of view however the side port is within the stomach.    Unchanged bilateral patchy opacities.    The bones are unremarkable.    IMPRESSION: Enteric feeding tube with the side port in the stomach; the tip is not imaged on this study. Bilateral patchy opacities that are unchanged and may be seen in the setting of infection.      Plan:    General: 58 y/o F with no PMH p/w fever and cough since Thursday. Tmax 103. She presented to urgent care initially and was treated with tamiflu for presumed flu. Despite this she continued to have fevers. Pt denies recent travel, sick contacts or any other sx or acute complaints. Endorses pinching pain in chest when she coughs. Also with nausea and poor PO intake. Denies shortness of breath, abdominal pain, dysuria or LE edema. (16 Mar 2020 02:05)                              Neuro:                                         Currently on Precedex. Off sedation, non-focal but not following commands                            Cardiovascular:                                          Continue hemodynamic monitoring.                                         Titrate Levophed  to MAP>65                                        Daily EKGs, monitor QTc                                        Continue  Heparin gtt - Target PTT 50-80                            Respiratory:                                         Acute hypoxemic respiratory failure due to Pneumonia / COVID-19. Extubated and got reintubated within 24 hrs                                         On full mechanical vent support  AC-40%-5                                                Wean FiO2 as tolerated                                                CPAP wean as tolerated. Not a candidate for extubation at this time until mental status improves                                                Continue bronchodilators, pulmonary toilet as tolerated                            GI                                         NGT feeds as tolerated                                         Continue Pepcid                                         Bowel regimen	                                                                 Renal:                                         Strict I/Os                                         SAM - Currently off CRRT. D/W Renal, HD in AM.  Continue Bicarb tabs + Lokelma to control                                          Monitor BUN / Cr / Lytes                                                 Hem/ Onc:                                                                                  DVT prophylaxis -  on Heparin gtt                                         Follow CBC  & signs of bleeding                           Infectious disease:                                            Pneumonia                                           COVID-19 +                                          Follow cultures                            Endocrine                                            DM-2: Continue Accu-Checks with coverage      Pertinent clinical, laboratory, radiographic, hemodynamic, echocardiographic, respiratory data, microbiologic data and chart were reviewed and analyzed frequently throughout the course of the day and night  Patient seen, examined and plan discussed with  CTICU team during rounds.    Pt's status &  goals of care  discussed with family     I have spent  75  minutes of critical care time with this pt between  7am  and 11.59 pm                  Favian Moreland MD

## 2020-04-12 NOTE — PROGRESS NOTE ADULT - SUBJECTIVE AND OBJECTIVE BOX
[facility] DIVISION OF KIDNEY DISEASES AND HYPERTENSION -- FOLLOW UP NOTE  Ami Otero  Nephrology Fellow  Pager [facility]: [phone]  Pager [facility]: [phone]  (after 5pm or weekend please page the on-call fellow)  --------------------------------------------------------------------------------  HPI: 60 yo F admitted to ICU for respiratory failure, pneumonia, SAM and COVID-19. Scr increased to 4.6 on 3/23/20. Pt. initiated on CRRT/CVVHDF on 3/23/20, discontinued on 4/9/20. Serum creatinine uptrend 4.8 to 5.1 today (4/12/20).    Pt. was seen and examined in ICU. Pt. is sedated, intubated (FIO2 50 to 40 and PEEP stable at 5), on IV vasopressor support. Pt. remains oliguric.       PAST HISTORY  --------------------------------------------------------------------------------  No significant changes to PMH, PSH, FHx, SHx, unless otherwise noted    ALLERGIES & MEDICATIONS  --------------------------------------------------------------------------------  Allergies    No Known Allergies    Intolerances      Standing Inpatient Medications  ampicillin  IVPB      ampicillin  IVPB 2 Gram(s) IV Intermittent every 8 hours  artificial tears (preservative free) Ophthalmic Solution 1 Drop(s) Both EYES every 6 hours  chlorhexidine 0.12% Liquid 15 milliLiter(s) Oral Mucosa every 12 hours  chlorhexidine 4% Liquid 1 Application(s) Topical <User Schedule>  dexMEDEtomidine Infusion 0.2 MICROgram(s)/kG/Hr IV Continuous <Continuous>  famotidine Injectable 20 milliGRAM(s) IV Push daily  heparin  Infusion 1050 Unit(s)/Hr IV Continuous <Continuous>  norepinephrine Infusion 0.05 MICROgram(s)/kG/Min IV Continuous <Continuous>  sodium zirconium cyclosilicate 10 Gram(s) Oral three times a day  vasopressin Infusion 0.001 Unit(s)/Min IV Continuous <Continuous>    PRN Inpatient Medications  acetaminophen    Suspension .. 650 milliGRAM(s) Oral every 6 hours PRN      REVIEW OF SYSTEMS  unable to obtain d/t intubated/sedated      VITALS/PHYSICAL EXAM  --------------------------------------------------------------------------------  T(C): 37.2 (04-12-20 @ 12:00), Max: 38 (04-12-20 @ 00:00)  HR: 101 (04-12-20 @ 12:00) (90 - 138)  BP: --  RR: 28 (04-12-20 @ 12:00) (24 - 29)  SpO2: 100% (04-12-20 @ 12:00) (100% - 100%)  Wt(kg): --        04-11-20 @ 07:01  -  04-12-20 @ 07:00  --------------------------------------------------------  IN: 1541.8 mL / OUT: 100 mL / NET: 1441.8 mL    04-12-20 @ 07:01  -  04-12-20 @ 13:27  --------------------------------------------------------  IN: 161 mL / OUT: 0 mL / NET: 161 mL      Physical Exam:  	Gen: Sedated, intubated  	HEENT: +ETT  	Pulm: +mechanical breath sounds  	CV: S1S2+  	Abd: Soft, non-distended   	Ext: No LE edema B/L  	Neuro: Sedated  	Skin: Warm and dry              Access: Right IJ non tunneled catheter without bleeding    LABS/STUDIES  --------------------------------------------------------------------------------              8.5    9.97  >-----------<  199      [04-12-20 @ 03:40]              25.4     136  |  96  |  109  ----------------------------<  99      [04-12-20 @ 03:40]  5.9   |  18  |  5.14        Ca     10.6     [04-12-20 @ 03:40]      Mg     3.8     [04-12-20 @ 03:40]      Phos  9.1     [04-12-20 @ 03:40]    TPro  6.4  /  Alb  2.8  /  TBili  < 0.2  /  DBili  x   /  AST  40  /  ALT  49  /  AlkPhos  121  [04-12-20 @ 03:40]    PT/INR: PT 10.7 , INR 0.94       [04-12-20 @ 03:40]  PTT: 28.4       [04-12-20 @ 03:40]    CK 58      [04-11-20 @ 04:00]    Creatinine Trend:  SCr 5.14 [04-12 @ 03:40]  SCr 4.81 [04-11 @ 20:30]  SCr 3.41 [04-11 @ 13:32]  SCr 3.90 [04-11 @ 04:00]  SCr 2.50 [04-10 @ 04:10]    Urinalysis - [03-29-20 @ 10:00]      Color BROWN / Appearance TURBID / SG 1.020 / pH 6.5      Gluc NEGATIVE / Ketone NEGATIVE  / Bili SMALL / Urobili NORMAL       Blood MODERATE / Protein 100 / Leuk Est LARGE / Nitrite NEGATIVE      RBC 10-20 / WBC >50 / Hyaline  / Gran  / Sq Epi FEW / Non Sq Epi  / Bacteria MODERATE      Ferritin 997.7      [04-10-20 @ 04:10]  Lipid: chol --, , HDL --, LDL --      [04-04-20 @ 02:59]    HCV 0.12, Nonreactive Hepatitis C AB  S/CO Ratio                        Interpretation  < 1.00                                   Non-Reactive  1.00 - 4.99                         Weakly-Reactive  >= 5.00                                Reactive  Non-Reactive: Aperson with a non-reactive HCV antibody  result is considered uninfected.  No further action is  needed unless recent infection is suspected.  In these  cases, consider repeat testing later to detect  seroconversion..  Weakly-Reactive: HCV antibody test is abnormal, HCV RNA  Qualitative test will follow.  Reactive: HCV antibody test is abnormal, HCV RNA  Qualitative test will follow.  Note: HCV antibody testing is performed on the [employer]   system.      [03-16-20 @ 05:30]

## 2020-04-12 NOTE — PROGRESS NOTE ADULT - PROBLEM SELECTOR PLAN 1
Pt. with oliguric SAM in the setting of hypotension and COVID-19. Scr on admission (3/15/20) was 0.84, increased to 4.6 on 3/26/20. Pt. likely with ATN. Pt. was started on CRRT/CVVHDF on 3/26/20, discontinued on 4/9/20. Labs reviewed. No plan for CRRT/HD today. Will assess daily for CRRT/HD. Check renal sonogram (when feasible). Monitor labs and urine output. Avoid any potential nephrotoxins

## 2020-04-13 LAB
ALBUMIN SERPL ELPH-MCNC: 2.6 G/DL — LOW (ref 3.3–5)
ALBUMIN SERPL ELPH-MCNC: 2.6 G/DL — LOW (ref 3.3–5)
ALBUMIN SERPL ELPH-MCNC: 2.8 G/DL — LOW (ref 3.3–5)
ALP SERPL-CCNC: 101 U/L — SIGNIFICANT CHANGE UP (ref 40–120)
ALP SERPL-CCNC: 101 U/L — SIGNIFICANT CHANGE UP (ref 40–120)
ALP SERPL-CCNC: 92 U/L — SIGNIFICANT CHANGE UP (ref 40–120)
ALT FLD-CCNC: 43 U/L — HIGH (ref 4–33)
ALT FLD-CCNC: 46 U/L — HIGH (ref 4–33)
ALT FLD-CCNC: 46 U/L — HIGH (ref 4–33)
ANION GAP SERPL CALC-SCNC: 27 MMO/L — HIGH (ref 7–14)
ANION GAP SERPL CALC-SCNC: 28 MMO/L — HIGH (ref 7–14)
APTT BLD: 26.9 SEC — LOW (ref 27.5–36.3)
APTT BLD: 63.7 SEC — HIGH (ref 27.5–36.3)
AST SERPL-CCNC: 26 U/L — SIGNIFICANT CHANGE UP (ref 4–32)
AST SERPL-CCNC: 32 U/L — SIGNIFICANT CHANGE UP (ref 4–32)
AST SERPL-CCNC: 32 U/L — SIGNIFICANT CHANGE UP (ref 4–32)
BASE EXCESS BLDA CALC-SCNC: -6.8 MMOL/L — SIGNIFICANT CHANGE UP
BASE EXCESS BLDA CALC-SCNC: -7.5 MMOL/L — SIGNIFICANT CHANGE UP
BASOPHILS # BLD AUTO: 0.06 K/UL — SIGNIFICANT CHANGE UP (ref 0–0.2)
BASOPHILS NFR BLD AUTO: 0.6 % — SIGNIFICANT CHANGE UP (ref 0–2)
BILIRUB SERPL-MCNC: < 0.2 MG/DL — LOW (ref 0.2–1.2)
BUN SERPL-MCNC: 129 MG/DL — HIGH (ref 7–23)
BUN SERPL-MCNC: 129 MG/DL — HIGH (ref 7–23)
BUN SERPL-MCNC: 141 MG/DL — HIGH (ref 7–23)
BUN SERPL-MCNC: 141 MG/DL — HIGH (ref 7–23)
CA-I BLDA-SCNC: 1.38 MMOL/L — HIGH (ref 1.15–1.29)
CALCIUM SERPL-MCNC: 10.6 MG/DL — HIGH (ref 8.4–10.5)
CALCIUM SERPL-MCNC: 10.7 MG/DL — HIGH (ref 8.4–10.5)
CALCIUM SERPL-MCNC: 10.8 MG/DL — HIGH (ref 8.4–10.5)
CALCIUM SERPL-MCNC: 10.8 MG/DL — HIGH (ref 8.4–10.5)
CHLORIDE BLDA-SCNC: 106 MMOL/L — SIGNIFICANT CHANGE UP (ref 96–108)
CHLORIDE SERPL-SCNC: 101 MMOL/L — SIGNIFICANT CHANGE UP (ref 98–107)
CHLORIDE SERPL-SCNC: 101 MMOL/L — SIGNIFICANT CHANGE UP (ref 98–107)
CHLORIDE SERPL-SCNC: 98 MMOL/L — SIGNIFICANT CHANGE UP (ref 98–107)
CHLORIDE SERPL-SCNC: 98 MMOL/L — SIGNIFICANT CHANGE UP (ref 98–107)
CO2 SERPL-SCNC: 16 MMOL/L — LOW (ref 22–31)
CO2 SERPL-SCNC: 17 MMOL/L — LOW (ref 22–31)
CREAT SERPL-MCNC: 5.42 MG/DL — HIGH (ref 0.5–1.3)
CREAT SERPL-MCNC: 5.45 MG/DL — HIGH (ref 0.5–1.3)
CREAT SERPL-MCNC: 5.69 MG/DL — HIGH (ref 0.5–1.3)
CREAT SERPL-MCNC: 5.69 MG/DL — HIGH (ref 0.5–1.3)
CRP SERPL-MCNC: 84.2 MG/L — HIGH
D DIMER BLD IA.RAPID-MCNC: 2791 NG/ML — SIGNIFICANT CHANGE UP
EOSINOPHIL # BLD AUTO: 0.61 K/UL — HIGH (ref 0–0.5)
EOSINOPHIL NFR BLD AUTO: 5.8 % — SIGNIFICANT CHANGE UP (ref 0–6)
FERRITIN SERPL-MCNC: 594.2 NG/ML — HIGH (ref 15–150)
FIBRINOGEN PPP-MCNC: 864 MG/DL — HIGH (ref 300–520)
GLUCOSE BLDA-MCNC: 101 MG/DL — HIGH (ref 70–99)
GLUCOSE BLDA-MCNC: 88 MG/DL — SIGNIFICANT CHANGE UP (ref 70–99)
GLUCOSE BLDC GLUCOMTR-MCNC: 99 MG/DL — SIGNIFICANT CHANGE UP (ref 70–99)
GLUCOSE SERPL-MCNC: 111 MG/DL — HIGH (ref 70–99)
GLUCOSE SERPL-MCNC: 118 MG/DL — HIGH (ref 70–99)
GLUCOSE SERPL-MCNC: 96 MG/DL — SIGNIFICANT CHANGE UP (ref 70–99)
GLUCOSE SERPL-MCNC: 96 MG/DL — SIGNIFICANT CHANGE UP (ref 70–99)
HCO3 BLDA-SCNC: 18 MMOL/L — LOW (ref 22–26)
HCO3 BLDA-SCNC: 19 MMOL/L — LOW (ref 22–26)
HCT VFR BLD CALC: 22.9 % — LOW (ref 34.5–45)
HCT VFR BLDA CALC: 24.4 % — LOW (ref 34.5–46.5)
HCT VFR BLDA CALC: 25.7 % — LOW (ref 34.5–46.5)
HGB BLD-MCNC: 7.6 G/DL — LOW (ref 11.5–15.5)
HGB BLDA-MCNC: 7.8 G/DL — LOW (ref 11.5–15.5)
HGB BLDA-MCNC: 8.3 G/DL — LOW (ref 11.5–15.5)
IMM GRANULOCYTES NFR BLD AUTO: 3.8 % — HIGH (ref 0–1.5)
INR BLD: 0.89 — SIGNIFICANT CHANGE UP (ref 0.88–1.17)
LACTATE BLDA-SCNC: 0.5 MMOL/L — SIGNIFICANT CHANGE UP (ref 0.5–2)
LACTATE BLDA-SCNC: 1.2 MMOL/L — SIGNIFICANT CHANGE UP (ref 0.5–2)
LDH SERPL L TO P-CCNC: 270 U/L — HIGH (ref 135–225)
LYMPHOCYTES # BLD AUTO: 1.14 K/UL — SIGNIFICANT CHANGE UP (ref 1–3.3)
LYMPHOCYTES # BLD AUTO: 10.8 % — LOW (ref 13–44)
MAGNESIUM SERPL-MCNC: 3.6 MG/DL — HIGH (ref 1.6–2.6)
MAGNESIUM SERPL-MCNC: 3.7 MG/DL — HIGH (ref 1.6–2.6)
MCHC RBC-ENTMCNC: 29.7 PG — SIGNIFICANT CHANGE UP (ref 27–34)
MCHC RBC-ENTMCNC: 33.2 % — SIGNIFICANT CHANGE UP (ref 32–36)
MCV RBC AUTO: 89.5 FL — SIGNIFICANT CHANGE UP (ref 80–100)
MONOCYTES # BLD AUTO: 1.75 K/UL — HIGH (ref 0–0.9)
MONOCYTES NFR BLD AUTO: 16.6 % — HIGH (ref 2–14)
NEUTROPHILS # BLD AUTO: 6.57 K/UL — SIGNIFICANT CHANGE UP (ref 1.8–7.4)
NEUTROPHILS NFR BLD AUTO: 62.4 % — SIGNIFICANT CHANGE UP (ref 43–77)
NRBC # FLD: 0 K/UL — SIGNIFICANT CHANGE UP (ref 0–0)
NT-PROBNP SERPL-SCNC: 6152 PG/ML — SIGNIFICANT CHANGE UP
PCO2 BLDA: 37 MMHG — SIGNIFICANT CHANGE UP (ref 32–48)
PCO2 BLDA: 39 MMHG — SIGNIFICANT CHANGE UP (ref 32–48)
PH BLDA: 7.28 PH — LOW (ref 7.35–7.45)
PH BLDA: 7.31 PH — LOW (ref 7.35–7.45)
PHOSPHATE SERPL-MCNC: 10.5 MG/DL — HIGH (ref 2.5–4.5)
PHOSPHATE SERPL-MCNC: 10.6 MG/DL — HIGH (ref 2.5–4.5)
PLATELET # BLD AUTO: 175 K/UL — SIGNIFICANT CHANGE UP (ref 150–400)
PMV BLD: 11.7 FL — SIGNIFICANT CHANGE UP (ref 7–13)
PO2 BLDA: 134 MMHG — HIGH (ref 83–108)
PO2 BLDA: 196 MMHG — HIGH (ref 83–108)
POTASSIUM BLDA-SCNC: 4 MMOL/L — SIGNIFICANT CHANGE UP (ref 3.4–4.5)
POTASSIUM BLDA-SCNC: 4.1 MMOL/L — SIGNIFICANT CHANGE UP (ref 3.4–4.5)
POTASSIUM SERPL-MCNC: 4 MMOL/L — SIGNIFICANT CHANGE UP (ref 3.5–5.3)
POTASSIUM SERPL-MCNC: 4.1 MMOL/L — SIGNIFICANT CHANGE UP (ref 3.5–5.3)
POTASSIUM SERPL-MCNC: 4.2 MMOL/L — SIGNIFICANT CHANGE UP (ref 3.5–5.3)
POTASSIUM SERPL-MCNC: 4.2 MMOL/L — SIGNIFICANT CHANGE UP (ref 3.5–5.3)
POTASSIUM SERPL-SCNC: 4 MMOL/L — SIGNIFICANT CHANGE UP (ref 3.5–5.3)
POTASSIUM SERPL-SCNC: 4.1 MMOL/L — SIGNIFICANT CHANGE UP (ref 3.5–5.3)
POTASSIUM SERPL-SCNC: 4.2 MMOL/L — SIGNIFICANT CHANGE UP (ref 3.5–5.3)
POTASSIUM SERPL-SCNC: 4.2 MMOL/L — SIGNIFICANT CHANGE UP (ref 3.5–5.3)
PROCALCITONIN SERPL-MCNC: 0.83 NG/ML — HIGH (ref 0.02–0.1)
PROT SERPL-MCNC: 6.1 G/DL — SIGNIFICANT CHANGE UP (ref 6–8.3)
PROT SERPL-MCNC: 6.1 G/DL — SIGNIFICANT CHANGE UP (ref 6–8.3)
PROT SERPL-MCNC: 6.2 G/DL — SIGNIFICANT CHANGE UP (ref 6–8.3)
PROTHROM AB SERPL-ACNC: 10.1 SEC — SIGNIFICANT CHANGE UP (ref 9.8–13.1)
RBC # BLD: 2.56 M/UL — LOW (ref 3.8–5.2)
RBC # FLD: 16.1 % — HIGH (ref 10.3–14.5)
SAO2 % BLDA: 98.7 % — SIGNIFICANT CHANGE UP (ref 95–99)
SAO2 % BLDA: 99.6 % — HIGH (ref 95–99)
SODIUM BLDA-SCNC: 141 MMOL/L — SIGNIFICANT CHANGE UP (ref 136–146)
SODIUM BLDA-SCNC: 144 MMOL/L — SIGNIFICANT CHANGE UP (ref 136–146)
SODIUM SERPL-SCNC: 141 MMOL/L — SIGNIFICANT CHANGE UP (ref 135–145)
SODIUM SERPL-SCNC: 141 MMOL/L — SIGNIFICANT CHANGE UP (ref 135–145)
SODIUM SERPL-SCNC: 145 MMOL/L — SIGNIFICANT CHANGE UP (ref 135–145)
SODIUM SERPL-SCNC: 145 MMOL/L — SIGNIFICANT CHANGE UP (ref 135–145)
TROPONIN T, HIGH SENSITIVITY: 87 NG/L — CRITICAL HIGH (ref ?–14)
WBC # BLD: 10.53 K/UL — HIGH (ref 3.8–10.5)
WBC # FLD AUTO: 10.53 K/UL — HIGH (ref 3.8–10.5)

## 2020-04-13 PROCEDURE — 99291 CRITICAL CARE FIRST HOUR: CPT

## 2020-04-13 PROCEDURE — 99232 SBSQ HOSP IP/OBS MODERATE 35: CPT | Mod: GC

## 2020-04-13 PROCEDURE — 99292 CRITICAL CARE ADDL 30 MIN: CPT

## 2020-04-13 RX ADMIN — Medication 216 GRAM(S): at 12:07

## 2020-04-13 RX ADMIN — Medication 1334 MILLIGRAM(S): at 17:45

## 2020-04-13 RX ADMIN — FAMOTIDINE 20 MILLIGRAM(S): 10 INJECTION INTRAVENOUS at 11:27

## 2020-04-13 RX ADMIN — Medication 650 MILLIGRAM(S): at 21:50

## 2020-04-13 RX ADMIN — Medication 1 DROP(S): at 17:45

## 2020-04-13 RX ADMIN — Medication 1 DROP(S): at 06:33

## 2020-04-13 RX ADMIN — CHLORHEXIDINE GLUCONATE 15 MILLILITER(S): 213 SOLUTION TOPICAL at 06:33

## 2020-04-13 RX ADMIN — Medication 650 MILLIGRAM(S): at 12:07

## 2020-04-13 RX ADMIN — CHLORHEXIDINE GLUCONATE 15 MILLILITER(S): 213 SOLUTION TOPICAL at 17:46

## 2020-04-13 RX ADMIN — Medication 1 DROP(S): at 12:06

## 2020-04-13 RX ADMIN — HEPARIN SODIUM 9.5 UNIT(S)/HR: 5000 INJECTION INTRAVENOUS; SUBCUTANEOUS at 11:27

## 2020-04-13 RX ADMIN — Medication 216 GRAM(S): at 21:50

## 2020-04-13 RX ADMIN — Medication 1334 MILLIGRAM(S): at 12:07

## 2020-04-13 RX ADMIN — Medication 1334 MILLIGRAM(S): at 10:58

## 2020-04-13 RX ADMIN — Medication 1 DROP(S): at 23:32

## 2020-04-13 RX ADMIN — Medication 216 GRAM(S): at 06:33

## 2020-04-13 RX ADMIN — CHLORHEXIDINE GLUCONATE 1 APPLICATION(S): 213 SOLUTION TOPICAL at 06:34

## 2020-04-13 RX ADMIN — Medication 650 MILLIGRAM(S): at 06:34

## 2020-04-13 RX ADMIN — Medication 1334 MILLIGRAM(S): at 21:51

## 2020-04-13 NOTE — PROGRESS NOTE ADULT - SUBJECTIVE AND OBJECTIVE BOX
ALEXA GARCIA                     MRN-[id number]    HPI:  60 y/o F with no PMH p/w fever and cough since Thursday. Tmax 103. She presented to urgent care initially and was treated with tamiflu for presumed flu. Despite this she continued to have fevers. Pt denies recent travel, sick contacts or any other sx or acute complaints. Endorses pinching pain in chest when she coughs. Also with nausea and poor PO intake. Denies shortness of breath, abdominal pain, dysuria or LE edema. (16 Mar 2020 02:05)      Issues:              Acute hypoxic respiratory failure              ARDS              Septic shock              COVID-19+              SAM - On CRRT  Hyperkalemia               Anemia  AMS    Drips:   Levophed / Precedex        PAST MEDICAL & SURGICAL HISTORY:  No pertinent past medical history  No significant past surgical history            VITAL SIGNS:  Vital Signs Last 24 Hrs  T(C): 36.4 (13 Apr 2020 04:00), Max: 37.3 (12 Apr 2020 16:00)  T(F): 97.6 (13 Apr 2020 04:00), Max: 99.2 (12 Apr 2020 16:00)  HR: 89 (13 Apr 2020 11:15) (81 - 103)  BP: --  BP(mean): --  RR: 28 (13 Apr 2020 08:00) (27 - 28)  SpO2: 100% (13 Apr 2020 11:15) (100% - 100%)    I/Os:   I&O's Detail    12 Apr 2020 07:01  -  13 Apr 2020 07:00  --------------------------------------------------------  IN:    dexmedetomidine Infusion: 147.8 mL    Enteral Tube Flush: 130 mL    ns in tub fed  [id number]: 360 mL    Solution: 150 mL    Solution: 100 mL  Total IN: 887.8 mL    OUT:    Rectal Tube: 300 mL  Total OUT: 300 mL    Total NET: 587.8 mL      13 Apr 2020 07:01  -  13 Apr 2020 11:37  --------------------------------------------------------  IN:    dexmedetomidine Infusion: 18 mL    Enteral Tube Flush: 60 mL    ns in tub fed  [id number]: 100 mL  Total IN: 178 mL    OUT:    Voided: 100 mL  Total OUT: 100 mL    Total NET: 78 mL          CAPILLARY BLOOD GLUCOSE      POCT Blood Glucose.: 99 mg/dL (13 Apr 2020 06:08)  POCT Blood Glucose.: 107 mg/dL (12 Apr 2020 23:52)      =======================MEDICATIONS===================  MEDICATIONS  (STANDING):  ampicillin  IVPB      ampicillin  IVPB 2 Gram(s) IV Intermittent every 8 hours  artificial tears (preservative free) Ophthalmic Solution 1 Drop(s) Both EYES every 6 hours  calcium acetate 1334 milliGRAM(s) Oral four times a day with meals  chlorhexidine 0.12% Liquid 15 milliLiter(s) Oral Mucosa every 12 hours  chlorhexidine 4% Liquid 1 Application(s) Topical <User Schedule>  dexMEDEtomidine Infusion 0.2 MICROgram(s)/kG/Hr (3.61 mL/Hr) IV Continuous <Continuous>  famotidine Injectable 20 milliGRAM(s) IV Push daily  heparin  Infusion 1050 Unit(s)/Hr (9.5 mL/Hr) IV Continuous <Continuous>  norepinephrine Infusion 0.05 MICROgram(s)/kG/Min (3.38 mL/Hr) IV Continuous <Continuous>  sodium bicarbonate 650 milliGRAM(s) Oral three times a day  sodium zirconium cyclosilicate 10 Gram(s) Oral three times a day  vasopressin Infusion 0.001 Unit(s)/Min (0.03 mL/Hr) IV Continuous <Continuous>    MEDICATIONS  (PRN):  acetaminophen    Suspension .. 650 milliGRAM(s) Oral every 6 hours PRN Temp greater or equal to 38C (100.4F)      =======================VENTILATOR SETTINGS===================  Mode: CPAP with PS  FiO2: 40  PEEP: 5  PS: 5  MAP: 7          PHYSICAL EXAM============================  General:               Weak, off sedation                                    Neuro:                  Off sedation, Staring, does not follow commands                          Cardiovascular:   S1 & S2, regular                           Respiratory:         Coarse breath sounds                          GI:                          Soft, nondistended, Bowel sounds +                            Ext:                       + Edema                               ============================LABS=========================                        7.6    10.53 )-----------( 175      ( 13 Apr 2020 02:50 )             22.9     04-13    141  |  98  |  129<H>  ----------------------------<  96  4.2   |  16<L>  |  5.69<H>    Ca    10.8<H>      13 Apr 2020 02:50  Phos  10.5     04-13  Mg     3.7     04-13    TPro  6.1  /  Alb  2.6<L>  /  TBili  < 0.2<L>  /  DBili  x   /  AST  32  /  ALT  46<H>  /  AlkPhos  101  04-13    LIVER FUNCTIONS - ( 13 Apr 2020 02:50 )  Alb: 2.6 g/dL / Pro: 6.1 g/dL / ALK PHOS: 101 u/L / ALT: 46 u/L / AST: 32 u/L / GGT: x           PT/INR - ( 13 Apr 2020 02:50 )   PT: 10.1 SEC;   INR: 0.89          PTT - ( 13 Apr 2020 02:50 )  PTT:26.9 SEC  ABG - ( 13 Apr 2020 02:50 )  pH, Arterial: 7.28  pH, Blood: x     /  pCO2: 39    /  pO2: 134   / HCO3: 18    / Base Excess: -7.5  /  SaO2: 98.7        A/P;  60 y/o F with no PMH p/w fever and cough since Thursday. Tmax 103. She presented to urgent care initially and was treated with tamiflu for presumed flu. Despite this she continued to have fevers. Pt denies recent travel, sick contacts or any other sx or acute complaints. Endorses pinching pain in chest when she coughs. Also with nausea and poor PO intake. Denies shortness of breath, abdominal pain, dysuria or LE edema. (16 Mar 2020 02:05)                              Neuro:                                         Currently on Precedex. Off sedation, non-focal but not following commands                            Cardiovascular:                                          Continue hemodynamic monitoring.                                         Titrate Levophed  to MAP>65                                        ST at times, Low dose lopressor                                        Continue  Heparin gtt - Target PTT 50-80                            Respiratory:                                         Acute hypoxemic respiratory failure due to Pneumonia / COVID-19. Extubated and got reintubated within 24 hrs                                         On full mechanical vent support  AC-40%-5                                                Wean FiO2 as tolerated                                                CPAP wean as tolerated. Not a candidate for extubation at this time until mental status improves                                                Continue bronchodilators, pulmonary toilet as tolerated                            GI                                         NGT feeds as tolerated                                         Continue Pepcid                                         Bowel regimen	                                                                 Renal:      Hyperkelemia, D/W Renal, may consider CRRT Vs HD                                         Strict I/Os                                         SAM - Currently off CRRT. D/W Renal, HD in AM.  Continue Bicarb tabs + Lokelma to control                                          Monitor BUN / Cr / Lytes                                                 Hem/ Onc:                                                                                  DVT prophylaxis -  on Heparin gtt                                         Follow CBC  & signs of bleeding                           Infectious disease:                                            Pneumonia                                           COVID-19 +                                          Follow cultures                            Endocrine                                            DM-2: Continue Accu-Checks with coverage      Pertinent clinical, laboratory, radiographic, hemodynamic, echocardiographic, respiratory data, microbiologic data and chart were reviewed and analyzed frequently throughout the course of the day and night  Patient seen, examined and plan discussed with  CTICU team during rounds.    Pt's status &  goals of care  discussed with family     I have spent  75  minutes of critical care time with this pt between  7am  and 11.59 pm                Leslye Juárez DO, FACEP

## 2020-04-13 NOTE — PROGRESS NOTE ADULT - SUBJECTIVE AND OBJECTIVE BOX
[facility] DIVISION OF KIDNEY DISEASES AND HYPERTENSION --    HPI: 58 yo F admitted to ICU for respiratory failure, pneumonia, SAM and COVID-19. Scr increased to 4.6 on 3/23/20. Pt. initiated on CRRT/CVVHDF on 3/23/20, discontinued on 4/9/20.     Pt. was seen and examined in ICU. Pt. is intubated, on IV vasopressor support. Pt. remains oliguric.     PAST HISTORY  --------------------------------------------------------------------------------  No significant changes to PMH, PSH, FHx, SHx, unless otherwise noted    ALLERGIES & MEDICATIONS  --------------------------------------------------------------------------------  Allergies    No Known Allergies    Intolerances    Standing Inpatient Medications  ampicillin  IVPB      ampicillin  IVPB 2 Gram(s) IV Intermittent every 8 hours  artificial tears (preservative free) Ophthalmic Solution 1 Drop(s) Both EYES every 6 hours  calcium acetate 1334 milliGRAM(s) Oral four times a day with meals  chlorhexidine 0.12% Liquid 15 milliLiter(s) Oral Mucosa every 12 hours  chlorhexidine 4% Liquid 1 Application(s) Topical <User Schedule>  dexMEDEtomidine Infusion 0.2 MICROgram(s)/kG/Hr IV Continuous <Continuous>  famotidine Injectable 20 milliGRAM(s) IV Push daily  heparin  Infusion 1050 Unit(s)/Hr IV Continuous <Continuous>  norepinephrine Infusion 0.05 MICROgram(s)/kG/Min IV Continuous <Continuous>  sodium bicarbonate 650 milliGRAM(s) Oral three times a day  sodium zirconium cyclosilicate 10 Gram(s) Oral three times a day  vasopressin Infusion 0.001 Unit(s)/Min IV Continuous <Continuous>    PRN Inpatient Medications  acetaminophen    Suspension .. 650 milliGRAM(s) Oral every 6 hours PRN    REVIEW OF SYSTEMS  --------------------------------------------------------------------------------  Unable to obtain.    VITALS/PHYSICAL EXAM  --------------------------------------------------------------------------------  T(C): 36.4 (04-13-20 @ 04:00), Max: 37.3 (04-12-20 @ 16:00)  HR: 81 (04-13-20 @ 09:04) (81 - 103)  BP: --  RR: 28 (04-13-20 @ 04:00) (27 - 28)  SpO2: 100% (04-13-20 @ 09:04) (100% - 100%)  Wt(kg): --    04-12-20 @ 07:01  -  04-13-20 @ 07:00  --------------------------------------------------------  IN: 887.8 mL / OUT: 300 mL / NET: 587.8 mL    Physical Exam:  	Gen: Sedated, intubated  	HEENT: +ETT  	Pulm: +mechanical breath sounds  	CV: S1S2+  	Abd: Soft, non-distended   	Ext: No LE edema B/L  	Skin: Warm and dry              Access: Right IJ non tunneled catheter+    LABS/STUDIES  --------------------------------------------------------------------------------              7.6    10.53 >-----------<  175      [04-13-20 @ 02:50]              22.9     141  |  98  |  129  ----------------------------<  96      [04-13-20 @ 02:50]  4.2   |  16  |  5.69        Ca     10.8     [04-13-20 @ 02:50]      Mg     3.7     [04-13-20 @ 02:50]      Phos  10.5     [04-13-20 @ 02:50]    TPro  6.1  /  Alb  2.6  /  TBili  < 0.2  /  DBili  x   /  AST  32  /  ALT  46  /  AlkPhos  101  [04-13-20 @ 02:50]    Creatinine Trend:  SCr 5.69 [04-13 @ 02:50]  SCr 5.72 [04-12 @ 20:45]  SCr 5.61 [04-12 @ 15:40]  SCr 5.14 [04-12 @ 03:40]  SCr 4.81 [04-11 @ 20:30]    HCV 0.12, Nonreactive Hepatitis C AB  S/CO Ratio                        Interpretation  < 1.00                                   Non-Reactive  1.00 - 4.99                         Weakly-Reactive  >= 5.00                                Reactive  Non-Reactive: Aperson with a non-reactive HCV antibody  result is considered uninfected.  No further action is  needed unless recent infection is suspected.  In these  cases, consider repeat testing later to detect  seroconversion..  Weakly-Reactive: HCV antibody test is abnormal, HCV RNA  Qualitative test will follow.  Reactive: HCV antibody test is abnormal, HCV RNA  Qualitative test will follow.  Note: HCV antibody testing is performed on the [employer]   system.      [03-16-20 @ 05:30] [facility] DIVISION OF KIDNEY DISEASES AND HYPERTENSION --    HPI: 60 yo F admitted to ICU for respiratory failure, pneumonia, SAM and COVID-19. Scr increased to 4.6 on 3/23/20. Pt. initiated on CRRT/CVVHDF on 3/23/20, discontinued on 4/9/20.     Pt. was seen and examined in ICU. Pt. is intubated, on IV vasopressor support. Pt. remains oliguric.     PAST HISTORY  --------------------------------------------------------------------------------  No significant changes to PMH, PSH, FHx, SHx, unless otherwise noted    ALLERGIES & MEDICATIONS  --------------------------------------------------------------------------------  Allergies    No Known Allergies    Intolerances    Standing Inpatient Medications  ampicillin  IVPB      ampicillin  IVPB 2 Gram(s) IV Intermittent every 8 hours  artificial tears (preservative free) Ophthalmic Solution 1 Drop(s) Both EYES every 6 hours  calcium acetate 1334 milliGRAM(s) Oral four times a day with meals  chlorhexidine 0.12% Liquid 15 milliLiter(s) Oral Mucosa every 12 hours  chlorhexidine 4% Liquid 1 Application(s) Topical <User Schedule>  dexMEDEtomidine Infusion 0.2 MICROgram(s)/kG/Hr IV Continuous <Continuous>  famotidine Injectable 20 milliGRAM(s) IV Push daily  heparin  Infusion 1050 Unit(s)/Hr IV Continuous <Continuous>  norepinephrine Infusion 0.05 MICROgram(s)/kG/Min IV Continuous <Continuous>  sodium bicarbonate 650 milliGRAM(s) Oral three times a day  sodium zirconium cyclosilicate 10 Gram(s) Oral three times a day  vasopressin Infusion 0.001 Unit(s)/Min IV Continuous <Continuous>    PRN Inpatient Medications  acetaminophen    Suspension .. 650 milliGRAM(s) Oral every 6 hours PRN    REVIEW OF SYSTEMS  --------------------------------------------------------------------------------  Unable to obtain.    VITALS/PHYSICAL EXAM  --------------------------------------------------------------------------------  T(C): 36.4 (04-13-20 @ 04:00), Max: 37.3 (04-12-20 @ 16:00)  HR: 81 (04-13-20 @ 09:04) (81 - 103)  BP: --  RR: 28 (04-13-20 @ 04:00) (27 - 28)  SpO2: 100% (04-13-20 @ 09:04) (100% - 100%)  Wt(kg): --    04-12-20 @ 07:01  -  04-13-20 @ 07:00  --------------------------------------------------------  IN: 887.8 mL / OUT: 300 mL / NET: 587.8 mL    Physical Exam:  	Gen: Sedated, intubated  	HEENT: +ETT  	Pulm: +mechanical breath sounds  	CV: S1S2+  	Abd: Soft, non-distended   	Ext: No LE edema B/L  	Skin: Warm and dry              Access: Right IJ non tunneled catheter+    LABS/STUDIES  --------------------------------------------------------------------------------              7.6    10.53 >-----------<  175      [04-13-20 @ 02:50]              22.9     141  |  98  |  129  ----------------------------<  96      [04-13-20 @ 02:50]  4.2   |  16  |  5.69        Ca     10.8     [04-13-20 @ 02:50]      Mg     3.7     [04-13-20 @ 02:50]      Phos  10.5     [04-13-20 @ 02:50]    TPro  6.1  /  Alb  2.6  /  TBili  < 0.2  /  DBili  x   /  AST  32  /  ALT  46  /  AlkPhos  101  [04-13-20 @ 02:50]    Creatinine Trend:  SCr 5.69 [04-13 @ 02:50]  SCr 5.72 [04-12 @ 20:45]  SCr 5.61 [04-12 @ 15:40]  SCr 5.14 [04-12 @ 03:40]  SCr 4.81 [04-11 @ 20:30]

## 2020-04-13 NOTE — CHART NOTE - NSCHARTNOTEFT_GEN_A_CORE
Source: EMR  Diet : NPO via Orogastric tube Jevity 1.2 @ 20 ml/hr x 24hrs = 576 kcal & 26.6 gm protein/d.     Patient continues to be intubated, sedated , per flow sheet w/ 2+ generalized edema . Collateral information not feasible 2/2 COVID 19 isolation protocol. Noted worsening renal function , pt. will benefit from renal formula , placed adjusted diet order for provider to verify .     Current Weight: - no recent wt. available     Pertinent Medications: MEDICATIONS  (STANDING):  ampicillin  IVPB      ampicillin  IVPB 2 Gram(s) IV Intermittent every 8 hours  calcium acetate 1334 milliGRAM(s) Oral four times a day with meals  dexMEDEtomidine Infusion 0.2 MICROgram(s)/kG/Hr (3.61 mL/Hr) IV Continuous <Continuous>  famotidine Injectable 20 milliGRAM(s) IV Push daily  heparin  Infusion 1050 Unit(s)/Hr (9.5 mL/Hr) IV Continuous <Continuous>  norepinephrine Infusion 0.05 MICROgram(s)/kG/Min (3.38 mL/Hr) IV Continuous <Continuous>  sodium bicarbonate 650 milliGRAM(s) Oral three times a day  sodium zirconium cyclosilicate 10 Gram(s) Oral three times a day  vasopressin Infusion 0.001 Unit(s)/Min (0.03 mL/Hr) IV Continuous <Continuous>      Pertinent Labs:  04-13 Na141 mmol/L Glu 96 mg/dL K+ 4.2 mmol/L Cr  5.69 mg/dL<H>  mg/dL<H> 04-13 Phos 10.5 mg/dL<H> 04-13 Alb 2.6 g/dL<L> 04-04 Chol --    LDL --    HDL --    Trig 203 mg/dL<H>      Skin: intact    Estimated Needs:   no change since previous assessment      Recommend Nepro Carb Steady @ 35 ml/hr x 24hrs w/ 1 pack No Carb Pro source daily     Monitoring and Evaluation:  Tolerance to diet prescription , weights & follow up per protocol

## 2020-04-13 NOTE — PROGRESS NOTE ADULT - PROBLEM SELECTOR PLAN 1
Pt. with oliguric SAM in the setting of hypotension and COVID-19. Scr on admission (3/15/20) was 0.84, increased to 4.6 on 3/26/20. Pt. with likely ATN. Pt. was started on CRRT/CVVHDF on 3/26/20, discontinued on 4/9/20. Labs from today reviewed. Serum potassium WNL on sodium zirconium cyclosilicate 10 G TID. Recommend to decrease to BID (from TID). No plan for CRRT/HD today. Will assess daily for CRRT/HD. Check renal sonogram (when feasible). Monitor labs and urine output. Avoid any potential nephrotoxins

## 2020-04-14 LAB
ALBUMIN SERPL ELPH-MCNC: 2.7 G/DL — LOW (ref 3.3–5)
ALP SERPL-CCNC: 96 U/L — SIGNIFICANT CHANGE UP (ref 40–120)
ALT FLD-CCNC: 45 U/L — HIGH (ref 4–33)
ANION GAP SERPL CALC-SCNC: 26 MMO/L — HIGH (ref 7–14)
APTT BLD: 69.9 SEC — HIGH (ref 27.5–36.3)
APTT BLD: 76.4 SEC — HIGH (ref 27.5–36.3)
APTT BLD: 77 SEC — HIGH (ref 27.5–36.3)
APTT BLD: 92.1 SEC — HIGH (ref 27.5–36.3)
AST SERPL-CCNC: 25 U/L — SIGNIFICANT CHANGE UP (ref 4–32)
BASE EXCESS BLDA CALC-SCNC: -4.4 MMOL/L — SIGNIFICANT CHANGE UP
BASE EXCESS BLDA CALC-SCNC: -6.3 MMOL/L — SIGNIFICANT CHANGE UP
BASOPHILS # BLD AUTO: 0.05 K/UL — SIGNIFICANT CHANGE UP (ref 0–0.2)
BASOPHILS NFR BLD AUTO: 0.5 % — SIGNIFICANT CHANGE UP (ref 0–2)
BILIRUB SERPL-MCNC: < 0.2 MG/DL — LOW (ref 0.2–1.2)
BLOOD GAS ARTERIAL - FIO2: 40 — SIGNIFICANT CHANGE UP
BUN SERPL-MCNC: 134 MG/DL — HIGH (ref 7–23)
CA-I BLDA-SCNC: 1.35 MMOL/L — HIGH (ref 1.15–1.29)
CALCIUM SERPL-MCNC: 10.7 MG/DL — HIGH (ref 8.4–10.5)
CHLORIDE BLDA-SCNC: 110 MMOL/L — HIGH (ref 96–108)
CHLORIDE SERPL-SCNC: 98 MMOL/L — SIGNIFICANT CHANGE UP (ref 98–107)
CO2 SERPL-SCNC: 17 MMOL/L — LOW (ref 22–31)
CREAT SERPL-MCNC: 5.26 MG/DL — HIGH (ref 0.5–1.3)
CRP SERPL-MCNC: 79.4 MG/L — HIGH
D DIMER BLD IA.RAPID-MCNC: 2614 NG/ML — SIGNIFICANT CHANGE UP
EOSINOPHIL # BLD AUTO: 0.55 K/UL — HIGH (ref 0–0.5)
EOSINOPHIL NFR BLD AUTO: 5.4 % — SIGNIFICANT CHANGE UP (ref 0–6)
FERRITIN SERPL-MCNC: 680.4 NG/ML — HIGH (ref 15–150)
FIBRINOGEN PPP-MCNC: 806 MG/DL — HIGH (ref 300–520)
GLUCOSE BLDA-MCNC: 102 MG/DL — HIGH (ref 70–99)
GLUCOSE BLDA-MCNC: 110 MG/DL — HIGH (ref 70–99)
GLUCOSE SERPL-MCNC: 114 MG/DL — HIGH (ref 70–99)
HCO3 BLDA-SCNC: 19 MMOL/L — LOW (ref 22–26)
HCO3 BLDA-SCNC: 21 MMOL/L — LOW (ref 22–26)
HCT VFR BLD CALC: 25.7 % — LOW (ref 34.5–45)
HCT VFR BLDA CALC: 27.2 % — LOW (ref 34.5–46.5)
HCT VFR BLDA CALC: 27.4 % — LOW (ref 34.5–46.5)
HGB BLD-MCNC: 8.5 G/DL — LOW (ref 11.5–15.5)
HGB BLDA-MCNC: 8.8 G/DL — LOW (ref 11.5–15.5)
HGB BLDA-MCNC: 8.8 G/DL — LOW (ref 11.5–15.5)
IMM GRANULOCYTES NFR BLD AUTO: 2.8 % — HIGH (ref 0–1.5)
INR BLD: 0.97 — SIGNIFICANT CHANGE UP (ref 0.88–1.17)
INR BLD: 0.99 — SIGNIFICANT CHANGE UP (ref 0.88–1.17)
LACTATE BLDA-SCNC: 0.6 MMOL/L — SIGNIFICANT CHANGE UP (ref 0.5–2)
LACTATE BLDA-SCNC: 1.5 MMOL/L — SIGNIFICANT CHANGE UP (ref 0.5–2)
LDH SERPL L TO P-CCNC: 276 U/L — HIGH (ref 135–225)
LYMPHOCYTES # BLD AUTO: 1.03 K/UL — SIGNIFICANT CHANGE UP (ref 1–3.3)
LYMPHOCYTES # BLD AUTO: 10.1 % — LOW (ref 13–44)
MAGNESIUM SERPL-MCNC: 3.6 MG/DL — HIGH (ref 1.6–2.6)
MCHC RBC-ENTMCNC: 29.4 PG — SIGNIFICANT CHANGE UP (ref 27–34)
MCHC RBC-ENTMCNC: 33.1 % — SIGNIFICANT CHANGE UP (ref 32–36)
MCV RBC AUTO: 88.9 FL — SIGNIFICANT CHANGE UP (ref 80–100)
MONOCYTES # BLD AUTO: 1.22 K/UL — HIGH (ref 0–0.9)
MONOCYTES NFR BLD AUTO: 11.9 % — SIGNIFICANT CHANGE UP (ref 2–14)
NEUTROPHILS # BLD AUTO: 7.08 K/UL — SIGNIFICANT CHANGE UP (ref 1.8–7.4)
NEUTROPHILS NFR BLD AUTO: 69.3 % — SIGNIFICANT CHANGE UP (ref 43–77)
NRBC # FLD: 0 K/UL — SIGNIFICANT CHANGE UP (ref 0–0)
NT-PROBNP SERPL-SCNC: 4706 PG/ML — SIGNIFICANT CHANGE UP
PCO2 BLDA: 43 MMHG — SIGNIFICANT CHANGE UP (ref 32–48)
PCO2 BLDA: 45 MMHG — SIGNIFICANT CHANGE UP (ref 32–48)
PH BLDA: 7.27 PH — LOW (ref 7.35–7.45)
PH BLDA: 7.29 PH — LOW (ref 7.35–7.45)
PHOSPHATE SERPL-MCNC: 11.1 MG/DL — HIGH (ref 2.5–4.5)
PLATELET # BLD AUTO: 220 K/UL — SIGNIFICANT CHANGE UP (ref 150–400)
PMV BLD: 11.5 FL — SIGNIFICANT CHANGE UP (ref 7–13)
PO2 BLDA: 110 MMHG — HIGH (ref 83–108)
PO2 BLDA: 140 MMHG — HIGH (ref 83–108)
POTASSIUM BLDA-SCNC: 3.8 MMOL/L — SIGNIFICANT CHANGE UP (ref 3.4–4.5)
POTASSIUM BLDA-SCNC: 4.1 MMOL/L — SIGNIFICANT CHANGE UP (ref 3.4–4.5)
POTASSIUM SERPL-MCNC: 4.2 MMOL/L — SIGNIFICANT CHANGE UP (ref 3.5–5.3)
POTASSIUM SERPL-SCNC: 4.2 MMOL/L — SIGNIFICANT CHANGE UP (ref 3.5–5.3)
PROT SERPL-MCNC: 6.5 G/DL — SIGNIFICANT CHANGE UP (ref 6–8.3)
PROTHROM AB SERPL-ACNC: 11.1 SEC — SIGNIFICANT CHANGE UP (ref 9.8–13.1)
PROTHROM AB SERPL-ACNC: 11.4 SEC — SIGNIFICANT CHANGE UP (ref 9.8–13.1)
RBC # BLD: 2.89 M/UL — LOW (ref 3.8–5.2)
RBC # FLD: 15.9 % — HIGH (ref 10.3–14.5)
SAO2 % BLDA: 97.5 % — SIGNIFICANT CHANGE UP (ref 95–99)
SAO2 % BLDA: 98.7 % — SIGNIFICANT CHANGE UP (ref 95–99)
SODIUM BLDA-SCNC: 146 MMOL/L — SIGNIFICANT CHANGE UP (ref 136–146)
SODIUM BLDA-SCNC: 148 MMOL/L — HIGH (ref 136–146)
SODIUM SERPL-SCNC: 141 MMOL/L — SIGNIFICANT CHANGE UP (ref 135–145)
TROPONIN T, HIGH SENSITIVITY: 93 NG/L — CRITICAL HIGH (ref ?–14)
WBC # BLD: 10.22 K/UL — SIGNIFICANT CHANGE UP (ref 3.8–10.5)
WBC # FLD AUTO: 10.22 K/UL — SIGNIFICANT CHANGE UP (ref 3.8–10.5)

## 2020-04-14 PROCEDURE — 99232 SBSQ HOSP IP/OBS MODERATE 35: CPT

## 2020-04-14 PROCEDURE — 99292 CRITICAL CARE ADDL 30 MIN: CPT

## 2020-04-14 PROCEDURE — 99291 CRITICAL CARE FIRST HOUR: CPT

## 2020-04-14 RX ORDER — VASOPRESSIN 20 [USP'U]/ML
0.04 INJECTION INTRAVENOUS
Qty: 50 | Refills: 0 | Status: DISCONTINUED | OUTPATIENT
Start: 2020-04-14 | End: 2020-04-19

## 2020-04-14 RX ORDER — HEPARIN SODIUM 5000 [USP'U]/ML
900 INJECTION INTRAVENOUS; SUBCUTANEOUS
Qty: 25000 | Refills: 0 | Status: DISCONTINUED | OUTPATIENT
Start: 2020-04-14 | End: 2020-04-19

## 2020-04-14 RX ORDER — BUMETANIDE 0.25 MG/ML
1 INJECTION INTRAMUSCULAR; INTRAVENOUS ONCE
Refills: 0 | Status: COMPLETED | OUTPATIENT
Start: 2020-04-14 | End: 2020-04-14

## 2020-04-14 RX ADMIN — CHLORHEXIDINE GLUCONATE 15 MILLILITER(S): 213 SOLUTION TOPICAL at 04:49

## 2020-04-14 RX ADMIN — Medication 650 MILLIGRAM(S): at 04:49

## 2020-04-14 RX ADMIN — Medication 650 MILLIGRAM(S): at 12:52

## 2020-04-14 RX ADMIN — FAMOTIDINE 20 MILLIGRAM(S): 10 INJECTION INTRAVENOUS at 12:51

## 2020-04-14 RX ADMIN — Medication 216 GRAM(S): at 21:01

## 2020-04-14 RX ADMIN — DEXMEDETOMIDINE HYDROCHLORIDE IN 0.9% SODIUM CHLORIDE 3.61 MICROGRAM(S)/KG/HR: 4 INJECTION INTRAVENOUS at 08:37

## 2020-04-14 RX ADMIN — Medication 1334 MILLIGRAM(S): at 18:49

## 2020-04-14 RX ADMIN — Medication 650 MILLIGRAM(S): at 21:03

## 2020-04-14 RX ADMIN — Medication 1 DROP(S): at 04:49

## 2020-04-14 RX ADMIN — Medication 216 GRAM(S): at 04:49

## 2020-04-14 RX ADMIN — BUMETANIDE 1 MILLIGRAM(S): 0.25 INJECTION INTRAMUSCULAR; INTRAVENOUS at 09:59

## 2020-04-14 RX ADMIN — CHLORHEXIDINE GLUCONATE 1 APPLICATION(S): 213 SOLUTION TOPICAL at 04:43

## 2020-04-14 RX ADMIN — Medication 1334 MILLIGRAM(S): at 12:51

## 2020-04-14 RX ADMIN — Medication 1334 MILLIGRAM(S): at 06:25

## 2020-04-14 RX ADMIN — CHLORHEXIDINE GLUCONATE 15 MILLILITER(S): 213 SOLUTION TOPICAL at 18:50

## 2020-04-14 RX ADMIN — Medication 1334 MILLIGRAM(S): at 21:03

## 2020-04-14 RX ADMIN — Medication 1 DROP(S): at 12:51

## 2020-04-14 RX ADMIN — HEPARIN SODIUM 9 UNIT(S)/HR: 5000 INJECTION INTRAVENOUS; SUBCUTANEOUS at 08:37

## 2020-04-14 RX ADMIN — Medication 1 DROP(S): at 18:50

## 2020-04-14 RX ADMIN — Medication 216 GRAM(S): at 12:51

## 2020-04-14 RX ADMIN — SODIUM ZIRCONIUM CYCLOSILICATE 10 GRAM(S): 10 POWDER, FOR SUSPENSION ORAL at 21:03

## 2020-04-14 NOTE — PROGRESS NOTE ADULT - SUBJECTIVE AND OBJECTIVE BOX
ALEXA GARCIA            MRN-[id number]         No Known Allergies               60 y/o F with no PMH p/w fever and cough since Thursday. Tmax 103. She presented to urgent care initially and was treated with tamiflu for presumed flu. Despite this she continued to have fevers. Pt denies recent travel, sick contacts or any other sx or acute complaints. Endorses pinching pain in chest when she coughs. Also with nausea and poor PO intake. Denies shortness of breath, abdominal pain, dysuria or LE edema. (16 Mar 2020 02:05)      Issues:              Acute hypoxic respiratory failure              ARDS              COVID-19+              SAM - On CRRT  Hyperkalemia               Anemia  AMS / Encephalopathy    Drips:   Precedex              Home Medications:      PAST MEDICAL & SURGICAL HISTORY:  No pertinent past medical history  No significant past surgical history        ICU Vital Signs Last 24 Hrs  T(C): 36.2 (14 Apr 2020 08:00), Max: 36.6 (14 Apr 2020 04:00)  T(F): 97.2 (14 Apr 2020 08:00), Max: 97.8 (14 Apr 2020 04:00)  HR: 109 (14 Apr 2020 12:00) (85 - 109)  BP: --  BP(mean): --  ABP: 141/68 (14 Apr 2020 12:00) (129/60 - 141/68)  ABP(mean): 95 (14 Apr 2020 12:00) (85 - 95)  RR: 12 (14 Apr 2020 12:00) (10 - 14)  SpO2: 100% (14 Apr 2020 12:00) (100% - 100%)    I&O's Detail    13 Apr 2020 07:01  -  14 Apr 2020 07:00  --------------------------------------------------------  IN:    dexmedetomidine Infusion: 79.2 mL    Enteral Tube Flush: 120 mL    heparin Infusion: 18 mL    heparin Infusion: 152 mL    ns in tub fed  [id number]: 440 mL    Solution: 100 mL  Total IN: 909.2 mL    OUT:    Rectal Tube: 100 mL    Voided: 850 mL  Total OUT: 950 mL    Total NET: -40.8 mL      14 Apr 2020 07:01  -  14 Apr 2020 15:21  --------------------------------------------------------  IN:    dexmedetomidine Infusion: 10.8 mL    Enteral Tube Flush: 60 mL    heparin Infusion: 52 mL    ns in tub fed  [id number]: 120 mL  Total IN: 242.8 mL    OUT:    Voided: 850 mL  Total OUT: 850 mL    Total NET: -607.2 mL        CAPILLARY BLOOD GLUCOSE      POCT Blood Glucose.: 99 mg/dL (13 Apr 2020 06:08)      Home Medications:      MEDICATIONS  (STANDING):  ampicillin  IVPB      ampicillin  IVPB 2 Gram(s) IV Intermittent every 8 hours  artificial tears (preservative free) Ophthalmic Solution 1 Drop(s) Both EYES every 6 hours  calcium acetate 1334 milliGRAM(s) Oral four times a day with meals  chlorhexidine 0.12% Liquid 15 milliLiter(s) Oral Mucosa every 12 hours  chlorhexidine 4% Liquid 1 Application(s) Topical <User Schedule>  dexMEDEtomidine Infusion 0.2 MICROgram(s)/kG/Hr (3.61 mL/Hr) IV Continuous <Continuous>  famotidine Injectable 20 milliGRAM(s) IV Push daily  heparin  Infusion 900 Unit(s)/Hr (8 mL/Hr) IV Continuous <Continuous>  norepinephrine Infusion 0.05 MICROgram(s)/kG/Min (3.38 mL/Hr) IV Continuous <Continuous>  sodium bicarbonate 650 milliGRAM(s) Oral three times a day  sodium zirconium cyclosilicate 10 Gram(s) Oral three times a day  vasopressin Infusion 0.001 Unit(s)/Min (0.03 mL/Hr) IV Continuous <Continuous>    MEDICATIONS  (PRN):  acetaminophen    Suspension .. 650 milliGRAM(s) Oral every 6 hours PRN Temp greater or equal to 38C (100.4F)      Mode: CPAP with PS  FiO2: 40  PEEP: 5  PS: 5  MAP: 7  PIP: 11      CAPILLARY BLOOD GLUCOSE      Physical exam:                            General:                Sedated                                               Neuro:                  Off sedation, did not follow commands                          Cardiovascular:   S1 & S2, regular                           Respiratory:         Coarse breath sounds                          GI:                          Soft, nondistended, Bowel sounds +                            Ext:                        Edema  Labs:                                                                           8.5    10.22 )-----------( 220      ( 14 Apr 2020 05:00 )             25.7             04-14    141  |  98  |  134<H>  ----------------------------<  114<H>  4.2   |  17<L>  |  5.26<H>    Ca    10.7<H>      14 Apr 2020 05:00  Phos  11.1     04-14  Mg     3.6     04-14    TPro  6.5  /  Alb  2.7<L>  /  TBili  < 0.2<L>  /  DBili  x   /  AST  25  /  ALT  45<H>  /  AlkPhos  96  04-14                  PT/INR - ( 14 Apr 2020 05:00 )   PT: 11.1 SEC;   INR: 0.97          PTT - ( 14 Apr 2020 05:00 )  PTT:92.1 SEC  LIVER FUNCTIONS - ( 14 Apr 2020 05:00 )  Alb: 2.7 g/dL / Pro: 6.5 g/dL / ALK PHOS: 96 u/L / ALT: 45 u/L / AST: 25 u/L / GGT: x          Transcutaneous Bilirubin                CXR:  < from: Xray Chest 1 View- PORTABLE-Urgent (04.12.20 @ 09:57) >  ET tube tip is above the ashley. Left-sided IJ approach central line with the tip in the brachiocephalic vein, unchanged compared to prior studies. Enteric feeding tube courses below the diaphragm with the tip out of field of view however the side port is within the stomach.    Unchanged bilateral patchy opacities.    The bones are unremarkable.    IMPRESSION: Enteric feeding tube with the side port in the stomach; the tip is not imaged on this study. Bilateral patchy opacities that are unchanged and may be seen in the setting of infection.          Plan:    General: 60 y/o F with no PMH p/w fever and cough since Thursday. Tmax 103. She presented to urgent care initially and was treated with tamiflu for presumed flu. Despite this she continued to have fevers. Pt denies recent travel, sick contacts or any other sx or acute complaints. Endorses pinching pain in chest when she coughs. Also with nausea and poor PO intake. Denies shortness of breath, abdominal pain, dysuria or LE edema. (16 Mar 2020 02:05)                              Neuro:                                         Currently on Precedex. Off sedation, non-focal but not following commands                            Cardiovascular:                                          Continue hemodynamic monitoring.                                         Tachycardia - Start Lopressor 2.5mg IVP Q6hrs                                        Daily EKGs, monitor QTc                                                                  Respiratory:                                         Acute hypoxemic respiratory failure due to Pneumonia / COVID-19. Extubated and got reintubated                                         On full mechanical vent support  AC-40%-5                                                Wean FiO2 as tolerated                                                CPAP wean as tolerated. Not a candidate for extubation at this time until mental status improves                                                Continue bronchodilators, pulmonary toilet as tolerated                            GI                                         NGT feeds as tolerated                                         Continue Pepcid                                         Bowel regimen.	                                                                 Renal:                                         Strict I/Os                                         SAM - Currently off CRRT. D/W Renal, as K is under control and not fluid overload - continue to watch  Continue Bicarb tabs + Lokelma to control K                                         Monitor BUN / Cr / Lytes                                                 Hem/ Onc:                                                                                  Follow CBC  & signs of bleeding                           Infectious disease:                                            Pneumonia                                           COVID-19 +                                          Follow cultures                            Endocrine                                            DM-2: Continue Accu-Checks with coverage.       Pertinent clinical, laboratory, radiographic, hemodynamic, echocardiographic, respiratory data, microbiologic data and chart were reviewed and analyzed frequently throughout the course of the day and night  Patient seen, examined and plan discussed with  CTICU team during rounds.    Pt's status &  goals of care  discussed with family.      I have spent  45  minutes of critical care time with this pt between  7am  and 11.59 pm             Favian Moreland MD ALEXA GARCIA            MRN-[id number]         No Known Allergies               58 y/o F with no PMH p/w fever and cough since Thursday. Tmax 103. She presented to urgent care initially and was treated with tamiflu for presumed flu. Despite this she continued to have fevers. Pt denies recent travel, sick contacts or any other sx or acute complaints. Endorses pinching pain in chest when she coughs. Also with nausea and poor PO intake. Denies shortness of breath, abdominal pain, dysuria or LE edema. (16 Mar 2020 02:05)      Issues:              Acute hypoxic respiratory failure              ARDS              COVID-19+              SAM   Hyperkalemia               Anemia  AMS / Encephalopathy    Drips:   Precedex              Home Medications:      PAST MEDICAL & SURGICAL HISTORY:  No pertinent past medical history  No significant past surgical history        ICU Vital Signs Last 24 Hrs  T(C): 36.2 (14 Apr 2020 08:00), Max: 36.6 (14 Apr 2020 04:00)  T(F): 97.2 (14 Apr 2020 08:00), Max: 97.8 (14 Apr 2020 04:00)  HR: 109 (14 Apr 2020 12:00) (85 - 109)  BP: --  BP(mean): --  ABP: 141/68 (14 Apr 2020 12:00) (129/60 - 141/68)  ABP(mean): 95 (14 Apr 2020 12:00) (85 - 95)  RR: 12 (14 Apr 2020 12:00) (10 - 14)  SpO2: 100% (14 Apr 2020 12:00) (100% - 100%)    I&O's Detail    13 Apr 2020 07:01  -  14 Apr 2020 07:00  --------------------------------------------------------  IN:    dexmedetomidine Infusion: 79.2 mL    Enteral Tube Flush: 120 mL    heparin Infusion: 18 mL    heparin Infusion: 152 mL    ns in tub fed  [id number]: 440 mL    Solution: 100 mL  Total IN: 909.2 mL    OUT:    Rectal Tube: 100 mL    Voided: 850 mL  Total OUT: 950 mL    Total NET: -40.8 mL      14 Apr 2020 07:01  -  14 Apr 2020 15:21  --------------------------------------------------------  IN:    dexmedetomidine Infusion: 10.8 mL    Enteral Tube Flush: 60 mL    heparin Infusion: 52 mL    ns in tub fed  [id number]: 120 mL  Total IN: 242.8 mL    OUT:    Voided: 850 mL  Total OUT: 850 mL    Total NET: -607.2 mL        CAPILLARY BLOOD GLUCOSE      POCT Blood Glucose.: 99 mg/dL (13 Apr 2020 06:08)      Home Medications:      MEDICATIONS  (STANDING):  ampicillin  IVPB      ampicillin  IVPB 2 Gram(s) IV Intermittent every 8 hours  artificial tears (preservative free) Ophthalmic Solution 1 Drop(s) Both EYES every 6 hours  calcium acetate 1334 milliGRAM(s) Oral four times a day with meals  chlorhexidine 0.12% Liquid 15 milliLiter(s) Oral Mucosa every 12 hours  chlorhexidine 4% Liquid 1 Application(s) Topical <User Schedule>  dexMEDEtomidine Infusion 0.2 MICROgram(s)/kG/Hr (3.61 mL/Hr) IV Continuous <Continuous>  famotidine Injectable 20 milliGRAM(s) IV Push daily  heparin  Infusion 900 Unit(s)/Hr (8 mL/Hr) IV Continuous <Continuous>  norepinephrine Infusion 0.05 MICROgram(s)/kG/Min (3.38 mL/Hr) IV Continuous <Continuous>  sodium bicarbonate 650 milliGRAM(s) Oral three times a day  sodium zirconium cyclosilicate 10 Gram(s) Oral three times a day  vasopressin Infusion 0.001 Unit(s)/Min (0.03 mL/Hr) IV Continuous <Continuous>    MEDICATIONS  (PRN):  acetaminophen    Suspension .. 650 milliGRAM(s) Oral every 6 hours PRN Temp greater or equal to 38C (100.4F)      Mode: CPAP with PS  FiO2: 40  PEEP: 5  PS: 5  MAP: 7  PIP: 11      CAPILLARY BLOOD GLUCOSE      Physical exam:                            General:                Sedated                                               Neuro:                  Off sedation, did not follow commands                          Cardiovascular:   S1 & S2, regular                           Respiratory:         Coarse breath sounds                          GI:                          Soft, nondistended, Bowel sounds +                            Ext:                        Edema  Labs:                                                                           8.5    10.22 )-----------( 220      ( 14 Apr 2020 05:00 )             25.7             04-14    141  |  98  |  134<H>  ----------------------------<  114<H>  4.2   |  17<L>  |  5.26<H>    Ca    10.7<H>      14 Apr 2020 05:00  Phos  11.1     04-14  Mg     3.6     04-14    TPro  6.5  /  Alb  2.7<L>  /  TBili  < 0.2<L>  /  DBili  x   /  AST  25  /  ALT  45<H>  /  AlkPhos  96  04-14                  PT/INR - ( 14 Apr 2020 05:00 )   PT: 11.1 SEC;   INR: 0.97          PTT - ( 14 Apr 2020 05:00 )  PTT:92.1 SEC  LIVER FUNCTIONS - ( 14 Apr 2020 05:00 )  Alb: 2.7 g/dL / Pro: 6.5 g/dL / ALK PHOS: 96 u/L / ALT: 45 u/L / AST: 25 u/L / GGT: x          Transcutaneous Bilirubin                CXR:  < from: Xray Chest 1 View- PORTABLE-Urgent (04.12.20 @ 09:57) >  ET tube tip is above the ashley. Left-sided IJ approach central line with the tip in the brachiocephalic vein, unchanged compared to prior studies. Enteric feeding tube courses below the diaphragm with the tip out of field of view however the side port is within the stomach.    Unchanged bilateral patchy opacities.    The bones are unremarkable.    IMPRESSION: Enteric feeding tube with the side port in the stomach; the tip is not imaged on this study. Bilateral patchy opacities that are unchanged and may be seen in the setting of infection.          Plan:    General: 58 y/o F with no PMH p/w fever and cough since Thursday. Tmax 103. She presented to urgent care initially and was treated with tamiflu for presumed flu. Despite this she continued to have fevers. Pt denies recent travel, sick contacts or any other sx or acute complaints. Endorses pinching pain in chest when she coughs. Also with nausea and poor PO intake. Denies shortness of breath, abdominal pain, dysuria or LE edema. (16 Mar 2020 02:05)                              Neuro:                                         Currently on Precedex. Off sedation, non-focal but not following commands                            Cardiovascular:                                          Continue hemodynamic monitoring.                                         Tachycardia - Start Lopressor 2.5mg IVP Q6hrs                                        Daily EKGs, monitor QTc                                                                  Respiratory:                                         Acute hypoxemic respiratory failure due to Pneumonia / COVID-19. Extubated and got reintubated                                         On full mechanical vent support  AC-40%-5                                                Wean FiO2 as tolerated                                                CPAP wean as tolerated. Not a candidate for extubation at this time until mental status improves                                                Continue bronchodilators, pulmonary toilet as tolerated                            GI                                         NGT feeds as tolerated                                         Continue Pepcid                                         Bowel regimen.	                                                                 Renal:                                         Strict I/Os                                         SAM - Currently off CRRT. D/W Renal, as K is under control and not fluid overload - continue to watch  Continue Bicarb tabs + Lokelma to control K                                         Monitor BUN / Cr / Lytes                                                 Hem/ Onc:                                                                                  Follow CBC  & signs of bleeding                           Infectious disease:                                            Pneumonia                                           COVID-19 +                                          Follow cultures                            Endocrine                                            DM-2: Continue Accu-Checks with coverage.       Pertinent clinical, laboratory, radiographic, hemodynamic, echocardiographic, respiratory data, microbiologic data and chart were reviewed and analyzed frequently throughout the course of the day and night  Patient seen, examined and plan discussed with  CTICU team during rounds.    Pt's status &  goals of care  discussed with family.      I have spent  75  minutes of critical care time with this pt between  7am  and 11.59 pm             Favian Moreland MD

## 2020-04-14 NOTE — PROGRESS NOTE ADULT - SUBJECTIVE AND OBJECTIVE BOX
[facility] DIVISION OF KIDNEY DISEASES AND HYPERTENSION --    HPI: 58 yo F admitted to ICU for respiratory failure, pneumonia, SAM and COVID-19. Scr increased to 4.6 on 3/23/20. Pt. initiated on CRRT/CVVHDF on 3/23/20, discontinued on 4/9/20.     Pt. was seen and examined in ICU. Pt. is intubated, on IV vasopressor support. Pt. noted to have increased UOP (850 mL) in last 24 hrs.      PAST HISTORY  --------------------------------------------------------------------------------  No significant changes to PMH, PSH, FHx, SHx, unless otherwise noted    ALLERGIES & MEDICATIONS  --------------------------------------------------------------------------------  Allergies    No Known Allergies    Intolerances    Standing Inpatient Medications  ampicillin  IVPB      ampicillin  IVPB 2 Gram(s) IV Intermittent every 8 hours  artificial tears (preservative free) Ophthalmic Solution 1 Drop(s) Both EYES every 6 hours  calcium acetate 1334 milliGRAM(s) Oral four times a day with meals  chlorhexidine 0.12% Liquid 15 milliLiter(s) Oral Mucosa every 12 hours  chlorhexidine 4% Liquid 1 Application(s) Topical <User Schedule>  dexMEDEtomidine Infusion 0.2 MICROgram(s)/kG/Hr IV Continuous <Continuous>  famotidine Injectable 20 milliGRAM(s) IV Push daily  heparin  Infusion 900 Unit(s)/Hr IV Continuous <Continuous>  norepinephrine Infusion 0.05 MICROgram(s)/kG/Min IV Continuous <Continuous>  sodium bicarbonate 650 milliGRAM(s) Oral three times a day  sodium zirconium cyclosilicate 10 Gram(s) Oral three times a day  vasopressin Infusion 0.001 Unit(s)/Min IV Continuous <Continuous>    PRN Inpatient Medications  acetaminophen    Suspension .. 650 milliGRAM(s) Oral every 6 hours PRN    REVIEW OF SYSTEMS  --------------------------------------------------------------------------------  Unable to obtain.    VITALS/PHYSICAL EXAM  --------------------------------------------------------------------------------  T(C): 36.6 (04-14-20 @ 04:00), Max: 36.7 (04-13-20 @ 12:00)  HR: 85 (04-14-20 @ 08:49) (85 - 94)  BP: --  RR: 11 (04-14-20 @ 04:00) (10 - 12)  SpO2: 100% (04-14-20 @ 08:49) (100% - 100%)  Wt(kg): --    04-13-20 @ 07:01  -  04-14-20 @ 07:00  --------------------------------------------------------  IN: 909.2 mL / OUT: 950 mL / NET: -40.8 mL    Physical Exam:  	Gen: intubated  	HEENT: +ETT  	Pulm: +mechanical breath sounds  	CV: S1S2+  	Abd: Soft, non-distended   	Ext: No LE edema B/L  	Skin: Warm              Access: Right IJ non tunneled catheter+    LABS/STUDIES  --------------------------------------------------------------------------------              8.5    10.22 >-----------<  220      [04-14-20 @ 05:00]              25.7     141  |  98  |  134  ----------------------------<  114      [04-14-20 @ 05:00]  4.2   |  17  |  5.26        Ca     10.7     [04-14-20 @ 05:00]      Mg     3.6     [04-14-20 @ 05:00]      Phos  11.1     [04-14-20 @ 05:00]    TPro  6.5  /  Alb  2.7  /  TBili  < 0.2  /  DBili  x   /  AST  25  /  ALT  45  /  AlkPhos  96  [04-14-20 @ 05:00]    Creatinine Trend:  SCr 5.26 [04-14 @ 05:00]  SCr 5.45 [04-13 @ 21:43]  SCr 5.42 [04-13 @ 18:00]  SCr 5.69 [04-13 @ 02:50]  SCr 5.72 [04-12 @ 20:45]

## 2020-04-14 NOTE — PROGRESS NOTE ADULT - PROBLEM SELECTOR PLAN 1
Pt. with SAM in the setting of hypotension and COVID-19. Scr on admission (3/15/20) was 0.84, increased to 4.6 on 3/26/20. Pt. with likely ATN. Pt. was started on CRRT/CVVHDF on 3/26/20, discontinued on 4/9/20. Pt. noted to have increased urine output (850 mL) in last 24 hrs. Labs from today reviewed. Scr elevated/stable at 5.26 today. Serum potassium WNL on sodium zirconium cyclosilicate 10 G TID. Recommend to decrease to BID (from TID). No plan for CRRT/HD today. Will assess daily for CRRT/HD. Check renal sonogram (when feasible). Monitor labs and urine output. Avoid any potential nephrotoxins.

## 2020-04-15 LAB
ALBUMIN SERPL ELPH-MCNC: 2.8 G/DL — LOW (ref 3.3–5)
ALP SERPL-CCNC: 91 U/L — SIGNIFICANT CHANGE UP (ref 40–120)
ALT FLD-CCNC: 37 U/L — HIGH (ref 4–33)
ANION GAP SERPL CALC-SCNC: 25 MMO/L — HIGH (ref 7–14)
ANISOCYTOSIS BLD QL: SLIGHT — SIGNIFICANT CHANGE UP
APTT BLD: 55.6 SEC — HIGH (ref 27.5–36.3)
APTT BLD: 67.8 SEC — HIGH (ref 27.5–36.3)
AST SERPL-CCNC: 25 U/L — SIGNIFICANT CHANGE UP (ref 4–32)
BASE EXCESS BLDA CALC-SCNC: -3.8 MMOL/L — SIGNIFICANT CHANGE UP
BASOPHILS # BLD AUTO: 0.01 K/UL — SIGNIFICANT CHANGE UP (ref 0–0.2)
BASOPHILS # BLD AUTO: 0.04 K/UL — SIGNIFICANT CHANGE UP (ref 0–0.2)
BASOPHILS NFR BLD AUTO: 0.1 % — SIGNIFICANT CHANGE UP (ref 0–2)
BASOPHILS NFR BLD AUTO: 0.4 % — SIGNIFICANT CHANGE UP (ref 0–2)
BASOPHILS NFR SPEC: 0 % — SIGNIFICANT CHANGE UP (ref 0–2)
BILIRUB SERPL-MCNC: < 0.2 MG/DL — LOW (ref 0.2–1.2)
BLASTS # FLD: 0 % — SIGNIFICANT CHANGE UP (ref 0–0)
BUN SERPL-MCNC: 133 MG/DL — HIGH (ref 7–23)
CALCIUM SERPL-MCNC: 10.7 MG/DL — HIGH (ref 8.4–10.5)
CHLORIDE SERPL-SCNC: 102 MMOL/L — SIGNIFICANT CHANGE UP (ref 98–107)
CK SERPL-CCNC: 18 U/L — LOW (ref 25–170)
CO2 SERPL-SCNC: 21 MMOL/L — LOW (ref 22–31)
CREAT SERPL-MCNC: 4.87 MG/DL — HIGH (ref 0.5–1.3)
CRP SERPL-MCNC: 88.9 MG/L — HIGH
D DIMER BLD IA.RAPID-MCNC: 2020 NG/ML — SIGNIFICANT CHANGE UP
DACRYOCYTES BLD QL SMEAR: SLIGHT — SIGNIFICANT CHANGE UP
EOSINOPHIL # BLD AUTO: 0.24 K/UL — SIGNIFICANT CHANGE UP (ref 0–0.5)
EOSINOPHIL # BLD AUTO: 0.26 K/UL — SIGNIFICANT CHANGE UP (ref 0–0.5)
EOSINOPHIL NFR BLD AUTO: 2.7 % — SIGNIFICANT CHANGE UP (ref 0–6)
EOSINOPHIL NFR BLD AUTO: 3.5 % — SIGNIFICANT CHANGE UP (ref 0–6)
EOSINOPHIL NFR FLD: 2.6 % — SIGNIFICANT CHANGE UP (ref 0–6)
FERRITIN SERPL-MCNC: 686.8 NG/ML — HIGH (ref 15–150)
FIBRINOGEN PPP-MCNC: 772 MG/DL — HIGH (ref 300–520)
GLUCOSE BLDC GLUCOMTR-MCNC: 106 MG/DL — HIGH (ref 70–99)
GLUCOSE BLDC GLUCOMTR-MCNC: 110 MG/DL — HIGH (ref 70–99)
GLUCOSE BLDC GLUCOMTR-MCNC: 92 MG/DL — SIGNIFICANT CHANGE UP (ref 70–99)
GLUCOSE SERPL-MCNC: 119 MG/DL — HIGH (ref 70–99)
HCO3 BLDA-SCNC: 21 MMOL/L — LOW (ref 22–26)
HCT VFR BLD CALC: 16.1 % — CRITICAL LOW (ref 34.5–45)
HCT VFR BLD CALC: 25.1 % — LOW (ref 34.5–45)
HGB BLD-MCNC: 5.4 G/DL — CRITICAL LOW (ref 11.5–15.5)
HGB BLD-MCNC: 8.3 G/DL — LOW (ref 11.5–15.5)
HYPOCHROMIA BLD QL: SLIGHT — SIGNIFICANT CHANGE UP
IMM GRANULOCYTES NFR BLD AUTO: 2.2 % — HIGH (ref 0–1.5)
IMM GRANULOCYTES NFR BLD AUTO: 2.4 % — HIGH (ref 0–1.5)
INR BLD: 0.99 — SIGNIFICANT CHANGE UP (ref 0.88–1.17)
LDH SERPL L TO P-CCNC: 374 U/L — HIGH (ref 135–225)
LYMPHOCYTES # BLD AUTO: 0.63 K/UL — LOW (ref 1–3.3)
LYMPHOCYTES # BLD AUTO: 0.86 K/UL — LOW (ref 1–3.3)
LYMPHOCYTES # BLD AUTO: 9 % — LOW (ref 13–44)
LYMPHOCYTES # BLD AUTO: 9.2 % — LOW (ref 13–44)
LYMPHOCYTES NFR SPEC AUTO: 7 % — LOW (ref 13–44)
MACROCYTES BLD QL: SLIGHT — SIGNIFICANT CHANGE UP
MAGNESIUM SERPL-MCNC: 3.5 MG/DL — HIGH (ref 1.6–2.6)
MCHC RBC-ENTMCNC: 29.7 PG — SIGNIFICANT CHANGE UP (ref 27–34)
MCHC RBC-ENTMCNC: 30.3 PG — SIGNIFICANT CHANGE UP (ref 27–34)
MCHC RBC-ENTMCNC: 33.1 % — SIGNIFICANT CHANGE UP (ref 32–36)
MCHC RBC-ENTMCNC: 33.5 % — SIGNIFICANT CHANGE UP (ref 32–36)
MCV RBC AUTO: 90 FL — SIGNIFICANT CHANGE UP (ref 80–100)
MCV RBC AUTO: 90.4 FL — SIGNIFICANT CHANGE UP (ref 80–100)
METAMYELOCYTES # FLD: 0 % — SIGNIFICANT CHANGE UP (ref 0–1)
MONOCYTES # BLD AUTO: 0.55 K/UL — SIGNIFICANT CHANGE UP (ref 0–0.9)
MONOCYTES # BLD AUTO: 0.7 K/UL — SIGNIFICANT CHANGE UP (ref 0–0.9)
MONOCYTES NFR BLD AUTO: 7.3 % — SIGNIFICANT CHANGE UP (ref 2–14)
MONOCYTES NFR BLD AUTO: 8 % — SIGNIFICANT CHANGE UP (ref 2–14)
MONOCYTES NFR BLD: 6.1 % — SIGNIFICANT CHANGE UP (ref 2–9)
MYELOCYTES NFR BLD: 0 % — SIGNIFICANT CHANGE UP (ref 0–0)
NEUTROPHIL AB SER-ACNC: 79 % — HIGH (ref 43–77)
NEUTROPHILS # BLD AUTO: 5.26 K/UL — SIGNIFICANT CHANGE UP (ref 1.8–7.4)
NEUTROPHILS # BLD AUTO: 7.47 K/UL — HIGH (ref 1.8–7.4)
NEUTROPHILS NFR BLD AUTO: 77 % — SIGNIFICANT CHANGE UP (ref 43–77)
NEUTROPHILS NFR BLD AUTO: 78.2 % — HIGH (ref 43–77)
NEUTS BAND # BLD: 0.9 % — SIGNIFICANT CHANGE UP (ref 0–6)
NRBC # FLD: 0 K/UL — SIGNIFICANT CHANGE UP (ref 0–0)
NRBC # FLD: 0 K/UL — SIGNIFICANT CHANGE UP (ref 0–0)
NT-PROBNP SERPL-SCNC: 3609 PG/ML — SIGNIFICANT CHANGE UP
OTHER - HEMATOLOGY %: 0 — SIGNIFICANT CHANGE UP
OVALOCYTES BLD QL SMEAR: SLIGHT — SIGNIFICANT CHANGE UP
PCO2 BLDA: 44 MMHG — SIGNIFICANT CHANGE UP (ref 32–48)
PH BLDA: 7.31 PH — LOW (ref 7.35–7.45)
PHOSPHATE SERPL-MCNC: 10.2 MG/DL — HIGH (ref 2.5–4.5)
PLATELET # BLD AUTO: 155 K/UL — SIGNIFICANT CHANGE UP (ref 150–400)
PLATELET # BLD AUTO: 272 K/UL — SIGNIFICANT CHANGE UP (ref 150–400)
PLATELET COUNT - ESTIMATE: NORMAL — SIGNIFICANT CHANGE UP
PMV BLD: 11.2 FL — SIGNIFICANT CHANGE UP (ref 7–13)
PMV BLD: 11.9 FL — SIGNIFICANT CHANGE UP (ref 7–13)
PO2 BLDA: 100 MMHG — SIGNIFICANT CHANGE UP (ref 83–108)
POIKILOCYTOSIS BLD QL AUTO: SLIGHT — SIGNIFICANT CHANGE UP
POLYCHROMASIA BLD QL SMEAR: SLIGHT — SIGNIFICANT CHANGE UP
POTASSIUM SERPL-MCNC: 3.7 MMOL/L — SIGNIFICANT CHANGE UP (ref 3.5–5.3)
POTASSIUM SERPL-SCNC: 3.7 MMOL/L — SIGNIFICANT CHANGE UP (ref 3.5–5.3)
PROCALCITONIN SERPL-MCNC: 0.37 NG/ML — HIGH (ref 0.02–0.1)
PROMYELOCYTES # FLD: 0 % — SIGNIFICANT CHANGE UP (ref 0–0)
PROT SERPL-MCNC: 6.4 G/DL — SIGNIFICANT CHANGE UP (ref 6–8.3)
PROTHROM AB SERPL-ACNC: 11.4 SEC — SIGNIFICANT CHANGE UP (ref 9.8–13.1)
RBC # BLD: 1.78 M/UL — LOW (ref 3.8–5.2)
RBC # BLD: 2.79 M/UL — LOW (ref 3.8–5.2)
RBC # FLD: 16.1 % — HIGH (ref 10.3–14.5)
RBC # FLD: 16.2 % — HIGH (ref 10.3–14.5)
SAO2 % BLDA: 97.2 % — SIGNIFICANT CHANGE UP (ref 95–99)
SODIUM SERPL-SCNC: 148 MMOL/L — HIGH (ref 135–145)
TROPONIN T, HIGH SENSITIVITY: 76 NG/L — CRITICAL HIGH (ref ?–14)
VARIANT LYMPHS # BLD: 3.5 % — SIGNIFICANT CHANGE UP
WBC # BLD: 6.84 K/UL — SIGNIFICANT CHANGE UP (ref 3.8–10.5)
WBC # BLD: 9.56 K/UL — SIGNIFICANT CHANGE UP (ref 3.8–10.5)
WBC # FLD AUTO: 6.84 K/UL — SIGNIFICANT CHANGE UP (ref 3.8–10.5)
WBC # FLD AUTO: 9.56 K/UL — SIGNIFICANT CHANGE UP (ref 3.8–10.5)

## 2020-04-15 PROCEDURE — 99292 CRITICAL CARE ADDL 30 MIN: CPT

## 2020-04-15 PROCEDURE — 99291 CRITICAL CARE FIRST HOUR: CPT

## 2020-04-15 PROCEDURE — 99232 SBSQ HOSP IP/OBS MODERATE 35: CPT | Mod: GC

## 2020-04-15 RX ORDER — BUMETANIDE 0.25 MG/ML
2 INJECTION INTRAMUSCULAR; INTRAVENOUS ONCE
Refills: 0 | Status: COMPLETED | OUTPATIENT
Start: 2020-04-15 | End: 2020-04-15

## 2020-04-15 RX ORDER — BUMETANIDE 0.25 MG/ML
1 INJECTION INTRAMUSCULAR; INTRAVENOUS ONCE
Refills: 0 | Status: COMPLETED | OUTPATIENT
Start: 2020-04-15 | End: 2020-04-15

## 2020-04-15 RX ADMIN — Medication 1334 MILLIGRAM(S): at 04:18

## 2020-04-15 RX ADMIN — FAMOTIDINE 20 MILLIGRAM(S): 10 INJECTION INTRAVENOUS at 11:07

## 2020-04-15 RX ADMIN — Medication 1334 MILLIGRAM(S): at 17:23

## 2020-04-15 RX ADMIN — Medication 1 DROP(S): at 04:17

## 2020-04-15 RX ADMIN — Medication 1 DROP(S): at 22:12

## 2020-04-15 RX ADMIN — Medication 1 DROP(S): at 11:02

## 2020-04-15 RX ADMIN — Medication 1334 MILLIGRAM(S): at 22:11

## 2020-04-15 RX ADMIN — Medication 216 GRAM(S): at 22:10

## 2020-04-15 RX ADMIN — Medication 650 MILLIGRAM(S): at 15:42

## 2020-04-15 RX ADMIN — BUMETANIDE 1 MILLIGRAM(S): 0.25 INJECTION INTRAMUSCULAR; INTRAVENOUS at 17:24

## 2020-04-15 RX ADMIN — DEXMEDETOMIDINE HYDROCHLORIDE IN 0.9% SODIUM CHLORIDE 3.61 MICROGRAM(S)/KG/HR: 4 INJECTION INTRAVENOUS at 22:13

## 2020-04-15 RX ADMIN — CHLORHEXIDINE GLUCONATE 15 MILLILITER(S): 213 SOLUTION TOPICAL at 04:19

## 2020-04-15 RX ADMIN — SODIUM ZIRCONIUM CYCLOSILICATE 10 GRAM(S): 10 POWDER, FOR SUSPENSION ORAL at 15:42

## 2020-04-15 RX ADMIN — Medication 1 DROP(S): at 17:23

## 2020-04-15 RX ADMIN — Medication 1334 MILLIGRAM(S): at 11:07

## 2020-04-15 RX ADMIN — SODIUM ZIRCONIUM CYCLOSILICATE 10 GRAM(S): 10 POWDER, FOR SUSPENSION ORAL at 04:18

## 2020-04-15 RX ADMIN — CHLORHEXIDINE GLUCONATE 1 APPLICATION(S): 213 SOLUTION TOPICAL at 04:18

## 2020-04-15 RX ADMIN — SODIUM ZIRCONIUM CYCLOSILICATE 10 GRAM(S): 10 POWDER, FOR SUSPENSION ORAL at 22:12

## 2020-04-15 RX ADMIN — CHLORHEXIDINE GLUCONATE 15 MILLILITER(S): 213 SOLUTION TOPICAL at 17:23

## 2020-04-15 RX ADMIN — Medication 216 GRAM(S): at 04:19

## 2020-04-15 RX ADMIN — Medication 650 MILLIGRAM(S): at 04:18

## 2020-04-15 RX ADMIN — Medication 216 GRAM(S): at 15:42

## 2020-04-15 RX ADMIN — BUMETANIDE 2 MILLIGRAM(S): 0.25 INJECTION INTRAMUSCULAR; INTRAVENOUS at 11:02

## 2020-04-15 NOTE — PROGRESS NOTE ADULT - SUBJECTIVE AND OBJECTIVE BOX
[facility] DIVISION OF KIDNEY DISEASES AND HYPERTENSION -- FOLLOW UP NOTE  --------------------------------------------------------------------------------  HPI: 59 year-old female admitted to ICU for respiratory failure, pneumonia, SAM and COVID-19. Scr increased to 4.6 on 3/23/20. Pt. initiated on CRRT/CVVHDF on 3/23/20, discontinued on 4/9/20. Scr currently downtrending.     Pt. was seen and examined in ICU. Pt. is intubated, on IV vasopressor support. Pt. noted to have increased UOP (1.9 L) in last 24 hrs with Bumex 2 mg IV x1 yesterday.      PAST HISTORY  --------------------------------------------------------------------------------  No significant changes to PMH, PSH, FHx, SHx, unless otherwise noted    ALLERGIES & MEDICATIONS  --------------------------------------------------------------------------------  Allergies    No Known Allergies    Intolerances    Standing Inpatient Medications  ampicillin  IVPB      ampicillin  IVPB 2 Gram(s) IV Intermittent every 8 hours  artificial tears (preservative free) Ophthalmic Solution 1 Drop(s) Both EYES every 6 hours  calcium acetate 1334 milliGRAM(s) Oral four times a day with meals  chlorhexidine 0.12% Liquid 15 milliLiter(s) Oral Mucosa every 12 hours  chlorhexidine 4% Liquid 1 Application(s) Topical <User Schedule>  dexMEDEtomidine Infusion 0.2 MICROgram(s)/kG/Hr IV Continuous <Continuous>  famotidine Injectable 20 milliGRAM(s) IV Push daily  heparin  Infusion 900 Unit(s)/Hr IV Continuous <Continuous>  sodium bicarbonate 650 milliGRAM(s) Oral three times a day  sodium zirconium cyclosilicate 10 Gram(s) Oral three times a day  vasopressin Infusion 0.04 Unit(s)/Min IV Continuous <Continuous>    REVIEW OF SYSTEMS  --------------------------------------------------------------------------------  Unable to obtain.    VITALS/PHYSICAL EXAM  --------------------------------------------------------------------------------  T(C): 36.9 (04-15-20 @ 04:00), Max: 37.1 (04-14-20 @ 20:00)  HR: 97 (04-15-20 @ 08:57) (97 - 119)  BP: --  RR: 26 (04-15-20 @ 04:00) (12 - 33)  SpO2: 100% (04-15-20 @ 08:57) (100% - 100%)  Wt(kg): --    04-14-20 @ 07:01  -  04-15-20 @ 07:00  --------------------------------------------------------  IN: 1110.8 mL / OUT: 1950 mL / NET: -839.2 mL    Physical Exam:  	Gen: intubated  	HEENT: +ETT  	Pulm: +mechanical breath sounds  	CV: S1S2+  	Abd: Soft, non-distended   	Ext: No LE edema B/L  	Skin: Warm              Access: Right IJ non tunneled catheter+    LABS/STUDIES  --------------------------------------------------------------------------------              8.3    9.56  >-----------<  272      [04-15-20 @ 07:25]              25.1     148  |  102  |  133  ----------------------------<  119      [04-15-20 @ 03:00]  3.7   |  21  |  4.87        Ca     10.7     [04-15-20 @ 03:00]      Mg     3.5     [04-15-20 @ 03:00]      Phos  10.2     [04-15-20 @ 03:00]    Creatinine Trend:  SCr 4.87 [04-15 @ 03:00]  SCr 5.26 [04-14 @ 05:00]  SCr 5.45 [04-13 @ 21:43]  SCr 5.42 [04-13 @ 18:00]  SCr 5.69 [04-13 @ 02:50] [facility] DIVISION OF KIDNEY DISEASES AND HYPERTENSION -- FOLLOW UP NOTE  --------------------------------------------------------------------------------  HPI: 59 year-old female admitted to ICU for respiratory failure, pneumonia, SAM and COVID-19. Scr increased to 4.6 on 3/23/20. Pt. initiated on CRRT/CVVHDF on 3/23/20, discontinued on 4/9/20. Scr currently downtrending.     Pt. was seen and examined in ICU. Pt. is intubated, on IV vasopressor support. Pt. noted to have increased UOP (1.9 L) in last 24 hrs. Pt. received Bumex 2 mg IV x1 yesterday.      PAST HISTORY  --------------------------------------------------------------------------------  No significant changes to PMH, PSH, FHx, SHx, unless otherwise noted    ALLERGIES & MEDICATIONS  --------------------------------------------------------------------------------  Allergies    No Known Allergies    Intolerances    Standing Inpatient Medications  ampicillin  IVPB      ampicillin  IVPB 2 Gram(s) IV Intermittent every 8 hours  artificial tears (preservative free) Ophthalmic Solution 1 Drop(s) Both EYES every 6 hours  calcium acetate 1334 milliGRAM(s) Oral four times a day with meals  chlorhexidine 0.12% Liquid 15 milliLiter(s) Oral Mucosa every 12 hours  chlorhexidine 4% Liquid 1 Application(s) Topical <User Schedule>  dexMEDEtomidine Infusion 0.2 MICROgram(s)/kG/Hr IV Continuous <Continuous>  famotidine Injectable 20 milliGRAM(s) IV Push daily  heparin  Infusion 900 Unit(s)/Hr IV Continuous <Continuous>  sodium bicarbonate 650 milliGRAM(s) Oral three times a day  sodium zirconium cyclosilicate 10 Gram(s) Oral three times a day  vasopressin Infusion 0.04 Unit(s)/Min IV Continuous <Continuous>    REVIEW OF SYSTEMS  --------------------------------------------------------------------------------  Unable to obtain.    VITALS/PHYSICAL EXAM  --------------------------------------------------------------------------------  T(C): 36.9 (04-15-20 @ 04:00), Max: 37.1 (04-14-20 @ 20:00)  HR: 97 (04-15-20 @ 08:57) (97 - 119)  BP: --  RR: 26 (04-15-20 @ 04:00) (12 - 33)  SpO2: 100% (04-15-20 @ 08:57) (100% - 100%)  Wt(kg): --    04-14-20 @ 07:01  -  04-15-20 @ 07:00  --------------------------------------------------------  IN: 1110.8 mL / OUT: 1950 mL / NET: -839.2 mL    Physical Exam:  	Gen: intubated  	HEENT: +ETT  	Pulm: +mechanical breath sounds  	CV: S1S2+  	Abd: Soft, non-distended   	Ext: No LE edema B/L  	Skin: Warm              Access: Right IJ non tunneled catheter+    LABS/STUDIES  --------------------------------------------------------------------------------              8.3    9.56  >-----------<  272      [04-15-20 @ 07:25]              25.1     148  |  102  |  133  ----------------------------<  119      [04-15-20 @ 03:00]  3.7   |  21  |  4.87        Ca     10.7     [04-15-20 @ 03:00]      Mg     3.5     [04-15-20 @ 03:00]      Phos  10.2     [04-15-20 @ 03:00]    Creatinine Trend:  SCr 4.87 [04-15 @ 03:00]  SCr 5.26 [04-14 @ 05:00]  SCr 5.45 [04-13 @ 21:43]  SCr 5.42 [04-13 @ 18:00]  SCr 5.69 [04-13 @ 02:50]

## 2020-04-15 NOTE — PROGRESS NOTE ADULT - SUBJECTIVE AND OBJECTIVE BOX
ALEXA GARCIA                     [id number]    HPI:  60 y/o F with no PMH p/w fever and cough since Thursday. Tmax 103. She presented to urgent care initially and was treated with tamiflu for presumed flu. Despite this she continued to have fevers. Pt denies recent travel, sick contacts or any other sx or acute complaints. Endorses pinching pain in chest when she coughs. Also with nausea and poor PO intake. Denies shortness of breath, abdominal pain, dysuria or LE edema. (16 Mar 2020 02:05)      Issues:              Acute hypoxic respiratory failure              ARDS              COVID-19+              SAM   Hyperkalemia               Anemia  AMS / Encephalopathy    Drips:   Precedex    PAST MEDICAL & SURGICAL HISTORY:  No pertinent past medical history  No significant past surgical history            VITAL SIGNS:  Vital Signs Last 24 Hrs  T(C): 36.8 (15 Apr 2020 08:00), Max: 37.1 (14 Apr 2020 20:00)  T(F): 98.2 (15 Apr 2020 08:00), Max: 98.8 (14 Apr 2020 20:00)  HR: 119 (15 Apr 2020 15:15) (97 - 119)  BP: --  BP(mean): --  RR: 16 (15 Apr 2020 12:00) (16 - 33)  SpO2: 100% (15 Apr 2020 15:15) (100% - 100%)    I/Os:   I&O's Detail    14 Apr 2020 07:01  -  15 Apr 2020 07:00  --------------------------------------------------------  IN:    dexmedetomidine Infusion: 10.8 mL    Enteral Tube Flush: 180 mL    heparin Infusion: 180 mL    ns in tub fed  [id number]: 440 mL    Solution: 300 mL  Total IN: 1110.8 mL    OUT:    Rectal Tube: 100 mL    Voided: 1850 mL  Total OUT: 1950 mL    Total NET: -839.2 mL      15 Apr 2020 07:01  -  15 Apr 2020 16:06  --------------------------------------------------------  IN:    Enteral Tube Flush: 60 mL    heparin Infusion: 16 mL  Total IN: 76 mL    OUT:    Voided: 300 mL  Total OUT: 300 mL    Total NET: -224 mL          CAPILLARY BLOOD GLUCOSE      POCT Blood Glucose.: 92 mg/dL (15 Apr 2020 14:17)      =======================MEDICATIONS===================  MEDICATIONS  (STANDING):  ampicillin  IVPB      ampicillin  IVPB 2 Gram(s) IV Intermittent every 8 hours  artificial tears (preservative free) Ophthalmic Solution 1 Drop(s) Both EYES every 6 hours  buMETAnide 1 milliGRAM(s) Oral once  calcium acetate 1334 milliGRAM(s) Oral four times a day with meals  chlorhexidine 0.12% Liquid 15 milliLiter(s) Oral Mucosa every 12 hours  chlorhexidine 4% Liquid 1 Application(s) Topical <User Schedule>  dexMEDEtomidine Infusion 0.2 MICROgram(s)/kG/Hr (3.61 mL/Hr) IV Continuous <Continuous>  famotidine Injectable 20 milliGRAM(s) IV Push daily  heparin  Infusion 900 Unit(s)/Hr (8 mL/Hr) IV Continuous <Continuous>  metolazone 10 milliGRAM(s) Oral once  sodium zirconium cyclosilicate 10 Gram(s) Oral three times a day  vasopressin Infusion 0.04 Unit(s)/Min (2.4 mL/Hr) IV Continuous <Continuous>    MEDICATIONS  (PRN):  acetaminophen    Suspension .. 650 milliGRAM(s) Oral every 6 hours PRN Temp greater or equal to 38C (100.4F)      =======================VENTILATOR SETTINGS===================  Mode: CPAP with PS  FiO2: 40  PEEP: 5  PS: 5  MAP: 8  PIP: 15          PHYSICAL EXAM============================                        General:                         Awake, alert, not in any distress                        Neuro:                Following verbal commends , MS improved                         Cardiovascular:   S1 & S2, regular                           Respiratory:         Coarse breath sounds bilat                          GI:                          Soft, nondistended, Bowel sounds +                            Ext:                        Edema    ============================LABS=========================                        8.3    9.56  )-----------( 272      ( 15 Apr 2020 07:25 )             25.1     04-15    148<H>  |  102  |  133<H>  ----------------------------<  119<H>  3.7   |  21<L>  |  4.87<H>    Ca    10.7<H>      15 Apr 2020 03:00  Phos  10.2     04-15  Mg     3.5     04-15    TPro  6.4  /  Alb  2.8<L>  /  TBili  < 0.2<L>  /  DBili  x   /  AST  25  /  ALT  37<H>  /  AlkPhos  91  04-15    LIVER FUNCTIONS - ( 15 Apr 2020 03:00 )  Alb: 2.8 g/dL / Pro: 6.4 g/dL / ALK PHOS: 91 u/L / ALT: 37 u/L / AST: 25 u/L / GGT: x           PT/INR - ( 15 Apr 2020 03:00 )   PT: 11.4 SEC;   INR: 0.99          PTT - ( 15 Apr 2020 03:00 )  PTT:67.8 SEC  ABG - ( 15 Apr 2020 03:00 )  pH, Arterial: 7.31  pH, Blood: x     /  pCO2: 44    /  pO2: 100   / HCO3: 21    / Base Excess: -3.8  /  SaO2: 97.2              CXR:  < from: Xray Chest 1 View- PORTABLE-Urgent (04.12.20 @ 09:57) >  ET tube tip is above the ashley. Left-sided IJ approach central line with the tip in the brachiocephalic vein, unchanged compared to prior studies. Enteric feeding tube courses below the diaphragm with the tip out of field of view however the side port is within the stomach.    Unchanged bilateral patchy opacities.    The bones are unremarkable.    IMPRESSION: Enteric feeding tube with the side port in the stomach; the tip is not imaged on this study. Bilateral patchy opacities that are unchanged and may be seen in the setting of infection.          Plan:    General: 60 y/o F with no PMH p/w fever and cough since Thursday. Tmax 103. She presented to urgent care initially and was treated with tamiflu for presumed flu. Despite this she continued to have fevers. Pt denies recent travel, sick contacts or any other sx or acute complaints. Endorses pinching pain in chest when she coughs. Also with nausea and poor PO intake. Denies shortness of breath, abdominal pain, dysuria or LE edema. (16 Mar 2020 02:05)                              Neuro:                                         Currently on Precedex. Off sedation, non-focal but not following commands                            Cardiovascular:                                          Continue hemodynamic monitoring.                                         Tachycardia - Start Lopressor 2.5mg IVP Q6hrs                                        Daily EKGs, monitor QTc                                                                  Respiratory:                                         Acute hypoxemic respiratory failure due to Pneumonia / COVID-19. Extubated and got reintubated                                         On full mechanical vent support  AC-40%-5                                                Wean FiO2 as tolerated                                                CPAP wean as tolerated. Not a candidate for extubation at this time until mental status improves                                                Continue bronchodilators, pulmonary toilet as tolerated                            GI                                         NGT feeds as tolerated                                         Continue Pepcid                                         Bowel regimen.	                                                                 Renal:                                         Strict I/Os                                         SAM - Currently off CRRT. D/W Renal, as K is under control and not fluid overload - continue to watch  Continue Bicarb tabs + Lokelma to control K                                         Monitor BUN / Cr / Lytes                                                 Hem/ Onc:                                                                                  Follow CBC  & signs of bleeding                           Infectious disease:                                        On ampicillin, repeat Blood cxs negative                                           Pneumonia                                           COVID-19 +                                          Follow cultures                            Endocrine                                            DM-2: Continue Accu-Checks with coverage.       Pertinent clinical, laboratory, radiographic, hemodynamic, echocardiographic, respiratory data, microbiologic data and chart were reviewed and analyzed frequently throughout the course of the day and night  Patient seen, examined and plan discussed with  CTICU team during rounds.    Pt's status &  goals of care  discussed with family.      I have spent  40  minutes of critical care time with this pt between  7am  and 11.59 pm

## 2020-04-15 NOTE — PROGRESS NOTE ADULT - PROBLEM SELECTOR PLAN 1
Pt. with SAM in the setting of hypotension and COVID-19. Scr on admission (3/15/20) was 0.84, increased to 4.6 on 3/26/20. Pt. with likely ATN. Pt. was started on CRRT/CVVHDF on 3/26/20, discontinued on 4/9/20. Pt. noted to have increased urine output (1.9 L) in last 24 hrs after being given Bumex 2 mg IV x1 yesterday. Labs from today reviewed. Scr downtrended to 4.87 today. Serum potassium WNL on sodium zirconium cyclosilicate 10 G TID. Recommend discontinuing sodium zirconium cyclosilicate today. No plan for CRRT/HD today. Will assess daily for CRRT/HD. Check renal sonogram (when feasible). Monitor labs and urine output. Avoid any potential nephrotoxins. Pt. with SAM in the setting of hypotension and COVID-19. Scr on admission (3/15/20) was 0.84, increased to 4.6 on 3/26/20. Pt. with likely ATN. Pt. was started on CRRT/CVVHDF on 3/26/20, discontinued on 4/9/20. Pt. with increased urine output (1.9 L) in last 24 hrs. Pt. received Bumex 2 mg IV x1 yesterday. Labs from today reviewed. Scr downtrended to 4.87 today. Serum potassium WNL on sodium zirconium cyclosilicate 10 G TID. Recommend discontinuing sodium zirconium cyclosilicate today. No plan for CRRT/HD today. Will assess daily for CRRT/HD. Check renal sonogram (when feasible). Monitor labs and urine output. Avoid any potential nephrotoxins

## 2020-04-16 DIAGNOSIS — E87.0 HYPEROSMOLALITY AND HYPERNATREMIA: ICD-10-CM

## 2020-04-16 LAB
ALBUMIN SERPL ELPH-MCNC: 2.7 G/DL — LOW (ref 3.3–5)
ALP SERPL-CCNC: 77 U/L — SIGNIFICANT CHANGE UP (ref 40–120)
ALT FLD-CCNC: 31 U/L — SIGNIFICANT CHANGE UP (ref 4–33)
ANION GAP SERPL CALC-SCNC: 21 MMO/L — HIGH (ref 7–14)
ANION GAP SERPL CALC-SCNC: 22 MMO/L — HIGH (ref 7–14)
APTT BLD: 68.7 SEC — HIGH (ref 27.5–36.3)
AST SERPL-CCNC: 18 U/L — SIGNIFICANT CHANGE UP (ref 4–32)
BASE EXCESS BLDA CALC-SCNC: 2.1 MMOL/L — SIGNIFICANT CHANGE UP
BASE EXCESS BLDA CALC-SCNC: 2.6 MMOL/L — SIGNIFICANT CHANGE UP
BASOPHILS # BLD AUTO: 0.07 K/UL — SIGNIFICANT CHANGE UP (ref 0–0.2)
BASOPHILS NFR BLD AUTO: 0.6 % — SIGNIFICANT CHANGE UP (ref 0–2)
BILIRUB SERPL-MCNC: < 0.2 MG/DL — LOW (ref 0.2–1.2)
BLOOD GAS ARTERIAL - FIO2: 30 — SIGNIFICANT CHANGE UP
BLOOD GAS ARTERIAL - FIO2: 40 — SIGNIFICANT CHANGE UP
BUN SERPL-MCNC: 115 MG/DL — HIGH (ref 7–23)
BUN SERPL-MCNC: 128 MG/DL — HIGH (ref 7–23)
CA-I BLDA-SCNC: 1.42 MMOL/L — HIGH (ref 1.15–1.29)
CALCIUM SERPL-MCNC: 10.9 MG/DL — HIGH (ref 8.4–10.5)
CALCIUM SERPL-MCNC: 11.5 MG/DL — HIGH (ref 8.4–10.5)
CHLORIDE BLDA-SCNC: 115 MMOL/L — HIGH (ref 96–108)
CHLORIDE SERPL-SCNC: 106 MMOL/L — SIGNIFICANT CHANGE UP (ref 98–107)
CHLORIDE SERPL-SCNC: 111 MMOL/L — HIGH (ref 98–107)
CK SERPL-CCNC: 14 U/L — LOW (ref 25–170)
CO2 SERPL-SCNC: 25 MMOL/L — SIGNIFICANT CHANGE UP (ref 22–31)
CO2 SERPL-SCNC: 25 MMOL/L — SIGNIFICANT CHANGE UP (ref 22–31)
CREAT BLDA-MCNC: 4.43 MG/DL — HIGH (ref 0.5–1.3)
CREAT SERPL-MCNC: 3.74 MG/DL — HIGH (ref 0.5–1.3)
CREAT SERPL-MCNC: 4.18 MG/DL — HIGH (ref 0.5–1.3)
CRP SERPL-MCNC: 90.8 MG/L — HIGH
D DIMER BLD IA.RAPID-MCNC: 1274 NG/ML — SIGNIFICANT CHANGE UP
EOSINOPHIL # BLD AUTO: 0.42 K/UL — SIGNIFICANT CHANGE UP (ref 0–0.5)
EOSINOPHIL NFR BLD AUTO: 3.9 % — SIGNIFICANT CHANGE UP (ref 0–6)
FIBRINOGEN PPP-MCNC: 755 MG/DL — HIGH (ref 300–520)
GLUCOSE BLDA-MCNC: 105 MG/DL — HIGH (ref 70–99)
GLUCOSE BLDA-MCNC: 91 MG/DL — SIGNIFICANT CHANGE UP (ref 70–99)
GLUCOSE BLDC GLUCOMTR-MCNC: 83 MG/DL — SIGNIFICANT CHANGE UP (ref 70–99)
GLUCOSE BLDC GLUCOMTR-MCNC: 98 MG/DL — SIGNIFICANT CHANGE UP (ref 70–99)
GLUCOSE SERPL-MCNC: 109 MG/DL — HIGH (ref 70–99)
GLUCOSE SERPL-MCNC: 99 MG/DL — SIGNIFICANT CHANGE UP (ref 70–99)
HCO3 BLDA-SCNC: 26 MMOL/L — SIGNIFICANT CHANGE UP (ref 22–26)
HCO3 BLDA-SCNC: 27 MMOL/L — HIGH (ref 22–26)
HCT VFR BLD CALC: 22.8 % — LOW (ref 34.5–45)
HCT VFR BLDA CALC: 23 % — LOW (ref 34.5–46.5)
HCT VFR BLDA CALC: 8.9 % — CRITICAL LOW (ref 34.5–46.5)
HGB BLD-MCNC: 7.5 G/DL — LOW (ref 11.5–15.5)
HGB BLDA-MCNC: 2.7 G/DL — CRITICAL LOW (ref 11.5–15.5)
HGB BLDA-MCNC: 7.4 G/DL — LOW (ref 11.5–15.5)
IMM GRANULOCYTES NFR BLD AUTO: 1.2 % — SIGNIFICANT CHANGE UP (ref 0–1.5)
INR BLD: 1.03 — SIGNIFICANT CHANGE UP (ref 0.88–1.17)
LACTATE BLDA-SCNC: 0.9 MMOL/L — SIGNIFICANT CHANGE UP (ref 0.5–2)
LACTATE BLDA-SCNC: 1.3 MMOL/L — SIGNIFICANT CHANGE UP (ref 0.5–2)
LYMPHOCYTES # BLD AUTO: 1.09 K/UL — SIGNIFICANT CHANGE UP (ref 1–3.3)
LYMPHOCYTES # BLD AUTO: 10.1 % — LOW (ref 13–44)
MAGNESIUM SERPL-MCNC: 3.4 MG/DL — HIGH (ref 1.6–2.6)
MCHC RBC-ENTMCNC: 30.1 PG — SIGNIFICANT CHANGE UP (ref 27–34)
MCHC RBC-ENTMCNC: 32.9 % — SIGNIFICANT CHANGE UP (ref 32–36)
MCV RBC AUTO: 91.6 FL — SIGNIFICANT CHANGE UP (ref 80–100)
MONOCYTES # BLD AUTO: 0.76 K/UL — SIGNIFICANT CHANGE UP (ref 0–0.9)
MONOCYTES NFR BLD AUTO: 7.1 % — SIGNIFICANT CHANGE UP (ref 2–14)
NEUTROPHILS # BLD AUTO: 8.31 K/UL — HIGH (ref 1.8–7.4)
NEUTROPHILS NFR BLD AUTO: 77.1 % — HIGH (ref 43–77)
NRBC # FLD: 0 K/UL — SIGNIFICANT CHANGE UP (ref 0–0)
NT-PROBNP SERPL-SCNC: 2750 PG/ML — SIGNIFICANT CHANGE UP
PCO2 BLDA: 43 MMHG — SIGNIFICANT CHANGE UP (ref 32–48)
PCO2 BLDA: 48 MMHG — SIGNIFICANT CHANGE UP (ref 32–48)
PH BLDA: 7.37 PH — SIGNIFICANT CHANGE UP (ref 7.35–7.45)
PH BLDA: 7.41 PH — SIGNIFICANT CHANGE UP (ref 7.35–7.45)
PHOSPHATE SERPL-MCNC: 8.2 MG/DL — HIGH (ref 2.5–4.5)
PLATELET # BLD AUTO: 279 K/UL — SIGNIFICANT CHANGE UP (ref 150–400)
PMV BLD: 12 FL — SIGNIFICANT CHANGE UP (ref 7–13)
PO2 BLDA: 123 MMHG — HIGH (ref 83–108)
PO2 BLDA: 86 MMHG — SIGNIFICANT CHANGE UP (ref 83–108)
POTASSIUM BLDA-SCNC: 2.8 MMOL/L — CRITICAL LOW (ref 3.4–4.5)
POTASSIUM BLDA-SCNC: 4 MMOL/L — SIGNIFICANT CHANGE UP (ref 3.4–4.5)
POTASSIUM SERPL-MCNC: 2.8 MMOL/L — CRITICAL LOW (ref 3.5–5.3)
POTASSIUM SERPL-MCNC: 4.1 MMOL/L — SIGNIFICANT CHANGE UP (ref 3.5–5.3)
POTASSIUM SERPL-SCNC: 2.8 MMOL/L — CRITICAL LOW (ref 3.5–5.3)
POTASSIUM SERPL-SCNC: 4.1 MMOL/L — SIGNIFICANT CHANGE UP (ref 3.5–5.3)
PROT SERPL-MCNC: 6.5 G/DL — SIGNIFICANT CHANGE UP (ref 6–8.3)
PROTHROM AB SERPL-ACNC: 11.8 SEC — SIGNIFICANT CHANGE UP (ref 9.8–13.1)
RBC # BLD: 2.49 M/UL — LOW (ref 3.8–5.2)
RBC # FLD: 15.9 % — HIGH (ref 10.3–14.5)
SAO2 % BLDA: 97.4 % — SIGNIFICANT CHANGE UP (ref 95–99)
SAO2 % BLDA: 98.9 % — SIGNIFICANT CHANGE UP (ref 95–99)
SODIUM BLDA-SCNC: 156 MMOL/L — HIGH (ref 136–146)
SODIUM BLDA-SCNC: 159 MMOL/L — HIGH (ref 136–146)
SODIUM SERPL-SCNC: 153 MMOL/L — HIGH (ref 135–145)
SODIUM SERPL-SCNC: 157 MMOL/L — HIGH (ref 135–145)
TROPONIN T, HIGH SENSITIVITY: 75 NG/L — CRITICAL HIGH (ref ?–14)
WBC # BLD: 10.78 K/UL — HIGH (ref 3.8–10.5)
WBC # FLD AUTO: 10.78 K/UL — HIGH (ref 3.8–10.5)

## 2020-04-16 PROCEDURE — 99291 CRITICAL CARE FIRST HOUR: CPT

## 2020-04-16 PROCEDURE — 99232 SBSQ HOSP IP/OBS MODERATE 35: CPT

## 2020-04-16 RX ORDER — DILTIAZEM HCL 120 MG
10 CAPSULE, EXT RELEASE 24 HR ORAL ONCE
Refills: 0 | Status: COMPLETED | OUTPATIENT
Start: 2020-04-16 | End: 2020-04-16

## 2020-04-16 RX ORDER — POTASSIUM CHLORIDE 20 MEQ
20 PACKET (EA) ORAL ONCE
Refills: 0 | Status: COMPLETED | OUTPATIENT
Start: 2020-04-16 | End: 2020-04-16

## 2020-04-16 RX ORDER — METOPROLOL TARTRATE 50 MG
5 TABLET ORAL ONCE
Refills: 0 | Status: COMPLETED | OUTPATIENT
Start: 2020-04-16 | End: 2020-04-16

## 2020-04-16 RX ADMIN — Medication 100 MILLIEQUIVALENT(S): at 06:12

## 2020-04-16 RX ADMIN — Medication 1 DROP(S): at 18:33

## 2020-04-16 RX ADMIN — Medication 1334 MILLIGRAM(S): at 21:48

## 2020-04-16 RX ADMIN — CHLORHEXIDINE GLUCONATE 1 APPLICATION(S): 213 SOLUTION TOPICAL at 05:00

## 2020-04-16 RX ADMIN — FAMOTIDINE 20 MILLIGRAM(S): 10 INJECTION INTRAVENOUS at 11:15

## 2020-04-16 RX ADMIN — DEXMEDETOMIDINE HYDROCHLORIDE IN 0.9% SODIUM CHLORIDE 3.61 MICROGRAM(S)/KG/HR: 4 INJECTION INTRAVENOUS at 23:17

## 2020-04-16 RX ADMIN — Medication 10 MILLIGRAM(S): at 21:48

## 2020-04-16 RX ADMIN — Medication 216 GRAM(S): at 15:00

## 2020-04-16 RX ADMIN — Medication 216 GRAM(S): at 21:53

## 2020-04-16 RX ADMIN — Medication 100 MILLIEQUIVALENT(S): at 11:14

## 2020-04-16 RX ADMIN — Medication 5 MILLIGRAM(S): at 16:00

## 2020-04-16 RX ADMIN — Medication 1334 MILLIGRAM(S): at 17:39

## 2020-04-16 RX ADMIN — Medication 1 DROP(S): at 23:17

## 2020-04-16 RX ADMIN — Medication 1334 MILLIGRAM(S): at 17:40

## 2020-04-16 RX ADMIN — Medication 1 DROP(S): at 11:26

## 2020-04-16 RX ADMIN — Medication 650 MILLIGRAM(S): at 21:52

## 2020-04-16 RX ADMIN — SODIUM ZIRCONIUM CYCLOSILICATE 10 GRAM(S): 10 POWDER, FOR SUSPENSION ORAL at 05:00

## 2020-04-16 RX ADMIN — Medication 216 GRAM(S): at 04:59

## 2020-04-16 RX ADMIN — CHLORHEXIDINE GLUCONATE 15 MILLILITER(S): 213 SOLUTION TOPICAL at 05:01

## 2020-04-16 RX ADMIN — Medication 1 DROP(S): at 05:00

## 2020-04-16 NOTE — PROGRESS NOTE ADULT - PROBLEM SELECTOR PLAN 1
Pt. with SAM in the setting of hypotension and COVID-19. Scr on admission (3/15/20) was 0.84, increased to 4.6 on 3/26/20. Pt. with likely ATN. Pt. was started on CRRT/CVVHDF on 3/26/20, discontinued on 4/9/20. Pt. with increased urine output (2.3 L) in last 24 hrs. Scr downtrended to 4.18 today. No further plan for CRRT/HD today. Check renal sonogram (when feasible). Monitor labs and urine output. Avoid any potential nephrotoxins

## 2020-04-16 NOTE — PROGRESS NOTE ADULT - SUBJECTIVE AND OBJECTIVE BOX
[facility] DIVISION OF KIDNEY DISEASES AND HYPERTENSION --    HPI: 59 year-old female admitted to ICU for respiratory failure, pneumonia, SAM and COVID-19. Scr increased to 4.6 on 3/23/20. Pt. initiated on CRRT/CVVHDF on 3/23/20, discontinued on 4/9/20. Scr currently downtrending.     Pt. was seen and examined in ICU. Pt. receiving O2 via NRB mask, on IV vasopressor support. Pt. noted to have increased UOP (2.3 L) in last 24 hrs.       PAST HISTORY  --------------------------------------------------------------------------------  No significant changes to PMH, PSH, FHx, SHx, unless otherwise noted    ALLERGIES & MEDICATIONS  --------------------------------------------------------------------------------  Allergies    No Known Allergies    Intolerances    Standing Inpatient Medications  ampicillin  IVPB      ampicillin  IVPB 2 Gram(s) IV Intermittent every 8 hours  artificial tears (preservative free) Ophthalmic Solution 1 Drop(s) Both EYES every 6 hours  calcium acetate 1334 milliGRAM(s) Oral four times a day with meals  chlorhexidine 4% Liquid 1 Application(s) Topical <User Schedule>  dexMEDEtomidine Infusion 0.2 MICROgram(s)/kG/Hr IV Continuous <Continuous>  famotidine Injectable 20 milliGRAM(s) IV Push daily  heparin  Infusion 900 Unit(s)/Hr IV Continuous <Continuous>  potassium chloride  20 mEq/100 mL IVPB 20 milliEquivalent(s) IV Intermittent once  sodium zirconium cyclosilicate 10 Gram(s) Oral three times a day  vasopressin Infusion 0.04 Unit(s)/Min IV Continuous <Continuous>    PRN Inpatient Medications  acetaminophen    Suspension .. 650 milliGRAM(s) Oral every 6 hours PRN    REVIEW OF SYSTEMS  --------------------------------------------------------------------------------  Unable to obtain.    VITALS/PHYSICAL EXAM  --------------------------------------------------------------------------------  T(C): 36.8 (04-16-20 @ 04:00), Max: 36.8 (04-16-20 @ 00:00)  HR: 88 (04-16-20 @ 07:08) (75 - 119)  BP: --  RR: 13 (04-16-20 @ 04:00) (13 - 17)  SpO2: 100% (04-16-20 @ 07:08) (100% - 100%)  Wt(kg): --    04-15-20 @ 07:01  -  04-16-20 @ 07:00  --------------------------------------------------------  IN: 1107.2 mL / OUT: 2300 mL / NET: -1192.8 mL    Physical Exam:  	Gen: NRB mask+  	HEENT: No JVD  	Pulm: +mechanical breath sounds  	CV: S1S2+  	Abd: soft, non-distended   	Ext: No LE edema B/L  	Skin: Warm              Access: Right IJ non tunneled catheter+    LABS/STUDIES  --------------------------------------------------------------------------------              7.5    10.78 >-----------<  279      [04-16-20 @ 03:51]              22.8     153  |  106  |  128  ----------------------------<  109      [04-16-20 @ 03:51]  2.8   |  25  |  4.18        Ca     10.9     [04-16-20 @ 03:51]      Mg     3.4     [04-16-20 @ 03:51]      Phos  8.2     [04-16-20 @ 03:51]    TPro  6.5  /  Alb  2.7  /  TBili  < 0.2  /  DBili  x   /  AST  18  /  ALT  31  /  AlkPhos  77  [04-16-20 @ 03:51]    Creatinine Trend:  SCr 4.18 [04-16 @ 03:51]  SCr 4.87 [04-15 @ 03:00]  SCr 5.26 [04-14 @ 05:00]  SCr 5.45 [04-13 @ 21:43]  SCr 5.42 [04-13 @ 18:00]

## 2020-04-16 NOTE — PROGRESS NOTE ADULT - SUBJECTIVE AND OBJECTIVE BOX
ALEXA GARCIA            MRN-[id number]         No Known Allergies                 60 y/o F with no PMH p/w fever and cough since Thursday. Tmax 103. She presented to urgent care initially and was treated with tamiflu for presumed flu. Despite this she continued to have fevers. Pt denies recent travel, sick contacts or any other sx or acute complaints. Endorses pinching pain in chest when she coughs. Also with nausea and poor PO intake. Denies shortness of breath, abdominal pain, dysuria or LE edema. (16 Mar 2020 02:05)      Issues:              Acute hypoxic respiratory failure              ARDS              COVID-19+              SAM   Hypokalemia               Anemia  AMS / Encephalopathy    Drips:   Precedex                 Home Medications:      PAST MEDICAL & SURGICAL HISTORY:  No pertinent past medical history  No significant past surgical history        ICU Vital Signs Last 24 Hrs  T(C): 36.8 (16 Apr 2020 04:00), Max: 36.8 (16 Apr 2020 00:00)  T(F): 98.3 (16 Apr 2020 04:00), Max: 98.3 (16 Apr 2020 04:00)  HR: 86 (16 Apr 2020 08:00) (75 - 119)  BP: --  BP(mean): --  ABP: 151/70 (16 Apr 2020 08:00) (109/53 - 151/71)  ABP(mean): 101 (16 Apr 2020 08:00) (72 - 101)  RR: 24 (16 Apr 2020 08:00) (13 - 24)  SpO2: 100% (16 Apr 2020 08:00) (100% - 100%)    I&O's Detail    15 Apr 2020 07:01  -  16 Apr 2020 07:00  --------------------------------------------------------  IN:    dexmedetomidine Infusion: 163.2 mL    Enteral Tube Flush: 180 mL    heparin Infusion: 144 mL    ns in tub fed  [id number]: 320 mL    Solution: 300 mL  Total IN: 1107.2 mL    OUT:    Voided: 2300 mL  Total OUT: 2300 mL    Total NET: -1192.8 mL        CAPILLARY BLOOD GLUCOSE      POCT Blood Glucose.: 83 mg/dL (16 Apr 2020 11:05)      Home Medications:      MEDICATIONS  (STANDING):  ampicillin  IVPB      ampicillin  IVPB 2 Gram(s) IV Intermittent every 8 hours  artificial tears (preservative free) Ophthalmic Solution 1 Drop(s) Both EYES every 6 hours  calcium acetate 1334 milliGRAM(s) Oral four times a day with meals  chlorhexidine 4% Liquid 1 Application(s) Topical <User Schedule>  dexMEDEtomidine Infusion 0.2 MICROgram(s)/kG/Hr (3.61 mL/Hr) IV Continuous <Continuous>  famotidine Injectable 20 milliGRAM(s) IV Push daily  heparin  Infusion 900 Unit(s)/Hr (8 mL/Hr) IV Continuous <Continuous>  metoprolol tartrate Injectable 5 milliGRAM(s) IV Push once  vasopressin Infusion 0.04 Unit(s)/Min (2.4 mL/Hr) IV Continuous <Continuous>    MEDICATIONS  (PRN):  acetaminophen    Suspension .. 650 milliGRAM(s) Oral every 6 hours PRN Temp greater or equal to 38C (100.4F)      Mode: standby      CAPILLARY BLOOD GLUCOSE      POCT Blood Glucose.: 83 mg/dL (16 Apr 2020 11:05)  POCT Blood Glucose.: 110 mg/dL (15 Apr 2020 22:21)  POCT Blood Glucose.: 106 mg/dL (15 Apr 2020 17:06)      Physical exam:   General:                Extubated this AM, following commands                                            Neuro:                  Off sedation,  appears weak but  following commands                          Cardiovascular:   S1 & S2, regular                           Respiratory:         Coarse breath sounds                          GI:                          Soft, nondistended, Bowel sounds +                            Ext:                        Edema                              Labs:                                                                           7.5    10.78 )-----------( 279      ( 16 Apr 2020 03:51 )             22.8             04-16    153<H>  |  106  |  128<H>  ----------------------------<  109<H>  2.8<LL>   |  25  |  4.18<H>    Ca    10.9<H>      16 Apr 2020 03:51  Phos  8.2     04-16  Mg     3.4     04-16    TPro  6.5  /  Alb  2.7<L>  /  TBili  < 0.2<L>  /  DBili  x   /  AST  18  /  ALT  31  /  AlkPhos  77  04-16                  PT/INR - ( 16 Apr 2020 03:51 )   PT: 11.8 SEC;   INR: 1.03          PTT - ( 16 Apr 2020 03:51 )  PTT:68.7 SEC  LIVER FUNCTIONS - ( 16 Apr 2020 03:51 )  Alb: 2.7 g/dL / Pro: 6.5 g/dL / ALK PHOS: 77 u/L / ALT: 31 u/L / AST: 18 u/L / GGT: x          Transcutaneous Bilirubin    CARDIAC MARKERS ( 16 Apr 2020 03:51 )  x     / x     / 14 u/L / x     / x      CARDIAC MARKERS ( 15 Apr 2020 03:00 )  x     / x     / 18 u/L / x     / x          CXR:    NGT appears below the diaphragm,  bilateral congestion    Plan:    General:  60 y/o F with no PMH p/w fever and cough since Thursday. Tmax 103. She presented to urgent care initially and was treated with tamiflu for presumed flu. Despite this she continued to have fevers. Pt denies recent travel, sick contacts or any other sx or acute complaints. Endorses pinching pain in chest when she coughs. Also with nausea and poor PO intake. Denies shortness of breath, abdominal pain, dysuria or LE edema. (16 Mar 2020 02:05)                              Neuro:                                         Currently on Precedex. Off sedation, non-focal and following commands but appears weak                            Cardiovascular:                                          Continue hemodynamic monitoring.                                         Tachycardia - Start Lopressor 2.5mg IVP Q6hrs if BP allows                                                                                                        Respiratory:                                         Acute hypoxemic respiratory failure due to Pneumonia / COVID-19. Extubated and doing OK                                                Wean FiO2 as tolerated                                                Continue bronchodilators, pulmonary toilet as tolerated                            GI                                         NGT feeds as tolerated                                         Continue Pepcid                                         Bowel regimen.	                                                                 Renal:                                         Strict I/Os                                         SAM - Currently off CRRT. Making good U.O, D/W Renal, no need for CRRT / HD at this time                                         Monitor BUN / Cr / Lytes                                                 Hem/ Onc:                                                                                  Anemia: Follow CBC  & signs of bleeding                           Infectious disease:                                            Pneumonia                                           COVID-19 +                                                                    Endocrine                                            DM-2: Continue Accu-Checks with coverage.       Pertinent clinical, laboratory, radiographic, hemodynamic, echocardiographic, respiratory data, microbiologic data and chart were reviewed and analyzed frequently throughout the course of the day and night  Patient seen, examined and plan discussed with  CTICU team during rounds.    Pt's status &  goals of care  discussed with family.      I have spent  35  minutes of critical care time with this pt between  7am  and 11.59 pm           Favian Moreland MD

## 2020-04-17 LAB
ALBUMIN SERPL ELPH-MCNC: 3.2 G/DL — LOW (ref 3.3–5)
ALP SERPL-CCNC: 78 U/L — SIGNIFICANT CHANGE UP (ref 40–120)
ALT FLD-CCNC: 31 U/L — SIGNIFICANT CHANGE UP (ref 4–33)
ANION GAP SERPL CALC-SCNC: 20 MMO/L — HIGH (ref 7–14)
APTT BLD: 72.7 SEC — HIGH (ref 27.5–36.3)
AST SERPL-CCNC: 22 U/L — SIGNIFICANT CHANGE UP (ref 4–32)
BASE EXCESS BLDA CALC-SCNC: 2.3 MMOL/L — SIGNIFICANT CHANGE UP
BASE EXCESS BLDA CALC-SCNC: 4 MMOL/L — SIGNIFICANT CHANGE UP
BASE EXCESS BLDA CALC-SCNC: 4.7 MMOL/L — SIGNIFICANT CHANGE UP
BASE EXCESS BLDA CALC-SCNC: 6.3 MMOL/L — SIGNIFICANT CHANGE UP
BASOPHILS # BLD AUTO: 0.07 K/UL — SIGNIFICANT CHANGE UP (ref 0–0.2)
BASOPHILS NFR BLD AUTO: 0.4 % — SIGNIFICANT CHANGE UP (ref 0–2)
BILIRUB SERPL-MCNC: < 0.2 MG/DL — LOW (ref 0.2–1.2)
BLD GP AB SCN SERPL QL: NEGATIVE — SIGNIFICANT CHANGE UP
BLOOD GAS ARTERIAL - FIO2: 30 — SIGNIFICANT CHANGE UP
BLOOD GAS ARTERIAL - FIO2: 40 — SIGNIFICANT CHANGE UP
BLOOD GAS ARTERIAL - FIO2: 50 — SIGNIFICANT CHANGE UP
BUN SERPL-MCNC: 112 MG/DL — HIGH (ref 7–23)
CALCIUM SERPL-MCNC: 11.9 MG/DL — HIGH (ref 8.4–10.5)
CHLORIDE BLDA-SCNC: 118 MMOL/L — HIGH (ref 96–108)
CHLORIDE BLDA-SCNC: 119 MMOL/L — HIGH (ref 96–108)
CHLORIDE BLDA-SCNC: 122 MMOL/L — HIGH (ref 96–108)
CHLORIDE SERPL-SCNC: 113 MMOL/L — HIGH (ref 98–107)
CK SERPL-CCNC: 12 U/L — LOW (ref 25–170)
CO2 SERPL-SCNC: 27 MMOL/L — SIGNIFICANT CHANGE UP (ref 22–31)
CREAT BLDA-MCNC: 3.9 MG/DL — HIGH (ref 0.5–1.3)
CREAT BLDA-MCNC: SIGNIFICANT CHANGE UP MG/DL (ref 0.5–1.3)
CREAT SERPL-MCNC: 3.49 MG/DL — HIGH (ref 0.5–1.3)
D DIMER BLD IA.RAPID-MCNC: 1215 NG/ML — SIGNIFICANT CHANGE UP
EOSINOPHIL # BLD AUTO: 0.42 K/UL — SIGNIFICANT CHANGE UP (ref 0–0.5)
EOSINOPHIL NFR BLD AUTO: 2.3 % — SIGNIFICANT CHANGE UP (ref 0–6)
FERRITIN SERPL-MCNC: 724.2 NG/ML — HIGH (ref 15–150)
FIBRINOGEN PPP-MCNC: 775 MG/DL — HIGH (ref 300–520)
GLUCOSE BLDA-MCNC: 101 MG/DL — HIGH (ref 70–99)
GLUCOSE BLDA-MCNC: 119 MG/DL — HIGH (ref 70–99)
GLUCOSE BLDA-MCNC: 127 MG/DL — HIGH (ref 70–99)
GLUCOSE BLDA-MCNC: 153 MG/DL — HIGH (ref 70–99)
GLUCOSE SERPL-MCNC: 121 MG/DL — HIGH (ref 70–99)
HCO3 BLDA-SCNC: 26 MMOL/L — SIGNIFICANT CHANGE UP (ref 22–26)
HCO3 BLDA-SCNC: 28 MMOL/L — HIGH (ref 22–26)
HCO3 BLDA-SCNC: 29 MMOL/L — HIGH (ref 22–26)
HCO3 BLDA-SCNC: 30 MMOL/L — HIGH (ref 22–26)
HCT VFR BLD CALC: 23.7 % — LOW (ref 34.5–45)
HCT VFR BLDA CALC: 23.2 % — LOW (ref 34.5–46.5)
HCT VFR BLDA CALC: 24.5 % — LOW (ref 34.5–46.5)
HCT VFR BLDA CALC: 24.6 % — LOW (ref 34.5–46.5)
HCT VFR BLDA CALC: 28.9 % — LOW (ref 34.5–46.5)
HGB BLD-MCNC: 7.7 G/DL — LOW (ref 11.5–15.5)
HGB BLDA-MCNC: 7.4 G/DL — LOW (ref 11.5–15.5)
HGB BLDA-MCNC: 7.9 G/DL — LOW (ref 11.5–15.5)
HGB BLDA-MCNC: 7.9 G/DL — LOW (ref 11.5–15.5)
HGB BLDA-MCNC: 9.3 G/DL — LOW (ref 11.5–15.5)
IMM GRANULOCYTES NFR BLD AUTO: 1 % — SIGNIFICANT CHANGE UP (ref 0–1.5)
INR BLD: 1.12 — SIGNIFICANT CHANGE UP (ref 0.88–1.17)
LACTATE BLDA-SCNC: 0.9 MMOL/L — SIGNIFICANT CHANGE UP (ref 0.5–2)
LACTATE BLDA-SCNC: 0.9 MMOL/L — SIGNIFICANT CHANGE UP (ref 0.5–2)
LACTATE BLDA-SCNC: 3.1 MMOL/L — HIGH (ref 0.5–2)
LDH SERPL L TO P-CCNC: 434 U/L — HIGH (ref 135–225)
LYMPHOCYTES # BLD AUTO: 1.25 K/UL — SIGNIFICANT CHANGE UP (ref 1–3.3)
LYMPHOCYTES # BLD AUTO: 6.9 % — LOW (ref 13–44)
MAGNESIUM SERPL-MCNC: 3.1 MG/DL — HIGH (ref 1.6–2.6)
MCHC RBC-ENTMCNC: 30.4 PG — SIGNIFICANT CHANGE UP (ref 27–34)
MCHC RBC-ENTMCNC: 32.5 % — SIGNIFICANT CHANGE UP (ref 32–36)
MCV RBC AUTO: 93.7 FL — SIGNIFICANT CHANGE UP (ref 80–100)
MONOCYTES # BLD AUTO: 1.03 K/UL — HIGH (ref 0–0.9)
MONOCYTES NFR BLD AUTO: 5.7 % — SIGNIFICANT CHANGE UP (ref 2–14)
NEUTROPHILS # BLD AUTO: 15.25 K/UL — HIGH (ref 1.8–7.4)
NEUTROPHILS NFR BLD AUTO: 83.7 % — HIGH (ref 43–77)
NRBC # FLD: 0 K/UL — SIGNIFICANT CHANGE UP (ref 0–0)
NT-PROBNP SERPL-SCNC: 1993 PG/ML — SIGNIFICANT CHANGE UP
PCO2 BLDA: 47 MMHG — SIGNIFICANT CHANGE UP (ref 32–48)
PCO2 BLDA: 50 MMHG — HIGH (ref 32–48)
PCO2 BLDA: 54 MMHG — HIGH (ref 32–48)
PCO2 BLDA: 56 MMHG — HIGH (ref 32–48)
PH BLDA: 7.32 PH — LOW (ref 7.35–7.45)
PH BLDA: 7.35 PH — SIGNIFICANT CHANGE UP (ref 7.35–7.45)
PH BLDA: 7.41 PH — SIGNIFICANT CHANGE UP (ref 7.35–7.45)
PH BLDA: 7.41 PH — SIGNIFICANT CHANGE UP (ref 7.35–7.45)
PHOSPHATE SERPL-MCNC: 5.5 MG/DL — HIGH (ref 2.5–4.5)
PLATELET # BLD AUTO: 342 K/UL — SIGNIFICANT CHANGE UP (ref 150–400)
PMV BLD: 12.2 FL — SIGNIFICANT CHANGE UP (ref 7–13)
PO2 BLDA: 124 MMHG — HIGH (ref 83–108)
PO2 BLDA: 181 MMHG — HIGH (ref 83–108)
PO2 BLDA: 244 MMHG — HIGH (ref 83–108)
PO2 BLDA: 81 MMHG — LOW (ref 83–108)
POTASSIUM BLDA-SCNC: 3.2 MMOL/L — LOW (ref 3.4–4.5)
POTASSIUM BLDA-SCNC: 3.2 MMOL/L — LOW (ref 3.4–4.5)
POTASSIUM BLDA-SCNC: 3.3 MMOL/L — LOW (ref 3.4–4.5)
POTASSIUM BLDA-SCNC: 3.3 MMOL/L — LOW (ref 3.4–4.5)
POTASSIUM SERPL-MCNC: 3.3 MMOL/L — LOW (ref 3.5–5.3)
POTASSIUM SERPL-SCNC: 3.3 MMOL/L — LOW (ref 3.5–5.3)
PROT SERPL-MCNC: 6.8 G/DL — SIGNIFICANT CHANGE UP (ref 6–8.3)
PROTHROM AB SERPL-ACNC: 12.8 SEC — SIGNIFICANT CHANGE UP (ref 9.8–13.1)
RBC # BLD: 2.53 M/UL — LOW (ref 3.8–5.2)
RBC # FLD: 16.5 % — HIGH (ref 10.3–14.5)
RH IG SCN BLD-IMP: POSITIVE — SIGNIFICANT CHANGE UP
SAO2 % BLDA: 95.4 % — SIGNIFICANT CHANGE UP (ref 95–99)
SAO2 % BLDA: 98.4 % — SIGNIFICANT CHANGE UP (ref 95–99)
SAO2 % BLDA: 99.3 % — HIGH (ref 95–99)
SAO2 % BLDA: 99.5 % — HIGH (ref 95–99)
SODIUM BLDA-SCNC: 161 MMOL/L — CRITICAL HIGH (ref 136–146)
SODIUM BLDA-SCNC: 162 MMOL/L — CRITICAL HIGH (ref 136–146)
SODIUM BLDA-SCNC: 164 MMOL/L — CRITICAL HIGH (ref 136–146)
SODIUM BLDA-SCNC: 165 MMOL/L — CRITICAL HIGH (ref 136–146)
SODIUM SERPL-SCNC: 160 MMOL/L — CRITICAL HIGH (ref 135–145)
TROPONIN T, HIGH SENSITIVITY: 79 NG/L — CRITICAL HIGH (ref ?–14)
WBC # BLD: 18.2 K/UL — HIGH (ref 3.8–10.5)
WBC # FLD AUTO: 18.2 K/UL — HIGH (ref 3.8–10.5)

## 2020-04-17 PROCEDURE — 99232 SBSQ HOSP IP/OBS MODERATE 35: CPT

## 2020-04-17 PROCEDURE — 99292 CRITICAL CARE ADDL 30 MIN: CPT

## 2020-04-17 PROCEDURE — 71045 X-RAY EXAM CHEST 1 VIEW: CPT | Mod: 26

## 2020-04-17 PROCEDURE — 99291 CRITICAL CARE FIRST HOUR: CPT

## 2020-04-17 RX ORDER — METOPROLOL TARTRATE 50 MG
2.5 TABLET ORAL ONCE
Refills: 0 | Status: COMPLETED | OUTPATIENT
Start: 2020-04-17 | End: 2020-04-17

## 2020-04-17 RX ORDER — DILTIAZEM HCL 120 MG
30 CAPSULE, EXT RELEASE 24 HR ORAL EVERY 8 HOURS
Refills: 0 | Status: DISCONTINUED | OUTPATIENT
Start: 2020-04-17 | End: 2020-04-17

## 2020-04-17 RX ORDER — DILTIAZEM HCL 120 MG
30 CAPSULE, EXT RELEASE 24 HR ORAL ONCE
Refills: 0 | Status: COMPLETED | OUTPATIENT
Start: 2020-04-17 | End: 2020-04-17

## 2020-04-17 RX ORDER — POTASSIUM CHLORIDE 20 MEQ
20 PACKET (EA) ORAL ONCE
Refills: 0 | Status: COMPLETED | OUTPATIENT
Start: 2020-04-17 | End: 2020-04-17

## 2020-04-17 RX ADMIN — Medication 1 DROP(S): at 06:23

## 2020-04-17 RX ADMIN — Medication 1334 MILLIGRAM(S): at 12:15

## 2020-04-17 RX ADMIN — Medication 30 MILLIGRAM(S): at 07:53

## 2020-04-17 RX ADMIN — Medication 216 GRAM(S): at 06:23

## 2020-04-17 RX ADMIN — FAMOTIDINE 20 MILLIGRAM(S): 10 INJECTION INTRAVENOUS at 12:13

## 2020-04-17 RX ADMIN — Medication 1 DROP(S): at 19:19

## 2020-04-17 RX ADMIN — Medication 1334 MILLIGRAM(S): at 19:21

## 2020-04-17 RX ADMIN — CHLORHEXIDINE GLUCONATE 1 APPLICATION(S): 213 SOLUTION TOPICAL at 12:14

## 2020-04-17 RX ADMIN — Medication 1 DROP(S): at 12:17

## 2020-04-17 RX ADMIN — Medication 2.5 MILLIGRAM(S): at 16:01

## 2020-04-17 RX ADMIN — Medication 1 DROP(S): at 06:25

## 2020-04-17 RX ADMIN — Medication 216 GRAM(S): at 18:20

## 2020-04-17 RX ADMIN — Medication 30 MILLIGRAM(S): at 01:56

## 2020-04-17 RX ADMIN — Medication 1334 MILLIGRAM(S): at 22:50

## 2020-04-17 RX ADMIN — Medication 216 GRAM(S): at 23:50

## 2020-04-17 RX ADMIN — Medication 100 MILLIEQUIVALENT(S): at 12:12

## 2020-04-17 RX ADMIN — Medication 1 DROP(S): at 23:51

## 2020-04-17 RX ADMIN — Medication 1334 MILLIGRAM(S): at 07:52

## 2020-04-17 NOTE — PROGRESS NOTE ADULT - SUBJECTIVE AND OBJECTIVE BOX
ALEXA GARCIA                     [id number]    HPI:  60 y/o F with no PMH p/w fever and cough since Thursday. Tmax 103. She presented to urgent care initially and was treated with tamiflu for presumed flu. Despite this she continued to have fevers. Pt denies recent travel, sick contacts or any other sx or acute complaints. Endorses pinching pain in chest when she coughs. Also with nausea and poor PO intake. Denies shortness of breath, abdominal pain, dysuria or LE edema. (16 Mar 2020 02:05)      PAST MEDICAL & SURGICAL HISTORY:  No pertinent past medical history  No significant past surgical history    Issues:              Acute hypoxic respiratory failure              ARDS              COVID-19+              SAM   Hypokalemia               Anemia  AMS / Encephalopathy    Drips:   Precedex        VITAL SIGNS:  Vital Signs Last 24 Hrs  T(C): 37.1 (17 Apr 2020 08:00), Max: 37.4 (17 Apr 2020 00:00)  T(F): 98.7 (17 Apr 2020 08:00), Max: 99.3 (17 Apr 2020 00:00)  HR: 103 (17 Apr 2020 08:59) (92 - 137)  BP: --  BP(mean): --  RR: 28 (17 Apr 2020 08:00) (13 - 28)  SpO2: 100% (17 Apr 2020 08:59) (99% - 100%)    I/Os:   I&O's Detail    16 Apr 2020 07:01  -  17 Apr 2020 07:00  --------------------------------------------------------  IN:    dexmedetomidine Infusion: 67.2 mL    Enteral Tube Flush: 280 mL    heparin Infusion: 192 mL    ns in tub fed  [id number]: 362 mL    Solution: 450 mL  Total IN: 1351.2 mL    OUT:    Stool: 100 mL    Voided: 1700 mL  Total OUT: 1800 mL    Total NET: -448.8 mL      17 Apr 2020 07:01  -  17 Apr 2020 12:55  --------------------------------------------------------  IN:  Total IN: 0 mL    OUT:    Stool: 50 mL    Voided: 150 mL  Total OUT: 200 mL    Total NET: -200 mL          CAPILLARY BLOOD GLUCOSE      POCT Blood Glucose.: 98 mg/dL (16 Apr 2020 23:06)      =======================MEDICATIONS===================  MEDICATIONS  (STANDING):  ampicillin  IVPB      ampicillin  IVPB 2 Gram(s) IV Intermittent every 8 hours  artificial tears (preservative free) Ophthalmic Solution 1 Drop(s) Both EYES every 6 hours  calcium acetate 1334 milliGRAM(s) Oral four times a day with meals  chlorhexidine 4% Liquid 1 Application(s) Topical <User Schedule>  dexMEDEtomidine Infusion 0.2 MICROgram(s)/kG/Hr (3.61 mL/Hr) IV Continuous <Continuous>  famotidine Injectable 20 milliGRAM(s) IV Push daily  heparin  Infusion 900 Unit(s)/Hr (8 mL/Hr) IV Continuous <Continuous>  vasopressin Infusion 0.04 Unit(s)/Min (2.4 mL/Hr) IV Continuous <Continuous>    MEDICATIONS  (PRN):  acetaminophen    Suspension .. 650 milliGRAM(s) Oral every 6 hours PRN Temp greater or equal to 38C (100.4F)      PHYSICAL EXAM============================  General:                Extubated, more lethragic today                                            Neuro:                  appears weak but  following commands                          Cardiovascular:   S1 & S2, regular, Tachycardia, ST                           Respiratory:         Coarse breath sounds                          GI:                          Soft, nondistended, Bowel sounds +                            Ext:                        +Edema, warm                              ============================LABS=========================                        7.7    18.20 )-----------( 342      ( 17 Apr 2020 05:30 )             23.7     04-17    160<HH>  |  113<H>  |  112<H>  ----------------------------<  121<H>  3.3<L>   |  27  |  3.49<H>    Ca    11.9<H>      17 Apr 2020 05:30  Phos  5.5     04-17  Mg     3.1     04-17    TPro  6.8  /  Alb  3.2<L>  /  TBili  < 0.2<L>  /  DBili  x   /  AST  22  /  ALT  31  /  AlkPhos  78  04-17    LIVER FUNCTIONS - ( 17 Apr 2020 05:30 )  Alb: 3.2 g/dL / Pro: 6.8 g/dL / ALK PHOS: 78 u/L / ALT: 31 u/L / AST: 22 u/L / GGT: x           PT/INR - ( 17 Apr 2020 05:30 )   PT: 12.8 SEC;   INR: 1.12          PTT - ( 17 Apr 2020 05:30 )  PTT:72.7 SEC  ABG - ( 17 Apr 2020 11:10 )  pH, Arterial: 7.41  pH, Blood: x     /  pCO2: 47    /  pO2: 181   / HCO3: 29    / Base Excess: 4.7   /  SaO2: 99.5                  ============================IMAGING STUDIES=========================  < from: Xray Chest 1 View AP/PA (04.17.20 @ 08:52) >  FINDINGS:    The heart and the mediastinal silhouettes are unremarkable. A left central venous catheter likely extends to the junction of the left subclavian vein with the superior vena cava. An NG tube extends into the left upper quadrantof abdomen. No evidence of pneumothorax.  No evidence of chelsea pleural effusion.   Decreased lung volumes. Moderate parenchymal infiltrates increased compared with previous study.  The bony structures are unremarkable.      IMPRESSION:    Decreased lung volumes compared with prior study. Moderate increased parenchymal infiltrates.    A/P  60 y/o F with no PMH p/w fever and cough since Thursday. Tmax 103. She presented to urgent care initially and was treated with tamiflu for presumed flu. Despite this she continued to have fevers. Pt denies recent travel, sick contacts or any other sx or acute complaints. Endorses pinching pain in chest when she coughs. Also with nausea and poor PO intake. Denies shortness of breath, abdominal pain, dysuria or LE edema. (16 Mar 2020 02:05)                              Neuro:                                         Currently on Precedex. Off sedation, non-focal and following commands but appears weak/more lethragic today                            Cardiovascular:                                          Continue hemodynamic monitoring.                                         Tachycardia - Start Lopressor 2.5mg IVP Q6hrs if BP allows                                                                                                        Respiratory:                                         Acute hypoxemic respiratory failure due to Pneumonia / COVID-19. Extubated and doing OK                                            Worsening WOB today, in acute resp distress, BiPAP started. monitor ABGs, abd close monitor resp status on BiPAP.                                                 Continue bronchodilators, pulmonary toilet as tolerated                            GI                                         NGT feeds as tolerated, hold for now                                         Continue Pepcid                                         Bowel regimen.	                                                                 Renal:                                         Strict I/Os                                         SAM - Currently off CRRT. Making good U.O, D/W Renal, no need for CRRT / HD at this time                                         Monitor BUN / Cr / Lytes                                                 Hem/ Onc:                                                                                  Anemia: Follow CBC  & signs of bleeding                           Infectious disease:                                            Pneumonia                                           COVID-19 +                                                                    Endocrine                                            DM-2: Continue Accu-Checks with coverage.       Pertinent clinical, laboratory, radiographic, hemodynamic, echocardiographic, respiratory data, microbiologic data and chart were reviewed and analyzed frequently throughout the course of the day and night  Patient seen, examined and plan discussed with  CTICU team during rounds.    Pt's status &  goals of care  discussed with family.      I have spent  75  minutes of critical care time with this pt between  7am  and 11.59 pm         Leslye Juárez DO, FACEP

## 2020-04-17 NOTE — PROGRESS NOTE ADULT - PROBLEM SELECTOR PROBLEM 1
SAM (acute kidney injury)
Respiratory failure
SAM (acute kidney injury)
Sepsis

## 2020-04-17 NOTE — PROGRESS NOTE ADULT - PROBLEM SELECTOR PLAN 2
Pt. with hypernatremia in setting of increased UOP. Recommend free water replacement/IV hypotonic fluids. Monitor SNa

## 2020-04-17 NOTE — PROGRESS NOTE ADULT - SUBJECTIVE AND OBJECTIVE BOX
[facility] DIVISION OF KIDNEY DISEASES AND HYPERTENSION --    HPI: 59-year-old female admitted to ICU for respiratory failure, pneumonia, SAM and COVID-19. Scr increased to 4.6 on 3/23/20. Pt. initiated on CRRT/CVVHDF on 3/23/20, discontinued on 4/9/20. Scr currently downtrending.     Pt. was seen and examined in ICU. Pt. receiving O2 via NRB mask, on IV vasopressor support. Pt. noted to have increased UOP (1.7 L) in last 24 hrs.    PAST HISTORY  --------------------------------------------------------------------------------  No significant changes to PMH, PSH, FHx, SHx, unless otherwise noted    ALLERGIES & MEDICATIONS  --------------------------------------------------------------------------------  Allergies    No Known Allergies    Intolerances    Standing Inpatient Medications  ampicillin  IVPB      ampicillin  IVPB 2 Gram(s) IV Intermittent every 8 hours  artificial tears (preservative free) Ophthalmic Solution 1 Drop(s) Both EYES every 6 hours  calcium acetate 1334 milliGRAM(s) Oral four times a day with meals  chlorhexidine 4% Liquid 1 Application(s) Topical <User Schedule>  dexMEDEtomidine Infusion 0.2 MICROgram(s)/kG/Hr IV Continuous <Continuous>  famotidine Injectable 20 milliGRAM(s) IV Push daily  heparin  Infusion 900 Unit(s)/Hr IV Continuous <Continuous>  potassium chloride  20 mEq/100 mL IVPB 20 milliEquivalent(s) IV Intermittent once  vasopressin Infusion 0.04 Unit(s)/Min IV Continuous <Continuous>    PRN Inpatient Medications  acetaminophen    Suspension .. 650 milliGRAM(s) Oral every 6 hours PRN    REVIEW OF SYSTEMS  --------------------------------------------------------------------------------  Unable to obtain.    VITALS/PHYSICAL EXAM  --------------------------------------------------------------------------------  T(C): 37.1 (04-17-20 @ 08:00), Max: 37.4 (04-17-20 @ 00:00)  HR: 103 (04-17-20 @ 08:59) (92 - 137)  BP: --  RR: 28 (04-17-20 @ 08:00) (13 - 28)  SpO2: 100% (04-17-20 @ 08:59) (99% - 100%)  Wt(kg): --    04-16-20 @ 07:01  -  04-17-20 @ 07:00  --------------------------------------------------------  IN: 1351.2 mL / OUT: 1800 mL / NET: -448.8 mL    04-17-20 @ 07:01  -  04-17-20 @ 12:03  --------------------------------------------------------  IN: 0 mL / OUT: 200 mL / NET: -200 mL    Physical Exam:  	Gen: NRB mask+  	HEENT: No JVD  	Pulm: +mechanical breath sounds  	CV: S1S2+  	Abd: soft, non-distended   	Ext: No LE edema B/L  	Skin: Warm              Access: Right IJ non tunneled catheter+    LABS/STUDIES  --------------------------------------------------------------------------------              7.7    18.20 >-----------<  342      [04-17-20 @ 05:30]              23.7     160  |  113  |  112  ----------------------------<  121      [04-17-20 @ 05:30]  3.3   |  27  |  3.49        Ca     11.9     [04-17-20 @ 05:30]      Mg     3.1     [04-17-20 @ 05:30]      Phos  5.5     [04-17-20 @ 05:30]    TPro  6.8  /  Alb  3.2  /  TBili  < 0.2  /  DBili  x   /  AST  22  /  ALT  31  /  AlkPhos  78  [04-17-20 @ 05:30]    CK 12      [04-17-20 @ 05:30]    Creatinine Trend:  SCr 3.49 [04-17 @ 05:30]  SCr 3.74 [04-16 @ 17:00]  SCr 4.18 [04-16 @ 03:51]  SCr 4.87 [04-15 @ 03:00]  SCr 5.26 [04-14 @ 05:00]

## 2020-04-17 NOTE — PROGRESS NOTE ADULT - PROBLEM SELECTOR PLAN 3
- Patient remains on CRRT.   - Supportive care.
Pt. with hypokalemia in setting of increased UOP and sodium zirconium cyclosilicate use. Serum potassium low at 2.8 today. Sodium zirconium cyclosilicate discontinued. Recommend potassium replacement. Recheck serum potassium today. Monitor serum potassium
Pt. with hypokalemia in setting of increased UOP. Serum potassium low at 3.3 today. Recommend potassium replacement. Recheck serum potassium today. Monitor serum potassium
Resolved after IVF, continue to monitor
Mild Thrombocytopenia - likely in setting of sepsis and ivfs received in the ED. No evidence of active bleeding   -continue to monitor for now     Normocytic anemia - likely in setting of sepsis and ivfs received in the ED. No evidence of active bleeding   -continue to monitor for now
Mild Thrombocytopenia - likely in setting of sepsis and ivfs received in the ED. No evidence of active bleeding   -continue to monitor for now     Normocytic anemia - likely in setting of sepsis and ivfs received in the ED. No evidence of active bleeding   -continue to monitor for now
Mild Thrombocytopenia - likely in setting of sepsis and ivfs received in the ED. No evidence of active bleeding. Resolved.   -continue to monitor for now     Normocytic anemia - likely in setting of sepsis and ivfs received in the ED. No evidence of active bleeding. Improved.    -continue to monitor for now

## 2020-04-17 NOTE — PROGRESS NOTE ADULT - NSHPATTENDINGPLANDISCUSS_GEN_ALL_CORE
ICU team
ICU team/attending
ICU team
ICU team
team
ICU team
ICU team/attending
ICU team
ICU team/RN
ICU team
ICU team/attending
ICU team
Pt pts daughter and ACP

## 2020-04-17 NOTE — PROGRESS NOTE ADULT - PROBLEM SELECTOR PLAN 1
Pt. with SAM in the setting of hypotension and COVID-19. Scr on admission (3/15/20) was 0.84, increased to 4.6 on 3/26/20. Pt. with likely ATN. Pt. was started on CRRT/CVVHDF on 3/26/20, discontinued on 4/9/20. Pt. with increased urine output (1.7 L) in last 24 hrs. Scr downtrended to 3.49 today. Pt. with resolving SAM. No further plan for CRRT/HD today. Recommend to remove HD catheter. Check renal sonogram (when feasible). Monitor labs and urine output. Avoid any potential nephrotoxins

## 2020-04-17 NOTE — PROGRESS NOTE ADULT - PROBLEM SELECTOR PROBLEM 2
Hyperkalemia
Hyperkalemia
Hypernatremia
Hypernatremia
Hypokalemia
Hypokalemia
Pneumonia
Septic shock
Pneumonia

## 2020-04-18 LAB
ALBUMIN SERPL ELPH-MCNC: 2.9 G/DL — LOW (ref 3.3–5)
ALP SERPL-CCNC: 68 U/L — SIGNIFICANT CHANGE UP (ref 40–120)
ALT FLD-CCNC: 27 U/L — SIGNIFICANT CHANGE UP (ref 4–33)
ANION GAP SERPL CALC-SCNC: 15 MMO/L — HIGH (ref 7–14)
ANION GAP SERPL CALC-SCNC: 15 MMO/L — HIGH (ref 7–14)
ANION GAP SERPL CALC-SCNC: 17 MMO/L — HIGH (ref 7–14)
APTT BLD: 75.5 SEC — HIGH (ref 27.5–36.3)
AST SERPL-CCNC: 20 U/L — SIGNIFICANT CHANGE UP (ref 4–32)
BASE EXCESS BLDA CALC-SCNC: 6.6 MMOL/L — SIGNIFICANT CHANGE UP
BASE EXCESS BLDA CALC-SCNC: 7.5 MMOL/L — SIGNIFICANT CHANGE UP
BASOPHILS # BLD AUTO: 0.07 K/UL — SIGNIFICANT CHANGE UP (ref 0–0.2)
BASOPHILS NFR BLD AUTO: 0.4 % — SIGNIFICANT CHANGE UP (ref 0–2)
BILIRUB SERPL-MCNC: < 0.2 MG/DL — LOW (ref 0.2–1.2)
BLOOD GAS ARTERIAL - FIO2: 40 — SIGNIFICANT CHANGE UP
BUN SERPL-MCNC: 121 MG/DL — HIGH (ref 7–23)
BUN SERPL-MCNC: 121 MG/DL — HIGH (ref 7–23)
BUN SERPL-MCNC: 123 MG/DL — HIGH (ref 7–23)
CALCIUM SERPL-MCNC: 11.4 MG/DL — HIGH (ref 8.4–10.5)
CALCIUM SERPL-MCNC: 12.1 MG/DL — HIGH (ref 8.4–10.5)
CALCIUM SERPL-MCNC: 12.5 MG/DL — HIGH (ref 8.4–10.5)
CHLORIDE BLDA-SCNC: SIGNIFICANT CHANGE UP MMOL/L (ref 96–108)
CHLORIDE SERPL-SCNC: 118 MMOL/L — HIGH (ref 98–107)
CHLORIDE SERPL-SCNC: 121 MMOL/L — HIGH (ref 98–107)
CHLORIDE SERPL-SCNC: 121 MMOL/L — HIGH (ref 98–107)
CK SERPL-CCNC: 11 U/L — LOW (ref 25–170)
CO2 SERPL-SCNC: 27 MMOL/L — SIGNIFICANT CHANGE UP (ref 22–31)
CO2 SERPL-SCNC: 29 MMOL/L — SIGNIFICANT CHANGE UP (ref 22–31)
CO2 SERPL-SCNC: 30 MMOL/L — SIGNIFICANT CHANGE UP (ref 22–31)
CREAT SERPL-MCNC: 2.38 MG/DL — HIGH (ref 0.5–1.3)
CREAT SERPL-MCNC: 2.65 MG/DL — HIGH (ref 0.5–1.3)
CREAT SERPL-MCNC: 2.98 MG/DL — HIGH (ref 0.5–1.3)
CRP SERPL-MCNC: 116.7 MG/L — HIGH
D DIMER BLD IA.RAPID-MCNC: 711 NG/ML — SIGNIFICANT CHANGE UP
EOSINOPHIL # BLD AUTO: 0.46 K/UL — SIGNIFICANT CHANGE UP (ref 0–0.5)
EOSINOPHIL NFR BLD AUTO: 2.4 % — SIGNIFICANT CHANGE UP (ref 0–6)
FIBRINOGEN PPP-MCNC: 702 MG/DL — HIGH (ref 300–520)
GLUCOSE BLDA-MCNC: 108 MG/DL — HIGH (ref 70–99)
GLUCOSE BLDC GLUCOMTR-MCNC: 121 MG/DL — HIGH (ref 70–99)
GLUCOSE SERPL-MCNC: 115 MG/DL — HIGH (ref 70–99)
GLUCOSE SERPL-MCNC: 151 MG/DL — HIGH (ref 70–99)
GLUCOSE SERPL-MCNC: 162 MG/DL — HIGH (ref 70–99)
HCO3 BLDA-SCNC: 30 MMOL/L — HIGH (ref 22–26)
HCO3 BLDA-SCNC: 31 MMOL/L — HIGH (ref 22–26)
HCT VFR BLD CALC: 26.1 % — LOW (ref 34.5–45)
HCT VFR BLDA CALC: 26.7 % — LOW (ref 34.5–46.5)
HGB BLD-MCNC: 8.6 G/DL — LOW (ref 11.5–15.5)
HGB BLDA-MCNC: 8.6 G/DL — LOW (ref 11.5–15.5)
IMM GRANULOCYTES NFR BLD AUTO: 0.7 % — SIGNIFICANT CHANGE UP (ref 0–1.5)
INR BLD: 1.05 — SIGNIFICANT CHANGE UP (ref 0.88–1.17)
LACTATE BLDA-SCNC: 0.9 MMOL/L — SIGNIFICANT CHANGE UP (ref 0.5–2)
LYMPHOCYTES # BLD AUTO: 1.15 K/UL — SIGNIFICANT CHANGE UP (ref 1–3.3)
LYMPHOCYTES # BLD AUTO: 6 % — LOW (ref 13–44)
MAGNESIUM SERPL-MCNC: 3 MG/DL — HIGH (ref 1.6–2.6)
MCHC RBC-ENTMCNC: 31 PG — SIGNIFICANT CHANGE UP (ref 27–34)
MCHC RBC-ENTMCNC: 33 % — SIGNIFICANT CHANGE UP (ref 32–36)
MCV RBC AUTO: 94.2 FL — SIGNIFICANT CHANGE UP (ref 80–100)
MONOCYTES # BLD AUTO: 0.72 K/UL — SIGNIFICANT CHANGE UP (ref 0–0.9)
MONOCYTES NFR BLD AUTO: 3.7 % — SIGNIFICANT CHANGE UP (ref 2–14)
NEUTROPHILS # BLD AUTO: 16.68 K/UL — HIGH (ref 1.8–7.4)
NEUTROPHILS NFR BLD AUTO: 86.8 % — HIGH (ref 43–77)
NRBC # FLD: 0 K/UL — SIGNIFICANT CHANGE UP (ref 0–0)
NT-PROBNP SERPL-SCNC: 1769 PG/ML — SIGNIFICANT CHANGE UP
PCO2 BLDA: 49 MMHG — HIGH (ref 32–48)
PCO2 BLDA: 51 MMHG — HIGH (ref 32–48)
PH BLDA: 7.42 PH — SIGNIFICANT CHANGE UP (ref 7.35–7.45)
PH BLDA: 7.42 PH — SIGNIFICANT CHANGE UP (ref 7.35–7.45)
PHOSPHATE SERPL-MCNC: 3.7 MG/DL — SIGNIFICANT CHANGE UP (ref 2.5–4.5)
PLATELET # BLD AUTO: 319 K/UL — SIGNIFICANT CHANGE UP (ref 150–400)
PMV BLD: 12.1 FL — SIGNIFICANT CHANGE UP (ref 7–13)
PO2 BLDA: 115 MMHG — HIGH (ref 83–108)
PO2 BLDA: 133 MMHG — HIGH (ref 83–108)
POTASSIUM BLDA-SCNC: 3.3 MMOL/L — LOW (ref 3.4–4.5)
POTASSIUM SERPL-MCNC: 2.9 MMOL/L — CRITICAL LOW (ref 3.5–5.3)
POTASSIUM SERPL-MCNC: 3 MMOL/L — LOW (ref 3.5–5.3)
POTASSIUM SERPL-MCNC: 3.3 MMOL/L — LOW (ref 3.5–5.3)
POTASSIUM SERPL-SCNC: 2.9 MMOL/L — CRITICAL LOW (ref 3.5–5.3)
POTASSIUM SERPL-SCNC: 3 MMOL/L — LOW (ref 3.5–5.3)
POTASSIUM SERPL-SCNC: 3.3 MMOL/L — LOW (ref 3.5–5.3)
PROT SERPL-MCNC: 6.7 G/DL — SIGNIFICANT CHANGE UP (ref 6–8.3)
PROTHROM AB SERPL-ACNC: 12.1 SEC — SIGNIFICANT CHANGE UP (ref 9.8–13.1)
RBC # BLD: 2.77 M/UL — LOW (ref 3.8–5.2)
RBC # FLD: 15.7 % — HIGH (ref 10.3–14.5)
SAO2 % BLDA: 98.6 % — SIGNIFICANT CHANGE UP (ref 95–99)
SAO2 % BLDA: 99 % — SIGNIFICANT CHANGE UP (ref 95–99)
SODIUM BLDA-SCNC: 166 MMOL/L — CRITICAL HIGH (ref 136–146)
SODIUM SERPL-SCNC: 160 MMOL/L — CRITICAL HIGH (ref 135–145)
SODIUM SERPL-SCNC: 166 MMOL/L — CRITICAL HIGH (ref 135–145)
SODIUM SERPL-SCNC: 167 MMOL/L — CRITICAL HIGH (ref 135–145)
TROPONIN T, HIGH SENSITIVITY: 90 NG/L — CRITICAL HIGH (ref ?–14)
WBC # BLD: 19.21 K/UL — HIGH (ref 3.8–10.5)
WBC # FLD AUTO: 19.21 K/UL — HIGH (ref 3.8–10.5)

## 2020-04-18 PROCEDURE — 99291 CRITICAL CARE FIRST HOUR: CPT

## 2020-04-18 PROCEDURE — 99292 CRITICAL CARE ADDL 30 MIN: CPT

## 2020-04-18 RX ORDER — METOPROLOL TARTRATE 50 MG
2.5 TABLET ORAL EVERY 6 HOURS
Refills: 0 | Status: DISCONTINUED | OUTPATIENT
Start: 2020-04-18 | End: 2020-04-19

## 2020-04-18 RX ORDER — METOPROLOL TARTRATE 50 MG
2.5 TABLET ORAL EVERY 6 HOURS
Refills: 0 | Status: DISCONTINUED | OUTPATIENT
Start: 2020-04-18 | End: 2020-04-18

## 2020-04-18 RX ORDER — POTASSIUM CHLORIDE 20 MEQ
20 PACKET (EA) ORAL ONCE
Refills: 0 | Status: COMPLETED | OUTPATIENT
Start: 2020-04-18 | End: 2020-04-18

## 2020-04-18 RX ORDER — METOPROLOL TARTRATE 50 MG
2.5 TABLET ORAL ONCE
Refills: 0 | Status: COMPLETED | OUTPATIENT
Start: 2020-04-18 | End: 2020-04-18

## 2020-04-18 RX ORDER — SODIUM CHLORIDE 9 MG/ML
250 INJECTION, SOLUTION INTRAVENOUS
Refills: 0 | Status: COMPLETED | OUTPATIENT
Start: 2020-04-18 | End: 2020-04-18

## 2020-04-18 RX ORDER — CEFEPIME 1 G/1
1000 INJECTION, POWDER, FOR SOLUTION INTRAMUSCULAR; INTRAVENOUS EVERY 12 HOURS
Refills: 0 | Status: DISCONTINUED | OUTPATIENT
Start: 2020-04-18 | End: 2020-04-19

## 2020-04-18 RX ORDER — VANCOMYCIN HCL 1 G
1000 VIAL (EA) INTRAVENOUS ONCE
Refills: 0 | Status: COMPLETED | OUTPATIENT
Start: 2020-04-18 | End: 2020-04-18

## 2020-04-18 RX ORDER — SODIUM CHLORIDE 9 MG/ML
1000 INJECTION, SOLUTION INTRAVENOUS
Refills: 0 | Status: DISCONTINUED | OUTPATIENT
Start: 2020-04-18 | End: 2020-04-19

## 2020-04-18 RX ORDER — CEFEPIME 1 G/1
INJECTION, POWDER, FOR SOLUTION INTRAMUSCULAR; INTRAVENOUS
Refills: 0 | Status: DISCONTINUED | OUTPATIENT
Start: 2020-04-18 | End: 2020-04-18

## 2020-04-18 RX ORDER — ACETAMINOPHEN 500 MG
1000 TABLET ORAL ONCE
Refills: 0 | Status: COMPLETED | OUTPATIENT
Start: 2020-04-18 | End: 2020-04-18

## 2020-04-18 RX ADMIN — CHLORHEXIDINE GLUCONATE 1 APPLICATION(S): 213 SOLUTION TOPICAL at 08:37

## 2020-04-18 RX ADMIN — SODIUM CHLORIDE 250 MILLILITER(S): 9 INJECTION, SOLUTION INTRAVENOUS at 20:00

## 2020-04-18 RX ADMIN — Medication 1 DROP(S): at 18:26

## 2020-04-18 RX ADMIN — Medication 1334 MILLIGRAM(S): at 18:27

## 2020-04-18 RX ADMIN — Medication 400 MILLIGRAM(S): at 20:25

## 2020-04-18 RX ADMIN — Medication 2.5 MILLIGRAM(S): at 18:28

## 2020-04-18 RX ADMIN — Medication 1 DROP(S): at 12:17

## 2020-04-18 RX ADMIN — Medication 1334 MILLIGRAM(S): at 08:36

## 2020-04-18 RX ADMIN — Medication 2.5 MILLIGRAM(S): at 02:03

## 2020-04-18 RX ADMIN — Medication 216 GRAM(S): at 04:42

## 2020-04-18 RX ADMIN — Medication 250 MILLIGRAM(S): at 08:36

## 2020-04-18 RX ADMIN — Medication 1 DROP(S): at 04:36

## 2020-04-18 RX ADMIN — Medication 2.5 MILLIGRAM(S): at 12:22

## 2020-04-18 RX ADMIN — Medication 1334 MILLIGRAM(S): at 12:23

## 2020-04-18 RX ADMIN — Medication 100 MILLIEQUIVALENT(S): at 23:43

## 2020-04-18 RX ADMIN — CEFEPIME 100 MILLIGRAM(S): 1 INJECTION, POWDER, FOR SOLUTION INTRAMUSCULAR; INTRAVENOUS at 18:28

## 2020-04-18 RX ADMIN — FAMOTIDINE 20 MILLIGRAM(S): 10 INJECTION INTRAVENOUS at 12:22

## 2020-04-18 NOTE — PROGRESS NOTE ADULT - REASON FOR ADMISSION
ARDS
cough, fever
COVID +
cough, fever

## 2020-04-18 NOTE — PROGRESS NOTE ADULT - SUBJECTIVE AND OBJECTIVE BOX
ALEXA GARCIA            MRN-[id number]         No Known Allergies                 60 y/o F with no PMH p/w fever and cough since Thursday. Tmax 103. She presented to urgent care initially and was treated with tamiflu for presumed flu. Despite this she continued to have fevers. Pt denies recent travel, sick contacts or any other sx or acute complaints. Endorses pinching pain in chest when she coughs. Also with nausea and poor PO intake. Denies shortness of breath, abdominal pain, dysuria or LE edema. (16 Mar 2020 02:05)      Issues:              Acute hypoxic respiratory failure              ARDS              COVID-19+              SAM   Hypokalemia               Anemia  AMS / Encephalopathy    Drips:   Precedex                   Home Medications:      PAST MEDICAL & SURGICAL HISTORY:  No pertinent past medical history  No significant past surgical history        ICU Vital Signs Last 24 Hrs  T(C): 37.4 (18 Apr 2020 12:00), Max: 37.4 (18 Apr 2020 12:00)  T(F): 99.3 (18 Apr 2020 12:00), Max: 99.3 (18 Apr 2020 12:00)  HR: 126 (18 Apr 2020 12:00) (101 - 126)  BP: --  BP(mean): --  ABP: 159/74 (18 Apr 2020 12:00) (140/62 - 174/82)  ABP(mean): 106 (18 Apr 2020 12:00) (92 - 118)  RR: 20 (18 Apr 2020 12:00) (20 - 29)  SpO2: 100% (18 Apr 2020 12:00) (97% - 100%)    I&O's Detail    17 Apr 2020 07:01  -  18 Apr 2020 07:00  --------------------------------------------------------  IN:    dexmedetomidine Infusion: 48.2 mL    Enteral Tube Flush: 250 mL    heparin Infusion: 72 mL    ns in tub fed  [id number]: 384 mL    Packed Red Blood Cells: 310 mL  Total IN: 1064.2 mL    OUT:    Stool: 50 mL    Voided: 150 mL  Total OUT: 200 mL    Total NET: 864.2 mL      18 Apr 2020 07:01  -  18 Apr 2020 12:52  --------------------------------------------------------  IN:    dextrose 5%.: 75 mL    Enteral Tube Flush: 100 mL    ns in tub fed  [id number]: 140 mL    Solution: 250 mL  Total IN: 565 mL    OUT:    Rectal Tube: 100 mL    Voided: 300 mL  Total OUT: 400 mL    Total NET: 165 mL        CAPILLARY BLOOD GLUCOSE      POCT Blood Glucose.: 121 mg/dL (18 Apr 2020 01:15)      Home Medications:      MEDICATIONS  (STANDING):  artificial tears (preservative free) Ophthalmic Solution 1 Drop(s) Both EYES every 6 hours  calcium acetate 1334 milliGRAM(s) Oral four times a day with meals  cefepime   IVPB 1000 milliGRAM(s) IV Intermittent every 12 hours  chlorhexidine 4% Liquid 1 Application(s) Topical <User Schedule>  dexMEDEtomidine Infusion 0.2 MICROgram(s)/kG/Hr (3.61 mL/Hr) IV Continuous <Continuous>  dextrose 5%. 1000 milliLiter(s) (75 mL/Hr) IV Continuous <Continuous>  famotidine Injectable 20 milliGRAM(s) IV Push daily  heparin  Infusion 900 Unit(s)/Hr (8 mL/Hr) IV Continuous <Continuous>  metoprolol tartrate Injectable 2.5 milliGRAM(s) IV Push every 6 hours  vasopressin Infusion 0.04 Unit(s)/Min (2.4 mL/Hr) IV Continuous <Continuous>    MEDICATIONS  (PRN):  acetaminophen    Suspension .. 650 milliGRAM(s) Oral every 6 hours PRN Temp greater or equal to 38C (100.4F)          CAPILLARY BLOOD GLUCOSE      POCT Blood Glucose.: 121 mg/dL (18 Apr 2020 01:15)      Physical exam:                             General:               Sedated and paralyzed                                                  Neuro:                  Could not assess                          Cardiovascular:   S1 & S2, regular / irregular                          Respiratory:         Coarse breath sounds                          GI:                          Soft, nondistended, Bowel sounds +                            Ext:                        No  edema     Labs:                                                                           8.6    19.21 )-----------( 319      ( 18 Apr 2020 05:10 )             26.1             04-18    166<HH>  |  121<H>  |  121<H>  ----------------------------<  115<H>  3.3<L>   |  30  |  2.98<H>    Ca    12.1<H>      18 Apr 2020 05:10  Phos  3.7     04-18  Mg     3.0     04-18    TPro  6.7  /  Alb  2.9<L>  /  TBili  < 0.2<L>  /  DBili  x   /  AST  20  /  ALT  27  /  AlkPhos  68  04-18                  PT/INR - ( 18 Apr 2020 05:10 )   PT: 12.1 SEC;   INR: 1.05          PTT - ( 18 Apr 2020 05:10 )  PTT:75.5 SEC  LIVER FUNCTIONS - ( 18 Apr 2020 05:10 )  Alb: 2.9 g/dL / Pro: 6.7 g/dL / ALK PHOS: 68 u/L / ALT: 27 u/L / AST: 20 u/L / GGT: x          Transcutaneous Bilirubin    CARDIAC MARKERS ( 18 Apr 2020 05:10 )  x     / x     / 11 u/L / x     / x      CARDIAC MARKERS ( 17 Apr 2020 05:30 )  x     / x     / 12 u/L / x     / x                  CXR:        Plan:    General:  HPI:  60 y/o F with no PMH p/w fever and cough since Thursday. Tmax 103. She presented to urgent care initially and was treated with tamiflu for presumed flu. Despite this she continued to have fevers. Pt denies recent travel, sick contacts or any other sx or acute complaints. Endorses pinching pain in chest when she coughs. Also with nausea and poor PO intake. Denies shortness of breath, abdominal pain, dysuria or LE edema. (16 Mar 2020 02:05)                              Neuro:                                         Sedated on Propofol, Versed                                        Paralyzed with Rocuronium / Nimbex. TOF goal 0 /4                            Cardiovascular:                                          Continue hemodynamic monitoring.                                         Titrate Levophed / Vasopressin to MAP>65                                        Daily EKGs, monitor QTc                                        Continue  Heparin gtt - Target PTT 50-80                            Respiratory:                                         Acute hypoxemic respiratory failure likely due to  COVID-19+ pneumonia                                         On full mechanical vent support RX--100-10.                                                Wean FiO2 as tolerated                                                Follow ABGs                                                Monitor Plateau pressure / driving pressure daily                                                Initiation and maintenance of vent settings as per ARDSnet protocol                                                             Continue bronchodilators, pulmonary toilet                            GI                                         NGT feeds as tolerated                                         Continue Pepcid                                         Bowel regimen	                                                                 Renal:                                         Strict I/Os                                         Maintain K >4 and Mg >3.0                                         Monitor BUN / Cr                                                 Hem/ Onc:                                                                                  DVT prophylaxis -  on Heparin gtt                                         Follow CBC  & signs of bleeding                           Infectious disease:                                            Pneumonia                                           COVID-19 +                                          Follow cultures                            Endocrine                                            DM-2: Continue Accu-Checks with coverage, NPH / Lantus      Pertinent clinical, laboratory, radiographic, hemodynamic, echocardiographic, respiratory data, microbiologic data and chart were reviewed and analyzed frequently throughout the course of the day and night  Patient seen, examined and plan discussed with  CTICU team during rounds.    Pt's status &  goals of care  discussed with family       I have spent  75  minutes of critical care time with this pt between  7am  and 11.59 pm              Favian Moreland MD ALEXA GARCIA            MRN-[id number]         No Known Allergies                 58 y/o F with no PMH p/w fever and cough since Thursday. Tmax 103. She presented to urgent care initially and was treated with tamiflu for presumed flu. Despite this she continued to have fevers. Pt denies recent travel, sick contacts or any other sx or acute complaints. Endorses pinching pain in chest when she coughs. Also with nausea and poor PO intake. Denies shortness of breath, abdominal pain, dysuria or LE edema. (16 Mar 2020 02:05)      Issues:              Acute hypoxic respiratory failure              ARDS              COVID-19+              SAM   Hypokalemia   Hypernatremia              Anemia  AMS / Encephalopathy    Drips:   Precedex                   Home Medications:      PAST MEDICAL & SURGICAL HISTORY:  No pertinent past medical history  No significant past surgical history        ICU Vital Signs Last 24 Hrs  T(C): 37.4 (18 Apr 2020 12:00), Max: 37.4 (18 Apr 2020 12:00)  T(F): 99.3 (18 Apr 2020 12:00), Max: 99.3 (18 Apr 2020 12:00)  HR: 126 (18 Apr 2020 12:00) (101 - 126)  BP: --  BP(mean): --  ABP: 159/74 (18 Apr 2020 12:00) (140/62 - 174/82)  ABP(mean): 106 (18 Apr 2020 12:00) (92 - 118)  RR: 20 (18 Apr 2020 12:00) (20 - 29)  SpO2: 100% (18 Apr 2020 12:00) (97% - 100%)    I&O's Detail    17 Apr 2020 07:01  -  18 Apr 2020 07:00  --------------------------------------------------------  IN:    dexmedetomidine Infusion: 48.2 mL    Enteral Tube Flush: 250 mL    heparin Infusion: 72 mL    ns in tub fed  [id number]: 384 mL    Packed Red Blood Cells: 310 mL  Total IN: 1064.2 mL    OUT:    Stool: 50 mL    Voided: 150 mL  Total OUT: 200 mL    Total NET: 864.2 mL      18 Apr 2020 07:01  -  18 Apr 2020 12:52  --------------------------------------------------------  IN:    dextrose 5%.: 75 mL    Enteral Tube Flush: 100 mL    ns in tub fed  [id number]: 140 mL    Solution: 250 mL  Total IN: 565 mL    OUT:    Rectal Tube: 100 mL    Voided: 300 mL  Total OUT: 400 mL    Total NET: 165 mL        CAPILLARY BLOOD GLUCOSE      POCT Blood Glucose.: 121 mg/dL (18 Apr 2020 01:15)      Home Medications:      MEDICATIONS  (STANDING):  artificial tears (preservative free) Ophthalmic Solution 1 Drop(s) Both EYES every 6 hours  calcium acetate 1334 milliGRAM(s) Oral four times a day with meals  cefepime   IVPB 1000 milliGRAM(s) IV Intermittent every 12 hours  chlorhexidine 4% Liquid 1 Application(s) Topical <User Schedule>  dexMEDEtomidine Infusion 0.2 MICROgram(s)/kG/Hr (3.61 mL/Hr) IV Continuous <Continuous>  dextrose 5%. 1000 milliLiter(s) (75 mL/Hr) IV Continuous <Continuous>  famotidine Injectable 20 milliGRAM(s) IV Push daily  heparin  Infusion 900 Unit(s)/Hr (8 mL/Hr) IV Continuous <Continuous>  metoprolol tartrate Injectable 2.5 milliGRAM(s) IV Push every 6 hours  vasopressin Infusion 0.04 Unit(s)/Min (2.4 mL/Hr) IV Continuous <Continuous>    MEDICATIONS  (PRN):  acetaminophen    Suspension .. 650 milliGRAM(s) Oral every 6 hours PRN Temp greater or equal to 38C (100.4F)          CAPILLARY BLOOD GLUCOSE      POCT Blood Glucose.: 121 mg/dL (18 Apr 2020 01:15)      Physical exam:                             General:               Appears weak and lethargic                                                Neuro:                  Did not follow commands                          Cardiovascular:   S1 & S2, regular                           Respiratory:         Coarse breath sounds                          GI:                          Soft, nondistended, Bowel sounds +                            Ext:                        No  edema     Labs:                                                                           8.6    19.21 )-----------( 319      ( 18 Apr 2020 05:10 )             26.1             04-18    166<HH>  |  121<H>  |  121<H>  ----------------------------<  115<H>  3.3<L>   |  30  |  2.98<H>    Ca    12.1<H>      18 Apr 2020 05:10  Phos  3.7     04-18  Mg     3.0     04-18    TPro  6.7  /  Alb  2.9<L>  /  TBili  < 0.2<L>  /  DBili  x   /  AST  20  /  ALT  27  /  AlkPhos  68  04-18                  PT/INR - ( 18 Apr 2020 05:10 )   PT: 12.1 SEC;   INR: 1.05          PTT - ( 18 Apr 2020 05:10 )  PTT:75.5 SEC  LIVER FUNCTIONS - ( 18 Apr 2020 05:10 )  Alb: 2.9 g/dL / Pro: 6.7 g/dL / ALK PHOS: 68 u/L / ALT: 27 u/L / AST: 20 u/L / GGT: x          Transcutaneous Bilirubin    CARDIAC MARKERS ( 18 Apr 2020 05:10 )  x     / x     / 11 u/L / x     / x      CARDIAC MARKERS ( 17 Apr 2020 05:30 )  x     / x     / 12 u/L / x     / x          CXR:    < from: Xray Chest 1 View- PORTABLE-Urgent (04.12.20 @ 09:57) >  ET tube tip is above the ashley. Left-sided IJ approach central line with the tip in the brachiocephalic vein, unchanged compared to prior studies. Enteric feeding tube courses below the diaphragm with the tip out of field of view however the side port is within the stomach.    Unchanged bilateral patchy opacities.    The bones are unremarkable.    IMPRESSION: Enteric feeding tube with the side port in the stomach; the tip is not imaged on this study. Bilateral patchy opacities that are unchanged and may be seen in the setting of infection.          Plan:    General: 58 y/o F with no PMH p/w fever and cough since Thursday. Tmax 103. She presented to urgent care initially and was treated with tamiflu for presumed flu. Despite this she continued to have fevers. Pt denies recent travel, sick contacts or any other sx or acute complaints. Endorses pinching pain in chest when she coughs. Also with nausea and poor PO intake. Denies shortness of breath, abdominal pain, dysuria or LE edema. (16 Mar 2020 02:05)                              Neuro:                                         Currently on Precedex.  Following commands but appears weak                            Cardiovascular:                                          Continue hemodynamic monitoring.                                         Tachycardia - Start Lopressor 2.5mg IVP Q6hrs if BP allows                                                                                                        Respiratory:                                         Acute hypoxemic respiratory failure due to Pneumonia / COVID-19. Extubated and CURRENTLY on BiPAP                                                Wean FiO2 as tolerated                                                Continue bronchodilators, pulmonary toilet as tolerated                            GI                                         NGT feeds as tolerated                                         Continue Pepcid                                         Bowel regimen.	                                                                 Renal:                                         Strict I/Os                                         SAM - Currently off CRRT. Making good U.O, D/W Renal, no need for CRRT / HD at this time                                         Monitor BUN / Cr / Lytes     Hypernatremia: Continue D5W 75cc/hr + Free water. BMP Q 6hrs                                                 Hem/ Onc:                                                                                  Anemia: Follow CBC  & signs of bleeding                           Infectious disease:                                            Pneumonia                                           COVID-19 +                                                                    Endocrine                                            DM-2: Continue Accu-Checks with coverage.       Pertinent clinical, laboratory, radiographic, hemodynamic, echocardiographic, respiratory data, microbiologic data and chart were reviewed and analyzed frequently throughout the course of the day and night  Patient seen, examined and plan discussed with  CTICU team during rounds.    Pt's status &  goals of care  discussed with family.      I have spent  75  minutes of critical care time with this pt between  7am  and 11.59 pm          Favian Moreland MD

## 2020-04-19 VITALS — OXYGEN SATURATION: 95 % | HEART RATE: 110 BPM | TEMPERATURE: 100 F | RESPIRATION RATE: 24 BRPM

## 2020-04-19 RX ORDER — POTASSIUM CHLORIDE 20 MEQ
20 PACKET (EA) ORAL ONCE
Refills: 0 | Status: DISCONTINUED | OUTPATIENT
Start: 2020-04-19 | End: 2020-04-19

## 2020-04-19 NOTE — DISCHARGE NOTE FOR THE EXPIRED PATIENT - HOSPITAL COURSE
58 y/o F with no PMH p/w fever and cough since Thursday. Tmax 103. She presented to urgent care initially and was treated with tamiflu for presumed flu. Despite this she continued to have fevers. Pt denies recent travel, sick contacts or any other sx or acute complaints. Endorses pinching pain in chest when she coughs. Also with nausea and poor PO intake. Denies shortness of breath, abdominal pain, dysuria or LE edema. 58 y/o F with no PMH p/w fever and cough since 3/12/20. Tmax 103. She presented to urgent care initially and was treated with tamiflu for presumed flu. Despite this she continued to have fevers. Pt denies recent travel, sick contacts or any other sx or acute complaints. Endorses pinching pain in chest when she coughs. Also with nausea and poor PO intake. Patient was admitted to ICU. Positive for COVID on 3/16/20. Patient's course in ICU significant for renal failure, requiring Dialysis. Patient was liberated from mechanical ventilation twice (4/3, 4/16). Patient required noninvasive ventilation with Bipap after extubation. Patient with progressively worsening respiratory status. Patient went into ventricular fibrillation on 4/19/20, ACLS protocol was initiated. Patient was pronounced dead at 12:25. Patient's son Mario was notified regarding her death.

## 2020-04-19 NOTE — CHART NOTE - NSCHARTNOTEFT_GEN_A_CORE
Informed that pt was in V-fib which quickly progressed to asystole. CPR  was initiated and continued as per ACLS protocol. Pt continued to be in asystole despite aggressive CPR. Pt was declared  at 12.25am. Pt's son was notified of expiration.

## 2021-09-28 NOTE — ED ADULT TRIAGE NOTE - LOCATION:
Video-Visit Details    Type of service:  Video Visit  Video Start Time: 8:24  Video End Time: 8:52  Originating Location (pt. Location): [place], [place]  Distant Location (provider location):  Home  Platform used for Video Visit: Victor Manuel Henry MD      Joanna Torres is a 53 

## 2023-04-29 NOTE — PROGRESS NOTE ADULT - PROBLEM SELECTOR PLAN 1
Pt. with oliguric SAM in the setting of hypotension and COVID-19. Scr on admission (3/15/20) was 0.84. Scr however had to 4.6 on 3/26/20. Pt. likely with ATN. Pt. was started on CRRT/CVVHDF on 3/26/20, stopped on 4/9/20. Labs reviewed today. No plan for CRRT/HD today. Will assess daily for CRRT/HD. Check renal sonogram (when feasible). Monitor labs and urine output. Avoid any potential nephrotoxins Pt. with oliguric SAM in the setting of hypotension and COVID-19. Scr on admission (3/15/20) was 0.84, increased to 4.6 on 3/26/20. Pt. likely with ATN. Pt. was started on CRRT/CVVHDF on 3/26/20, discontinued on 4/9/20. Labs from today reviewed. No plan for CRRT/HD today. Will assess daily for CRRT/HD. Check renal sonogram (when feasible). Monitor labs and urine output. Avoid any potential nephrotoxins 29-Apr-2023 22:29

## 2024-08-05 NOTE — DISCHARGE NOTE NURSING/CASE MANAGEMENT/SOCIAL WORK - PATIENT PORTAL LINK FT
You can access the FollowMyHealth Patient Portal offered by [facility] by registering at the following website: [email or web address]. By joining [facility]’s FollowMyHealth portal, you will also be able to view your health information using other applications (apps) compatible with our system.
No

## 2024-11-13 NOTE — CONSULT NOTE ADULT - REASON FOR ADMISSION
cough, fever
No. EMILEE screening performed.  STOP BANG Legend: 0-2 = LOW Risk; 3-4 = INTERMEDIATE Risk; 5-8 = HIGH Risk

## 2025-05-19 NOTE — H&P ADULT - ASSESSMENT
58 y/o F with no PMH p/w fever and cough admitted for PNA (bacterial vs viral). R/o COVID
Follow Instructions Provided by your Surgical Team